# Patient Record
Sex: MALE | Race: WHITE
[De-identification: names, ages, dates, MRNs, and addresses within clinical notes are randomized per-mention and may not be internally consistent; named-entity substitution may affect disease eponyms.]

---

## 2017-06-17 ENCOUNTER — HOSPITAL ENCOUNTER (INPATIENT)
Dept: HOSPITAL 47 - EC | Age: 52
LOS: 3 days | Discharge: HOME | DRG: 434 | End: 2017-06-20
Payer: COMMERCIAL

## 2017-06-17 VITALS — BODY MASS INDEX: 34.2 KG/M2

## 2017-06-17 DIAGNOSIS — F32.9: ICD-10-CM

## 2017-06-17 DIAGNOSIS — F17.200: ICD-10-CM

## 2017-06-17 DIAGNOSIS — K70.10: Primary | ICD-10-CM

## 2017-06-17 DIAGNOSIS — F10.20: ICD-10-CM

## 2017-06-17 DIAGNOSIS — Z82.49: ICD-10-CM

## 2017-06-17 DIAGNOSIS — K76.89: ICD-10-CM

## 2017-06-17 DIAGNOSIS — Z79.899: ICD-10-CM

## 2017-06-17 DIAGNOSIS — D69.59: ICD-10-CM

## 2017-06-17 DIAGNOSIS — I10: ICD-10-CM

## 2017-06-17 DIAGNOSIS — Z88.2: ICD-10-CM

## 2017-06-17 DIAGNOSIS — N28.9: ICD-10-CM

## 2017-06-17 DIAGNOSIS — Z88.5: ICD-10-CM

## 2017-06-17 DIAGNOSIS — Z79.82: ICD-10-CM

## 2017-06-17 DIAGNOSIS — K21.9: ICD-10-CM

## 2017-06-17 DIAGNOSIS — E87.70: ICD-10-CM

## 2017-06-17 DIAGNOSIS — E66.9: ICD-10-CM

## 2017-06-17 DIAGNOSIS — K76.0: ICD-10-CM

## 2017-06-17 LAB
ALP SERPL-CCNC: 253 U/L (ref 38–126)
ALT SERPL-CCNC: 399 U/L (ref 21–72)
ANION GAP SERPL CALC-SCNC: 16 MMOL/L
AST SERPL-CCNC: 434 U/L (ref 17–59)
BASOPHILS # BLD AUTO: 0 K/UL (ref 0–0.2)
BASOPHILS NFR BLD AUTO: 0 %
BILIRUB UR QL STRIP.AUTO: (no result)
BUN SERPL-SCNC: 16 MG/DL (ref 9–20)
CALCIUM SPEC-MCNC: 8.1 MG/DL (ref 8.4–10.2)
CH: 29
CHCM: 34.3
CHLORIDE SERPL-SCNC: 100 MMOL/L (ref 98–107)
CK SERPL-CCNC: 136 U/L (ref 55–170)
CO2 SERPL-SCNC: 20 MMOL/L (ref 22–30)
EOSINOPHIL # BLD AUTO: 0 K/UL (ref 0–0.7)
EOSINOPHIL NFR BLD AUTO: 0 %
ERYTHROCYTE [DISTWIDTH] IN BLOOD BY AUTOMATED COUNT: 5.07 M/UL (ref 4.3–5.9)
ERYTHROCYTE [DISTWIDTH] IN BLOOD: 16.3 % (ref 11.5–15.5)
GLUCOSE SERPL-MCNC: 134 MG/DL (ref 74–99)
GLUCOSE UR QL: (no result)
HCT VFR BLD AUTO: 43 % (ref 39–53)
HDW: 2.39
HEPATITIS B CORE IGM INDEX: 0.06
HEPATITIS B SURFACE AG INDEX: 0.05
HEPATITIS C VIRUS IGG  INDEX: 0.04
HGB BLD-MCNC: 14.9 GM/DL (ref 13–17.5)
LUC NFR BLD AUTO: 2 %
LYMPHOCYTES # SPEC AUTO: 1.5 K/UL (ref 1–4.8)
LYMPHOCYTES NFR SPEC AUTO: 16 %
MCH RBC QN AUTO: 29.4 PG (ref 25–35)
MCHC RBC AUTO-ENTMCNC: 34.7 G/DL (ref 31–37)
MCV RBC AUTO: 84.8 FL (ref 80–100)
MONOCYTES # BLD AUTO: 0.5 K/UL (ref 0–1)
MONOCYTES NFR BLD AUTO: 5 %
NEUTROPHILS # BLD AUTO: 7.1 K/UL (ref 1.3–7.7)
NEUTROPHILS NFR BLD AUTO: 77 %
NON-AFRICAN AMERICAN GFR(MDRD): >60
PARTICLE COUNT: (no result)
PH UR: 5.5 [PH] (ref 5–8)
POTASSIUM SERPL-SCNC: 3.9 MMOL/L (ref 3.5–5.1)
PROT SERPL-MCNC: 6.7 G/DL (ref 6.3–8.2)
RBC UR QL: 1 /HPF (ref 0–5)
SODIUM SERPL-SCNC: 136 MMOL/L (ref 137–145)
SP GR UR: 1.02 (ref 1–1.03)
TROPONIN I SERPL-MCNC: 0.02 NG/ML (ref 0–0.03)
UA BILLING (MACRO VS. MICRO): (no result)
UROBILINOGEN UR QL STRIP: 2 MG/DL (ref ?–2)
WBC # BLD AUTO: 0.17 10*3/UL
WBC # BLD AUTO: 9.2 K/UL (ref 3.8–10.6)
WBC #/AREA URNS HPF: 8 /HPF (ref 0–5)
WBC (PEROX): 8.97

## 2017-06-17 PROCEDURE — 85025 COMPLETE CBC W/AUTO DIFF WBC: CPT

## 2017-06-17 PROCEDURE — 76705 ECHO EXAM OF ABDOMEN: CPT

## 2017-06-17 PROCEDURE — 80053 COMPREHEN METABOLIC PANEL: CPT

## 2017-06-17 PROCEDURE — 85610 PROTHROMBIN TIME: CPT

## 2017-06-17 PROCEDURE — 82550 ASSAY OF CK (CPK): CPT

## 2017-06-17 PROCEDURE — 83880 ASSAY OF NATRIURETIC PEPTIDE: CPT

## 2017-06-17 PROCEDURE — 93005 ELECTROCARDIOGRAM TRACING: CPT

## 2017-06-17 PROCEDURE — 81001 URINALYSIS AUTO W/SCOPE: CPT

## 2017-06-17 PROCEDURE — 96374 THER/PROPH/DIAG INJ IV PUSH: CPT

## 2017-06-17 PROCEDURE — 99285 EMERGENCY DEPT VISIT HI MDM: CPT

## 2017-06-17 PROCEDURE — 71020: CPT

## 2017-06-17 PROCEDURE — 82553 CREATINE MB FRACTION: CPT

## 2017-06-17 PROCEDURE — 74177 CT ABD & PELVIS W/CONTRAST: CPT

## 2017-06-17 PROCEDURE — 83735 ASSAY OF MAGNESIUM: CPT

## 2017-06-17 PROCEDURE — 82140 ASSAY OF AMMONIA: CPT

## 2017-06-17 PROCEDURE — 84484 ASSAY OF TROPONIN QUANT: CPT

## 2017-06-17 PROCEDURE — 36415 COLL VENOUS BLD VENIPUNCTURE: CPT

## 2017-06-17 PROCEDURE — 80074 ACUTE HEPATITIS PANEL: CPT

## 2017-06-17 RX ADMIN — PANTOPRAZOLE SODIUM SCH MG: 40 TABLET, DELAYED RELEASE ORAL at 20:21

## 2017-06-17 RX ADMIN — CEFAZOLIN SCH MLS/HR: 330 INJECTION, POWDER, FOR SOLUTION INTRAMUSCULAR; INTRAVENOUS at 18:01

## 2017-06-17 RX ADMIN — IOHEXOL PRN ML: 350 INJECTION, SOLUTION INTRAVENOUS at 18:00

## 2017-06-17 RX ADMIN — DIAZEPAM SCH MG: 5 TABLET ORAL at 22:29

## 2017-06-17 RX ADMIN — ASPIRIN 81 MG CHEWABLE TABLET SCH MG: 81 TABLET CHEWABLE at 20:21

## 2017-06-17 RX ADMIN — LISINOPRIL SCH MG: 20 TABLET ORAL at 20:21

## 2017-06-17 RX ADMIN — IOHEXOL PRN ML: 350 INJECTION, SOLUTION INTRAVENOUS at 16:50

## 2017-06-17 RX ADMIN — CITALOPRAM HYDROBROMIDE SCH MG: 20 TABLET ORAL at 20:21

## 2017-06-17 NOTE — US
EXAMINATION TYPE: US abdomen limited

 

DATE OF EXAM: 6/17/2017

 

COMPARISON: NONE

 

CLINICAL HISTORY: Pain. EC patient with low energy, elevated liver function tests/bilirubin

 

EXAM MEASUREMENTS:

 

Liver Length:  21.4 cm   

Gallbladder Wall:  0.2 cm   

CBD:  0.3 cm

Right Kidney:  12.1 x 6.4 x 5.9 cm

 

 

 

Pancreas:  hyperechoic and limitedly seen due to overlying bowel gas

Liver:  enlarged and attenuated posteriorly  

Gallbladder:  wnl

**Evidence for sonographic Sheikh's sign:  No

CBD:  wnl 

Right Kidney:  wnl 

 

Liver measures 21 cm compatible with hepatomegaly

 

IMPRESSION: 

1. Liver appears enlarged and with increased  echogenicity correlate for fatty infiltration. Hepatiti
s or hepatocellular disease in the differential diagnosis.

## 2017-06-17 NOTE — ED
General Adult HPI





- General


Chief complaint: Recheck/Abnormal Lab/Rx


Stated complaint: low energy


Time Seen by Provider: 06/17/17 10:31


Source: patient


Mode of arrival: ambulatory


Limitations: no limitations





- History of Present Illness


Initial comments: 





51-year-old presents not feeling well for the last couple days and states his 

arms feel heavy note chest pain and feels some nausea.  He states that he has a 

history of hypertension and is on clonidine patch number to, Lotrel and was 

placed on Dyazide since taking the Dyazide he has not felt well was 

discontinued couple days ago he continues to feel weak.  No discomfort in his 

arms no shortness of breath when he walks.  No history of diabetes asthma 

seizures or heart disease has been treated for difficult hypertension for years





- Related Data


 Home Medications











 Medication  Instructions  Recorded  Confirmed


 


Aspirin EC [Ecotrin Low Dose] 81 mg PO HS 06/17/17 06/17/17


 


Citalopram Hydrobromide [CeleXA] 40 mg PO HS 06/17/17 06/17/17


 


EPINEPHrine [Epipen 2-Moses] 0.3 mg IM ONCE PRN 06/17/17 06/17/17


 


Multivitamins, Thera [Multivitamin 1 tab PO HS 06/17/17 06/17/17





(formulary)]   


 


Omeprazole 40 mg PO HS 06/17/17 06/17/17


 


amLODIPine BESYLATE/BENAZEPRIL 1 cap PO HS 06/17/17 06/17/17





[Lotrel 10-20 mg Capsule]   


 


cloNIDine 0.2 MG/24HR PATCH 1 patch TRANSDERM TU 06/17/17 06/17/17





[Catapres-TTS]   


 


tiZANidine HCL [Zanaflex] 4 mg PO Q8HR PRN 06/17/17 06/17/17











 Allergies











Allergy/AdvReac Type Severity Reaction Status Date / Time


 


bee venom protein (honey bee) Allergy  Anaphylaxis Verified 06/17/17 11:28


 


codeine Allergy  Nausea & Verified 06/17/17 11:28





   Vomiting  


 


Sulfa (Sulfonamide Allergy  Swelling Verified 06/17/17 11:28





Antibiotics)     














Review of Systems


ROS Statement: 


Those systems with pertinent positive or pertinent negative responses have been 

documented in the HPI.





ROS Other: All systems not noted in ROS Statement are negative.


Constitutional: Denies: fever, chills, weakness


Eyes: Denies: eye pain, eye discharge


ENT: Denies: ear pain, throat pain


Respiratory: Denies: cough, dyspnea


Cardiovascular: Denies: chest pain


Endocrine: Reports: fatigue


Gastrointestinal: Reports: nausea.  Denies: abdominal pain, vomiting, diarrhea


Genitourinary: Denies: urgency, dysuria, frequency


Musculoskeletal: Denies: back pain


Skin: Denies: rash


Neurological: Denies: headache


Psychiatric: Denies: anxiety, depression


Hematological/Lymphatic: Denies: easy bleeding, easy bruising





Past Medical History


Past Medical History: Hypertension


Additional Past Medical History / Comment(s): seasonal allergies


History of Any Multi-Drug Resistant Organisms: None Reported


Past Surgical History: Hernia Repair


Past Psychological History: No Psychological Hx Reported


Smoking Status: Former smoker


Past Alcohol Use History: Occasional


Past Drug Use History: None Reported





General Exam


Limitations: no limitations


General appearance: alert, in no apparent distress


Head exam: Present: atraumatic


Eye exam: Present: PERRL, EOMI


ENT exam: Present: normal oropharynx, mucous membranes moist, TM's normal 

bilaterally


Neck exam: Present: normal inspection


Respiratory exam: Present: normal lung sounds bilaterally


Cardiovascular Exam: Present: regular rate, normal heart sounds.  Absent: 

systolic murmur, diastolic murmur


GI/Abdominal exam: Present: soft.  Absent: tenderness, normal bowel sounds


Extremities exam: Present: normal inspection


Neurological exam: Present: alert, CN II-XII intact


Psychiatric exam: Present: normal affect, normal mood


Skin exam: Present: warm, dry





Course


 Vital Signs











  06/17/17 06/17/17 06/17/17





  10:23 12:04 13:00


 


Temperature 97.8 F  


 


Pulse Rate 108 H 95 96


 


Respiratory 20 18 18





Rate   


 


Blood Pressure 132/84 123/68 123/75


 


O2 Sat by Pulse 98 96 97





Oximetry   














  06/17/17





  14:00


 


Temperature 


 


Pulse Rate 94


 


Respiratory 18





Rate 


 


Blood Pressure 132/77


 


O2 Sat by Pulse 97





Oximetry 














Medical Decision Making





- Medical Decision Making





Elevated liver enzymes showed appears to be obstructive ultrasound shows no 

stones patient's feeling rather poor poorly has some edema him after discussing 

with the attending will admit consult gastro-as well as CT ordered, slow 

hydration.





- Lab Data


Result diagrams: 


 06/17/17 11:15





 06/17/17 11:15


 Lab Results











  06/17/17 06/17/17 06/17/17 Range/Units





  11:15 11:15 11:15 


 


WBC  9.2    (3.8-10.6)  k/uL


 


RBC  5.07    (4.30-5.90)  m/uL


 


Hgb  14.9    (13.0-17.5)  gm/dL


 


Hct  43.0    (39.0-53.0)  %


 


MCV  84.8    (80.0-100.0)  fL


 


MCH  29.4    (25.0-35.0)  pg


 


MCHC  34.7    (31.0-37.0)  g/dL


 


RDW  16.3 H    (11.5-15.5)  %


 


Plt Count  152    (150-450)  k/uL


 


Neutrophils %  77    %


 


Lymphocytes %  16    %


 


Monocytes %  5    %


 


Eosinophils %  0    %


 


Basophils %  0    %


 


Neutrophils #  7.1    (1.3-7.7)  k/uL


 


Lymphocytes #  1.5    (1.0-4.8)  k/uL


 


Monocytes #  0.5    (0-1.0)  k/uL


 


Eosinophils #  0.0    (0-0.7)  k/uL


 


Basophils #  0.0    (0-0.2)  k/uL


 


Anisocytosis  Slight    


 


Sodium   136 L   (137-145)  mmol/L


 


Potassium   3.9   (3.5-5.1)  mmol/L


 


Chloride   100   ()  mmol/L


 


Carbon Dioxide   20 L   (22-30)  mmol/L


 


Anion Gap   16   mmol/L


 


BUN   16   (9-20)  mg/dL


 


Creatinine   0.96   (0.66-1.25)  mg/dL


 


Est GFR (MDRD) Af Amer   >60   (>60 ml/min/1.73 sqM)  


 


Est GFR (MDRD) Non-Af   >60   (>60 ml/min/1.73 sqM)  


 


Glucose   134 H   (74-99)  mg/dL


 


Calcium   8.1 L   (8.4-10.2)  mg/dL


 


Total Bilirubin   3.4 H   (0.2-1.3)  mg/dL


 


AST   434 H   (17-59)  U/L


 


ALT   399 H   (21-72)  U/L


 


Alkaline Phosphatase   253 H   ()  U/L


 


Total Creatine Kinase    136  ()  U/L


 


CK-MB (CK-2)    2.3  (0.0-2.4)  ng/mL


 


CK-MB (CK-2) Rel Index    1.7  


 


Troponin I    0.017  (0.000-0.034)  ng/mL


 


NT-Pro-B Natriuret Pep     pg/mL


 


Total Protein   6.7   (6.3-8.2)  g/dL


 


Albumin   3.5   (3.5-5.0)  g/dL


 


Urine Color     


 


Urine Appearance     (Clear)  


 


Urine pH     (5.0-8.0)  


 


Ur Specific Gravity     (1.001-1.035)  


 


Urine Protein     (Negative)  


 


Urine Glucose (UA)     (Negative)  


 


Urine Ketones     (Negative)  


 


Urine Blood     (Negative)  


 


Urine Nitrite     (Negative)  


 


Urine Bilirubin     (Negative)  


 


Urine Urobilinogen     (<2.0)  mg/dL


 


Ur Leukocyte Esterase     (Negative)  


 


Urine RBC     (0-5)  /hpf


 


Urine WBC     (0-5)  /hpf


 


Cellular Casts     (0)  /lpf


 


Hyaline Casts     (0-2)  /lpf


 


Urine Mucus     (None)  /hpf














  06/17/17 06/17/17 Range/Units





  11:15 11:45 


 


WBC    (3.8-10.6)  k/uL


 


RBC    (4.30-5.90)  m/uL


 


Hgb    (13.0-17.5)  gm/dL


 


Hct    (39.0-53.0)  %


 


MCV    (80.0-100.0)  fL


 


MCH    (25.0-35.0)  pg


 


MCHC    (31.0-37.0)  g/dL


 


RDW    (11.5-15.5)  %


 


Plt Count    (150-450)  k/uL


 


Neutrophils %    %


 


Lymphocytes %    %


 


Monocytes %    %


 


Eosinophils %    %


 


Basophils %    %


 


Neutrophils #    (1.3-7.7)  k/uL


 


Lymphocytes #    (1.0-4.8)  k/uL


 


Monocytes #    (0-1.0)  k/uL


 


Eosinophils #    (0-0.7)  k/uL


 


Basophils #    (0-0.2)  k/uL


 


Anisocytosis    


 


Sodium    (137-145)  mmol/L


 


Potassium    (3.5-5.1)  mmol/L


 


Chloride    ()  mmol/L


 


Carbon Dioxide    (22-30)  mmol/L


 


Anion Gap    mmol/L


 


BUN    (9-20)  mg/dL


 


Creatinine    (0.66-1.25)  mg/dL


 


Est GFR (MDRD) Af Amer    (>60 ml/min/1.73 sqM)  


 


Est GFR (MDRD) Non-Af    (>60 ml/min/1.73 sqM)  


 


Glucose    (74-99)  mg/dL


 


Calcium    (8.4-10.2)  mg/dL


 


Total Bilirubin    (0.2-1.3)  mg/dL


 


AST    (17-59)  U/L


 


ALT    (21-72)  U/L


 


Alkaline Phosphatase    ()  U/L


 


Total Creatine Kinase    ()  U/L


 


CK-MB (CK-2)    (0.0-2.4)  ng/mL


 


CK-MB (CK-2) Rel Index    


 


Troponin I    (0.000-0.034)  ng/mL


 


NT-Pro-B Natriuret Pep  51   pg/mL


 


Total Protein    (6.3-8.2)  g/dL


 


Albumin    (3.5-5.0)  g/dL


 


Urine Color   Dark Yellow  


 


Urine Appearance   Cloudy  (Clear)  


 


Urine pH   5.5  (5.0-8.0)  


 


Ur Specific Gravity   1.019  (1.001-1.035)  


 


Urine Protein   Trace H  (Negative)  


 


Urine Glucose (UA)   1+ H  (Negative)  


 


Urine Ketones   Negative  (Negative)  


 


Urine Blood   Negative  (Negative)  


 


Urine Nitrite   Negative  (Negative)  


 


Urine Bilirubin   1+ H  (Negative)  


 


Urine Urobilinogen   2.0  (<2.0)  mg/dL


 


Ur Leukocyte Esterase   Negative  (Negative)  


 


Urine RBC   1  (0-5)  /hpf


 


Urine WBC   8 H  (0-5)  /hpf


 


Cellular Casts   10  (0)  /lpf


 


Hyaline Casts   27 H  (0-2)  /lpf


 


Urine Mucus   Few H  (None)  /hpf














- EKG Data


-: EKG Interpreted by Me





06/17/17 12:01


EKG 06/17/2017 1146 ventricular rate 10 5 bpm, OR interval 134 ms, QRS duration 

90 ms, QT interval 324 ms sinus tachycardia possible left atrial enlargement 

and nonspecific ST abnormality.





- Radiology Data


Radiology results: report reviewed


No acute processes





Disposition


Clinical Impression: 


 Obstructive jaundice





Disposition: ADMITTED AS IP TO THIS HOSP


Condition: Fair


Referrals: 


Rick Woodward MD [Primary Care Provider] - 1-2 days


Time of Disposition: 14:38

## 2017-06-17 NOTE — CT
EXAMINATION TYPE: CT abdomen pelvis w con

 

DATE OF EXAM: 6/17/2017

 

COMPARISON: NONE

 

HISTORY: Patient complains of fatigue and nausea.

 

CT DLP: 2082.6 mGycm

Automated exposure control for dose reduction was used.

 

TECHNIQUE:  Helical acquisition of images was performed from the lung bases through the pelvis.

 

CONTRAST: 

Performed with Oral Contrast and with IV Contrast, patient injected with 100 mL of Omnipaque 300.

 

FINDINGS: 

 

Lung bases are clear of infiltrate. There is no pleural effusion.

 

There is some decreased density throughout the liver consistent with fatty infiltration. Gallbladder 
appears normal. Spleen and pancreas appear normal. Bile ducts are not dilated. There is no adrenal ma
ss. Kidneys show satisfactory contrast opacification. There is no hydronephrosis. There is no retrope
ritoneal adenopathy. There is no ascites. Bladder distends smoothly. There is no sign of a pelvic mas
s. I see no bony destructive process. Appendix appears normal.

IMPRESSION: 

THERE IS EVIDENCE OF FATTY INFILTRATION OF THE LIVER. NO SIGN OF ACUTE ABDOMEN AND PELVIS.

## 2017-06-17 NOTE — XR
EXAMINATION TYPE: XR chest 2V

 

DATE OF EXAM: 6/17/2017

 

COMPARISON: NONE

 

TECHNIQUE: PA and lateral views submitted.

 

HISTORY: Chest discomfort

 

FINDINGS:

The lungs are clear and  there is no pneumothorax, pleural effusion, or focal pneumonia.  Heart mildl
y prominent. Hypertrophic and degenerative change of the spine.

 

IMPRESSION: 

1. No acute process.

## 2017-06-18 LAB
ALP SERPL-CCNC: 199 U/L (ref 38–126)
ALT SERPL-CCNC: 300 U/L (ref 21–72)
ANION GAP SERPL CALC-SCNC: 11 MMOL/L
AST SERPL-CCNC: 287 U/L (ref 17–59)
BASOPHILS # BLD AUTO: 0 K/UL (ref 0–0.2)
BASOPHILS NFR BLD AUTO: 0 %
BUN SERPL-SCNC: 11 MG/DL (ref 9–20)
CALCIUM SPEC-MCNC: 7.4 MG/DL (ref 8.4–10.2)
CH: 28.7
CHCM: 32.8
CHLORIDE SERPL-SCNC: 103 MMOL/L (ref 98–107)
CO2 SERPL-SCNC: 21 MMOL/L (ref 22–30)
EOSINOPHIL # BLD AUTO: 0 K/UL (ref 0–0.7)
EOSINOPHIL NFR BLD AUTO: 0 %
ERYTHROCYTE [DISTWIDTH] IN BLOOD BY AUTOMATED COUNT: 4.69 M/UL (ref 4.3–5.9)
ERYTHROCYTE [DISTWIDTH] IN BLOOD: 16.6 % (ref 11.5–15.5)
GLUCOSE SERPL-MCNC: 106 MG/DL (ref 74–99)
HCT VFR BLD AUTO: 41.2 % (ref 39–53)
HDW: 2.28
HGB BLD-MCNC: 13.3 GM/DL (ref 13–17.5)
LUC NFR BLD AUTO: 1 %
LYMPHOCYTES # SPEC AUTO: 1.7 K/UL (ref 1–4.8)
LYMPHOCYTES NFR SPEC AUTO: 26 %
MAGNESIUM SPEC-SCNC: 1.6 MG/DL (ref 1.6–2.3)
MCH RBC QN AUTO: 28.2 PG (ref 25–35)
MCHC RBC AUTO-ENTMCNC: 32.2 G/DL (ref 31–37)
MCV RBC AUTO: 87.8 FL (ref 80–100)
MONOCYTES # BLD AUTO: 0.4 K/UL (ref 0–1)
MONOCYTES NFR BLD AUTO: 6 %
NEUTROPHILS # BLD AUTO: 4.3 K/UL (ref 1.3–7.7)
NEUTROPHILS NFR BLD AUTO: 66 %
NON-AFRICAN AMERICAN GFR(MDRD): >60
POTASSIUM SERPL-SCNC: 3.4 MMOL/L (ref 3.5–5.1)
PROT SERPL-MCNC: 6.1 G/DL (ref 6.3–8.2)
SODIUM SERPL-SCNC: 135 MMOL/L (ref 137–145)
WBC # BLD AUTO: 0.09 10*3/UL
WBC # BLD AUTO: 6.5 K/UL (ref 3.8–10.6)
WBC (PEROX): 6.33

## 2017-06-18 RX ADMIN — LISINOPRIL SCH MG: 20 TABLET ORAL at 22:11

## 2017-06-18 RX ADMIN — CITALOPRAM HYDROBROMIDE SCH MG: 20 TABLET ORAL at 22:11

## 2017-06-18 RX ADMIN — ENOXAPARIN SODIUM SCH MG: 40 INJECTION SUBCUTANEOUS at 08:39

## 2017-06-18 RX ADMIN — PANTOPRAZOLE SODIUM SCH MG: 40 TABLET, DELAYED RELEASE ORAL at 22:11

## 2017-06-18 RX ADMIN — DIAZEPAM SCH MG: 5 TABLET ORAL at 15:34

## 2017-06-18 RX ADMIN — ASPIRIN 81 MG CHEWABLE TABLET SCH MG: 81 TABLET CHEWABLE at 22:09

## 2017-06-18 RX ADMIN — METOPROLOL TARTRATE SCH MG: 25 TABLET, FILM COATED ORAL at 08:38

## 2017-06-18 RX ADMIN — DIAZEPAM SCH MG: 5 TABLET ORAL at 22:09

## 2017-06-18 RX ADMIN — DIAZEPAM SCH MG: 5 TABLET ORAL at 08:39

## 2017-06-18 RX ADMIN — CEFAZOLIN SCH: 330 INJECTION, POWDER, FOR SOLUTION INTRAMUSCULAR; INTRAVENOUS at 10:30

## 2017-06-18 NOTE — HP
DATE OF ADMISSION:  6/17/2017



PRESENTING COMPLAINT:  Weak and tired. 



HISTORY OF PRESENTING COMPLAINT:  This is a pleasant 51-year-old 

patient of Dr. Woodward.  Chronic stable medical conditions include 

GERD, hypertension and depression. Patient is here with his wife. 

Patient recently noticed that he was developing calf swelling, was 

given a diuretic. Started feeling tired and backed off of the diuretic 

after he noticed low blood pressure.  Started getting more and more 

weak and tired.  After (      ) he lost about 14 pounds in 2 weeks and 

decided to come in.  The patient was found to have elevated liver 

enzymes. No abdominal pain.  



REVIEW OF SYSTEMS:

CONSTITUTIONAL: Weak, tired. 

HEENT: Facial puffiness. 

RESPIRATORY: None. 

CARDIOVASCULAR: None. 

GASTROINTESTINAL:  Some abdominal distention. 

GENITOURINARY: None. 

MUSCULOSKELETAL: None. 

DERMATOLOGIC:  None. 

HEMATOLOGIC: None. 

LYMPHATICS: None. 

PSYCHIATRY: None. 

NEUROLOGICAL: None.



PAST SURGICAL HISTORY: GERD, hypertension, seasonal allergies. 



PAST SURGICAL HISTORY: Hernia repair, umbilical hernia repair. 



SOCIAL HISTORY: Patient drinks about 4 to 5 beers a day. Works as a 

 from the [employer]. .  



FAMILY HISTORY: Diabetes, hypertension. 



HOME MEDICATIONS:

1.  Zanaflex 4 mg q.8 p.r.n. 

2.  Catapres patch, 0.2 every Tuesday. 

3.  Lotrel 10/20, 1 capsule p.o. q.h.s. 

4.  Omeprazole 40 mg q.h.s. 

5.  Multivitamin 1 tablet p.o. q.h.s. 

6.  EpiPen two packs 0.3 mg once p.r.n. 

7.  Celexa 40 mg q.h.s. 

8.  Aspirin 81 mg p.o. q.h.s. 



ALLERGIES: BEE VENOM, CODEIINE, SULFA. 



On examination, temperature 97.8, pulse 108, respiratory rate 20, 

blood pressure 130/84, pulse ox 98% on room air on admission. 

GENERAL APPEARANCE: Well built. BMI 34.2. Sitting up. Somewhat (      

) complexion.  Buggy eyes. 

HEENT: External appearance of nose and ears normal.  Oral cavity 

normal.  

NECK: JVD not raised, thyroid not palpable. 

RESPIRATORY: Effort normal. 

LUNGS: Fair air entry. 

CARDIOVASCULAR: First and second sounds normal. No edema. 

ABDOMEN: Distended. Liver is palpable four fingers, nontender.  Spleen 

not palpable. No guarding or rigidity..  

LYMPHATIC: No lymph nodes palpable in neck or axillae. 

PSYCHIATRY: Alert and normal times three.  Mood and affect normal. 

NEUROLOGICAL: Pupils equal. Cranial nerves grossly intact. The patient 

has fine tremors. DERMATOLOGIC:  Patient has spider nevi in the upper 

chest. 



INVESTIGATIONS: White count 9.2, hemoglobin 14.9. Potassium 3.9. BUN 

and creatinine are normal.  Bilirubin is 3.4, repeat is 5.  AST is 

434, ALT is 399. ProBNP is 51.  Hepatitis screen is negative. CT scan 

of the abdomen and pelvis shows some fatty infiltration. Gallbladder 

is normal. The patient's abdominal ultrasound shows liver to be 

enlarged.  



ASSESSMENT:

1.  Acute alcoholic hepatitis. 

2.  Hyperbilirubinemia. 

3.  Obesity, body mass index of 34.2. 

4.  Chronic alcohol dependence. 

5.  Mild fluid overload. 

6.  Gastroesophageal reflux disease.

7.  Essential hypertension.

8.  Depression, not otherwise specified.



PLAN: Patient is counseled on cessation of smoking and he understands 

the same. Repeat patient's LFTs tomorrow to make sure they are 

trending downward.  We will replace the patient's potassium.  Dr. Pandya 

with GI was consulted.  Maybe we will do a waitful watch and make sure 

they are coming down and have the patient followed as an outpatient 

depending how he fares. Case was discussed with the patient and the 

patient's wife. Questions were answered.  



EVANGELIST WOODWARD MD

## 2017-06-19 LAB
ALP SERPL-CCNC: 239 U/L (ref 38–126)
ALT SERPL-CCNC: 298 U/L (ref 21–72)
ANION GAP SERPL CALC-SCNC: 12 MMOL/L
AST SERPL-CCNC: 257 U/L (ref 17–59)
BASOPHILS # BLD AUTO: 0 K/UL (ref 0–0.2)
BASOPHILS NFR BLD AUTO: 0 %
BUN SERPL-SCNC: 8 MG/DL (ref 9–20)
CALCIUM SPEC-MCNC: 8.3 MG/DL (ref 8.4–10.2)
CH: 28.7
CHCM: 31.8
CHLORIDE SERPL-SCNC: 101 MMOL/L (ref 98–107)
CO2 SERPL-SCNC: 23 MMOL/L (ref 22–30)
EOSINOPHIL # BLD AUTO: 0 K/UL (ref 0–0.7)
EOSINOPHIL NFR BLD AUTO: 1 %
ERYTHROCYTE [DISTWIDTH] IN BLOOD BY AUTOMATED COUNT: 5.21 M/UL (ref 4.3–5.9)
ERYTHROCYTE [DISTWIDTH] IN BLOOD: 16.6 % (ref 11.5–15.5)
GLUCOSE SERPL-MCNC: 106 MG/DL (ref 74–99)
HCT VFR BLD AUTO: 47.3 % (ref 39–53)
HDW: 2.36
HGB BLD-MCNC: 15.1 GM/DL (ref 13–17.5)
LUC NFR BLD AUTO: 2 %
LYMPHOCYTES # SPEC AUTO: 1.2 K/UL (ref 1–4.8)
LYMPHOCYTES NFR SPEC AUTO: 20 %
MCH RBC QN AUTO: 29 PG (ref 25–35)
MCHC RBC AUTO-ENTMCNC: 31.9 G/DL (ref 31–37)
MCV RBC AUTO: 90.7 FL (ref 80–100)
MONOCYTES # BLD AUTO: 0.3 K/UL (ref 0–1)
MONOCYTES NFR BLD AUTO: 6 %
NEUTROPHILS # BLD AUTO: 4.2 K/UL (ref 1.3–7.7)
NEUTROPHILS NFR BLD AUTO: 72 %
NON-AFRICAN AMERICAN GFR(MDRD): >60
POTASSIUM SERPL-SCNC: 3.6 MMOL/L (ref 3.5–5.1)
PROT SERPL-MCNC: 7.7 G/DL (ref 6.3–8.2)
SODIUM SERPL-SCNC: 136 MMOL/L (ref 137–145)
WBC # BLD AUTO: 0.09 10*3/UL
WBC # BLD AUTO: 5.9 K/UL (ref 3.8–10.6)
WBC (PEROX): 6.22

## 2017-06-19 RX ADMIN — DIAZEPAM SCH MG: 5 TABLET ORAL at 17:06

## 2017-06-19 RX ADMIN — DIAZEPAM SCH: 5 TABLET ORAL at 08:58

## 2017-06-19 RX ADMIN — LISINOPRIL SCH MG: 20 TABLET ORAL at 20:47

## 2017-06-19 RX ADMIN — CITALOPRAM HYDROBROMIDE SCH MG: 20 TABLET ORAL at 20:47

## 2017-06-19 RX ADMIN — CEFAZOLIN SCH: 330 INJECTION, POWDER, FOR SOLUTION INTRAMUSCULAR; INTRAVENOUS at 16:26

## 2017-06-19 RX ADMIN — ASPIRIN 81 MG CHEWABLE TABLET SCH MG: 81 TABLET CHEWABLE at 20:48

## 2017-06-19 RX ADMIN — METOPROLOL TARTRATE SCH: 25 TABLET, FILM COATED ORAL at 08:58

## 2017-06-19 RX ADMIN — ENOXAPARIN SODIUM SCH: 40 INJECTION SUBCUTANEOUS at 08:58

## 2017-06-19 RX ADMIN — PANTOPRAZOLE SODIUM SCH MG: 40 TABLET, DELAYED RELEASE ORAL at 20:48

## 2017-06-19 RX ADMIN — DIAZEPAM SCH MG: 5 TABLET ORAL at 20:47

## 2017-06-19 RX ADMIN — CEFAZOLIN SCH MLS/HR: 330 INJECTION, POWDER, FOR SOLUTION INTRAMUSCULAR; INTRAVENOUS at 00:13

## 2017-06-19 NOTE — P.CONS
History of Present Illness





- Reason for Consult


Consult date: 06/18/17





- History of Present Illness





The patient is a 51-year-old male who presented to the emergency room with the 

complaint of tiredness and weakness of few days' duration and some nausea.  The 

patient was noted to have abnormal liver chemistries.  Ultrasound and CT of the 

abdomen showed fatty infiltration of the liver.  The patient was admitted to 

the hospital for further workup and management.  We are asked to see him 

regarding his liver test abnormalities.





His total bilirubin yesterday was 3.4 today at 5.  /297, /300 

alkaline phosphatase 253/199.  His platelets were 152 on admission today 119.  

Normal MCV.  The patient indicated that he had blood drawn last around 2 years 

ago and had been told that he had mild elevations in his liver enzymes in the 

past.





The patient admits to drinking 4-5 beers daily and he has done that 

continuously for a long time.  In addition, he has hypertension and has been 

having blood pressure control issues and his medications were recently adjusted 

and Dyazide was admitted after the onset of lower extremity edema.  The edema 

has improved and he lost weight but she was feeling tired and he stopped his 

Dyazide few days earlier.





There is no history of hepatitis or exposure to persons with hepatitis or 

family history of liver disease.  No other symptoms were associated with 

chronic liver disease.  He probably has added 20-25 pounds over the last couple 

or 3 years.





Review of Systems





Constitutional: Denies fever, chills or unintentional weight loss


Neurologic: No headaches, double vision or other neurologic complaints


Cardiopulmonary: Denied chest pain and shortness of breath or palpitations.  

Has history of hypertension as noted above


Gastrointestinal: See present illness above.  History of chronic reflux


Genitourinary: Denies hematuria, dysuria or frequency


Endocrine: Denied diabetes or thyroid disease


Musculoskeletal: Denied joint pains or swelling


Skin: No rashes


Hematologic: No anemia or bleeding tendency


Psychiatric: History of depression on treatment





Past Medical History


Past Medical History: GERD/Reflux, Hypertension


Additional Past Medical History / Comment(s): seasonal allergies


History of Any Multi-Drug Resistant Organisms: None Reported


Past Surgical History: Hernia Repair


Additional Past Surgical History / Comment(s): umbilical hernia repair 2013


Past Psychological History: No Psychological Hx Reported


Smoking Status: Never smoker


Past Alcohol Use History: Daily


Additional Past Alcohol Use History / Comment(s): pt states about 2 beers per 

day, last yesterday evening





- Past Family History


  ** Mother


Family Medical History: Diabetes Mellitus, Hypertension





  ** Father


Family Medical History: Hypertension





Medications and Allergies


 Home Medications











 Medication  Instructions  Recorded  Confirmed  Type


 


Aspirin EC [Ecotrin Low Dose] 81 mg PO HS 06/17/17 06/17/17 History


 


Citalopram Hydrobromide [CeleXA] 40 mg PO HS 06/17/17 06/17/17 History


 


EPINEPHrine [Epipen 2-Moses] 0.3 mg IM ONCE PRN 06/17/17 06/17/17 History


 


Multivitamins, Thera [Multivitamin 1 tab PO HS 06/17/17 06/17/17 History





(formulary)]    


 


Omeprazole 40 mg PO HS 06/17/17 06/17/17 History


 


amLODIPine BESYLATE/BENAZEPRIL 1 cap PO  06/17/17 06/17/17 History





[Lotrel 10-20 mg Capsule]    


 


cloNIDine 0.2 MG/24HR PATCH 1 patch TRANSDERM TU 06/17/17 06/17/17 History





[Catapres-TTS]    


 


tiZANidine HCL [Zanaflex] 4 mg PO Q8HR PRN 06/17/17 06/17/17 History











 Allergies











Allergy/AdvReac Type Severity Reaction Status Date / Time


 


bee venom protein (honey bee) Allergy  Anaphylaxis Verified 06/17/17 11:28


 


codeine Allergy  Nausea & Verified 06/17/17 11:28





   Vomiting  


 


Sulfa (Sulfonamide Allergy  Swelling Verified 06/17/17 11:28





Antibiotics)     














Physical Exam


Vitals: 


 Vital Signs











  Temp Pulse Pulse Resp BP BP Pulse Ox


 


 06/18/17 07:00  98.4 F   80  16   118/70  95


 


 06/18/17 00:00    102 H  18   


 


 06/17/17 23:00  99.1 F   102 H  18   117/67  96


 


 06/17/17 16:00  98.4 F   95  18   130/64  100


 


 06/17/17 15:00  99.1 F  98   18  129/85   96


 


 06/17/17 14:00   94   18  132/77   97








 Intake and Output











 06/17/17 06/18/17 06/18/17





 22:59 06:59 14:59


 


Intake Total 240 295 240


 


Output Total   1


 


Balance 240 295 239


 


Intake:   


 


  Intake, IV Titration  295 





  Amount   


 


    Sodium Chloride 0.9% 1,  295 





    000 ml @ 60 mls/hr IV .   





    [id number] [facility] Rx#:[id number]   


 


  Oral 240  240


 


Output:   


 


  Urine   1


 


Other:   


 


  Voiding Method Toilet Toilet Toilet


 


  # Voids 2 4 


 


  Weight 111.13 kg  














General: Appears stated age, very pleasant, in no acute distress


Head and neck: Normocephalic and atraumatic, conjunctivae pink and sclerae muddy

, mucous membranes moist and pink.  No masses in the neck or tracheal shifts


Lungs: Clear to auscultation with no dullness to percussion


Heart: Regular, no abnormal sounds, murmurs, gallops or friction rubs


Abdomen: Obese but soft.  No masses or organomegaly is appreciated or any 

shifting dullness or fluid wave.  No tenderness, guarding or rebound.  Bowel 

sounds present


Extremities: No clubbing, cyanosis or edema


Skin: No obvious spider angiomas or palmar erythema


Neurologic: Alert and oriented 3.  Cranial nerves grossly intact.  No gross 

sensory or motor abnormalities





Results


CBC & Chem 7: 


 06/18/17 06:46





 06/18/17 06:46


Labs: 


 Abnormal Lab Results - Last 24 Hours (Table)











  06/18/17 06/18/17 Range/Units





  06:46 06:46 


 


RDW  16.6 H   (11.5-15.5)  %


 


Plt Count  119 L   (150-450)  k/uL


 


Sodium   135 L  (137-145)  mmol/L


 


Potassium   3.4 L  (3.5-5.1)  mmol/L


 


Carbon Dioxide   21 L  (22-30)  mmol/L


 


Glucose   106 H  (74-99)  mg/dL


 


Calcium   7.4 L  (8.4-10.2)  mg/dL


 


Total Bilirubin   5.0 H  (0.2-1.3)  mg/dL


 


AST   287 H  (17-59)  U/L


 


ALT   300 H  (21-72)  U/L


 


Alkaline Phosphatase   199 H  ()  U/L


 


Total Protein   6.1 L  (6.3-8.2)  g/dL


 


Albumin   3.0 L  (3.5-5.0)  g/dL














Assessment and Plan


Plan: 





The abnormal liver enzymes and total bilirubin in this patient could be a 

manifestation of acute hepatitis superimposed on chronic liver disease.  His 

alcohol consumption has been excessive for many years and he is obese and the 

CT and ultrasound showing evidence of fatty infiltration of the liver with no 

evidence of extra biliary tree obstruction.  An acute alcoholic hepatitis 

superimposed on chronic fatty liver, whether alcohol related or nonalcoholic 

fatty liver disease, is likely.  The effect of medication should be kept in 

mind and the patient has already stopped Dyazide and his other medications have 

not been changed for some time.  Despite the absence of family history of liver 

disease, other coexisting conditions such as hemochromatosis should be kept in 

mind.  The patient is not manifesting other evidence of liver failure.





I agree with your current management.  Will observe closely in the hospital 

while monitoring for alcohol withdrawal.  Will repeat and follow liver profile 

and clinical status closely.  Consideration can be given for a liver biopsy in 

the short-term to bring some clarity as to the various issues at play, as noted 

above, and whether he already has evidence of scarring.





I will discuss with you and follow do with interest.

## 2017-06-19 NOTE — P.PN
Progress Note - Text


DATE OF SERVICE: 


06/19/2017





PRESENTING COMPLAINT:


Weak and tired





INTERVAL HISTORY:


This patient presented with acute alcoholic hepatitis.  Patient was sitting at 

the edge of the bed, eating his lunch, appears comfortable, able walk to and 

from the bathroom with no assistance.  Appears jaundiced. Has edema to lower 

extremities, added aldactone.   Wife, mother-in-law at the bedside.  Patient 

and family had many questions regarding patient's condition.  Questions were 

answered or referred to GI.








REVIEW OF SYSTEMS:


Done for constitutional ,cardiovascular, GI, pulmonary with relevant findings 

as above.





CURRENT MEDICATIONS


Valium, 5 mg by mouth 3 times a day, Lovenox, Zestril, melatonin, Lopressor 25 

mg by mouth daily, Protonix.





PHYSICAL EXAM


VITAL SIGNS: 


Temperature 98.2, pulse 86, respiratory rate 16, blood pressure 126/81, oxygen 

saturation 97% on room air





GENERAL APPEARANCE: Sitting on the bed, not in distress. 


EYES: Pupils equal. icteric conjunctiva.  


NECK: JVD not raised. Mass not palpable. 


RESPIRATORY: Respiratory effort normal. Lungs clear to auscultation. 


CARDIOVASCULAR: First and second sounds normal.Mild edema. 


ABDOMEN: Soft. Liver and spleen not palpable. No tenderness. No mass palpable. 


PSYCHIATRY: Alert and oriented x3. Mood and affect normal. 





INVESTIGATIONS:


Platelet count 106, sodium 136, total bilirubin 9.5,  ALTs to 98 

alkaline phosphatase 239.





ASSESSMENT:


1.  Acute alcoholic hepatitis.


2. Thrombocytopenia, likely due to alcohol use.


2.  Hyperbilirubinemia.


3   Obesity body mass index of 34.2.


4.  Chronic alcohol dependence.


5.  Mild fluid overload.


6.  Gastric esophageal reflux disease.


7.  Essential hypertension.


8.  Depression not otherwise specified.





PLAN:


Bilirubin elevated from previous days values will continue to Monitor LFTs daily

, Aldactone added.  Patient and family had many questions regarding patient's 

condition, diet, post hospital care.  Discussion had an questions answered to 

the patient's and family's satisfaction.  Discharge planning for the next 1-2 

days.





NP statement: Patient was seen and examined by nurse practitioner Ernestina George and all elements of the case discussed with attending  Dr. Chavez

## 2017-06-20 VITALS
DIASTOLIC BLOOD PRESSURE: 85 MMHG | RESPIRATION RATE: 20 BRPM | SYSTOLIC BLOOD PRESSURE: 138 MMHG | TEMPERATURE: 97.9 F | HEART RATE: 91 BPM

## 2017-06-20 LAB
ALP SERPL-CCNC: 247 U/L (ref 38–126)
ALT SERPL-CCNC: 224 U/L (ref 21–72)
ANION GAP SERPL CALC-SCNC: 13 MMOL/L
AST SERPL-CCNC: 191 U/L (ref 17–59)
BASOPHILS # BLD AUTO: 0 K/UL (ref 0–0.2)
BASOPHILS NFR BLD AUTO: 0 %
BUN SERPL-SCNC: 9 MG/DL (ref 9–20)
CALCIUM SPEC-MCNC: 8.4 MG/DL (ref 8.4–10.2)
CH: 29.2
CHCM: 32.7
CHLORIDE SERPL-SCNC: 105 MMOL/L (ref 98–107)
CO2 SERPL-SCNC: 24 MMOL/L (ref 22–30)
EOSINOPHIL # BLD AUTO: 0.1 K/UL (ref 0–0.7)
EOSINOPHIL NFR BLD AUTO: 1 %
ERYTHROCYTE [DISTWIDTH] IN BLOOD BY AUTOMATED COUNT: 5.06 M/UL (ref 4.3–5.9)
ERYTHROCYTE [DISTWIDTH] IN BLOOD: 16.8 % (ref 11.5–15.5)
GLUCOSE SERPL-MCNC: 115 MG/DL (ref 74–99)
HCT VFR BLD AUTO: 45.6 % (ref 39–53)
HDW: 2.59
HGB BLD-MCNC: 14.9 GM/DL (ref 13–17.5)
INR PPP: 1.2 (ref ?–1.1)
LUC NFR BLD AUTO: 3 %
LYMPHOCYTES # SPEC AUTO: 1.7 K/UL (ref 1–4.8)
LYMPHOCYTES NFR SPEC AUTO: 27 %
MCH RBC QN AUTO: 29.3 PG (ref 25–35)
MCHC RBC AUTO-ENTMCNC: 32.6 G/DL (ref 31–37)
MCV RBC AUTO: 89.9 FL (ref 80–100)
MONOCYTES # BLD AUTO: 0.3 K/UL (ref 0–1)
MONOCYTES NFR BLD AUTO: 5 %
NEUTROPHILS # BLD AUTO: 3.9 K/UL (ref 1.3–7.7)
NEUTROPHILS NFR BLD AUTO: 65 %
NON-AFRICAN AMERICAN GFR(MDRD): >60
POTASSIUM SERPL-SCNC: 4 MMOL/L (ref 3.5–5.1)
PROT SERPL-MCNC: 7.4 G/DL (ref 6.3–8.2)
PT BLD: 12 SEC (ref 9–12)
SODIUM SERPL-SCNC: 142 MMOL/L (ref 137–145)
WBC # BLD AUTO: 0.16 10*3/UL
WBC # BLD AUTO: 6.1 K/UL (ref 3.8–10.6)
WBC (PEROX): 6.05

## 2017-06-20 RX ADMIN — METOPROLOL TARTRATE SCH MG: 25 TABLET, FILM COATED ORAL at 08:20

## 2017-06-20 RX ADMIN — CEFAZOLIN SCH: 330 INJECTION, POWDER, FOR SOLUTION INTRAMUSCULAR; INTRAVENOUS at 11:11

## 2017-06-20 RX ADMIN — DIAZEPAM SCH MG: 5 TABLET ORAL at 08:20

## 2017-06-20 RX ADMIN — ENOXAPARIN SODIUM SCH: 40 INJECTION SUBCUTANEOUS at 08:17

## 2017-06-20 NOTE — PN
DATE OF SERVICE:  06/19/2017



ATTENDING NOTE:  



This patient was seen and examined by me on 6/19/2017.  I reviewed the 

note of my nurse practitioner, Ms. George. Discussed, reviewed 

additional findings as below. Patient admitted with acute alcoholic 

hepatitis. Having some swelling in the lower extremity. He has been 

getting IV fluids. Somewhat tired. Wife in the room.  



On examination, blood pressure 126/81. 

Icterus is present. 

Edema is present. 

ABDOMEN: Distended, soft. 



INVESTIGATIONS: , , total bilirubin 9.5. 



ASSESSMENT:

1. Acute alcoholic hepatitis with some improvement in LFTs. 

2. Hyperbilirubinemia, worsening. 

3. Acute fluid overload from getting IV fluids and renal dysfunction. 



PLAN: IV fluids will be discontinued. Will add Aldactone. Repeat labs 

in the morning. Care was discussed in detail with the patient and the 

wife. Will follow.

## 2017-06-20 NOTE — P.PN
Subjective


Principal diagnosis: 


Alcohol hepatitis








-year-old male reevaluated today in regards to elevated liver enzymes.  Total 

bilirubin yesterday 9.5 up from 5.0 day prior.  Feels well.  Denies abdominal 

pain.  Tolerating regular diet.  Afebrile.  Repeat morning chemistries pending.








Objective





- Vital Signs


Vital signs: 


 Vital Signs











Temp  97.9 F   06/20/17 07:00


 


Pulse  91   06/20/17 07:00


 


Resp  20   06/20/17 07:00


 


BP  138/85   06/20/17 07:00


 


Pulse Ox  98   06/20/17 07:00








 Intake & Output











 06/19/17 06/20/17 06/20/17





 18:59 06:59 18:59


 


Intake Total 200 1180 


 


Output Total 1  


 


Balance 199 1180 


 


Weight 111.13 kg  


 


Intake:   


 


  Oral 200 1180 


 


Output:   


 


  Urine 1  


 


Other:   


 


  Voiding Method Toilet Toilet 


 


  # Voids 2 2 














- Exam


General appearance: The patient is alert, oriented, in no acute distress.  

Visibly jaundice.


HET: Head is normocephalic and atraumatic.  Pupils are equal and reactive.  

Sclerae icterus.  Oropharynx is clear without lesions.


Neck: Supple without lymphadenopathy.  Trachea midline.


Heart: S1 S2.  Regular rate and rhythm.


Lungs: No crackles or wheezes are heard.


Abdomen: Soft, nontender, mildly bloated with  bowel sounds.  No peritoneal 

signs.  No palpable organomegaly or masses.


Extremities: Resting tremors; mild.  Normal skin color and turgor.  No cyanosis

, rash, ulceration, clubbing, or edema.  Radial and pedal pulses are 2/4 

bilaterally.


Neurological: No focal deficits.  Strength and sensation are grossly intact.








- Labs


CBC & Chem 7: 


 06/20/17 07:08





 06/19/17 10:08


Labs: 


 Abnormal Lab Results - Last 24 Hours (Table)











  06/19/17 06/19/17 06/20/17 Range/Units





  10:08 10:08 07:08 


 


RDW  16.6 H   16.8 H  (11.5-15.5)  %


 


Plt Count  106 L   105 L  (150-450)  k/uL


 


Sodium   136 L   (137-145)  mmol/L


 


BUN   8 L   (9-20)  mg/dL


 


Glucose   106 H   (74-99)  mg/dL


 


Calcium   8.3 L   (8.4-10.2)  mg/dL


 


Total Bilirubin   9.5 H   (0.2-1.3)  mg/dL


 


AST   257 H   (17-59)  U/L


 


ALT   298 H   (21-72)  U/L


 


Alkaline Phosphatase   239 H   ()  U/L














Assessment and Plan


(1) Hepatitis


Narrative/Plan: 


Acute hepatitis suspect alcohol component with possible superimposed chronic 

liver disease


Status: Acute   


Plan: 


1.  PT/INR, ammonia level, CMP.  Will review; if stable plan for discharge 

today and follow-up in office within a week with repeat blood work in 3-5 days.

  Alcohol abstinence strongly advised.





Assessment and plan of care discussed with Dr. Pandya

## 2017-06-20 NOTE — P.DS
Providers


Date of admission: 


06/17/17 14:32





Expected date of discharge: 06/20/17


Attending physician: 


Andrew Chavez





Consults: 





 





06/17/17 14:33


Consult Physician Routine 


   Consulting Provider: Jose Pandya


   Consult Reason/Comments: Elevated Liver Enzymes


   Do you want consulting provider notified?: Yes











Primary care physician: 


[facility] Course: 











FINAL DIAGNOSES:


Acute alcoholic hepatitis.


Thrombocytopenia, likely due to alcohol use.


Hyperbilirubinemia.


Obesity body mass index of 34.2.


Chronic alcohol dependence.


Mild fluid overload.


Gastroesophageal reflux disease.


Essential hypertension.


Depression not otherwise specified.





HOSPTIAL COURSE:


This a 51-year-old gentleman who was admitted with acute alcoholic hepatitis 

secondary to alcohol use.  IV fluids initiated, lab work drawn, nothing by 

mouth.  Total bilirubin on day of admission was 5.0, subsequently went up to 9.5

, and patient was kept an extra day to keep a close eye on these values. Today 

bilirubin 7.3.  Overall liver function tests are down trending or stayed the 

same.  There was some fluid overload noted to patient's lower extremities, 

Aldactone was added NICKY hose ordered.   Tolerating his diet, ambulatory in the 

halls, moved his bowels.  GI cleared the patient for discharge and is to follow-

up with lab work in 3-5 days and should see Dr. Pandya in a week.








PHYSICAL EXAM: 





CARDIOVASCULAR: First and second sounds noted, mild edema present, NICKY hose in 

place.





RESPIRATORY: Respiratory effort normal, lungs diminished bilaterally to 

auscultation





GI: No tenderness noted, mild bloating , liver and spleen not palpable





NEUROLOGIC: Resting tremor noted








Patient was seen and examined by nurse practitioner Ernestina George in all 

elements of the case discussed with attending Dr. Chavez


DISOPSITION: 


Pertinent Studies: 


ABDOMINAL ULTRASOUND:


Liver appears enlarged with an increased echogenicity correlate for fatty 

infiltrate.  Hepatitis or hepatocellular disease in the differential diagnosis.








Patient Condition at Discharge: Stable





Plan - Discharge Summary


New Discharge Prescriptions: 


New


   Diazepam [Valium] 5 mg PO AS DIRECTED #6 tab


   Spironolactone [Aldactone] 25 mg PO DAILY #30 tab





Continue


   tiZANidine HCL [Zanaflex] 4 mg PO Q8HR PRN


     PRN Reason: Muscle Spasm


   Multivitamins, Thera [Multivitamin (formulary)] 1 tab PO HS


   Aspirin EC [Ecotrin Low Dose] 81 mg PO HS


   cloNIDine 0.2 MG/24HR PATCH [Catapres-TTS] 1 patch TRANSDERM 


   amLODIPine BESYLATE/BENAZEPRIL [Lotrel 10-20 mg Capsule] 1 cap PO HS


   Omeprazole 40 mg PO HS


   EPINEPHrine [Epipen 2-Moses] 0.3 mg IM ONCE PRN


     PRN Reason: Anaphylaxis


   Citalopram Hydrobromide [CeleXA] 40 mg PO HS


Discharge Medication List





Aspirin EC [Ecotrin Low Dose] 81 mg PO HS 06/17/17 [History]


Citalopram Hydrobromide [CeleXA] 40 mg PO HS 06/17/17 [History]


EPINEPHrine [Epipen 2-Moses] 0.3 mg IM ONCE PRN 06/17/17 [History]


Multivitamins, Thera [Multivitamin (formulary)] 1 tab PO HS 06/17/17 [History]


Omeprazole 40 mg PO HS 06/17/17 [History]


amLODIPine BESYLATE/BENAZEPRIL [Lotrel 10-20 mg Capsule] 1 cap PO HS 06/17/17 [

History]


cloNIDine 0.2 MG/24HR PATCH [Catapres-TTS] 1 patch TRANSDERM  06/17/17 [

History]


tiZANidine HCL [Zanaflex] 4 mg PO Q8HR PRN 06/17/17 [History]


Diazepam [Valium] 5 mg PO AS DIRECTED #6 tab 06/20/17 [Rx]


Spironolactone [Aldactone] 25 mg PO DAILY #30 tab 06/20/17 [Rx]








Follow up Appointment(s)/Referral(s): 


Jose Pandya MD [STAFF PHYSICIAN] - 07/13/17 4:45 pm


Rick Woodward MD [Primary Care Provider] - 06/22/17 2:00 pm


Patient Instructions/Handouts:  Spironolactone (By mouth), Diazepam (By mouth)


Activity/Diet/Wound Care/Special Instructions: 


NO alcohol


Discharge Disposition: HOME SELF-CARE

## 2017-06-23 NOTE — DS
DATE OF ADMISSION:   06/17/2017

DATE OF DISCHARGE:   06/20/2017



ATTENDING NOTE: This patient seen and examined by me on 06/20/2017. 

Reviewed the discharge summary of my nurse practitioner, Ms. Brittaneycuba. 

Discussed additional findings below. This is a patient admitted with 

acute alcoholic hepatitis, drinking for quite some time. Patient's AST 

and ALT were 434/399 on admission, did come down to 191/224 at the 

time of discharge. Patient's total bilirubin was 3.4, did go up to 

9.5, started to come down. The patient's hepatitis A, B, and C screen 

was negative. Platelets are running low, felt to be from alcohol. 

Patient is counseled extensively about cessation of alcohol. Also, 

patient getting fluid overloaded from liver disease and Aldactone is 

being added. Told to check daily weights and keep some fluid 

restricted. Patient's ultrasound of the abdomen showed the liver to be 

enlarged and CT scan of the abdomen and pelvis are again suggestive of 

fatty infiltration, enlarged liver.  



CONSULTATIONS: Dr. Pandya from GI. 



DISCHARGE PLANNING: More than 35 minutes. 



DISCHARGE MEDICATIONS: 

1. Aspirin 81 mg q.h.s. 

2. Celexa 40 mg p.o. q.h.s. 

3. EpiPen p.r.n. 

4. Multivitamin 1 tablet p.o. q.h.s. 

5. Omeprazole 40 mg at bedtime. 

6. Lotrel 10/20, 1 capsule p.o. q.h.s. 

7. Catapres patch Tuesday. 

8. Zanaflex 4 mg q.8 p.r.n.

9. Valium taper. 

10. Aldactone 25 mg a day. 



Follow up with Dr. Pandya on 07/13/2017. Follow up with Dr. Rick Woodward on 06/22/2017.  



FINAL DIAGNOSES:

1. Acute fluid overload due to chronic liver disease. 

2. Hyperbilirubinemia due to chronic liver disease due to alcohol.

## 2017-07-17 NOTE — DS
ADDENDUM:



DATE OF ADMISSION:  06/17/2017

DATE OF DISCHARGE:  06/21/2017



On examination, 

PSYCH:  Alert and oriented x3.

LUNGS:  Slightly decreased breath sounds.

ABDOMEN:  Soft, nontender. 
MTDD

## 2018-03-01 ENCOUNTER — HOSPITAL ENCOUNTER (OUTPATIENT)
Dept: HOSPITAL 47 - ORWHC2ENDO | Age: 53
Discharge: HOME | End: 2018-03-01
Payer: COMMERCIAL

## 2018-03-01 VITALS — SYSTOLIC BLOOD PRESSURE: 135 MMHG | HEART RATE: 67 BPM | DIASTOLIC BLOOD PRESSURE: 82 MMHG

## 2018-03-01 VITALS — TEMPERATURE: 98.4 F | RESPIRATION RATE: 16 BRPM

## 2018-03-01 VITALS — BODY MASS INDEX: 25.7 KG/M2

## 2018-03-01 DIAGNOSIS — Z91.030: ICD-10-CM

## 2018-03-01 DIAGNOSIS — I10: ICD-10-CM

## 2018-03-01 DIAGNOSIS — Z79.82: ICD-10-CM

## 2018-03-01 DIAGNOSIS — Z88.2: ICD-10-CM

## 2018-03-01 DIAGNOSIS — Z79.899: ICD-10-CM

## 2018-03-01 DIAGNOSIS — Z88.5: ICD-10-CM

## 2018-03-01 DIAGNOSIS — Z12.11: Primary | ICD-10-CM

## 2018-03-01 DIAGNOSIS — K21.9: ICD-10-CM

## 2018-03-01 PROCEDURE — 45378 DIAGNOSTIC COLONOSCOPY: CPT

## 2018-03-01 NOTE — P.PCN
Date of Procedure: 03/01/18


Procedure(s) Performed: 


Procedure: Total colonoscopy.





Preoperative diagnosis: Screening for neoplasia.





Postoperative diagnosis: Exam within normal limits.





Preparation: HalfLytely prep.





Sedation: Was provided by anesthesia.





Brief clinical history: The patient is a 52-year-old male who is referred for 

this evaluation for screening for neoplasia age being his risk factor.  There 

is no family history of colon cancer.  The patient has no abdominal complaints, 

bleeding or anemia.  This would be his first colonoscopy.





Procedure: With the patient on his left lateral decubitus position and after 

informed consent and adequate sedation, the perianal area was inspected and it 

did not show any fissures or fistulas.  There were no masses felt on digital 

rectal examination.  The Olympus CFQ 160L video colonoscope was then inserted 

in the rectum in the usual fashion and advanced to the cecum.  The mucosa 

appeared healthy.  No polyps or tumors were seen or any obvious diverticular 

disease or other pathology.  I retroflexed the endoscope in the rectum before 

the endoscope was withdrawn.





The patient tolerated the procedure well.





Plan: The patient was reassured. He will follow-up with you as planned and I 

recommended repeat exam in 10 years.

## 2018-07-31 ENCOUNTER — HOSPITAL ENCOUNTER (OUTPATIENT)
Dept: HOSPITAL 47 - RADUSWWP | Age: 53
Discharge: HOME | End: 2018-07-31
Payer: COMMERCIAL

## 2018-07-31 DIAGNOSIS — F10.21: ICD-10-CM

## 2018-07-31 DIAGNOSIS — K76.0: Primary | ICD-10-CM

## 2018-07-31 PROCEDURE — 76705 ECHO EXAM OF ABDOMEN: CPT

## 2018-07-31 NOTE — US
EXAMINATION TYPE: US liver

 

DATE OF EXAM: 7/31/2018

 

COMPARISON: US 6/17/2017

 

CLINICAL HISTORY: 52-year-old male F10.21 History of alcoholism independence in. Pt states history of
 ETOH abuse, recent relapse x 1 month

 

TECHNIQUE: Multiple sonographic images of the right upper quadrant are obtained.

 

FINDINGS:

 

EXAM MEASUREMENTS:

 

Liver Length:  19.3 cm   

Gallbladder Wall:  0.2 cm   

CBD:  0.2 cm

Right Kidney:  11.1 x 5.0 x 4.9 cm

 

 

 

Pancreas:  Obscured by bowel gas

Liver:  Enlarged, slightly heterogeneous, however marked improvement in penetration when compared to 
previous  

Gallbladder:  wnl

**Evidence for sonographic Sheikh's sign:  No

CBD:  wnl 

Right Kidney:  No hydronephrosis 

 

 

 

IMPRESSION: 

1. Some improvement in the hepatomegaly (19.3 cm now versus 21.4 cm, previously).

2. Significant improvement in the previous fatty infiltration of the liver.

## 2018-08-02 ENCOUNTER — HOSPITAL ENCOUNTER (EMERGENCY)
Dept: HOSPITAL 47 - EC | Age: 53
Discharge: HOME | End: 2018-08-02
Payer: COMMERCIAL

## 2018-08-02 VITALS — DIASTOLIC BLOOD PRESSURE: 68 MMHG | HEART RATE: 83 BPM | SYSTOLIC BLOOD PRESSURE: 114 MMHG | TEMPERATURE: 97.9 F

## 2018-08-02 VITALS — RESPIRATION RATE: 18 BRPM

## 2018-08-02 DIAGNOSIS — Z88.5: ICD-10-CM

## 2018-08-02 DIAGNOSIS — Z98.890: ICD-10-CM

## 2018-08-02 DIAGNOSIS — Z91.030: ICD-10-CM

## 2018-08-02 DIAGNOSIS — I10: ICD-10-CM

## 2018-08-02 DIAGNOSIS — T78.2XXA: ICD-10-CM

## 2018-08-02 DIAGNOSIS — T63.441A: Primary | ICD-10-CM

## 2018-08-02 DIAGNOSIS — Z79.899: ICD-10-CM

## 2018-08-02 DIAGNOSIS — Z88.2: ICD-10-CM

## 2018-08-02 DIAGNOSIS — Z79.82: ICD-10-CM

## 2018-08-02 DIAGNOSIS — K21.9: ICD-10-CM

## 2018-08-02 PROCEDURE — 99284 EMERGENCY DEPT VISIT MOD MDM: CPT

## 2018-08-02 PROCEDURE — 96361 HYDRATE IV INFUSION ADD-ON: CPT

## 2018-08-02 PROCEDURE — 96375 TX/PRO/DX INJ NEW DRUG ADDON: CPT

## 2018-08-02 PROCEDURE — 96374 THER/PROPH/DIAG INJ IV PUSH: CPT

## 2018-08-02 NOTE — ED
General Adult HPI





- General


Chief complaint: Allergic Reaction


Stated complaint: Allergic Reaction


Time Seen by Provider: 08/02/18 11:25


Source: patient, RN notes reviewed


Mode of arrival: ambulatory


Limitations: no limitations





- History of Present Illness


Initial comments: 





This is a 52-year-old male who presents emergency Department with a known 

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  Patient is a  he got 

stung by multiple bees and he believes those are the same bees that gave him an 

anaphylactic reaction before.  Patient took a Benadryl and gave him self and 

epinephrine.  Patient came to the hospital recently.  Patient denies any throat 

swelling lip tingling lip swelling or difficulty breathing.  Patient states he 

does have a rash is itchy all over.  Patient states this does not feel as bad 

as the last time.  Patient denies any chest pain or discomfort patient denies 

any palpitations.  Patient denies abdominal pain patient denies nausea vomiting 

diarrhea.  Patient denies any lightheadedness dizziness or near syncopal 

episode.  Patient states he has itching around his eyes he feels as though his 

eyes a little swollen his itching of his hands chest and back.





- Related Data


 Home Medications











 Medication  Instructions  Recorded  Confirmed


 


Aspirin EC [Ecotrin Low Dose] 81 mg PO HS 06/17/17 08/02/18


 


Multivitamins, Thera [Multivitamin 1 tab PO HS 06/17/17 08/02/18





(formulary)]   


 


Omeprazole 40 mg PO HS 06/17/17 08/02/18


 


Atorvastatin [Lipitor] 20 mg PO HS 02/28/18 08/02/18


 


Cholecalciferol [Vitamin D3] 5,000 unit PO HS 02/28/18 08/02/18


 


Diazepam [Valium] 5 mg PO BID PRN 02/28/18 08/02/18


 


Turmeric Root Extract [Turmeric] 500 mg PO DAILY 02/28/18 08/02/18


 


amLODIPine BESYLATE/BENAZEPRIL 1 cap PO BID 02/28/18 08/02/18





[amLODIPine BESYLATE/BENAZEPRIL   





5-20 mg]   


 


EPINEPHrine [Epipen 2-Moses] 0.3 mg IM ONCE PRN 08/02/18 08/02/18


 


Melatonin 3 mg PO HS 08/02/18 08/02/18


 


PARoxetine HCL [PARoxetine HCL ER] 25 mg PO DAILY 08/02/18 08/02/18


 


diphenhydrAMINE [Benadryl] 25 mg PO HS PRN 08/02/18 08/02/18


 


tiZANidine HCL 4 mg PO Q8H 08/02/18 08/02/18








 Previous Rx's











 Medication  Instructions  Recorded


 


EPINEPHrine (Auto Inject) [Epipen] 0.3 mg IM ONCE PRN #2 syringe 08/02/18


 


predniSONE 40 mg PO DAILY #8 tab 08/02/18











 Allergies











Allergy/AdvReac Type Severity Reaction Status Date / Time


 


bee venom protein (honey bee) Allergy  Anaphylaxis Verified 08/02/18 11:48


 


Sulfa (Sulfonamide Allergy  Swelling Verified 08/02/18 11:48





Antibiotics)     


 


codeine AdvReac  Nausea & Verified 08/02/18 11:48





   Vomiting  














Review of Systems


ROS Statement: 


Those systems with pertinent positive or pertinent negative responses have been 

documented in the HPI.





ROS Other: All systems not noted in ROS Statement are negative.





Past Medical History


Past Medical History: GERD/Reflux, Hypertension


Additional Past Medical History / Comment(s): seasonal allergies


History of Any Multi-Drug Resistant Organisms: None Reported


Past Surgical History: Hernia Repair


Additional Past Surgical History / Comment(s): umbilical hernia repair 2013


Past Anesthesia/Blood Transfusion Reactions: Postoperative Nausea & Vomiting (

PONV)


Past Psychological History: No Psychological Hx Reported


Smoking Status: Never smoker


Past Alcohol Use History: Occasional


Past Drug Use History: None Reported





- Past Family History


  ** Mother


Family Medical History: Cancer, Diabetes Mellitus, Hypertension


Additional Family Medical History / Comment(s): nonHodgkins lymphoma





  ** Father


Family Medical History: Hypertension





General Exam





- General Exam Comments


Initial Comments: 





GENERAL:


Patient is well-developed and well-nourished.  Patient is nontoxic and well-

hydrated and is in mild distress.





ENT:


Neck is soft and supple.  No significant lymphadenopathy is noted.  Oropharynx 

is clear.  Moist mucous membranes.  Neck has full range of motion without 

eliciting any pain.  





EYES:


The sclera were anicteric and conjunctiva were pink and moist.  Extraocular 

movements were intact and pupils were equal round and reactive to light.  

Eyelids were unremarkable.





PULMONARY:


Unlabored respirations.  Good breath sounds bilaterally.  No audible rales 

rhonchi or wheezing was noted.





CARDIOVASCULAR:


There is a regular rate and rhythm without any murmurs gallops or rubs. 





SKIN:


Patient has some swelling underneath both eyes and some redness.  Patient has 

some redness to his arms chest abdomen back..





NEUROLOGIC:


Patient is alert and oriented x3.  Cranial nerves II through XII are grossly 

intact.  Motor and sensory are also intact.  Normal speech, volume and content.

  Symmetrical smile.  





MUSCULOSKELETAL:


Normal extremities with adequate strength and full range of motion.  


LYMPHATICS:


No significant lymphadenopathy is noted





PSYCHIATRIC:


Normal psychiatric evaluation. 


Limitations: no limitations





Course


 Vital Signs











  08/02/18 08/02/18





  11:27 12:16


 


Temperature 99.1 F 


 


Pulse Rate 124 H 84


 


Respiratory 18 18





Rate  


 


Blood Pressure 120/66 104/61


 


O2 Sat by Pulse 97 98





Oximetry  














Medical Decision Making





- Medical Decision Making





EKG shows normal sinus rhythm at 77 bpm TX interval 146 dresses 82 QT interval 

38 QTC is 439.  Patient's EKG shows no ST segment elevation or depression or T 

wave abnormalities are noted.





One pack and reevaluate the patient he stated the itching gone away if the 

swelling is gone away and he has had no throat swelling or difficulty breathing.





Critical Care Time


Critical Care Time: Yes


Total Critical Care Time: 35





Disposition


Clinical Impression: 


 Anaphylaxis





Disposition: HOME SELF-CARE


Instructions:  Anaphylaxis (ED)


Prescriptions: 


EPINEPHrine (Auto Inject) [Epipen] 0.3 mg IM ONCE PRN #2 syringe


 PRN Reason: Anaphylaxis


predniSONE 40 mg PO DAILY #8 tab


Is patient prescribed a controlled substance at d/c from ED?: No


Referrals: 


Rick Woodward MD [Primary Care Provider] - 1-2 days


Time of Disposition: 12:57

## 2018-09-03 ENCOUNTER — HOSPITAL ENCOUNTER (EMERGENCY)
Dept: HOSPITAL 47 - EC | Age: 53
Discharge: HOME | End: 2018-09-03
Payer: COMMERCIAL

## 2018-09-03 VITALS — TEMPERATURE: 98.6 F | RESPIRATION RATE: 18 BRPM

## 2018-09-03 VITALS — HEART RATE: 98 BPM | SYSTOLIC BLOOD PRESSURE: 130 MMHG | DIASTOLIC BLOOD PRESSURE: 79 MMHG

## 2018-09-03 DIAGNOSIS — F10.230: Primary | ICD-10-CM

## 2018-09-03 DIAGNOSIS — Z88.5: ICD-10-CM

## 2018-09-03 DIAGNOSIS — Z88.2: ICD-10-CM

## 2018-09-03 DIAGNOSIS — Z79.899: ICD-10-CM

## 2018-09-03 DIAGNOSIS — K21.9: ICD-10-CM

## 2018-09-03 DIAGNOSIS — Z91.030: ICD-10-CM

## 2018-09-03 DIAGNOSIS — Z79.82: ICD-10-CM

## 2018-09-03 DIAGNOSIS — I10: ICD-10-CM

## 2018-09-03 LAB
ALBUMIN SERPL-MCNC: 4.5 G/DL (ref 3.5–5)
ALP SERPL-CCNC: 95 U/L (ref 38–126)
ALT SERPL-CCNC: 65 U/L (ref 21–72)
ANION GAP SERPL CALC-SCNC: 16 MMOL/L
AST SERPL-CCNC: 101 U/L (ref 17–59)
BASOPHILS # BLD AUTO: 0 K/UL (ref 0–0.2)
BASOPHILS NFR BLD AUTO: 0 %
BUN SERPL-SCNC: 14 MG/DL (ref 9–20)
CALCIUM SPEC-MCNC: 9.4 MG/DL (ref 8.4–10.2)
CHLORIDE SERPL-SCNC: 105 MMOL/L (ref 98–107)
CK SERPL-CCNC: 88 U/L (ref 55–170)
CO2 SERPL-SCNC: 18 MMOL/L (ref 22–30)
EOSINOPHIL # BLD AUTO: 0.1 K/UL (ref 0–0.7)
EOSINOPHIL NFR BLD AUTO: 1 %
ERYTHROCYTE [DISTWIDTH] IN BLOOD BY AUTOMATED COUNT: 5.19 M/UL (ref 4.3–5.9)
ERYTHROCYTE [DISTWIDTH] IN BLOOD: 12.9 % (ref 11.5–15.5)
GLUCOSE SERPL-MCNC: 121 MG/DL (ref 74–99)
HCT VFR BLD AUTO: 47.5 % (ref 39–53)
HGB BLD-MCNC: 15.9 GM/DL (ref 13–17.5)
HYALINE CASTS UR QL AUTO: 13 /LPF (ref 0–2)
LIPASE SERPL-CCNC: 354 U/L (ref 23–300)
LYMPHOCYTES # SPEC AUTO: 1.5 K/UL (ref 1–4.8)
LYMPHOCYTES NFR SPEC AUTO: 11 %
MAGNESIUM SPEC-SCNC: 1.6 MG/DL (ref 1.6–2.3)
MCH RBC QN AUTO: 30.7 PG (ref 25–35)
MCHC RBC AUTO-ENTMCNC: 33.6 G/DL (ref 31–37)
MCV RBC AUTO: 91.4 FL (ref 80–100)
MONOCYTES # BLD AUTO: 0.5 K/UL (ref 0–1)
MONOCYTES NFR BLD AUTO: 4 %
NEUTROPHILS # BLD AUTO: 10.7 K/UL (ref 1.3–7.7)
NEUTROPHILS NFR BLD AUTO: 83 %
PH UR: 5.5 [PH] (ref 5–8)
PLATELET # BLD AUTO: 233 K/UL (ref 150–450)
POTASSIUM SERPL-SCNC: 4.1 MMOL/L (ref 3.5–5.1)
PROT SERPL-MCNC: 7.7 G/DL (ref 6.3–8.2)
PROT UR QL: (no result)
RBC UR QL: 3 /HPF (ref 0–5)
SODIUM SERPL-SCNC: 139 MMOL/L (ref 137–145)
SP GR UR: 1.02 (ref 1–1.03)
SQUAMOUS UR QL AUTO: <1 /HPF (ref 0–4)
TROPONIN I SERPL-MCNC: <0.012 NG/ML (ref 0–0.03)
UROBILINOGEN UR QL STRIP: <2 MG/DL (ref ?–2)
WBC # BLD AUTO: 12.9 K/UL (ref 3.8–10.6)
WBC #/AREA URNS HPF: 3 /HPF (ref 0–5)

## 2018-09-03 PROCEDURE — 80320 DRUG SCREEN QUANTALCOHOLS: CPT

## 2018-09-03 PROCEDURE — 84484 ASSAY OF TROPONIN QUANT: CPT

## 2018-09-03 PROCEDURE — 83690 ASSAY OF LIPASE: CPT

## 2018-09-03 PROCEDURE — 96361 HYDRATE IV INFUSION ADD-ON: CPT

## 2018-09-03 PROCEDURE — 82553 CREATINE MB FRACTION: CPT

## 2018-09-03 PROCEDURE — 99285 EMERGENCY DEPT VISIT HI MDM: CPT

## 2018-09-03 PROCEDURE — 36415 COLL VENOUS BLD VENIPUNCTURE: CPT

## 2018-09-03 PROCEDURE — 80053 COMPREHEN METABOLIC PANEL: CPT

## 2018-09-03 PROCEDURE — 82075 ASSAY OF BREATH ETHANOL: CPT

## 2018-09-03 PROCEDURE — 85025 COMPLETE CBC W/AUTO DIFF WBC: CPT

## 2018-09-03 PROCEDURE — 81001 URINALYSIS AUTO W/SCOPE: CPT

## 2018-09-03 PROCEDURE — 96375 TX/PRO/DX INJ NEW DRUG ADDON: CPT

## 2018-09-03 PROCEDURE — 83735 ASSAY OF MAGNESIUM: CPT

## 2018-09-03 PROCEDURE — 84100 ASSAY OF PHOSPHORUS: CPT

## 2018-09-03 PROCEDURE — 96374 THER/PROPH/DIAG INJ IV PUSH: CPT

## 2018-09-03 PROCEDURE — 82550 ASSAY OF CK (CPK): CPT

## 2018-09-03 PROCEDURE — 93005 ELECTROCARDIOGRAM TRACING: CPT

## 2018-09-03 PROCEDURE — 96372 THER/PROPH/DIAG INJ SC/IM: CPT

## 2018-09-03 NOTE — ED
General Adult HPI





- General


Chief complaint: Recheck/Abnormal Lab/Rx


Stated complaint: Withdrawls


Time Seen by Provider: 09/03/18 12:42


Source: patient, RN notes reviewed


Mode of arrival: ambulatory


Limitations: no limitations





- History of Present Illness


Initial comments: 





Patient's a 52-year-old male significant past medical history for hypertension, 

alcoholism, presenting to the emergency room today with a chief complaint of 

alcohol withdrawal.  Patient states that his last drink was at midnight.  He 

states he's had some shaking and nausea vomiting.  Patient denies any other 

complaints or symptoms currently. Patient denies any recent fever, chills, 

shortness of breath, chest pain, back pain, numbness or tingling, headaches or 

visual changes, or any other complaints.





- Related Data


 Home Medications











 Medication  Instructions  Recorded  Confirmed


 


Aspirin EC [Ecotrin Low Dose] 81 mg PO HS 06/17/17 08/02/18


 


Multivitamins, Thera [Multivitamin 1 tab PO HS 06/17/17 08/02/18





(formulary)]   


 


Omeprazole 40 mg PO HS 06/17/17 08/02/18


 


Atorvastatin [Lipitor] 20 mg PO HS 02/28/18 08/02/18


 


Cholecalciferol [Vitamin D3] 5,000 unit PO HS 02/28/18 08/02/18


 


Diazepam [Valium] 5 mg PO BID PRN 02/28/18 08/02/18


 


Turmeric Root Extract [Turmeric] 500 mg PO DAILY 02/28/18 08/02/18


 


amLODIPine BESYLATE/BENAZEPRIL 1 cap PO BID 02/28/18 08/02/18





[amLODIPine BESYLATE/BENAZEPRIL   





5-20 mg]   


 


EPINEPHrine [Epipen 2-Moses] 0.3 mg IM ONCE PRN 08/02/18 08/02/18


 


Melatonin 3 mg PO HS 08/02/18 08/02/18


 


PARoxetine HCL [PARoxetine HCL ER] 25 mg PO DAILY 08/02/18 08/02/18


 


diphenhydrAMINE [Benadryl] 25 mg PO HS PRN 08/02/18 08/02/18


 


tiZANidine HCL 4 mg PO Q8H 08/02/18 08/02/18








 Previous Rx's











 Medication  Instructions  Recorded


 


EPINEPHrine (Auto Inject) [Epipen] 0.3 mg IM ONCE PRN #2 syringe 08/02/18


 


predniSONE 40 mg PO DAILY #8 tab 08/02/18


 


Ondansetron Odt [Zofran ODT] 4 mg PO Q8HR PRN #20 tab 09/03/18


 


chlordiazePOXIDE HCl [Librium] 25 mg PO AS DIRECTED #22 capsule 09/03/18


 


cloNIDine HCL [Catapres] 0.1 mg PO BID #6 tab 09/03/18











 Allergies











Allergy/AdvReac Type Severity Reaction Status Date / Time


 


bee venom protein (honey bee) Allergy  Anaphylaxis Verified 09/03/18 12:40


 


Sulfa (Sulfonamide Allergy  Swelling Verified 09/03/18 12:40





Antibiotics)     


 


codeine AdvReac  Nausea & Verified 09/03/18 12:40





   Vomiting  














Review of Systems


ROS Statement: 


Those systems with pertinent positive or pertinent negative responses have been 

documented in the HPI.





ROS Other: All systems not noted in ROS Statement are negative.





Past Medical History


Past Medical History: GERD/Reflux, Hypertension


Additional Past Medical History / Comment(s): seasonal allergies


History of Any Multi-Drug Resistant Organisms: None Reported


Past Surgical History: Hernia Repair


Additional Past Surgical History / Comment(s): umbilical hernia repair 2013


Past Anesthesia/Blood Transfusion Reactions: Postoperative Nausea & Vomiting (

PONV)


Past Psychological History: No Psychological Hx Reported


Smoking Status: Never smoker


Past Alcohol Use History: Abuse, Daily, Heavy


Past Drug Use History: Prescription Drug Abuse





- Past Family History


  ** Mother


Family Medical History: Cancer, Diabetes Mellitus, Hypertension


Additional Family Medical History / Comment(s): nonHodgkins lymphoma





  ** Father


Family Medical History: Hypertension





General Exam





- General Exam Comments


Initial Comments: 





General:  The patient is awake and alert, in no distress, and does not appear 

acutely ill. 


Eye:  Pupils are equal, round and reactive to light, extra-ocular movements are 

intact.  No nystagmus.  There is normal conjunctiva bilaterally.  No signs of 

icterus.  


Ears, nose, mouth and throat:  There are moist mucous membranes and no oral 

lesions. 


Neck:  The neck is supple, there is no tenderness or JVD.  


Cardiovascular:  There is a regular rate and rhythm. No murmur, rub or gallop 

is appreciated.


Respiratory:  Lungs are clear to auscultation, respirations are non-labored, 

breath sounds are equal.  No wheezes, stridor, rales, or rhonchi.


Gastrointestinal:  Abdomen soft on palpation.


Musculoskeletal:  Normal ROM, no tenderness.  Strength 5/5. Sensation intact. 

Pulses equal bilaterally 2+.  


Neurological:  A&O x 3. CN II-XII intact, There are no obvious motor or sensory 

deficits. Coordination appears grossly intact. Speech is normal.


Skin:  Skin is warm and dry and no rashes or lesions are noted. 


Psychiatric:  Cooperative, appropriate mood & affect, normal judgment.  


Limitations: no limitations





Course


 Vital Signs











  09/03/18 09/03/18





  12:37 13:52


 


Temperature 98.6 F 


 


Pulse Rate 130 H 98


 


Respiratory 18 18





Rate  


 


Blood Pressure 106/75 130/79


 


O2 Sat by Pulse 97 99





Oximetry  














EKG Findings





- EKG Comments:


EKG Findings:: EKG performed at 1350: Shows normal sinus rhythm at 88 bpm.  NE 

interval is 138.  .  QT//445.  Compared to previous EKG on 06/17/ 2017.





Medical Decision Making





- Medical Decision Making





Patient reexamined at this time shows no signs of distress.  Is resting 

comfortable.  States he feels much better here in the emergency room.  Patient 

labs been reviewed.  Options were discussed with patient about admission versus 

outpatient therapy.  He states he would like to go home.  He'll be prescribed 

medications of Zofran, Catapres, and Librium.  He is advised to return if any 

symptoms increase worsen.  He states he does plan on trying to get into a rehab 

program.





- Lab Data


Result diagrams: 


 09/03/18 13:14





 09/03/18 13:14


 Lab Results











  09/03/18 09/03/18 09/03/18 Range/Units





  13:14 13:14 13:14 


 


WBC  12.9 H    (3.8-10.6)  k/uL


 


RBC  5.19    (4.30-5.90)  m/uL


 


Hgb  15.9    (13.0-17.5)  gm/dL


 


Hct  47.5    (39.0-53.0)  %


 


MCV  91.4    (80.0-100.0)  fL


 


MCH  30.7    (25.0-35.0)  pg


 


MCHC  33.6    (31.0-37.0)  g/dL


 


RDW  12.9    (11.5-15.5)  %


 


Plt Count  233    (150-450)  k/uL


 


Neutrophils %  83    %


 


Lymphocytes %  11    %


 


Monocytes %  4    %


 


Eosinophils %  1    %


 


Basophils %  0    %


 


Neutrophils #  10.7 H    (1.3-7.7)  k/uL


 


Lymphocytes #  1.5    (1.0-4.8)  k/uL


 


Monocytes #  0.5    (0-1.0)  k/uL


 


Eosinophils #  0.1    (0-0.7)  k/uL


 


Basophils #  0.0    (0-0.2)  k/uL


 


Sodium   139   (137-145)  mmol/L


 


Potassium   4.1   (3.5-5.1)  mmol/L


 


Chloride   105   ()  mmol/L


 


Carbon Dioxide   18 L   (22-30)  mmol/L


 


Anion Gap   16   mmol/L


 


BUN   14   (9-20)  mg/dL


 


Creatinine   0.60 L   (0.66-1.25)  mg/dL


 


Est GFR (CKD-EPI)AfAm   >90   (>60 ml/min/1.73 sqM)  


 


Est GFR (CKD-EPI)NonAf   >90   (>60 ml/min/1.73 sqM)  


 


Glucose   121 H   (74-99)  mg/dL


 


Calcium   9.4   (8.4-10.2)  mg/dL


 


Phosphorus   3.9   (2.5-4.5)  mg/dL


 


Magnesium   1.6   (1.6-2.3)  mg/dL


 


Total Bilirubin   1.2   (0.2-1.3)  mg/dL


 


AST   101 H   (17-59)  U/L


 


ALT   65   (21-72)  U/L


 


Alkaline Phosphatase   95   ()  U/L


 


Total Creatine Kinase    88  ()  U/L


 


CK-MB (CK-2)    1.2  (0.0-2.4)  ng/mL


 


CK-MB (CK-2) Rel Index    1.4  


 


Troponin I    <0.012  (0.000-0.034)  ng/mL


 


Total Protein   7.7   (6.3-8.2)  g/dL


 


Albumin   4.5   (3.5-5.0)  g/dL


 


Lipase   354 H   ()  U/L


 


Urine Color     


 


Urine Appearance     (Clear)  


 


Urine pH     (5.0-8.0)  


 


Ur Specific Gravity     (1.001-1.035)  


 


Urine Protein     (Negative)  


 


Urine Glucose (UA)     (Negative)  


 


Urine Ketones     (Negative)  


 


Urine Blood     (Negative)  


 


Urine Nitrite     (Negative)  


 


Urine Bilirubin     (Negative)  


 


Urine Urobilinogen     (<2.0)  mg/dL


 


Ur Leukocyte Esterase     (Negative)  


 


Urine RBC     (0-5)  /hpf


 


Urine WBC     (0-5)  /hpf


 


Ur Squamous Epith Cells     (0-4)  /hpf


 


Urine Bacteria     (None)  /hpf


 


Hyaline Casts     (0-2)  /lpf


 


Urine Mucus     (None)  /hpf


 


Serum Alcohol   <10   mg/dL














  09/03/18 Range/Units





  14:46 


 


WBC   (3.8-10.6)  k/uL


 


RBC   (4.30-5.90)  m/uL


 


Hgb   (13.0-17.5)  gm/dL


 


Hct   (39.0-53.0)  %


 


MCV   (80.0-100.0)  fL


 


MCH   (25.0-35.0)  pg


 


MCHC   (31.0-37.0)  g/dL


 


RDW   (11.5-15.5)  %


 


Plt Count   (150-450)  k/uL


 


Neutrophils %   %


 


Lymphocytes %   %


 


Monocytes %   %


 


Eosinophils %   %


 


Basophils %   %


 


Neutrophils #   (1.3-7.7)  k/uL


 


Lymphocytes #   (1.0-4.8)  k/uL


 


Monocytes #   (0-1.0)  k/uL


 


Eosinophils #   (0-0.7)  k/uL


 


Basophils #   (0-0.2)  k/uL


 


Sodium   (137-145)  mmol/L


 


Potassium   (3.5-5.1)  mmol/L


 


Chloride   ()  mmol/L


 


Carbon Dioxide   (22-30)  mmol/L


 


Anion Gap   mmol/L


 


BUN   (9-20)  mg/dL


 


Creatinine   (0.66-1.25)  mg/dL


 


Est GFR (CKD-EPI)AfAm   (>60 ml/min/1.73 sqM)  


 


Est GFR (CKD-EPI)NonAf   (>60 ml/min/1.73 sqM)  


 


Glucose   (74-99)  mg/dL


 


Calcium   (8.4-10.2)  mg/dL


 


Phosphorus   (2.5-4.5)  mg/dL


 


Magnesium   (1.6-2.3)  mg/dL


 


Total Bilirubin   (0.2-1.3)  mg/dL


 


AST   (17-59)  U/L


 


ALT   (21-72)  U/L


 


Alkaline Phosphatase   ()  U/L


 


Total Creatine Kinase   ()  U/L


 


CK-MB (CK-2)   (0.0-2.4)  ng/mL


 


CK-MB (CK-2) Rel Index   


 


Troponin I   (0.000-0.034)  ng/mL


 


Total Protein   (6.3-8.2)  g/dL


 


Albumin   (3.5-5.0)  g/dL


 


Lipase   ()  U/L


 


Urine Color  Yellow  


 


Urine Appearance  Cloudy  (Clear)  


 


Urine pH  5.5  (5.0-8.0)  


 


Ur Specific Gravity  1.022  (1.001-1.035)  


 


Urine Protein  1+ H  (Negative)  


 


Urine Glucose (UA)  Trace H  (Negative)  


 


Urine Ketones  Negative  (Negative)  


 


Urine Blood  Negative  (Negative)  


 


Urine Nitrite  Negative  (Negative)  


 


Urine Bilirubin  Negative  (Negative)  


 


Urine Urobilinogen  <2.0  (<2.0)  mg/dL


 


Ur Leukocyte Esterase  Negative  (Negative)  


 


Urine RBC  3  (0-5)  /hpf


 


Urine WBC  3  (0-5)  /hpf


 


Ur Squamous Epith Cells  <1  (0-4)  /hpf


 


Urine Bacteria  Rare H  (None)  /hpf


 


Hyaline Casts  13 H  (0-2)  /lpf


 


Urine Mucus  Many H  (None)  /hpf


 


Serum Alcohol   mg/dL














Disposition


Clinical Impression: 


 Alcohol withdrawal





Disposition: HOME SELF-CARE


Condition: Good


Instructions:  Alcohol Withdrawal (ED)


Additional Instructions: 


Please use medication as discussed.  Please follow-up with family doctor in the 

next 2 days.  Please return to emergency room if the symptoms increase or 

worsen or for any other concerns.


Prescriptions: 


chlordiazePOXIDE HCl [Librium] 25 mg PO AS DIRECTED #22 capsule


cloNIDine HCL [Catapres] 0.1 mg PO BID #6 tab


Ondansetron Odt [Zofran ODT] 4 mg PO Q8HR PRN #20 tab


 PRN Reason: Nausea


Is patient prescribed a controlled substance at d/c from ED?: No


Referrals: 


Rick Woodward MD [Primary Care Provider] - 1-2 days


Time of Disposition: 15:03

## 2019-07-17 ENCOUNTER — HOSPITAL ENCOUNTER (INPATIENT)
Dept: HOSPITAL 47 - EC | Age: 54
LOS: 2 days | Discharge: HOME | DRG: 683 | End: 2019-07-19
Attending: HOSPITALIST | Admitting: HOSPITALIST
Payer: COMMERCIAL

## 2019-07-17 VITALS — BODY MASS INDEX: 30.2 KG/M2

## 2019-07-17 DIAGNOSIS — Z98.890: ICD-10-CM

## 2019-07-17 DIAGNOSIS — E87.2: ICD-10-CM

## 2019-07-17 DIAGNOSIS — R77.8: ICD-10-CM

## 2019-07-17 DIAGNOSIS — Z88.5: ICD-10-CM

## 2019-07-17 DIAGNOSIS — M54.9: ICD-10-CM

## 2019-07-17 DIAGNOSIS — N17.9: Primary | ICD-10-CM

## 2019-07-17 DIAGNOSIS — K70.10: ICD-10-CM

## 2019-07-17 DIAGNOSIS — E86.0: ICD-10-CM

## 2019-07-17 DIAGNOSIS — Z88.2: ICD-10-CM

## 2019-07-17 DIAGNOSIS — Z80.7: ICD-10-CM

## 2019-07-17 DIAGNOSIS — I10: ICD-10-CM

## 2019-07-17 DIAGNOSIS — K21.9: ICD-10-CM

## 2019-07-17 DIAGNOSIS — E78.5: ICD-10-CM

## 2019-07-17 DIAGNOSIS — Z91.030: ICD-10-CM

## 2019-07-17 DIAGNOSIS — Z82.49: ICD-10-CM

## 2019-07-17 DIAGNOSIS — Z79.899: ICD-10-CM

## 2019-07-17 DIAGNOSIS — Z79.82: ICD-10-CM

## 2019-07-17 DIAGNOSIS — Z91.048: ICD-10-CM

## 2019-07-17 DIAGNOSIS — M19.90: ICD-10-CM

## 2019-07-17 DIAGNOSIS — F32.9: ICD-10-CM

## 2019-07-17 DIAGNOSIS — K76.9: ICD-10-CM

## 2019-07-17 DIAGNOSIS — M54.2: ICD-10-CM

## 2019-07-17 DIAGNOSIS — F10.239: ICD-10-CM

## 2019-07-17 DIAGNOSIS — M62.82: ICD-10-CM

## 2019-07-17 DIAGNOSIS — I95.9: ICD-10-CM

## 2019-07-17 DIAGNOSIS — K52.9: ICD-10-CM

## 2019-07-17 DIAGNOSIS — Z83.3: ICD-10-CM
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
## 2019-07-17 DIAGNOSIS — E87.6: ICD-10-CM

## 2019-07-17 LAB
ALBUMIN SERPL-MCNC: 3.6 G/DL (ref 3.5–5)
ALP SERPL-CCNC: 257 U/L (ref 38–126)
ALT SERPL-CCNC: 169 U/L (ref 21–72)
ANION GAP SERPL CALC-SCNC: 18 MMOL/L
APTT BLD: 21.9 SEC (ref 22–30)
AST SERPL-CCNC: 363 U/L (ref 17–59)
BASOPHILS # BLD AUTO: 0 K/UL (ref 0–0.2)
BASOPHILS NFR BLD AUTO: 0 %
BILIRUB UR QL STRIP.AUTO: (no result)
BUN SERPL-SCNC: 19 MG/DL (ref 9–20)
CALCIUM SPEC-MCNC: 8.6 MG/DL (ref 8.4–10.2)
CHLORIDE SERPL-SCNC: 89 MMOL/L (ref 98–107)
CO2 SERPL-SCNC: 27 MMOL/L (ref 22–30)
EOSINOPHIL # BLD AUTO: 0.1 K/UL (ref 0–0.7)
EOSINOPHIL NFR BLD AUTO: 1 %
ERYTHROCYTE [DISTWIDTH] IN BLOOD BY AUTOMATED COUNT: 4.62 M/UL (ref 4.3–5.9)
ERYTHROCYTE [DISTWIDTH] IN BLOOD: 15.6 % (ref 11.5–15.5)
GLUCOSE SERPL-MCNC: 146 MG/DL (ref 74–99)
HCT VFR BLD AUTO: 41.4 % (ref 39–53)
HGB BLD-MCNC: 13.7 GM/DL (ref 13–17.5)
HYALINE CASTS UR QL AUTO: 157 /LPF (ref 0–2)
INR PPP: 1.1 (ref ?–1.2)
LIPASE SERPL-CCNC: 173 U/L (ref 23–300)
LYMPHOCYTES # SPEC AUTO: 1.7 K/UL (ref 1–4.8)
LYMPHOCYTES NFR SPEC AUTO: 23 %
MAGNESIUM SPEC-SCNC: 1.9 MG/DL (ref 1.6–2.3)
MCH RBC QN AUTO: 29.7 PG (ref 25–35)
MCHC RBC AUTO-ENTMCNC: 33.1 G/DL (ref 31–37)
MCV RBC AUTO: 89.7 FL (ref 80–100)
MONOCYTES # BLD AUTO: 0.3 K/UL (ref 0–1)
MONOCYTES NFR BLD AUTO: 5 %
NEUTROPHILS # BLD AUTO: 5.1 K/UL (ref 1.3–7.7)
NEUTROPHILS NFR BLD AUTO: 70 %
PH UR: 5.5 [PH] (ref 5–8)
PLATELET # BLD AUTO: 92 K/UL (ref 150–450)
POTASSIUM SERPL-SCNC: 2.8 MMOL/L (ref 3.5–5.1)
PROT SERPL-MCNC: 6.7 G/DL (ref 6.3–8.2)
PROT UR QL: (no result)
PT BLD: 11.7 SEC (ref 9–12)
RBC UR QL: 3 /HPF (ref 0–5)
SODIUM SERPL-SCNC: 134 MMOL/L (ref 137–145)
SP GR UR: 1.02 (ref 1–1.03)
SQUAMOUS UR QL AUTO: 2 /HPF (ref 0–4)
UROBILINOGEN UR QL STRIP: 3 MG/DL (ref ?–2)
WBC # BLD AUTO: 7.2 K/UL (ref 3.8–10.6)
WBC #/AREA URNS HPF: 47 /HPF (ref 0–5)

## 2019-07-17 PROCEDURE — 74176 CT ABD & PELVIS W/O CONTRAST: CPT

## 2019-07-17 PROCEDURE — 93005 ELECTROCARDIOGRAM TRACING: CPT

## 2019-07-17 PROCEDURE — 81001 URINALYSIS AUTO W/SCOPE: CPT

## 2019-07-17 PROCEDURE — 83605 ASSAY OF LACTIC ACID: CPT

## 2019-07-17 PROCEDURE — 80320 DRUG SCREEN QUANTALCOHOLS: CPT

## 2019-07-17 PROCEDURE — 96365 THER/PROPH/DIAG IV INF INIT: CPT

## 2019-07-17 PROCEDURE — 96375 TX/PRO/DX INJ NEW DRUG ADDON: CPT

## 2019-07-17 PROCEDURE — 80048 BASIC METABOLIC PNL TOTAL CA: CPT

## 2019-07-17 PROCEDURE — 99285 EMERGENCY DEPT VISIT HI MDM: CPT

## 2019-07-17 PROCEDURE — 96361 HYDRATE IV INFUSION ADD-ON: CPT

## 2019-07-17 PROCEDURE — 85610 PROTHROMBIN TIME: CPT

## 2019-07-17 PROCEDURE — 84484 ASSAY OF TROPONIN QUANT: CPT

## 2019-07-17 PROCEDURE — 36415 COLL VENOUS BLD VENIPUNCTURE: CPT

## 2019-07-17 PROCEDURE — 82550 ASSAY OF CK (CPK): CPT

## 2019-07-17 PROCEDURE — 71046 X-RAY EXAM CHEST 2 VIEWS: CPT

## 2019-07-17 PROCEDURE — 85025 COMPLETE CBC W/AUTO DIFF WBC: CPT

## 2019-07-17 PROCEDURE — 80053 COMPREHEN METABOLIC PANEL: CPT

## 2019-07-17 PROCEDURE — 80306 DRUG TEST PRSMV INSTRMNT: CPT

## 2019-07-17 PROCEDURE — 83735 ASSAY OF MAGNESIUM: CPT

## 2019-07-17 PROCEDURE — 85730 THROMBOPLASTIN TIME PARTIAL: CPT

## 2019-07-17 PROCEDURE — 96372 THER/PROPH/DIAG INJ SC/IM: CPT

## 2019-07-17 PROCEDURE — 83690 ASSAY OF LIPASE: CPT

## 2019-07-17 PROCEDURE — 82533 TOTAL CORTISOL: CPT

## 2019-07-17 RX ADMIN — POTASSIUM CHLORIDE SCH MLS/HR: 14.9 INJECTION, SOLUTION INTRAVENOUS at 23:27

## 2019-07-17 RX ADMIN — Medication SCH: at 23:18

## 2019-07-17 RX ADMIN — CEFAZOLIN SCH MLS/HR: 330 INJECTION, POWDER, FOR SOLUTION INTRAMUSCULAR; INTRAVENOUS at 20:32

## 2019-07-17 RX ADMIN — ATORVASTATIN CALCIUM SCH MG: 20 TABLET, FILM COATED ORAL at 20:32

## 2019-07-17 RX ADMIN — THERA TABS SCH: TAB at 19:55

## 2019-07-17 RX ADMIN — DIAZEPAM SCH MG: 2 TABLET ORAL at 23:27

## 2019-07-17 RX ADMIN — ASPIRIN 81 MG CHEWABLE TABLET SCH MG: 81 TABLET CHEWABLE at 20:32

## 2019-07-17 RX ADMIN — CEFAZOLIN SCH MLS/HR: 330 INJECTION, POWDER, FOR SOLUTION INTRAMUSCULAR; INTRAVENOUS at 11:51

## 2019-07-17 RX ADMIN — Medication SCH MG: at 20:32

## 2019-07-17 RX ADMIN — PANTOPRAZOLE SODIUM SCH MG: 40 TABLET, DELAYED RELEASE ORAL at 18:25

## 2019-07-17 NOTE — CT
EXAMINATION TYPE: CT abdomen pelvis wo con

 

DATE OF EXAM: 7/17/2019

 

COMPARISON: 6/17/2017

 

HISTORY: vomiting, weakness, elevated lactic acid

 

CT DLP: 697.1 mGycm

 

Examination of the solid and hollow viscera is limited given the lack of contrast.

 

FINDINGS: 

 

LUNG BASES: No evidence for nodule. No evidence for infiltrate.

 

LIVER/GB: Hepatomegaly with hepatic steatosis. The gallbladder is unremarkable. No space-occupying he
patic lesion.

 

PANCREAS: No pancreatic mass identified. No inflammatory process seen.

 

SPLEEN: No evidence for splenomegaly. No intrasplenic lesions seen.

 

ADRENALS: No adrenal nodules identified. No evidence for thickening.

 

KIDNEYS: No evidence for renal mass. No nephrolithiasis. No hydronephrosis.

 

BOWEL: Appendix has a normal appearance. No evidence of bowel obstruction. No inflammatory process. N
o evidence for free air or abscess.

 

Lymph nodes: No evidence for adenopathy greater than 1 cm.

 

Abdominal aorta: Atheromatous changes seen. No evidence for aneurysm.

 

Genital organs: No significant abnormality.

 

Other: Degenerative changes lumbar spine. A small fat-containing umbilical hernia noted.

 

IMPRESSION: 

1. No acute process noted at this time.

2. Hepatomegaly with hepatic steatosis.

3. Fat-containing umbilical hernia small in size.

## 2019-07-17 NOTE — P.HPIM
History of Present Illness


H&P Date: 07/17/19


Chief Complaint: Weak and tired





History of presenting complaint:


This is a pleasant 53-year-old patient follows with  from [facility]. 

Chronic stable medical conditions include GERD, hypertension, hyperlipidemia.  

History drink heavy alcohol and he did not drink since April.  Patient over 2 

weeks ago went on vacation started drinking again.  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 

For last 3 days has been feeling what he describes as hot and started vomiting 

for 3 days not able to keep much food down this morning was lightheaded and got 

dizzy twice and he started get up.  He felt rather exhausted.  Finally was sent 

into the hospital from his work.  No fever no chills.  Just exhausted and tired.

 No chest pain no palpitation.





Review of systems:


GEN.:  Weak tired, dizzy


EYES: None


HEENT: None


NECK: None


RESPIRATORY: None


CARDIOVASCULAR: None


GASTROINTESTINAL: Nausea vomiting, not able to keep much food down


GENITOURINARY: None


MUSCULOSKELETAL: None


LYMPHATICS: None


HEMATOLOGICAL: None  


PSYCHIATRY: None


NEUROLOGICAL: Some tremorsc





Past medical history:


GERD, hypertension, liver disease, seasonal ALLERGIES





Social history patient is drinking alcohol for several years up to 3 months ago 

but then relapsed about 10 days ago when he went on a vacation.  Smoking never. 

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





Family history:


Diabetes, hypertension, non-Hodgkin's lymphoma





Physical examination:


VITAL SIGNS: 98.5, 91, 16, 86/53, 96% room air


GENERAL: Average built, sitting up, tired appearing.


EYES: Pupils equal.  Conjunctiva normal.


HEENT: External appearance of nose and ears normal, oral cavity grossly normal.


NECK: JVD not raised; masses not palpable.


HEART: First and second heart sounds are normal;  no edema.  


LUNGS: Respiratory rate normal; clear to auscultation.  


ABDOMEN: Soft,  nontender, liver spleen not palpable, no masses palpable.  


PSYCH: Alert and oriented x3;  mood  and affect bit anxiousl.  


NEUROLOGICAL: Cranial nerves grossly intact; no facial asymmetry,   power and 

sensation grossly intact, fine tremors. 


LYMPHATICS: No lymph nodes palpable in the axilla and neck





INVESTIGATIONS, reviewed in the clinical context:


White count 7.2 hemoglobin 13.7 platelets 92 potassium 2.8 BUN 19 creatinine is 

4.06


Potassium 2.8 crit and 4.06  ENT was 69 total bilirubin 6.8





Assessment:


-Acute severe renal failure from persistent vomiting chronic causing acute 

kidney injury


-Severe hypokalemia from vomiting


-Lactic acidosis type II from dehydration


-Alcoholic hepatitis


-Troponin leak likely from hemodynamic mismatch no evidence of acute cornea 

syndrome


-GERD


-Essential hypertension


-Hyperlipidemia


-Early alcohol withdrawal syndrome


-Mild rhabdomyolysis





Plan:


Patient started and IV fluids decisions such patient's fluids to lactated 

Ringer's.  We'll also add Valium 2 mg 3 times a day for withdrawal syndrome 

control follow electrolytes closely repeat labs in the morning care was 

discussed with the patient





Past Medical History


Past Medical History: GERD/Reflux, Hypertension, Liver Disease


Additional Past Medical History / Comment(s): seasonal allergies


History of Any Multi-Drug Resistant Organisms: None Reported


Past Surgical History: Hernia Repair


Additional Past Surgical History / Comment(s): umbilical hernia repair 2013


Past Anesthesia/Blood Transfusion Reactions: Postoperative Nausea & Vomiting 

(PONV)


Past Psychological History: No Psychological Hx Reported


Smoking Status: Never smoker


Past Alcohol Use History: Abuse, Daily, Heavy


Past Drug Use History: Prescription Drug Abuse





- Past Family History


  ** Mother


Family Medical History: Cancer, Diabetes Mellitus, Hypertension


Additional Family Medical History / Comment(s): nonHodgkins lymphoma





  ** Father


Family Medical History: Hypertension





Medications and Allergies


                                Home Medications











 Medication  Instructions  Recorded  Confirmed  Type


 


Aspirin EC [Ecotrin Low Dose] 81 mg PO HS 06/17/17 07/17/19 History


 


Multivitamins, Thera [Multivitamin 1 tab PO HS 06/17/17 07/17/19 History





(formulary)]    


 


Atorvastatin [Lipitor] 20 mg PO HS 02/28/18 07/17/19 History


 


Cholecalciferol [Vitamin D3] 5,000 unit PO HS 02/28/18 07/17/19 History


 


amLODIPine BESYLATE/BENAZEPRIL 1 cap PO BID 02/28/18 07/17/19 History





[amLODIPine BESYLATE/BENAZEPRIL    





5-20 mg]    


 


EPINEPHrine (Auto Inject) [Epipen] 0.3 mg IM ONCE PRN #2 syringe 08/02/18 07/17/19 Rx


 


Melatonin 3 mg PO HS 08/02/18 07/17/19 History


 


Citalopram Hydrobromide [CeleXA] 60 mg PO DAILY 07/17/19 07/17/19 History


 


Glucosam/Ky-Msm1/C/Bear/Bosw 1 tab PO DAILY 07/17/19 07/17/19 History





[Glucosamine-Chondroitin Tablet]    


 


Naltrexone HCl [Revia] 50 mg PO DAILY 07/17/19 07/17/19 History


 


Omega-3 Fatty Acids/Fish Oil [Fish 1 cap PO DAILY 07/17/19 07/17/19 History





Oil 1,000 mg Softgel]    


 


Omeprazole [PriLOSEC] 20 mg PO AC-BID 07/17/19 07/17/19 History


 


Ondansetron Odt [Zofran ODT] 4 mg PO TID PRN 07/17/19 07/17/19 History


 


Thiamine [Vitamin B-1] 50 mg PO DAILY 07/17/19 07/17/19 History


 


traZODone HCL 50 mg PO HS PRN 07/17/19 07/17/19 History








                                    Allergies











Allergy/AdvReac Type Severity Reaction Status Date / Time


 


bee venom protein (honey bee) Allergy  Anaphylaxis Verified 07/17/19 10:02


 


Sulfa (Sulfonamide Allergy  Swelling Verified 07/17/19 10:02





Antibiotics)     


 


codeine AdvReac  Nausea & Verified 07/17/19 10:02





   Vomiting  














Physical Exam


Vitals: 


                                   Vital Signs











  Temp Pulse Pulse Resp BP BP Pulse Ox


 


 07/17/19 19:50  98.5 F   69  17   101/62  100


 


 07/17/19 18:30   72   18  109/65   98


 


 07/17/19 17:51   68   18  99/63   99


 


 07/17/19 15:25   76   18  98/62   98


 


 07/17/19 13:21  98.6 F  59 L   18  98/69   98


 


 07/17/19 11:40   58 L   18  94/62   100


 


 07/17/19 10:15      77/53  


 


 07/17/19 09:36  98.5 F  91   16  86/53   96








                                Intake and Output











 07/17/19 07/17/19 07/17/19





 06:59 14:59 22:59


 


Other:   


 


  Voiding Method   Toilet


 


  Weight  90.718 kg 














Results


CBC & Chem 7: 


                                 07/17/19 10:18





                                 07/17/19 10:18


Labs: 


                  Abnormal Lab Results - Last 24 Hours (Table)











  07/17/19 07/17/19 07/17/19 Range/Units





  10:18 10:18 10:18 


 


RDW   15.6 H   (11.5-15.5)  %


 


Plt Count   92 L   (150-450)  k/uL


 


APTT     (22.0-30.0)  sec


 


Sodium  134 L    (137-145)  mmol/L


 


Potassium  2.8 L    (3.5-5.1)  mmol/L


 


Chloride  89 L    ()  mmol/L


 


Creatinine  4.06 H    (0.66-1.25)  mg/dL


 


Glucose  146 H    (74-99)  mg/dL


 


Plasma Lactic Acid Darrick    5.2 H*  (0.7-2.0)  mmol/L


 


Total Bilirubin  6.8 H    (0.2-1.3)  mg/dL


 


AST  363 H    (17-59)  U/L


 


ALT  169 H    (21-72)  U/L


 


Alkaline Phosphatase  257 H    ()  U/L


 


Creatine Kinase     ()  U/L


 


Troponin I     (0.000-0.034)  ng/mL


 


Urine Protein     (Negative)  


 


Urine Glucose (UA)     (Negative)  


 


Urine Blood     (Negative)  


 


Urine Bilirubin     (Negative)  


 


Urine WBC     (0-5)  /hpf


 


Urine WBC Clumps     (None)  /hpf


 


Hyaline Casts     (0-2)  /lpf


 


Urine Mucus     (None)  /hpf














  07/17/19 07/17/19 07/17/19 Range/Units





  10:18 10:18 10:18 


 


RDW     (11.5-15.5)  %


 


Plt Count     (150-450)  k/uL


 


APTT  21.9 L    (22.0-30.0)  sec


 


Sodium     (137-145)  mmol/L


 


Potassium     (3.5-5.1)  mmol/L


 


Chloride     ()  mmol/L


 


Creatinine     (0.66-1.25)  mg/dL


 


Glucose     (74-99)  mg/dL


 


Plasma Lactic Acid Darrick     (0.7-2.0)  mmol/L


 


Total Bilirubin     (0.2-1.3)  mg/dL


 


AST     (17-59)  U/L


 


ALT     (21-72)  U/L


 


Alkaline Phosphatase     ()  U/L


 


Creatine Kinase    906 H  ()  U/L


 


Troponin I   0.054 H*   (0.000-0.034)  ng/mL


 


Urine Protein     (Negative)  


 


Urine Glucose (UA)     (Negative)  


 


Urine Blood     (Negative)  


 


Urine Bilirubin     (Negative)  


 


Urine WBC     (0-5)  /hpf


 


Urine WBC Clumps     (None)  /hpf


 


Hyaline Casts     (0-2)  /lpf


 


Urine Mucus     (None)  /hpf














  07/17/19 Range/Units





  11:49 


 


RDW   (11.5-15.5)  %


 


Plt Count   (150-450)  k/uL


 


APTT   (22.0-30.0)  sec


 


Sodium   (137-145)  mmol/L


 


Potassium   (3.5-5.1)  mmol/L


 


Chloride   ()  mmol/L


 


Creatinine   (0.66-1.25)  mg/dL


 


Glucose   (74-99)  mg/dL


 


Plasma Lactic Acid Darrick   (0.7-2.0)  mmol/L


 


Total Bilirubin   (0.2-1.3)  mg/dL


 


AST   (17-59)  U/L


 


ALT   (21-72)  U/L


 


Alkaline Phosphatase   ()  U/L


 


Creatine Kinase   ()  U/L


 


Troponin I   (0.000-0.034)  ng/mL


 


Urine Protein  1+ H  (Negative)  


 


Urine Glucose (UA)  Trace H  (Negative)  


 


Urine Blood  Small H  (Negative)  


 


Urine Bilirubin  2+ H  (Negative)  


 


Urine WBC  47 H  (0-5)  /hpf


 


Urine WBC Clumps  Few H  (None)  /hpf


 


Hyaline Casts  157 H  (0-2)  /lpf


 


Urine Mucus  Many H  (None)  /hpf














Thrombosis Risk Factor Assmnt





- Choose All That Apply


Any of the Below Risk Factors Present?: Yes


Each Factor Represents 1 point: Age 41-60 years, Obesity (BMI >25)


Other Risk Factors: No


Other congenital or acquired thrombophilia - If yes, enter type in comment: No


Thrombosis Risk Factor Assessment Total Risk Factor Score: 2


Thrombosis Risk Factor Assessment Level: Low Risk

## 2019-07-17 NOTE — P.[facility]
History of Present Illness





- Reason for Consult


acute renal failure





- History of Present Illness





Reason for consultation: Acute kidney injury





History of present illness:


Patient is a 53-year-old male seen in renal consultation for acute kidney 

injury.  Patient was evaluated in the emergency room.  Patient presented to the 

hospital with generalized weakness.  He also  admits to nausea and vomiting 

going on since Sunday.  Patient states yesterday he had about 8 episodes of 

emesis.  Denies any diarrhea.  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 

out of the heat.  He has not been able to eat and drink much.  He was also 

taking antihypertensives at home including losartan.  Patient's blood pressure 

was low in the systolic 70s.  He has received 2 L of normal saline bolus and is 

now maintained on normal saline at 100 mL an hour.  No evidence of 

hydronephrosis was noted on CAT scan.  He denies regular use of nonsteroidals.  

No history of diabetes.  Denies family history of renal disease.  Patient states

he hasn't been urinating much.  He denies any hematuria or dysuria.  Vomiting 

has improved.  Potassium was low at 2.8 which is being replaced.  No abdominal 

pain.  No edema.





Vital signs are stable.


General: The patient appeared well nourished and normally developed. 


HEENT: Head exam is unremarkable. Neck is without jugular venous distension.


LUNGS: Lungs are clear to auscultation and percussion. Breath sounds decreased.


HEART: Rate and Rhythm are regular. First and second heart sounds normal. No 

murmurs, rubs or gallops. 


ABDOMEN: Abdominal exam reveals normal bowel sounds. Non-tender and non-

distended. No evidence of peritonitis.


EXTREMITITES: No clubbing, cyanosis, or edema.





Past Medical History


Past Medical History: GERD/Reflux, Hyperlipidemia, Hypertension, Liver Disease, 

Osteoarthritis (OA)


Additional Past Medical History / Comment(s): ETOH abuse-has quit several times 

and last quit 4/2019 but resumed drinking recently while on vacation-last drank 

7/14/19, alcoholic hepatitis with past withdrawal/DTs, DDD, cervical and back 

pain, seasonal allergies.


History of Any Multi-Drug Resistant Organisms: None Reported


Past Surgical History: Hernia Repair


Additional Past Surgical History / Comment(s): umbilical hernia repair 2013, co

lonoscopy, vasectomy


Past Anesthesia/Blood Transfusion Reactions: Postoperative Nausea & Vomiting 

(PONV)


Past Psychological History: Depression


Additional Psychological History / Comment(s): Pt resides alone.  He is 

independent.


Smoking Status: Never smoker


Past Alcohol Use History: Abuse, Daily, Heavy


Additional Past Alcohol Use History / Comment(s): Pt has hx of alcoholism.  Pt 

states he quit drinking in April, 2019 and recently relapsed while on vacation-

last drank 7/14/19.  He states he has quit in the past, once for nearly a year.


Additional Drug Use History / Comment(s): Pt denies PDA.





- Past Family History


  ** Mother


Family Medical History: Cancer, Diabetes Mellitus, Hypertension


Additional Family Medical History / Comment(s): nonHodgkins lymphoma





  ** Father


Family Medical History: Hypertension





Medications and Allergies


                                Home Medications











 Medication  Instructions  Recorded  Confirmed  Type


 


Aspirin EC [Ecotrin Low Dose] 81 mg PO HS 06/17/17 07/17/19 History


 


Multivitamins, Thera [Multivitamin 1 tab PO HS 06/17/17 07/17/19 History





(formulary)]    


 


Atorvastatin [Lipitor] 20 mg PO HS 02/28/18 07/17/19 History


 


Cholecalciferol [Vitamin D3] 5,000 unit PO HS 02/28/18 07/17/19 History


 


amLODIPine BESYLATE/BENAZEPRIL 1 cap PO BID 02/28/18 07/17/19 History





[amLODIPine BESYLATE/BENAZEPRIL    





5-20 mg]    


 


EPINEPHrine (Auto Inject) [Epipen] 0.3 mg IM ONCE PRN #2 syringe 08/02/18 07/17/19 Rx


 


Melatonin 3 mg PO HS 08/02/18 07/17/19 History


 


Citalopram Hydrobromide [CeleXA] 60 mg PO DAILY 07/17/19 07/17/19 History


 


Glucosam/Ky-Msm1/C/Bear/Bosw 1 tab PO DAILY 07/17/19 07/17/19 History





[Glucosamine-Chondroitin Tablet]    


 


Naltrexone HCl [Revia] 50 mg PO DAILY 07/17/19 07/17/19 History


 


Omega-3 Fatty Acids/Fish Oil [Fish 1 cap PO DAILY 07/17/19 07/17/19 History





Oil 1,000 mg Softgel]    


 


Omeprazole [PriLOSEC] 20 mg PO AC-BID 07/17/19 07/17/19 History


 


Ondansetron Odt [Zofran ODT] 4 mg PO TID PRN 07/17/19 07/17/19 History


 


Thiamine [Vitamin B-1] 50 mg PO DAILY 07/17/19 07/17/19 History


 


traZODone HCL 50 mg PO HS PRN 07/17/19 07/17/19 History








                                    Allergies











Allergy/AdvReac Type Severity Reaction Status Date / Time


 


bee venom protein (honey bee) Allergy  Anaphylaxis Verified 07/17/19 10:02


 


Sulfa (Sulfonamide Allergy  Swelling Verified 07/17/19 10:02





Antibiotics)     


 


codeine AdvReac  Nausea & Verified 07/17/19 10:02





   Vomiting  














Physical Exam


Vitals: 


                                   Vital Signs











  Temp Pulse Resp BP Pulse Ox


 


 07/17/19 13:21  98.6 F  59 L  18  98/69  98


 


 07/17/19 11:40   58 L  18  94/62  100


 


 07/17/19 10:15     77/53 


 


 07/17/19 09:36  98.5 F  91  16  86/53  96








                                Intake and Output











 07/16/19 07/17/19 07/17/19





 22:59 06:59 14:59


 


Other:   


 


  Weight   90.718 kg














Results





- Lab Results


                             Most recent lab results











Calcium  8.6 mg/dL (8.4-10.2)   07/17/19  10:18    


 


Magnesium  1.9 mg/dL (1.6-2.3)   07/17/19  10:18    














                                 07/17/19 10:18





                                 07/17/19 10:18





Assessment and Plan


Plan: 





Assessment:


1.  Acute kidney injury mostly prerenal secondary to hypotension and 

intravascular volume depletion from vomiting.  Creatinine 4.06 on admission.  

Baseline creatinine near 1.  No evidence of hydronephrosis noted.


2.  Hypotension secondary to antihypertensives and intravascular volume 

depletion.


3.  Hypokalemia from poor oral intake.  Magnesium normal.  Being replaced.


4.  Lactic acidosis secondary to hypotension.


5.  Nausea and vomiting.  Better. ? Gastroenteritis.  No acute process noted on 

CAT scan.





Plan:


Maintain normal saline at 100 mL an hour.


Hold antihypertensives.


Check morning cortisol level.


Replace potassium.  80 mEq today.


Repeat electrolytes in the morning.





Thank you for the consultation.  I will continue to follow the patient with you 

during his hospital stay.

## 2019-07-17 NOTE — ED
Weakness HPI





- General


Chief complaint: Weakness


Stated complaint: falls, vomiting


Time Seen by Provider: 07/17/19 09:59


Source: patient


Mode of arrival: ambulatory


Limitations: no limitations





- History of Present Illness


Initial comments: 


53-year-old male presenting for chief complaint generalized weakness. Patient 

states that he has been weak and vomiting since Sunday.  Patient states he is on

naltrexone, patient states he did drink on the medication, then began vomiting. 

He states the vomiting continued into Monday, slowed down Tuesday and has not 

had any vomiting since yesterday evening.  Does drop to cyst diarrhea fever or 

recent travel.  Patient states he has been unable to keep down water.  Patient 

states when he goes to work for the past 2 days he has felt weak especially 

going from sitting to standing.  Patient states she has been taking his blood 

pressure medications.  Patient states he has cut down his drinking significantly

he states he was put on the blood pressure medication but it was elevated when 

he was a heavy everyday drinker.  Patient states he has had slight hand tremors 

but does not feel like he is withdrawing.  Patient has no other complaints 

denies any chest pain shortness of breath he denies any headache or neck 

stiffness.  Review of systems negative upon arrival patient appears well however

the pressure significantly decreased.  HR WNL. Afebrile. 








- Related Data


                                Home Medications











 Medication  Instructions  Recorded  Confirmed


 


Aspirin EC [Ecotrin Low Dose] 81 mg PO HS 06/17/17 07/17/19


 


Multivitamins, Thera [Multivitamin 1 tab PO HS 06/17/17 07/17/19





(formulary)]   


 


Atorvastatin [Lipitor] 20 mg PO HS 02/28/18 07/17/19


 


Cholecalciferol [Vitamin D3] 5,000 unit PO HS 02/28/18 07/17/19


 


amLODIPine BESYLATE/BENAZEPRIL 1 cap PO BID 02/28/18 07/17/19





[amLODIPine BESYLATE/BENAZEPRIL   





5-20 mg]   


 


Melatonin 3 mg PO HS 08/02/18 07/17/19


 


Citalopram Hydrobromide [CeleXA] 60 mg PO DAILY 07/17/19 07/17/19


 


Glucosam/Ky-Msm1/C/Bear/Bosw 1 tab PO DAILY 07/17/19 07/17/19





[Glucosamine-Chondroitin Tablet]   


 


Naltrexone HCl [Revia] 50 mg PO DAILY 07/17/19 07/17/19


 


Omega-3 Fatty Acids/Fish Oil [Fish 1 cap PO DAILY 07/17/19 07/17/19





Oil 1,000 mg Softgel]   


 


Omeprazole [PriLOSEC] 20 mg PO AC-BID 07/17/19 07/17/19


 


Ondansetron Odt [Zofran ODT] 4 mg PO TID PRN 07/17/19 07/17/19


 


Thiamine [Vitamin B-1] 50 mg PO DAILY 07/17/19 07/17/19


 


traZODone HCL 50 mg PO HS PRN 07/17/19 07/17/19








                                  Previous Rx's











 Medication  Instructions  Recorded


 


EPINEPHrine (Auto Inject) [Epipen] 0.3 mg IM ONCE PRN #2 syringe 08/02/18











                                    Allergies











Allergy/AdvReac Type Severity Reaction Status Date / Time


 


bee venom protein (honey bee) Allergy  Anaphylaxis Verified 07/17/19 10:02


 


Sulfa (Sulfonamide Allergy  Swelling Verified 07/17/19 10:02





Antibiotics)     


 


codeine AdvReac  Nausea & Verified 07/17/19 10:02





   Vomiting  














Review of Systems


ROS Statement: 


Those systems with pertinent positive or pertinent negative responses have been 

documented in the HPI.





ROS Other: All systems not noted in ROS Statement are negative.





Past Medical History


Past Medical History: GERD/Reflux, Hypertension, Liver Disease


Additional Past Medical History / Comment(s): seasonal allergies


History of Any Multi-Drug Resistant Organisms: None Reported


Past Surgical History: Hernia Repair


Additional Past Surgical History / Comment(s): umbilical hernia repair 2013


Past Anesthesia/Blood Transfusion Reactions: Postoperative Nausea & Vomiting 

(PONV)


Past Psychological History: No Psychological Hx Reported


Smoking Status: Never smoker


Past Alcohol Use History: Abuse, Daily, Heavy


Past Drug Use History: Prescription Drug Abuse





- Past Family History


  ** Mother


Family Medical History: Cancer, Diabetes Mellitus, Hypertension


Additional Family Medical History / Comment(s): nonHodgkins lymphoma





  ** Father


Family Medical History: Hypertension





General Exam





- General Exam Comments


Initial Comments: 


General:  The patient is awake and alert, in no distress, resting comfortably


Eye: +3 mm pupils are equal, round and reactive to light, extra-ocular movements

 are intact.  No nystagmus.  There is normal conjunctiva bilaterally.  No signs 

of icterus.  


Ears, nose, mouth and throat:  There are moist mucous membranes and no oral 

lesions. 


Neck:  The neck is supple, there is no tenderness or JVD.  


Cardiovascular:  There is a regular rate and rhythm. No murmur, rub or gallop is

 appreciated.


Respiratory:  Lungs are clear to auscultation, respirations are non-labored, 

breath sounds are equal.  No wheezes, stridor, rales, or rhonchi.


Gastrointestinal:  Soft, non-distended, non-tender abdomen without masses or 

organomegaly noted. There is no rebound or guarding present.  No CVA tenderness.

 Bowel sounds are unremarkable.


Musculoskeletal:  Normal ROM, no tenderness.  Strength 5/5. Sensation intact. 

Pulses equal bilaterally 2+.  


Neurological:  A&O x 3. CN II-XII intact, There are no obvious motor or sensory 

deficits. Coordination appears grossly intact. Speech is normal. No tremor.


Skin:  Skin is warm and dry and no rashes or lesions are noted. No LE edema.


Psychiatric:  Cooperative, appropriate mood & affect, normal judgment.  


Limitations: no limitations





Course


                                   Vital Signs











  07/17/19 07/17/19 07/17/19





  09:36 10:15 11:40


 


Temperature 98.5 F  


 


Pulse Rate 91  58 L


 


Respiratory 16  18





Rate   


 


Blood Pressure 86/53 77/53 94/62


 


O2 Sat by Pulse 96  100





Oximetry   














  07/17/19





  13:21


 


Temperature 98.6 F


 


Pulse Rate 59 L


 


Respiratory 18





Rate 


 


Blood Pressure 98/69


 


O2 Sat by Pulse 98





Oximetry 














Procedures





- Universal Protocol (Time Out)


Nurse: Sulema Mcnair





Medical Decision Making





- Medical Decision Making


53-year-old male presented for generalized weakness.  Hypotensive upon arrival. 

 Patient states she has been vomiting after drinking  while taking naltrexone. 

Alcohol today 0. Patient dry on exam. Patient Cr increased, troponin elevated, 

transaminases elevated. No abdominal pain on exam or complaints of abdominal 

pain. Patient EKG WNL.  At this time I feel patient is dehydrated which has 

caused prerenal acute kidney failure. Lactic acid 5.2 initially, repeat 2.0. 

Patient afebrile does not appears septic. CT abdomen obtained given history of 

vomiting although I feel it is medication induced. (-) for acute process. No 

obstructive urological process noted. UA revealed significant WBC, given 

ceftriaxone. Potassium 2.8, orally replaced.  Patient was given aggressive IV 

hydration emergency department. BP continues to trend upwards. Remains stable. 

Discussed case with attending Dr. Dockery, will admit patient for YEVGENIY, dehydration,

 elevated troponin and lactic acid. Dr. Chavez accepted admission, no further 

instruction. Nephrology on consult. Patient agreeable with admission.








- Lab Data


Result diagrams: 


                                 07/17/19 10:18





                                 07/17/19 10:18


                                   Lab Results











  07/17/19 07/17/19 07/17/19 Range/Units





  10:18 10:18 10:18 


 


WBC   7.2   (3.8-10.6)  k/uL


 


RBC   4.62   (4.30-5.90)  m/uL


 


Hgb   13.7   (13.0-17.5)  gm/dL


 


Hct   41.4   (39.0-53.0)  %


 


MCV   89.7   (80.0-100.0)  fL


 


MCH   29.7   (25.0-35.0)  pg


 


MCHC   33.1   (31.0-37.0)  g/dL


 


RDW   15.6 H   (11.5-15.5)  %


 


Plt Count   92 L   (150-450)  k/uL


 


Neutrophils %   70   %


 


Lymphocytes %   23   %


 


Monocytes %   5   %


 


Eosinophils %   1   %


 


Basophils %   0   %


 


Neutrophils #   5.1   (1.3-7.7)  k/uL


 


Lymphocytes #   1.7   (1.0-4.8)  k/uL


 


Monocytes #   0.3   (0-1.0)  k/uL


 


Eosinophils #   0.1   (0-0.7)  k/uL


 


Basophils #   0.0   (0-0.2)  k/uL


 


Manual Slide Review   Performed   


 


Poikilocytosis (manual   Present   


 


Stomatocytes   Present   


 


PT     (9.0-12.0)  sec


 


INR     (<1.2)  


 


APTT     (22.0-30.0)  sec


 


Sodium  134 L    (137-145)  mmol/L


 


Potassium  2.8 L    (3.5-5.1)  mmol/L


 


Chloride  89 L    ()  mmol/L


 


Carbon Dioxide  27    (22-30)  mmol/L


 


Anion Gap  18    mmol/L


 


BUN  19    (9-20)  mg/dL


 


Creatinine  4.06 H    (0.66-1.25)  mg/dL


 


Est GFR (CKD-EPI)AfAm  18    (>60 ml/min/1.73 sqM)  


 


Est GFR (CKD-EPI)NonAf  16    (>60 ml/min/1.73 sqM)  


 


Glucose  146 H    (74-99)  mg/dL


 


Lactic Ac Sepsis Rflx     


 


Plasma Lactic Acid Darrick    5.2 H*  (0.7-2.0)  mmol/L


 


Calcium  8.6    (8.4-10.2)  mg/dL


 


Magnesium  1.9    (1.6-2.3)  mg/dL


 


Total Bilirubin  6.8 H    (0.2-1.3)  mg/dL


 


AST  363 H    (17-59)  U/L


 


ALT  169 H    (21-72)  U/L


 


Alkaline Phosphatase  257 H    ()  U/L


 


Creatine Kinase     ()  U/L


 


Troponin I     (0.000-0.034)  ng/mL


 


Total Protein  6.7    (6.3-8.2)  g/dL


 


Albumin  3.6    (3.5-5.0)  g/dL


 


Lipase  173    ()  U/L


 


Urine Color     


 


Urine Appearance     (Clear)  


 


Urine pH     (5.0-8.0)  


 


Ur Specific Gravity     (1.001-1.035)  


 


Urine Protein     (Negative)  


 


Urine Glucose (UA)     (Negative)  


 


Urine Ketones     (Negative)  


 


Urine Blood     (Negative)  


 


Urine Nitrite     (Negative)  


 


Urine Bilirubin     (Negative)  


 


Urine Urobilinogen     (<2.0)  mg/dL


 


Ur Leukocyte Esterase     (Negative)  


 


Urine RBC     (0-5)  /hpf


 


Urine WBC     (0-5)  /hpf


 


Urine WBC Clumps     (None)  /hpf


 


Ur Squamous Epith Cells     (0-4)  /hpf


 


Hyaline Casts     (0-2)  /lpf


 


Urine Mucus     (None)  /hpf


 


Urine Opiates Screen     (NotDetected)  


 


Ur Oxycodone Screen     (NotDetected)  


 


Urine Methadone Screen     (NotDetected)  


 


Ur Propoxyphene Screen     (NotDetected)  


 


Ur Barbiturates Screen     (NotDetected)  


 


U Tricyclic Antidepress     (NotDetected)  


 


Ur Phencyclidine Scrn     (NotDetected)  


 


Ur Amphetamines Screen     (NotDetected)  


 


U Methamphetamines Scrn     (NotDetected)  


 


U Benzodiazepines Scrn     (NotDetected)  


 


Urine Cocaine Screen     (NotDetected)  


 


U Marijuana (THC) Screen     (NotDetected)  


 


Serum Alcohol  <10    mg/dL














  07/17/19 07/17/19 07/17/19 Range/Units





  10:18 10:18 10:18 


 


WBC     (3.8-10.6)  k/uL


 


RBC     (4.30-5.90)  m/uL


 


Hgb     (13.0-17.5)  gm/dL


 


Hct     (39.0-53.0)  %


 


MCV     (80.0-100.0)  fL


 


MCH     (25.0-35.0)  pg


 


MCHC     (31.0-37.0)  g/dL


 


RDW     (11.5-15.5)  %


 


Plt Count     (150-450)  k/uL


 


Neutrophils %     %


 


Lymphocytes %     %


 


Monocytes %     %


 


Eosinophils %     %


 


Basophils %     %


 


Neutrophils #     (1.3-7.7)  k/uL


 


Lymphocytes #     (1.0-4.8)  k/uL


 


Monocytes #     (0-1.0)  k/uL


 


Eosinophils #     (0-0.7)  k/uL


 


Basophils #     (0-0.2)  k/uL


 


Manual Slide Review     


 


Poikilocytosis (manual     


 


Stomatocytes     


 


PT  11.7    (9.0-12.0)  sec


 


INR  1.1    (<1.2)  


 


APTT  21.9 L    (22.0-30.0)  sec


 


Sodium     (137-145)  mmol/L


 


Potassium     (3.5-5.1)  mmol/L


 


Chloride     ()  mmol/L


 


Carbon Dioxide     (22-30)  mmol/L


 


Anion Gap     mmol/L


 


BUN     (9-20)  mg/dL


 


Creatinine     (0.66-1.25)  mg/dL


 


Est GFR (CKD-EPI)AfAm     (>60 ml/min/1.73 sqM)  


 


Est GFR (CKD-EPI)NonAf     (>60 ml/min/1.73 sqM)  


 


Glucose     (74-99)  mg/dL


 


Lactic Ac Sepsis Rflx     


 


Plasma Lactic Acid Darrick     (0.7-2.0)  mmol/L


 


Calcium     (8.4-10.2)  mg/dL


 


Magnesium     (1.6-2.3)  mg/dL


 


Total Bilirubin     (0.2-1.3)  mg/dL


 


AST     (17-59)  U/L


 


ALT     (21-72)  U/L


 


Alkaline Phosphatase     ()  U/L


 


Creatine Kinase    906 H  ()  U/L


 


Troponin I   0.054 H*   (0.000-0.034)  ng/mL


 


Total Protein     (6.3-8.2)  g/dL


 


Albumin     (3.5-5.0)  g/dL


 


Lipase     ()  U/L


 


Urine Color     


 


Urine Appearance     (Clear)  


 


Urine pH     (5.0-8.0)  


 


Ur Specific Gravity     (1.001-1.035)  


 


Urine Protein     (Negative)  


 


Urine Glucose (UA)     (Negative)  


 


Urine Ketones     (Negative)  


 


Urine Blood     (Negative)  


 


Urine Nitrite     (Negative)  


 


Urine Bilirubin     (Negative)  


 


Urine Urobilinogen     (<2.0)  mg/dL


 


Ur Leukocyte Esterase     (Negative)  


 


Urine RBC     (0-5)  /hpf


 


Urine WBC     (0-5)  /hpf


 


Urine WBC Clumps     (None)  /hpf


 


Ur Squamous Epith Cells     (0-4)  /hpf


 


Hyaline Casts     (0-2)  /lpf


 


Urine Mucus     (None)  /hpf


 


Urine Opiates Screen     (NotDetected)  


 


Ur Oxycodone Screen     (NotDetected)  


 


Urine Methadone Screen     (NotDetected)  


 


Ur Propoxyphene Screen     (NotDetected)  


 


Ur Barbiturates Screen     (NotDetected)  


 


U Tricyclic Antidepress     (NotDetected)  


 


Ur Phencyclidine Scrn     (NotDetected)  


 


Ur Amphetamines Screen     (NotDetected)  


 


U Methamphetamines Scrn     (NotDetected)  


 


U Benzodiazepines Scrn     (NotDetected)  


 


Urine Cocaine Screen     (NotDetected)  


 


U Marijuana (THC) Screen     (NotDetected)  


 


Serum Alcohol     mg/dL














  07/17/19 07/17/19 Range/Units





  11:09 11:49 


 


WBC    (3.8-10.6)  k/uL


 


RBC    (4.30-5.90)  m/uL


 


Hgb    (13.0-17.5)  gm/dL


 


Hct    (39.0-53.0)  %


 


MCV    (80.0-100.0)  fL


 


MCH    (25.0-35.0)  pg


 


MCHC    (31.0-37.0)  g/dL


 


RDW    (11.5-15.5)  %


 


Plt Count    (150-450)  k/uL


 


Neutrophils %    %


 


Lymphocytes %    %


 


Monocytes %    %


 


Eosinophils %    %


 


Basophils %    %


 


Neutrophils #    (1.3-7.7)  k/uL


 


Lymphocytes #    (1.0-4.8)  k/uL


 


Monocytes #    (0-1.0)  k/uL


 


Eosinophils #    (0-0.7)  k/uL


 


Basophils #    (0-0.2)  k/uL


 


Manual Slide Review    


 


Poikilocytosis (manual    


 


Stomatocytes    


 


PT    (9.0-12.0)  sec


 


INR    (<1.2)  


 


APTT    (22.0-30.0)  sec


 


Sodium    (137-145)  mmol/L


 


Potassium    (3.5-5.1)  mmol/L


 


Chloride    ()  mmol/L


 


Carbon Dioxide    (22-30)  mmol/L


 


Anion Gap    mmol/L


 


BUN    (9-20)  mg/dL


 


Creatinine    (0.66-1.25)  mg/dL


 


Est GFR (CKD-EPI)AfAm    (>60 ml/min/1.73 sqM)  


 


Est GFR (CKD-EPI)NonAf    (>60 ml/min/1.73 sqM)  


 


Glucose    (74-99)  mg/dL


 


Lactic Ac Sepsis Rflx  Y   


 


Plasma Lactic Acid Darrick    (0.7-2.0)  mmol/L


 


Calcium    (8.4-10.2)  mg/dL


 


Magnesium    (1.6-2.3)  mg/dL


 


Total Bilirubin    (0.2-1.3)  mg/dL


 


AST    (17-59)  U/L


 


ALT    (21-72)  U/L


 


Alkaline Phosphatase    ()  U/L


 


Creatine Kinase    ()  U/L


 


Troponin I    (0.000-0.034)  ng/mL


 


Total Protein    (6.3-8.2)  g/dL


 


Albumin    (3.5-5.0)  g/dL


 


Lipase    ()  U/L


 


Urine Color   Dark Brown  


 


Urine Appearance   Turbid  (Clear)  


 


Urine pH   5.5  (5.0-8.0)  


 


Ur Specific Gravity   1.021  (1.001-1.035)  


 


Urine Protein   1+ H  (Negative)  


 


Urine Glucose (UA)   Trace H  (Negative)  


 


Urine Ketones   Negative  (Negative)  


 


Urine Blood   Small H  (Negative)  


 


Urine Nitrite   Negative  (Negative)  


 


Urine Bilirubin   2+ H  (Negative)  


 


Urine Urobilinogen   3.0  (<2.0)  mg/dL


 


Ur Leukocyte Esterase   Negative  (Negative)  


 


Urine RBC   3  (0-5)  /hpf


 


Urine WBC   47 H  (0-5)  /hpf


 


Urine WBC Clumps   Few H  (None)  /hpf


 


Ur Squamous Epith Cells   2  (0-4)  /hpf


 


Hyaline Casts   157 H  (0-2)  /lpf


 


Urine Mucus   Many H  (None)  /hpf


 


Urine Opiates Screen   Not Detected  (NotDetected)  


 


Ur Oxycodone Screen   Not Detected  (NotDetected)  


 


Urine Methadone Screen   Not Detected  (NotDetected)  


 


Ur Propoxyphene Screen   Not Detected  (NotDetected)  


 


Ur Barbiturates Screen   Not Detected  (NotDetected)  


 


U Tricyclic Antidepress   Not Detected  (NotDetected)  


 


Ur Phencyclidine Scrn   Not Detected  (NotDetected)  


 


Ur Amphetamines Screen   Not Detected  (NotDetected)  


 


U Methamphetamines Scrn   Not Detected  (NotDetected)  


 


U Benzodiazepines Scrn   Not Detected  (NotDetected)  


 


Urine Cocaine Screen   Not Detected  (NotDetected)  


 


U Marijuana (THC) Screen   Not Detected  (NotDetected)  


 


Serum Alcohol    mg/dL














- EKG Data


EKG Comments: 








A 12-lead EKG was performed and shows the following: Rate is 68bpm, and rhythm 

is normal sinus. There are normal QRS complexes and normal R-wave progression. 

ST segments have no elevation or depression, and MN segments appear normal.  MN 

interval 154 ms, QRS duration 108 ms, QT//450 ms.








Disposition


Clinical Impression: 


 Dehydration, Acute renal failure, Vomiting, Low blood potassium, Elevated liver

 enzymes, History of alcohol abuse, Hypotension, Weakness, Elevated troponin





Disposition: ADMITTED AS IP TO THIS [facility]


Condition: Serious


Is patient prescribed a controlled substance at d/c from ED?: No


Time of Disposition: 12:05


Decision to Admit Reason: Admit from EC


Decision Date: 07/17/19


Decision Time: 12:05

## 2019-07-17 NOTE — XR
EXAMINATION TYPE: XR chest 2V

 

DATE OF EXAM: 7/17/2019

 

COMPARISON: 6/17/2017

 

HISTORY: Shortness of breath

 

TECHNIQUE:  Frontal and lateral views of the chest are obtained.

 

FINDINGS:

 

Scattered senescent parenchymal changes noted. Hyperinflation compatible with COPD. 

 

No evidence for infiltrate. No evidence for atelectasis.

 

Heart size is stable.

 

Mediastinal structures are stable and grossly unremarkable.

 

No evidence for hilar prominence.

 

Degenerative changes dorsal spine. 

 

IMPRESSION:

1. No evidence for acute pulmonary disease.

## 2019-07-18 VITALS — RESPIRATION RATE: 20 BRPM

## 2019-07-18 LAB
ANION GAP SERPL CALC-SCNC: 5 MMOL/L
BUN SERPL-SCNC: 10 MG/DL (ref 9–20)
CALCIUM SPEC-MCNC: 8.1 MG/DL (ref 8.4–10.2)
CHLORIDE SERPL-SCNC: 103 MMOL/L (ref 98–107)
CO2 SERPL-SCNC: 30 MMOL/L (ref 22–30)
GLUCOSE SERPL-MCNC: 104 MG/DL (ref 74–99)
MAGNESIUM SPEC-SCNC: 2 MG/DL (ref 1.6–2.3)
POTASSIUM SERPL-SCNC: 3.8 MMOL/L (ref 3.5–5.1)
SODIUM SERPL-SCNC: 138 MMOL/L (ref 137–145)

## 2019-07-18 RX ADMIN — PANTOPRAZOLE SODIUM SCH MG: 40 TABLET, DELAYED RELEASE ORAL at 06:31

## 2019-07-18 RX ADMIN — DIAZEPAM SCH MG: 2 TABLET ORAL at 09:14

## 2019-07-18 RX ADMIN — DIAZEPAM SCH MG: 2 TABLET ORAL at 15:27

## 2019-07-18 RX ADMIN — Medication SCH MG: at 06:31

## 2019-07-18 RX ADMIN — CITALOPRAM HYDROBROMIDE SCH MG: 20 TABLET ORAL at 09:14

## 2019-07-18 RX ADMIN — POTASSIUM CHLORIDE SCH MLS/HR: 14.9 INJECTION, SOLUTION INTRAVENOUS at 16:47

## 2019-07-18 RX ADMIN — POTASSIUM CHLORIDE SCH MLS/HR: 14.9 INJECTION, SOLUTION INTRAVENOUS at 06:35

## 2019-07-18 RX ADMIN — Medication SCH MG: at 16:44

## 2019-07-18 RX ADMIN — ASPIRIN 81 MG CHEWABLE TABLET SCH MG: 81 TABLET CHEWABLE at 21:43

## 2019-07-18 RX ADMIN — PANTOPRAZOLE SODIUM SCH MG: 40 TABLET, DELAYED RELEASE ORAL at 16:44

## 2019-07-18 RX ADMIN — Medication SCH MG: at 21:43

## 2019-07-18 RX ADMIN — POTASSIUM CHLORIDE SCH: 14.9 INJECTION, SOLUTION INTRAVENOUS at 14:04

## 2019-07-18 RX ADMIN — THERA TABS SCH EACH: TAB at 21:43

## 2019-07-18 RX ADMIN — DIAZEPAM SCH MG: 2 TABLET ORAL at 21:43

## 2019-07-18 RX ADMIN — NALTREXONE HYDROCHLORIDE SCH MG: 50 TABLET, FILM COATED ORAL at 09:14

## 2019-07-18 RX ADMIN — ATORVASTATIN CALCIUM SCH MG: 20 TABLET, FILM COATED ORAL at 21:43

## 2019-07-18 NOTE — P.PN
Progress Note - Text


Progress Note Date: 07/18/19





Chief Complaint: Weak and tired





Interval history:


This is a pleasant 53-year-old patient follows with  from [place]. 

Chronic stable medical conditions include GERD, hypertension, hyperlipidemia.  

History drink heavy alcohol and he did not drink since April.  Patient over 2 

weeks ago went on vacation started drinking again.  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 

For last 3 days has been feeling what he describes as hot and started vomiting 

for 3 days not able to keep much food down this morning was lightheaded and got 

dizzy twice and he started get up.  He felt rather exhausted.  Finally was sent 

into the hospital from his work.  No fever no chills.  Just exhausted and tired.

 No chest pain no palpitation.


Admitting diagnoses acute severe renal failure, severe hyperkalemia, lactic 

acidosis.


Today-does feel a bit better.  Up to the bathroom.  Still some shaking is 

present.  Oral intake improving.  Oral intake improving





Review of systems: Was done for constitutional, cardiovascular, GI, pulmonary. 

relevant finding as above





Active Medications





Aspirin (Aspirin)  81 mg PO [facility]


   Last Admin: 07/17/19 20:32 Dose:  81 mg


   Documented by: 


Atorvastatin Calcium (Lipitor)  20 mg PO [facility]


   Last Admin: 07/17/19 20:32 Dose:  20 mg


   Documented by: 


Citalopram Hydrobromide (Celexa)  60 mg PO DAILY [facility]


   Last Admin: 07/18/19 09:14 Dose:  60 mg


   Documented by: 


Diazepam (Valium)  2 mg PO TID [facility]


   Last Admin: 07/18/19 15:27 Dose:  2 mg


   Documented by: 


Enoxaparin Sodium (Lovenox)  40 mg SQ Q24H [facility]


Lactated Ringer's (Lactated Ringers)  1,000 mls @ 150 mls/hr IV .Q6H40M [facility]


   Last Admin: 07/18/19 16:47 Dose:  150 mls/hr


   Documented by: 


Lorazepam (Ativan)  1 mg IV Q2HR PRN


   PRN Reason: CIWA 8 or 9


   Last Admin: 07/18/19 06:35 Dose:  1 mg


   Documented by: 


Lorazepam (Ativan)  1 mg IV Q1HR PRN


   PRN Reason: CIWA 10 to 15


Lorazepam (Ativan)  2 mg IV Q10M PRN


   PRN Reason: CIWA 16 or higher


   Stop: 07/19/19 10:00


Melatonin (Melatonin)  3 mg PO HS [facility]


   Last Admin: 07/17/19 23:18 Dose:  Not Given


   Documented by: 


Multivitamins (Theragran)  1 each PO HS [facility]


   Last Admin: 07/17/19 19:55 Dose:  Not Given


   Documented by: 


Naloxone HCl (Narcan)  0.2 mg IV Q2M PRN


   PRN Reason: Opioid Reversal


Naltrexone HCl (Revia)  50 mg PO DAILY [facility]


   Last Admin: 07/18/19 09:14 Dose:  50 mg


   Documented by: 


Pantoprazole Sodium (Protonix)  40 mg PO AC-BID [facility]


   Last Admin: 07/18/19 16:44 Dose:  40 mg


   Documented by: 


Thiamine HCl (Vitamin B-1)  100 mg PO BID-W/MEALS [facility]


   Last Admin: 07/18/19 16:44 Dose:  100 mg


   Documented by: 


Trazodone HCl (Desyrel)  50 mg PO HS PRN


   PRN Reason: Insomnia


   Last Admin: 07/17/19 22:25 Dose:  50 mg


   Documented by: 











Physical examination:


VITAL SIGNS: 98, 75, 18, 120s/84, 93% room air


GENERAL: Sitting up, appears a bit more steady today


EYES: Pupils equal.  Conjunctiva normal.


HEENT: External appearance of nose and ears normal, oral cavity grossly normal.


NECK: JVD not raised; masses not palpable.


HEART: First and second heart sounds are normal;  no edema.  


LUNGS: Respiratory rate normal; clear to auscultation.  


ABDOMEN: Soft,  nontender, liver spleen not palpable, no masses palpable.  


PSYCH: Alert and oriented x3;  mood  and affect bit anxiousl.  


NEUROLOGICAL: Cranial nerves grossly intact; no facial asymmetry,   power and 

sensation grossly intact, fine tremors. 








INVESTIGATIONS, reviewed in the clinical context:


White count 7.2 hemoglobin 13.7 platelets 92 potassium 2.8 BUN 19 creatinine is 

4.06


Potassium 3.8 BUN 10 creatinine 1.29





Assessment:


-Acute severe renal failure from persistent vomiting chronic causing acute 

kidney injury, improving


-Severe hypokalemia from vomiting, improving


-Lactic acidosis type II from dehydration


-Alcoholic hepatitis


-Troponin leak likely from hemodynamic mismatch no evidence of acute cornea 

syndrome


-GERD


-Essential hypertension


-Hyperlipidemia


-Early alcohol withdrawal syndrome


-Mild rhabdomyolysis





Plan:


We'll keep the patient on Valium today.  Potassium being aggressively replaced. 

So his magnesium.  Patient is counseled against drinking alcohol yet again.  

Questions were answered.  Encouraged to ablate.  Possible discharge in 24 hours.

## 2019-07-18 NOTE — P.PN
Subjective





Patient is seen in follow-up for acute kidney injury.  Creatinine was 4.06 on 

admission and is down to 1.29 today.  Patient denies any chest pain or shortness

of breath.  Blood pressures controlled.  He is maintained on IV fluids.  Good 

urine output.  No dizziness.  





Vital signs are stable.


General: The patient appeared well nourished and normally developed. 


HEENT: Head exam is unremarkable. Neck is without jugular venous distension.


LUNGS: Lungs are clear to auscultation and percussion. Breath sounds decreased.


HEART: Rate and Rhythm are regular. First and second heart sounds normal. No 

murmurs, rubs or gallops. 


ABDOMEN: Abdominal exam reveals normal bowel sounds. Non-tender and non-

distended. No evidence of peritonitis.


EXTREMITITES: No clubbing, cyanosis, or edema.





Objective





- Vital Signs


Vital signs: 


                                   Vital Signs











Temp  98 F   07/18/19 08:00


 


Pulse  75   07/18/19 08:00


 


Resp  18   07/18/19 08:00


 


BP  128/84   07/18/19 08:00


 


Pulse Ox  93 L  07/18/19 08:00








                                 Intake & Output











 07/17/19 07/18/19 07/18/19





 18:59 06:59 18:59


 


Intake Total  250 360


 


Balance  250 360


 


Weight 90.718 kg 98.4 kg 98.4 kg


 


Intake:   


 


  Intake, IV Titration  250 





  Amount   


 


    Sodium Chloride 0.9% 1,  250 





    000 ml @ 100 mls/hr IV .   





    Q10H [facility] Rx#:[id number]   


 


  Oral   360


 


Other:   


 


  Voiding Method  Toilet 


 


  # Voids  1 














- Labs


CBC & Chem 7: 


                                 07/17/19 10:18





                                 07/18/19 05:33


Labs: 


                  Abnormal Lab Results - Last 24 Hours (Table)











  07/17/19 07/17/19 07/17/19 Range/Units





  10:18 10:18 10:18 


 


RDW  15.6 H    (11.5-15.5)  %


 


Plt Count  92 L    (150-450)  k/uL


 


Creatinine     (0.66-1.25)  mg/dL


 


Glucose     (74-99)  mg/dL


 


Plasma Lactic Acid Darrick   5.2 H*   (0.7-2.0)  mmol/L


 


Calcium     (8.4-10.2)  mg/dL


 


Creatine Kinase     ()  U/L


 


Troponin I    0.054 H*  (0.000-0.034)  ng/mL


 


Urine Protein     (Negative)  


 


Urine Glucose (UA)     (Negative)  


 


Urine Blood     (Negative)  


 


Urine Bilirubin     (Negative)  


 


Urine WBC     (0-5)  /hpf


 


Urine WBC Clumps     (None)  /hpf


 


Hyaline Casts     (0-2)  /lpf


 


Urine Mucus     (None)  /hpf














  07/17/19 07/17/19 07/18/19 Range/Units





  10:18 11:49 05:33 


 


RDW     (11.5-15.5)  %


 


Plt Count     (150-450)  k/uL


 


Creatinine    1.29 H  (0.66-1.25)  mg/dL


 


Glucose    104 H  (74-99)  mg/dL


 


Plasma Lactic Acid Darrick     (0.7-2.0)  mmol/L


 


Calcium    8.1 L  (8.4-10.2)  mg/dL


 


Creatine Kinase  906 H    ()  U/L


 


Troponin I     (0.000-0.034)  ng/mL


 


Urine Protein   1+ H   (Negative)  


 


Urine Glucose (UA)   Trace H   (Negative)  


 


Urine Blood   Small H   (Negative)  


 


Urine Bilirubin   2+ H   (Negative)  


 


Urine WBC   47 H   (0-5)  /hpf


 


Urine WBC Clumps   Few H   (None)  /hpf


 


Hyaline Casts   157 H   (0-2)  /lpf


 


Urine Mucus   Many H   (None)  /hpf














Assessment and Plan


Plan: 





Assessment:


1.  Acute kidney injury mostly prerenal secondary to hypotension and 

intravascular volume depletion from vomiting.  Creatinine 4.06 on admission and 

is down to 1.29 today.  Baseline creatinine near 1.  No evidence of 

hydronephrosis noted.


2.  Hypotension secondary to antihypertensives and intravascular volume 

depletion.  Resolved.


3.  Hypokalemia from poor oral intake.  Magnesium normal.  Better post 

replacement.


4.  Lactic acidosis secondary to hypotension.


5.  Nausea and vomiting.  Better. ? Gastroenteritis.  No acute process noted on 

CAT scan.





Plan:


Decreased rate of LR to 75 mL an hour.


Hold antihypertensives.

## 2019-07-19 VITALS — SYSTOLIC BLOOD PRESSURE: 143 MMHG | DIASTOLIC BLOOD PRESSURE: 96 MMHG | TEMPERATURE: 97.8 F | HEART RATE: 73 BPM

## 2019-07-19 LAB
ANION GAP SERPL CALC-SCNC: 7 MMOL/L
BUN SERPL-SCNC: 4 MG/DL (ref 9–20)
CALCIUM SPEC-MCNC: 8.5 MG/DL (ref 8.4–10.2)
CHLORIDE SERPL-SCNC: 106 MMOL/L (ref 98–107)
CO2 SERPL-SCNC: 27 MMOL/L (ref 22–30)
GLUCOSE SERPL-MCNC: 126 MG/DL (ref 74–99)
MAGNESIUM SPEC-SCNC: 1.9 MG/DL (ref 1.6–2.3)
POTASSIUM SERPL-SCNC: 4.1 MMOL/L (ref 3.5–5.1)
SODIUM SERPL-SCNC: 140 MMOL/L (ref 137–145)

## 2019-07-19 RX ADMIN — DIAZEPAM SCH MG: 2 TABLET ORAL at 07:37

## 2019-07-19 RX ADMIN — NALTREXONE HYDROCHLORIDE SCH MG: 50 TABLET, FILM COATED ORAL at 07:37

## 2019-07-19 RX ADMIN — PANTOPRAZOLE SODIUM SCH MG: 40 TABLET, DELAYED RELEASE ORAL at 07:37

## 2019-07-19 RX ADMIN — Medication SCH MG: at 07:37

## 2019-07-19 RX ADMIN — CITALOPRAM HYDROBROMIDE SCH MG: 20 TABLET ORAL at 07:37

## 2019-07-19 RX ADMIN — POTASSIUM CHLORIDE SCH MLS/HR: 14.9 INJECTION, SOLUTION INTRAVENOUS at 07:37

## 2019-07-19 RX ADMIN — POTASSIUM CHLORIDE SCH: 14.9 INJECTION, SOLUTION INTRAVENOUS at 01:41

## 2019-07-22 NOTE — P.DS
Providers


Date of admission: 


07/17/19 13:02





Expected date of discharge: 07/19/19


Attending physician: 


Andrew Chavez





Consults: 





                                        





07/17/19 12:05


Consult Physician Routine 


   Consulting Provider: Asiya Lim


   Consult Reason/Comments: acute renal failure (suspected pre renal)


   Do you want consulting provider notified?: Yes











Primary care physician: 


Rick Woodward





[facility] Course: 





Interval history:


This is a pleasant 53-year-old patient follows with  from [place]. 

Chronic stable medical conditions include GERD, hypertension, hyperlipidemia.  

History drink heavy alcohol and he did not drink since April.  Patient over 2 

weeks ago went on vacation started drinking again.  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 

For last 3 days has been feeling what he describes as hot and started vomiting 

for 3 days not able to keep much food down this morning was lightheaded and got 

dizzy twice and he started get up.  He felt rather exhausted.  Finally was sent 

into the hospital from his work.  No fever no chills.  Just exhausted and tired.

 No chest pain no palpitation.


Admitting diagnoses acute severe renal failure, severe hyperkalemia, lactic 

acidosis.


Treated with IV fluids.  Electrolytes were corrected.  He'll also put on Valium 

for early alcohol withdrawal syndrome.  Doing much better by the time of 

discharge.  Creatinine dropped from 4.06 down to 0.81.  Patient is counseled at 

length about his alcohol.  Questions were answered.  Patient's up and about.  

Feeling well.


Discussion and discharge planning more than 35 minutes





Physical examination:


VITAL SIGNS: 97.8, 73, 20, 143/96, 95% room air


GENERAL: Sitting up in a chair, comfortable


EYES: Pupils equal.  Conjunctiva normal.


HEENT: External appearance of nose and ears normal, oral cavity grossly normal.


NECK: JVD not raised; masses not palpable.


HEART: First and second heart sounds are normal;  no edema.  


LUNGS: Respiratory rate normal; clear to auscultation.  


ABDOMEN: Soft,  nontender, liver spleen not palpable, no masses palpable.  


PSYCH: Alert and oriented x3;  mood  and affect bit anxiousl.  


NEUROLOGICAL: Cranial nerves grossly intact; no facial asymmetry,   power and 

sensation grossly intact, fine tremors. 








INVESTIGATIONS, reviewed in the clinical context:


Creatinine 0.81





Assessment:


-Acute severe renal failure, prerenal from persistent vomiting chronic causing 

acute kidney injury, improving


-Severe hypokalemia from vomiting, improving


-Lactic acidosis type II from dehydration


-Alcoholic hepatitis


-Troponin leak likely from hemodynamic mismatch no evidence of acute coronary 

syndrome


-GERD


-Essential hypertension


-Hyperlipidemia


-Early alcohol withdrawal syndrome


-Mild rhabdomyolysis





Disposition:


Home











Patient Condition at Discharge: Stable





Plan - Discharge Summary


Discharge Rx Participant: No


New Discharge Prescriptions: 


New


   Metoprolol Tartrate [Lopressor] 12.5 mg PO BID #6 dose





Continue


   Multivitamins, Thera [Multivitamin (formulary)] 1 tab PO HS


   Aspirin EC [Ecotrin Low Dose] 81 mg PO HS


   Cholecalciferol [Vitamin D3 (25 Mcg = 1000 Iu)] 5,000 unit PO HS


   Atorvastatin [Lipitor] 20 mg PO HS


   Melatonin 3 mg PO HS


   EPINEPHrine (Auto Inject) [Epipen] 0.3 mg IM ONCE PRN #2 syringe


     PRN Reason: Anaphylaxis


   Omeprazole [PriLOSEC] 20 mg PO AC-BID


   Ondansetron Odt [Zofran ODT] 4 mg PO TID PRN


     PRN Reason: Nausea


   Glucosam/Ky-Msm1/C/Bear/Bosw [Glucosamine-Chondroitin Tablet] 1 tab PO 

DAILY


   traZODone HCL 50 mg PO HS PRN


     PRN Reason: Insomnia


   Thiamine [Vitamin B-1] 50 mg PO DAILY


   Naltrexone HCl [Revia] 50 mg PO DAILY


   Citalopram Hydrobromide [CeleXA] 60 mg PO DAILY


   Omega-3 Fatty Acids/Fish Oil [Fish Oil 1,000 mg Softgel] 1 cap PO DAILY





Discontinued


   amLODIPine BESYLATE/BENAZEPRIL [amLODIPine BESYLATE/BENAZEPRIL 5-20 mg] 1 cap

PO BID


Discharge Medication List





Aspirin EC [Ecotrin Low Dose] 81 mg PO HS 06/17/17 [History]


Multivitamins, Thera [Multivitamin (formulary)] 1 tab PO HS 06/17/17 [History]


Atorvastatin [Lipitor] 20 mg PO HS 02/28/18 [History]


Cholecalciferol [Vitamin D3 (25 Mcg = 1000 Iu)] 5,000 unit PO HS 02/28/18 

[History]


EPINEPHrine (Auto Inject) [Epipen] 0.3 mg IM ONCE PRN #2 syringe 08/02/18 [Rx]


Melatonin 3 mg PO HS 08/02/18 [History]


Citalopram Hydrobromide [CeleXA] 60 mg PO DAILY 07/17/19 [History]


Glucosam/Ky-Msm1/C/Bear/Bosw [Glucosamine-Chondroitin Tablet] 1 tab PO DAILY 

07/17/19 [History]


Naltrexone HCl [Revia] 50 mg PO DAILY 07/17/19 [History]


Omega-3 Fatty Acids/Fish Oil [Fish Oil 1,000 mg Softgel] 1 cap PO DAILY 07/17/19

[History]


Omeprazole [PriLOSEC] 20 mg PO AC-BID 07/17/19 [History]


Ondansetron Odt [Zofran ODT] 4 mg PO TID PRN 07/17/19 [History]


Thiamine [Vitamin B-1] 50 mg PO DAILY 07/17/19 [History]


traZODone HCL 50 mg PO HS PRN 07/17/19 [History]


Metoprolol Tartrate [Lopressor] 12.5 mg PO BID #6 dose 07/19/19 [Rx]








Follow up Appointment(s)/Referral(s): 


Rick Woodward MD [Primary Care Provider] - 07/22/19 12:15 pm


Patient Instructions/Handouts:  Dehydration (DC), Abuse of Alcohol (DC)


Discharge/Stand Alone Forms:  Work/Release Restrictions Form

## 2020-02-13 ENCOUNTER — HOSPITAL ENCOUNTER (EMERGENCY)
Dept: HOSPITAL 47 - EC | Age: 55
Discharge: HOME | End: 2020-02-13
Payer: COMMERCIAL

## 2020-02-13 VITALS — RESPIRATION RATE: 16 BRPM | HEART RATE: 112 BPM

## 2020-02-13 VITALS — SYSTOLIC BLOOD PRESSURE: 149 MMHG | DIASTOLIC BLOOD PRESSURE: 104 MMHG

## 2020-02-13 VITALS — TEMPERATURE: 98 F

## 2020-02-13 DIAGNOSIS — Z79.82: ICD-10-CM

## 2020-02-13 DIAGNOSIS — Z91.030: ICD-10-CM

## 2020-02-13 DIAGNOSIS — Z88.5: ICD-10-CM

## 2020-02-13 DIAGNOSIS — Z79.899: ICD-10-CM

## 2020-02-13 DIAGNOSIS — Z88.2: ICD-10-CM

## 2020-02-13 DIAGNOSIS — K21.9: ICD-10-CM

## 2020-02-13 DIAGNOSIS — R11.2: Primary | ICD-10-CM

## 2020-02-13 DIAGNOSIS — Z98.890: ICD-10-CM

## 2020-02-13 DIAGNOSIS — Z82.49: ICD-10-CM

## 2020-02-13 DIAGNOSIS — R16.0: ICD-10-CM

## 2020-02-13 DIAGNOSIS — R74.0: ICD-10-CM

## 2020-02-13 DIAGNOSIS — R00.0: ICD-10-CM

## 2020-02-13 DIAGNOSIS — I10: ICD-10-CM

## 2020-02-13 LAB
ALBUMIN SERPL-MCNC: 4.1 G/DL (ref 3.5–5)
ALP SERPL-CCNC: 138 U/L (ref 38–126)
ALT SERPL-CCNC: 54 U/L (ref 4–49)
AMYLASE SERPL-CCNC: 86 U/L (ref 30–110)
ANION GAP SERPL CALC-SCNC: 18 MMOL/L
AST SERPL-CCNC: 78 U/L (ref 17–59)
BASOPHILS # BLD AUTO: 0.2 K/UL (ref 0–0.2)
BASOPHILS NFR BLD AUTO: 2 %
BUN SERPL-SCNC: 10 MG/DL (ref 9–20)
CALCIUM SPEC-MCNC: 9.1 MG/DL (ref 8.4–10.2)
CHLORIDE SERPL-SCNC: 100 MMOL/L (ref 98–107)
CO2 SERPL-SCNC: 22 MMOL/L (ref 22–30)
EOSINOPHIL # BLD AUTO: 0 K/UL (ref 0–0.7)
EOSINOPHIL NFR BLD AUTO: 0 %
ERYTHROCYTE [DISTWIDTH] IN BLOOD BY AUTOMATED COUNT: 4.66 M/UL (ref 4.3–5.9)
ERYTHROCYTE [DISTWIDTH] IN BLOOD: 13.6 % (ref 11.5–15.5)
GLUCOSE SERPL-MCNC: 117 MG/DL (ref 74–99)
HCT VFR BLD AUTO: 44.8 % (ref 39–53)
HGB BLD-MCNC: 14.8 GM/DL (ref 13–17.5)
HYALINE CASTS UR QL AUTO: 132 /LPF (ref 0–2)
KETONES UR QL STRIP.AUTO: (no result)
LYMPHOCYTES # SPEC AUTO: 1.4 K/UL (ref 1–4.8)
LYMPHOCYTES NFR SPEC AUTO: 14 %
MCH RBC QN AUTO: 31.7 PG (ref 25–35)
MCHC RBC AUTO-ENTMCNC: 33 G/DL (ref 31–37)
MCV RBC AUTO: 96.1 FL (ref 80–100)
MONOCYTES # BLD AUTO: 0.7 K/UL (ref 0–1)
MONOCYTES NFR BLD AUTO: 7 %
NEUTROPHILS # BLD AUTO: 7.1 K/UL (ref 1.3–7.7)
NEUTROPHILS NFR BLD AUTO: 73 %
PH UR: 5.5 [PH] (ref 5–8)
PLATELET # BLD AUTO: 160 K/UL (ref 150–450)
POTASSIUM SERPL-SCNC: 3.4 MMOL/L (ref 3.5–5.1)
PROT SERPL-MCNC: 7.6 G/DL (ref 6.3–8.2)
PROT UR QL: (no result)
RBC UR QL: <1 /HPF (ref 0–5)
SODIUM SERPL-SCNC: 140 MMOL/L (ref 137–145)
SP GR UR: 1.03 (ref 1–1.03)
SQUAMOUS UR QL AUTO: 1 /HPF (ref 0–4)
UROBILINOGEN UR QL STRIP: 4 MG/DL (ref ?–2)
WBC # BLD AUTO: 9.8 K/UL (ref 3.8–10.6)
WBC #/AREA URNS HPF: 4 /HPF (ref 0–5)

## 2020-02-13 PROCEDURE — 76705 ECHO EXAM OF ABDOMEN: CPT

## 2020-02-13 PROCEDURE — 99284 EMERGENCY DEPT VISIT MOD MDM: CPT

## 2020-02-13 PROCEDURE — 81001 URINALYSIS AUTO W/SCOPE: CPT

## 2020-02-13 PROCEDURE — 82150 ASSAY OF AMYLASE: CPT

## 2020-02-13 PROCEDURE — 96374 THER/PROPH/DIAG INJ IV PUSH: CPT

## 2020-02-13 PROCEDURE — 80053 COMPREHEN METABOLIC PANEL: CPT

## 2020-02-13 PROCEDURE — 96361 HYDRATE IV INFUSION ADD-ON: CPT

## 2020-02-13 PROCEDURE — 36415 COLL VENOUS BLD VENIPUNCTURE: CPT

## 2020-02-13 PROCEDURE — 85025 COMPLETE CBC W/AUTO DIFF WBC: CPT

## 2020-02-13 PROCEDURE — 83690 ASSAY OF LIPASE: CPT

## 2020-02-13 PROCEDURE — 93005 ELECTROCARDIOGRAM TRACING: CPT

## 2020-02-13 PROCEDURE — 84484 ASSAY OF TROPONIN QUANT: CPT

## 2020-02-13 NOTE — US
EXAMINATION TYPE: US abdomen limited

 

DATE OF EXAM: 2/13/2020

 

COMPARISON: NONE

 

CLINICAL HISTORY: pain.

 

EXAM MEASUREMENTS:

 

Liver Length:  19.2 cm   

Gallbladder Wall:  0.3 cm   

CBD:  0.4 cm

Right Kidney:  11.2 x 5.5 x 5.5 cm

 

Severe overlying bowel gas, patient unable to hold still for test, technically difficult and limited 
study.

 

Pancreas:  Obscured by bowel gas

Liver:  Increased attenuation, decreased visualization of vessels, enlarged, somewhat limited visuali
zation

Gallbladder:  wnl, as seen, limited views

**Evidence for sonographic Sheikh's sign:  no

CBD:  wnl, limited visualization 

Right Kidney:  No hydronephrosis or masses seen, limited views 

 

 

 

IMPRESSION:

 

1. Hepatomegaly with underlying hepatic steatosis.

## 2020-02-13 NOTE — ED
Nausea/Vomiting/Diarrhea HPI





- General


Chief complaint: Nausea/Vomiting/Diarrhea


Stated complaint: Nausea/Vomiting/Dizzy


Time Seen by Provider: 02/13/20 11:10


Source: patient, RN notes reviewed


Mode of arrival: ambulatory


Limitations: no limitations





- History of Present Illness


Initial comments: 





Patient 54-year-old male presented to the emergency room today with chief 

complaint of symptoms of nausea, vomiting over the last 4 days.  Patient does 

admit that he was able to keep down fluids (approximately 2 days ago he says is 

more difficult time.  Patient does admit to some soreness in the abdomen with 

vomiting.  He states is having approximately 10:15 episodes per day.  Denies any

signs of blood in the emesis.  Patient denies any diarrhea.  She points her 

symptoms at this time. Patient denies any recent fever, chills, shortness of 

breath, chest pain, back pain, numbness or tingling, dysuria or hematuria, 

constipation or diarrhea, headaches or visual changes, or any other complaints.





- Related Data


                                Home Medications











 Medication  Instructions  Recorded  Confirmed


 


Aspirin EC [Ecotrin Low Dose] 81 mg PO HS 06/17/17 07/17/19


 


Multivitamins, Thera [Multivitamin 1 tab PO HS 06/17/17 07/17/19





(formulary)]   


 


Atorvastatin [Lipitor] 20 mg PO HS 02/28/18 07/17/19


 


Cholecalciferol [Vitamin D3 (25 5,000 unit PO HS 02/28/18 07/17/19





Mcg = 1000 Iu)]   


 


Melatonin 3 mg PO HS 08/02/18 07/17/19


 


Citalopram Hydrobromide [CeleXA] 60 mg PO DAILY 07/17/19 07/17/19


 


Glucosam/Ky-Msm1/C/Bear/Bosw 1 tab PO DAILY 07/17/19 07/17/19





[Glucosamine-Chondroitin Tablet]   


 


Naltrexone HCl [Revia] 50 mg PO DAILY 07/17/19 07/17/19


 


Omega-3 Fatty Acids/Fish Oil [Fish 1 cap PO DAILY 07/17/19 07/17/19





Oil 1,000 mg Softgel]   


 


Omeprazole [PriLOSEC] 20 mg PO AC-BID 07/17/19 07/17/19


 


Ondansetron Odt [Zofran ODT] 4 mg PO TID PRN 07/17/19 07/17/19


 


Thiamine [Vitamin B-1] 50 mg PO DAILY 07/17/19 07/17/19


 


traZODone HCL 50 mg PO HS PRN 07/17/19 07/17/19








                                  Previous Rx's











 Medication  Instructions  Recorded


 


EPINEPHrine (Auto Inject) [Epipen] 0.3 mg IM ONCE PRN #2 syringe 08/02/18


 


Metoprolol Tartrate [Lopressor] 12.5 mg PO BID #6 dose 07/19/19


 


Ondansetron Odt [Zofran ODT] 4 mg PO Q8HR PRN #20 tab 02/13/20











                                    Allergies











Allergy/AdvReac Type Severity Reaction Status Date / Time


 


bee venom protein (honey bee) Allergy  Anaphylaxis Verified 02/13/20 10:56


 


Sulfa (Sulfonamide Allergy  Swelling Verified 02/13/20 10:56





Antibiotics)     


 


codeine AdvReac  Nausea & Verified 02/13/20 10:56





   Vomiting  














Review of Systems


ROS Statement: 


Those systems with pertinent positive or pertinent negative responses have been 

documented in the HPI.





ROS Other: All systems not noted in ROS Statement are negative.





Past Medical History


Past Medical History: GERD/Reflux, Hypertension, Liver Disease


Additional Past Medical History / Comment(s): seasonal allergies


History of Any Multi-Drug Resistant Organisms: None Reported


Past Surgical History: Hernia Repair


Additional Past Surgical History / Comment(s): umbilical hernia repair 2013


Past Anesthesia/Blood Transfusion Reactions: Postoperative Nausea & Vomiting 

(PONV)


Past Psychological History: No Psychological Hx Reported


Smoking Status: Never smoker


Past Alcohol Use History: Heavy


Past Drug Use History: Prescription Drug Abuse





- Past Family History


  ** Mother


Family Medical History: Cancer, Diabetes Mellitus, Hypertension


Additional Family Medical History / Comment(s): nonHodgkins lymphoma





  ** Father


Family Medical History: Hypertension





General Exam





- General Exam Comments


Initial Comments: 





General:  The patient is awake and alert, in no distress, and does not appear 

acutely ill. 


Eye:  There is normal conjunctiva bilaterally.  No signs of icterus.  


Ears, nose, mouth and throat:  There are moist mucous membranes and no oral 

lesions. 


Neck:  The neck is supple, there is no tenderness or JVD.  


Cardiovascular:  There is a regular rate and rhythm. No murmur, rub or gallop is

appreciated.


Respiratory:  Lungs are clear to auscultation, respirations are non-labored, 

breath sounds are equal.  No wheezes, stridor, rales, or rhonchi.


Gastrointestinal:  Soft, non-distended, non-tender abdomen without masses or org

anomegaly noted. There is no rebound or guarding present.  No CVA tenderness. 


Musculoskeletal:  Normal ROM, no tenderness.    


Neurological:  A&O x 3. CN II-XII intact, There are no obvious motor or sensory 

deficits. Coordination appears grossly intact. Speech is normal.


Skin:  Skin is warm and dry and no rashes or lesions are noted. 


Psychiatric:  Cooperative, appropriate mood & affect, normal judgment.  


Limitations: no limitations





Course


                                   Vital Signs











  02/13/20 02/13/20





  10:56 13:51


 


Temperature 98.7 F 98.0 F


 


Pulse Rate 128 H 102 H


 


Respiratory 20 18





Rate  


 


Blood Pressure 145/108 148/102


 


O2 Sat by Pulse 94 L 98





Oximetry  














Medical Decision Making





- Medical Decision Making





EKG performed at 1110: Shows sinus tachycardia 126 bpm.  DC interval 142.  QRS 

90.  QT//443.  No acute ST change





Patient reexamined at this time is resting over.  Does note feeling better here 

in emergency room after IV fluids, Zofran.  Patient labs been reviewed did show 

mildly elevated liver enzymes and alk phos.  A ultrasound was obtained showing 

hepatomegaly.  Patient does admit to a past history of daily drinking.  Patient 

states she's had elevated liver enzymes in the past.  At this times doing well 

feels couple being discharged home.  Will be prescription for Zofran./Pulses 

family doctor next 2 days.  Advised to return to emergency room symptoms 

increase or worsen or for new concerns.  Patient states understanding and is in 

agreement with this plan.





- Lab Data


Result diagrams: 


                                 02/13/20 11:20





                                 02/13/20 11:20


                                   Lab Results











  02/13/20 02/13/20 02/13/20 Range/Units





  11:20 11:20 11:20 


 


WBC   9.8   (3.8-10.6)  k/uL


 


RBC   4.66   (4.30-5.90)  m/uL


 


Hgb   14.8   (13.0-17.5)  gm/dL


 


Hct   44.8   (39.0-53.0)  %


 


MCV   96.1   (80.0-100.0)  fL


 


MCH   31.7   (25.0-35.0)  pg


 


MCHC   33.0   (31.0-37.0)  g/dL


 


RDW   13.6   (11.5-15.5)  %


 


Plt Count   160   (150-450)  k/uL


 


Neutrophils %   73   %


 


Lymphocytes %   14   %


 


Monocytes %   7   %


 


Eosinophils %   0   %


 


Basophils %   2   %


 


Neutrophils #   7.1   (1.3-7.7)  k/uL


 


Lymphocytes #   1.4   (1.0-4.8)  k/uL


 


Monocytes #   0.7   (0-1.0)  k/uL


 


Eosinophils #   0.0   (0-0.7)  k/uL


 


Basophils #   0.2   (0-0.2)  k/uL


 


Sodium  140    (137-145)  mmol/L


 


Potassium  3.4 L    (3.5-5.1)  mmol/L


 


Chloride  100    ()  mmol/L


 


Carbon Dioxide  22    (22-30)  mmol/L


 


Anion Gap  18    mmol/L


 


BUN  10    (9-20)  mg/dL


 


Creatinine  1.15    (0.66-1.25)  mg/dL


 


Est GFR (CKD-EPI)AfAm  84    (>60 ml/min/1.73 sqM)  


 


Est GFR (CKD-EPI)NonAf  72    (>60 ml/min/1.73 sqM)  


 


Glucose  117 H    (74-99)  mg/dL


 


Calcium  9.1    (8.4-10.2)  mg/dL


 


Total Bilirubin  1.1    (0.2-1.3)  mg/dL


 


AST  78 H    (17-59)  U/L


 


ALT  54 H    (4-49)  U/L


 


Alkaline Phosphatase  138 H    ()  U/L


 


Troponin I    <0.012  (0.000-0.034)  ng/mL


 


Total Protein  7.6    (6.3-8.2)  g/dL


 


Albumin  4.1    (3.5-5.0)  g/dL


 


Amylase  86    ()  U/L


 


Lipase  296    ()  U/L


 


Urine Color     


 


Urine Appearance     (Clear)  


 


Urine pH     (5.0-8.0)  


 


Ur Specific Gravity     (1.001-1.035)  


 


Urine Protein     (Negative)  


 


Urine Glucose (UA)     (Negative)  


 


Urine Ketones     (Negative)  


 


Urine Blood     (Negative)  


 


Urine Nitrite     (Negative)  


 


Urine Bilirubin     (Negative)  


 


Urine Urobilinogen     (<2.0)  mg/dL


 


Ur Leukocyte Esterase     (Negative)  


 


Urine RBC     (0-5)  /hpf


 


Urine WBC     (0-5)  /hpf


 


Ur Squamous Epith Cells     (0-4)  /hpf


 


Hyaline Casts     (0-2)  /lpf


 


Urine Mucus     (None)  /hpf














  02/13/20 Range/Units





  12:46 


 


WBC   (3.8-10.6)  k/uL


 


RBC   (4.30-5.90)  m/uL


 


Hgb   (13.0-17.5)  gm/dL


 


Hct   (39.0-53.0)  %


 


MCV   (80.0-100.0)  fL


 


MCH   (25.0-35.0)  pg


 


MCHC   (31.0-37.0)  g/dL


 


RDW   (11.5-15.5)  %


 


Plt Count   (150-450)  k/uL


 


Neutrophils %   %


 


Lymphocytes %   %


 


Monocytes %   %


 


Eosinophils %   %


 


Basophils %   %


 


Neutrophils #   (1.3-7.7)  k/uL


 


Lymphocytes #   (1.0-4.8)  k/uL


 


Monocytes #   (0-1.0)  k/uL


 


Eosinophils #   (0-0.7)  k/uL


 


Basophils #   (0-0.2)  k/uL


 


Sodium   (137-145)  mmol/L


 


Potassium   (3.5-5.1)  mmol/L


 


Chloride   ()  mmol/L


 


Carbon Dioxide   (22-30)  mmol/L


 


Anion Gap   mmol/L


 


BUN   (9-20)  mg/dL


 


Creatinine   (0.66-1.25)  mg/dL


 


Est GFR (CKD-EPI)AfAm   (>60 ml/min/1.73 sqM)  


 


Est GFR (CKD-EPI)NonAf   (>60 ml/min/1.73 sqM)  


 


Glucose   (74-99)  mg/dL


 


Calcium   (8.4-10.2)  mg/dL


 


Total Bilirubin   (0.2-1.3)  mg/dL


 


AST   (17-59)  U/L


 


ALT   (4-49)  U/L


 


Alkaline Phosphatase   ()  U/L


 


Troponin I   (0.000-0.034)  ng/mL


 


Total Protein   (6.3-8.2)  g/dL


 


Albumin   (3.5-5.0)  g/dL


 


Amylase   ()  U/L


 


Lipase   ()  U/L


 


Urine Color  Yellow  


 


Urine Appearance  Cloudy  (Clear)  


 


Urine pH  5.5  (5.0-8.0)  


 


Ur Specific Gravity  1.028  (1.001-1.035)  


 


Urine Protein  1+ H  (Negative)  


 


Urine Glucose (UA)  Negative  (Negative)  


 


Urine Ketones  2+ H  (Negative)  


 


Urine Blood  Negative  (Negative)  


 


Urine Nitrite  Negative  (Negative)  


 


Urine Bilirubin  Negative  (Negative)  


 


Urine Urobilinogen  4.0  (<2.0)  mg/dL


 


Ur Leukocyte Esterase  Negative  (Negative)  


 


Urine RBC  <1  (0-5)  /hpf


 


Urine WBC  4  (0-5)  /hpf


 


Ur Squamous Epith Cells  1  (0-4)  /hpf


 


Hyaline Casts  132 H  (0-2)  /lpf


 


Urine Mucus  Many H  (None)  /hpf














Disposition


Clinical Impression: 


 Nausea & vomiting





Disposition: HOME SELF-CARE


Condition: Good


Instructions (If sedation given, give patient instructions):  Acute Nausea and 

Vomiting (ED)


Additional Instructions: 


Please use medication as discussed.  Please follow-up with family doctor in the 

next 2 days of symptoms have not improved.  Please return to emergency room if 

the symptoms increase or worsen or for any other concerns.


Prescriptions: 


Ondansetron Odt [Zofran ODT] 4 mg PO Q8HR PRN #20 tab


 PRN Reason: Nausea


Is patient prescribed a controlled substance at d/c from ED?: No


Referrals: 


Rick Woodward MD [Primary Care Provider] - 1-2 days


Time of Disposition: 14:38

## 2020-08-19 ENCOUNTER — HOSPITAL ENCOUNTER (OUTPATIENT)
Dept: HOSPITAL 47 - RADMRIMAIN | Age: 55
Discharge: HOME | End: 2020-08-19
Attending: NURSE PRACTITIONER
Payer: COMMERCIAL

## 2020-08-19 DIAGNOSIS — M71.22: ICD-10-CM

## 2020-08-19 DIAGNOSIS — M17.12: ICD-10-CM

## 2020-08-19 DIAGNOSIS — S83.242A: Primary | ICD-10-CM

## 2020-08-20 NOTE — MR
EXAMINATION TYPE: MR knee LT wo con

 

DATE OF EXAM: 8/19/2020

 

COMPARISON: NONE

 

HISTORY: Lt knee pain/swelling x 6 mos

 

TECHNIQUE: 

Multiplanar, multisequence images of the knee is performed without IV contrast.

 

FINDINGS:

 

MEDIAL MENISCUS: Medial extrusion medial meniscus noted on coronal images Anterior horn is intact wit
hout tear. Posterior horn shows oblique signal centrally sagittal image 25 extending to inferior leslie
cular surface consistent with full-thickness tear.

 

LATERAL MENISCUS: Anterior and posterior horns are intact without tear.

 

CRUCIATE LIGAMENTS: The anterior and posterior cruciate ligaments are intact and unremarkable.

 

COLLATERAL LIGAMENTS: The medial collateral ligament and lateral collateral ligament complex are inta
ct and unremarkable. 

 

EXTENSOR MECHANISM: Visualized quadriceps and patellar tendons are intact.

 

EFFUSION:  No significant suprapatellar joint effusion.

 

POPLITEAL CYST: Small to tiny popliteal/baker cyst sagittal image 27 for reference.

 

TRICOMPARTMENT SPACES: Moderate narrowing patellofemoral compartment with mild spurring. More mild to
 moderate narrowing and mild spurring medial greater than lateral tibiofemoral compartments.

 

CARTILAGE: Some chondromalacia patella with thinning of articular cartilage along the inferior aspect
 of the posterior patellar pole. No full thickness cartilaginous loss. Some thinning of articular car
tilage medial tibial femoral compartment best seen on sagittal images

 

BONE MARROW SIGNAL: No focal abnormal marrow signal is appreciated.

 

OTHER: Mild to moderate soft tissue swelling and diffuse subcutaneous edema is noted.

 

IMPRESSION: 

1. Full-thickness focal oblique tear posterior horn medial meniscus.

2. Fairly moderate tricompartment degenerative changes greatest patellofemoral compartment as detaile
d above.

3. Mild to moderate diffuse soft tissue swelling and subcutaneous edema noted.

4. Small to tiny popliteal cyst.

## 2021-06-07 ENCOUNTER — HOSPITAL ENCOUNTER (EMERGENCY)
Dept: HOSPITAL 47 - EC | Age: 56
Discharge: HOME | End: 2021-06-07
Payer: COMMERCIAL

## 2021-06-07 VITALS — RESPIRATION RATE: 20 BRPM

## 2021-06-07 VITALS — SYSTOLIC BLOOD PRESSURE: 130 MMHG | HEART RATE: 74 BPM | DIASTOLIC BLOOD PRESSURE: 96 MMHG | TEMPERATURE: 98.6 F

## 2021-06-07 DIAGNOSIS — K21.9: ICD-10-CM

## 2021-06-07 DIAGNOSIS — I10: ICD-10-CM

## 2021-06-07 DIAGNOSIS — Z79.82: ICD-10-CM
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
## 2021-06-07 DIAGNOSIS — E86.0: Primary | ICD-10-CM

## 2021-06-07 DIAGNOSIS — S09.90XA: ICD-10-CM
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
## 2021-06-07 LAB
ALBUMIN SERPL-MCNC: 3.1 G/DL (ref 3.5–5)
ALP SERPL-CCNC: 64 U/L (ref 38–126)
ALT SERPL-CCNC: 19 U/L (ref 4–49)
ANION GAP SERPL CALC-SCNC: 9 MMOL/L
APTT BLD: 22.6 SEC (ref 22–30)
AST SERPL-CCNC: 32 U/L (ref 17–59)
BASOPHILS # BLD AUTO: 0.1 K/UL (ref 0–0.2)
BASOPHILS NFR BLD AUTO: 1 %
BUN SERPL-SCNC: 5 MG/DL (ref 9–20)
CALCIUM SPEC-MCNC: 8.9 MG/DL (ref 8.4–10.2)
CHLORIDE SERPL-SCNC: 108 MMOL/L (ref 98–107)
CO2 SERPL-SCNC: 25 MMOL/L (ref 22–30)
EOSINOPHIL # BLD AUTO: 0.3 K/UL (ref 0–0.7)
EOSINOPHIL NFR BLD AUTO: 3 %
ERYTHROCYTE [DISTWIDTH] IN BLOOD BY AUTOMATED COUNT: 3.68 M/UL (ref 4.3–5.9)
ERYTHROCYTE [DISTWIDTH] IN BLOOD: 15.7 % (ref 11.5–15.5)
GLUCOSE SERPL-MCNC: 115 MG/DL (ref 74–99)
HCT VFR BLD AUTO: 35 % (ref 39–53)
HGB BLD-MCNC: 11.7 GM/DL (ref 13–17.5)
INR PPP: 1 (ref ?–1.2)
LYMPHOCYTES # SPEC AUTO: 2 K/UL (ref 1–4.8)
LYMPHOCYTES NFR SPEC AUTO: 20 %
MAGNESIUM SPEC-SCNC: 2 MG/DL (ref 1.6–2.3)
MCH RBC QN AUTO: 31.8 PG (ref 25–35)
MCHC RBC AUTO-ENTMCNC: 33.5 G/DL (ref 31–37)
MCV RBC AUTO: 95.1 FL (ref 80–100)
MONOCYTES # BLD AUTO: 0.6 K/UL (ref 0–1)
MONOCYTES NFR BLD AUTO: 5 %
NEUTROPHILS # BLD AUTO: 7.1 K/UL (ref 1.3–7.7)
NEUTROPHILS NFR BLD AUTO: 70 %
PLATELET # BLD AUTO: 283 K/UL (ref 150–450)
POTASSIUM SERPL-SCNC: 3.6 MMOL/L (ref 3.5–5.1)
PROT SERPL-MCNC: 5.8 G/DL (ref 6.3–8.2)
PT BLD: 10.7 SEC (ref 9–12)
SODIUM SERPL-SCNC: 142 MMOL/L (ref 137–145)
WBC # BLD AUTO: 10.2 K/UL (ref 3.8–10.6)

## 2021-06-07 PROCEDURE — 85730 THROMBOPLASTIN TIME PARTIAL: CPT

## 2021-06-07 PROCEDURE — 83735 ASSAY OF MAGNESIUM: CPT

## 2021-06-07 PROCEDURE — 80320 DRUG SCREEN QUANTALCOHOLS: CPT

## 2021-06-07 PROCEDURE — 85025 COMPLETE CBC W/AUTO DIFF WBC: CPT

## 2021-06-07 PROCEDURE — 96361 HYDRATE IV INFUSION ADD-ON: CPT

## 2021-06-07 PROCEDURE — 71046 X-RAY EXAM CHEST 2 VIEWS: CPT

## 2021-06-07 PROCEDURE — 99284 EMERGENCY DEPT VISIT MOD MDM: CPT

## 2021-06-07 PROCEDURE — 84484 ASSAY OF TROPONIN QUANT: CPT

## 2021-06-07 PROCEDURE — 93005 ELECTROCARDIOGRAM TRACING: CPT

## 2021-06-07 PROCEDURE — 80053 COMPREHEN METABOLIC PANEL: CPT

## 2021-06-07 PROCEDURE — 72125 CT NECK SPINE W/O DYE: CPT

## 2021-06-07 PROCEDURE — 96360 HYDRATION IV INFUSION INIT: CPT

## 2021-06-07 PROCEDURE — 70450 CT HEAD/BRAIN W/O DYE: CPT

## 2021-06-07 PROCEDURE — 85610 PROTHROMBIN TIME: CPT

## 2021-06-07 NOTE — XR
EXAMINATION TYPE: XR chest 2V

 

DATE OF EXAM: 6/7/2021

 

COMPARISON: 7/17/2019

 

HISTORY: Chest pain

 

TECHNIQUE: 2 views

 

FINDINGS: Heart is normal. Lungs are clear of consolidation. There is no pleural effusion. There is r
elative poor inspiration. There is no heart failure.

 

IMPRESSION: Inspiration decreased slightly compared to old exam. No pulmonary consolidation or heart 
failure.

## 2021-06-07 NOTE — ED
Head Injury HPI





- General


Chief complaint: Head Injury


Stated complaint: fell, hit head


Time Seen by Provider: 06/07/21 17:32


Source: patient, RN notes reviewed


Mode of arrival: ambulatory


Limitations: no limitations





- History of Present Illness


Initial comments: 





Patient is a 55-year-old male that presents to the emergency department with his

daughter complaining of falls.  Daughter notes the patient is been falling more 

having difficulty walking in sending weird incoherent texts.  Patient notes that

he was carrying a laundry basket while wearing his bifocals when he kindly got 

disoriented fell hit his temple on the left side on his ottoman and then rolled 

out of for hit the back of his head on the floor.  Daughter notes the patient 

has drank 1 for local and another alcoholic drink today.  Daughter also notes 

that patient left work around noon thinking at work a full day.  Patient denied 

any pain while sitting up in bed during exam and interview.  He was answering 

questions appropriately.  Patient denied any blood thinners or loss of consc

iousness.  Patient did note that he takes ReVia and is still drinking alcohol 

the same time.  He denied any chest pain shortness of breath headache nausea 

vomiting diarrhea constipation fever fatigue chills.





- Related Data


                                Home Medications











 Medication  Instructions  Recorded  Confirmed


 


Aspirin EC [Ecotrin Low Dose] 81 mg PO HS 06/17/17 07/17/19


 


Multivitamins, Thera [Multivitamin 1 tab PO HS 06/17/17 07/17/19





(formulary)]   


 


Atorvastatin [Lipitor] 20 mg PO HS 02/28/18 07/17/19


 


Cholecalciferol [Vitamin D3 (25 5,000 unit PO HS 02/28/18 07/17/19





Mcg = 1000 Iu)]   


 


Melatonin 3 mg PO HS 08/02/18 07/17/19


 


Citalopram Hydrobromide [CeleXA] 60 mg PO DAILY 07/17/19 07/17/19


 


Glucosam/Ky-Msm1/C/Bear/Bosw 1 tab PO DAILY 07/17/19 07/17/19





[Glucosamine-Chondroitin Tablet]   


 


Naltrexone HCl [Revia] 50 mg PO DAILY 07/17/19 07/17/19


 


Omega-3 Fatty Acids/Fish Oil [Fish 1 cap PO DAILY 07/17/19 07/17/19





Oil 1,000 mg Softgel]   


 


Omeprazole [PriLOSEC] 20 mg PO AC-BID 07/17/19 07/17/19


 


Ondansetron Odt [Zofran ODT] 4 mg PO TID PRN 07/17/19 07/17/19


 


Thiamine [Vitamin B-1] 50 mg PO DAILY 07/17/19 07/17/19


 


traZODone HCL 50 mg PO HS PRN 07/17/19 07/17/19








                                  Previous Rx's











 Medication  Instructions  Recorded


 


EPINEPHrine (Auto Inject) [Epipen] 0.3 mg IM ONCE PRN #2 syringe 08/02/18


 


Metoprolol Tartrate [Lopressor] 12.5 mg PO BID #6 dose 07/19/19


 


Ondansetron Odt [Zofran ODT] 4 mg PO Q8HR PRN #20 tab 02/13/20











Allergies/Adverse reactions: 


                                    Allergies











Allergy/AdvReac Type Severity Reaction Status Date / Time


 


bee venom protein (honey bee) Allergy  Anaphylaxis Verified 06/07/21 17:20


 


Sulfa (Sulfonamide Allergy  Swelling Verified 06/07/21 17:20





Antibiotics)     


 


codeine AdvReac  Nausea & Verified 06/07/21 17:20





   Vomiting  














Review of Systems


ROS Statement: 


Those systems with pertinent positive or pertinent negative responses have been 

documented in the HPI.





ROS Other: All systems not noted in ROS Statement are negative.





Past Medical History


Past Medical History: GERD/Reflux, Hypertension, Liver Disease


Additional Past Medical History / Comment(s): seasonal allergies


History of Any Multi-Drug Resistant Organisms: None Reported


Past Surgical History: Hernia Repair


Additional Past Surgical History / Comment(s): umbilical hernia repair 2013


Past Anesthesia/Blood Transfusion Reactions: Postoperative Nausea & Vomiting 

(PONV)


Past Psychological History: No Psychological Hx Reported


Smoking Status: Never smoker


Past Alcohol Use History: Heavy


Past Drug Use History: Prescription Drug Abuse





- Past Family History


  ** Mother


Family Medical History: Cancer, Diabetes Mellitus, Hypertension


Additional Family Medical History / Comment(s): nonHodgkins lymphoma





  ** Father


Family Medical History: Hypertension





General Exam


Limitations: no limitations


General appearance: alert, in no apparent distress, appears intoxicated


Head exam: Present: atraumatic, normocephalic, normal inspection


Eye exam: Present: normal appearance, PERRL, EOMI.  Absent: scleral icterus, 

conjunctival injection, periorbital swelling


Neck exam: Present: normal inspection.  Absent: tenderness, lymphadenopathy


Respiratory exam: Present: normal lung sounds bilaterally.  Absent: respiratory 

distress, wheezes, rales, rhonchi, stridor


Cardiovascular Exam: Present: regular rate, normal rhythm, normal heart sounds. 

Absent: systolic murmur, diastolic murmur, rubs, gallop, clicks


GI/Abdominal exam: Present: soft, normal bowel sounds.  Absent: distended, 

tenderness, guarding, rebound, rigid


Extremities exam: Present: normal inspection, full ROM, normal capillary refill.

 Absent: tenderness, pedal edema, joint swelling, calf tenderness


Neurological exam: Present: alert, oriented X3


  ** Expanded


Patient oriented to: Present: person, place, time


Speech: Present: fluid speech


Cranial nerves: EOM's Intact: Normal, Tongue Deviation: Normal, Nystagmus: 

Normal


Cerebellar function: Finger to Nose: Normal, Heel to Shin: Normal


Motor strength exam: RUE: 5, LUE: 5, RLE: 5, LLE: 5


Eye Response: (4) open spontaneously


Motor Response: (6) obeys commands


Verbal Response: (5) oriented


Psychiatric exam: Present: normal affect, normal mood


Skin exam: Present: warm, dry, intact, normal color.  Absent: rash





Course


                                   Vital Signs











  06/07/21





  17:17


 


Temperature 98.0 F


 


Pulse Rate 88


 


Respiratory 20





Rate 


 


Blood Pressure 116/78


 


O2 Sat by Pulse 95





Oximetry 














Medical Decision Making





- Medical Decision Making





55-year-old male complaining of hitting his temple and back of his head after 

falling at home.


Labs, EKG, cardiac monitor, CT of the brain and C-spine, chest x-ray, 1 L normal

saline ordered.


Labs: Hemoglobin 11.7, hematocrit 35.0 be within 5, creatinine 0.59, troponin 

within normal limits, clotting within normal limits, rest unremarkable..


Imaging negative for any acute process.


Case discussed with Dr. Malave, patient can discharge home follow-up primary 

care.





- Lab Data


Result diagrams: 


                                 06/07/21 18:19





                                 06/07/21 18:19


                                   Lab Results











  06/07/21 06/07/21 06/07/21 Range/Units





  18:19 18:19 18:19 


 


WBC  10.2    (3.8-10.6)  k/uL


 


RBC  3.68 L    (4.30-5.90)  m/uL


 


Hgb  11.7 L    (13.0-17.5)  gm/dL


 


Hct  35.0 L    (39.0-53.0)  %


 


MCV  95.1    (80.0-100.0)  fL


 


MCH  31.8    (25.0-35.0)  pg


 


MCHC  33.5    (31.0-37.0)  g/dL


 


RDW  15.7 H    (11.5-15.5)  %


 


Plt Count  283    (150-450)  k/uL


 


MPV  7.1    


 


Neutrophils %  70    %


 


Lymphocytes %  20    %


 


Monocytes %  5    %


 


Eosinophils %  3    %


 


Basophils %  1    %


 


Neutrophils #  7.1    (1.3-7.7)  k/uL


 


Lymphocytes #  2.0    (1.0-4.8)  k/uL


 


Monocytes #  0.6    (0-1.0)  k/uL


 


Eosinophils #  0.3    (0-0.7)  k/uL


 


Basophils #  0.1    (0-0.2)  k/uL


 


PT   10.7   (9.0-12.0)  sec


 


INR   1.0   (<1.2)  


 


APTT   22.6   (22.0-30.0)  sec


 


Sodium    142  (137-145)  mmol/L


 


Potassium    3.6  (3.5-5.1)  mmol/L


 


Chloride    108 H  ()  mmol/L


 


Carbon Dioxide    25  (22-30)  mmol/L


 


Anion Gap    9  mmol/L


 


BUN    5 L  (9-20)  mg/dL


 


Creatinine    0.59 L  (0.66-1.25)  mg/dL


 


Est GFR (CKD-EPI)AfAm    >90  (>60 ml/min/1.73 sqM)  


 


Est GFR (CKD-EPI)NonAf    >90  (>60 ml/min/1.73 sqM)  


 


Glucose    115 H  (74-99)  mg/dL


 


Calcium    8.9  (8.4-10.2)  mg/dL


 


Magnesium    2.0  (1.6-2.3)  mg/dL


 


Total Bilirubin    0.3  (0.2-1.3)  mg/dL


 


AST    32  (17-59)  U/L


 


ALT    19  (4-49)  U/L


 


Alkaline Phosphatase    64  ()  U/L


 


Troponin I     (0.000-0.034)  ng/mL


 


Total Protein    5.8 L  (6.3-8.2)  g/dL


 


Albumin    3.1 L  (3.5-5.0)  g/dL


 


Serum Alcohol    54  mg/dL














  06/07/21 Range/Units





  18:19 


 


WBC   (3.8-10.6)  k/uL


 


RBC   (4.30-5.90)  m/uL


 


Hgb   (13.0-17.5)  gm/dL


 


Hct   (39.0-53.0)  %


 


MCV   (80.0-100.0)  fL


 


MCH   (25.0-35.0)  pg


 


MCHC   (31.0-37.0)  g/dL


 


RDW   (11.5-15.5)  %


 


Plt Count   (150-450)  k/uL


 


MPV   


 


Neutrophils %   %


 


Lymphocytes %   %


 


Monocytes %   %


 


Eosinophils %   %


 


Basophils %   %


 


Neutrophils #   (1.3-7.7)  k/uL


 


Lymphocytes #   (1.0-4.8)  k/uL


 


Monocytes #   (0-1.0)  k/uL


 


Eosinophils #   (0-0.7)  k/uL


 


Basophils #   (0-0.2)  k/uL


 


PT   (9.0-12.0)  sec


 


INR   (<1.2)  


 


APTT   (22.0-30.0)  sec


 


Sodium   (137-145)  mmol/L


 


Potassium   (3.5-5.1)  mmol/L


 


Chloride   ()  mmol/L


 


Carbon Dioxide   (22-30)  mmol/L


 


Anion Gap   mmol/L


 


BUN   (9-20)  mg/dL


 


Creatinine   (0.66-1.25)  mg/dL


 


Est GFR (CKD-EPI)AfAm   (>60 ml/min/1.73 sqM)  


 


Est GFR (CKD-EPI)NonAf   (>60 ml/min/1.73 sqM)  


 


Glucose   (74-99)  mg/dL


 


Calcium   (8.4-10.2)  mg/dL


 


Magnesium   (1.6-2.3)  mg/dL


 


Total Bilirubin   (0.2-1.3)  mg/dL


 


AST   (17-59)  U/L


 


ALT   (4-49)  U/L


 


Alkaline Phosphatase   ()  U/L


 


Troponin I  <0.012  (0.000-0.034)  ng/mL


 


Total Protein   (6.3-8.2)  g/dL


 


Albumin   (3.5-5.0)  g/dL


 


Serum Alcohol   mg/dL














- EKG Data


-: EKG Interpreted by Me


EKG shows normal: sinus rhythm


Rate: normal


EKG Comments: 





Ventricular rate 75 bpm, GA interval 140 ms, QRS duration 90 ms,  ms, 

PRT axis 29/13/80.


Normal sinus rhythm, normal ECG.





- Radiology Data


Radiology results: report reviewed, image reviewed


Chest x-ray: Inspiration decreased slightly compared to old exam.  No pulmonary 

consolidation heart failure.


CT of the brain and C-spine: Negative computed tomography scan of the brain.  

Mild atrophy.  Spondylitic changes in the cervical spine.  No fracture.





Disposition


Clinical Impression: 


 Weakness, Dehydration, Fall





Disposition: HOME SELF-CARE


Condition: Stable


Instructions (If sedation given, give patient instructions):  Concussion (ED), 

Dehydration (ED)


Additional Instructions: 


Please return to the Emergency Department if symptoms worsen or any other conc

erns.


Follow-up with primary care in the next 1-2 days.


Discussed with primary care potential medication reactions.


Increase oral fluid intake.


Avoid alcohol.





Is patient prescribed a controlled substance at d/c from ED?: No


Referrals: 


Rick Woodward MD [Primary Care Provider] - 1-2 days


Time of Disposition: 19:47

## 2021-06-07 NOTE — CT
EXAMINATION TYPE: CT brain caoiine wo con

 

DATE OF EXAM: 6/7/2021

 

COMPARISON: CT brain 3/16/2011

 

HISTORY: Pt fall

 

CT DLP: 1591.8 mGycm

Automated exposure control for dose reduction was used.

 

There is mild cerebral atrophy. There is no mass effect nor midline shift. There is no sign of intrac
ranial hemorrhage. The calvarium is intact. Skull base is intact. There is normal aeration of the mas
toid sinuses.

 

The cervical vertebra have  Fairly Normal alignment. There is degenerative anterior spurring at C5-6 
and C6-7. There is disc space narrowing at C5-6 and C6-7. There is spurring and minimal subluxation a
t C3-4.

 

IMPRESSION:

Negative CT scan of the brain. Mild atrophy.

 

Spondylotic changes in the cervical spine. No fracture.

## 2021-08-23 ENCOUNTER — HOSPITAL ENCOUNTER (INPATIENT)
Dept: HOSPITAL 47 - EC | Age: 56
LOS: 23 days | Discharge: HOME HEALTH | DRG: 871 | End: 2021-09-15
Attending: INTERNAL MEDICINE | Admitting: INTERNAL MEDICINE
Payer: COMMERCIAL

## 2021-08-23 VITALS — BODY MASS INDEX: 21 KG/M2

## 2021-08-23 DIAGNOSIS — E87.0: ICD-10-CM

## 2021-08-23 DIAGNOSIS — E53.1: ICD-10-CM

## 2021-08-23 DIAGNOSIS — M62.82: ICD-10-CM

## 2021-08-23 DIAGNOSIS — E11.9: ICD-10-CM
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
## 2021-08-23 DIAGNOSIS — A86: ICD-10-CM

## 2021-08-23 DIAGNOSIS — F10.231: ICD-10-CM

## 2021-08-23 DIAGNOSIS — B96.89: ICD-10-CM

## 2021-08-23 DIAGNOSIS — D50.9: ICD-10-CM

## 2021-08-23 DIAGNOSIS — Z79.899: ICD-10-CM

## 2021-08-23 DIAGNOSIS — F41.1: ICD-10-CM

## 2021-08-23 DIAGNOSIS — K21.9: ICD-10-CM

## 2021-08-23 DIAGNOSIS — S80.212A: ICD-10-CM

## 2021-08-23 DIAGNOSIS — J98.6: ICD-10-CM

## 2021-08-23 DIAGNOSIS — B37.0: ICD-10-CM

## 2021-08-23 DIAGNOSIS — E87.6: ICD-10-CM

## 2021-08-23 DIAGNOSIS — J96.01: ICD-10-CM

## 2021-08-23 DIAGNOSIS — Z82.49: ICD-10-CM

## 2021-08-23 DIAGNOSIS — E51.9: ICD-10-CM

## 2021-08-23 DIAGNOSIS — I50.9: ICD-10-CM
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
## 2021-08-23 DIAGNOSIS — Z88.2: ICD-10-CM

## 2021-08-23 DIAGNOSIS — D53.9: ICD-10-CM

## 2021-08-23 DIAGNOSIS — M87.9: ICD-10-CM

## 2021-08-23 DIAGNOSIS — R29.6: ICD-10-CM

## 2021-08-23 DIAGNOSIS — Z91.81: ICD-10-CM

## 2021-08-23 DIAGNOSIS — S20.319A: ICD-10-CM

## 2021-08-23 DIAGNOSIS — G92: ICD-10-CM

## 2021-08-23 DIAGNOSIS — E43: ICD-10-CM

## 2021-08-23 DIAGNOSIS — S02.32XA: ICD-10-CM

## 2021-08-23 DIAGNOSIS — Z80.7: ICD-10-CM

## 2021-08-23 DIAGNOSIS — G03.9: ICD-10-CM

## 2021-08-23 DIAGNOSIS — R70.0: ICD-10-CM

## 2021-08-23 DIAGNOSIS — M47.9: ICD-10-CM

## 2021-08-23 DIAGNOSIS — S80.211A: ICD-10-CM

## 2021-08-23 DIAGNOSIS — K76.0: ICD-10-CM

## 2021-08-23 DIAGNOSIS — L03.116: ICD-10-CM

## 2021-08-23 DIAGNOSIS — A41.9: Primary | ICD-10-CM

## 2021-08-23 DIAGNOSIS — Z88.0: ICD-10-CM

## 2021-08-23 DIAGNOSIS — E83.42: ICD-10-CM

## 2021-08-23 DIAGNOSIS — Z78.1: ICD-10-CM

## 2021-08-23 DIAGNOSIS — E86.0: ICD-10-CM

## 2021-08-23 DIAGNOSIS — E87.8: ICD-10-CM

## 2021-08-23 DIAGNOSIS — S05.12XA: ICD-10-CM

## 2021-08-23 DIAGNOSIS — G93.89: ICD-10-CM

## 2021-08-23 DIAGNOSIS — M77.52: ICD-10-CM

## 2021-08-23 DIAGNOSIS — J98.11: ICD-10-CM

## 2021-08-23 DIAGNOSIS — I11.0: ICD-10-CM

## 2021-08-23 DIAGNOSIS — J69.0: ICD-10-CM

## 2021-08-23 DIAGNOSIS — Z20.822: ICD-10-CM

## 2021-08-23 DIAGNOSIS — Z83.3: ICD-10-CM

## 2021-08-23 LAB
ALBUMIN SERPL-MCNC: 2.8 G/DL (ref 3.5–5)
ALP SERPL-CCNC: 73 U/L (ref 38–126)
ALT SERPL-CCNC: 44 U/L (ref 4–49)
ANION GAP SERPL CALC-SCNC: 13 MMOL/L
ANION GAP SERPL CALC-SCNC: 14 MMOL/L
APTT BLD: 22.5 SEC (ref 22–30)
AST SERPL-CCNC: 100 U/L (ref 17–59)
BASOPHILS # BLD AUTO: 0.1 K/UL (ref 0–0.2)
BASOPHILS NFR BLD AUTO: 1 %
BUN SERPL-SCNC: 14 MG/DL (ref 9–20)
BUN SERPL-SCNC: 15 MG/DL (ref 9–20)
CALCIUM SPEC-MCNC: 8.3 MG/DL (ref 8.4–10.2)
CALCIUM SPEC-MCNC: 8.9 MG/DL (ref 8.4–10.2)
CHLORIDE SERPL-SCNC: 114 MMOL/L (ref 98–107)
CHLORIDE SERPL-SCNC: 115 MMOL/L (ref 98–107)
CK SERPL-CCNC: 999 U/L (ref 55–170)
CO2 SERPL-SCNC: 24 MMOL/L (ref 22–30)
CO2 SERPL-SCNC: 25 MMOL/L (ref 22–30)
EOSINOPHIL # BLD AUTO: 0 K/UL (ref 0–0.7)
EOSINOPHIL NFR BLD AUTO: 0 %
ERYTHROCYTE [DISTWIDTH] IN BLOOD BY AUTOMATED COUNT: 3.65 M/UL (ref 4.3–5.9)
ERYTHROCYTE [DISTWIDTH] IN BLOOD: 14.8 % (ref 11.5–15.5)
GLUCOSE BLD-MCNC: 96 MG/DL (ref 75–99)
GLUCOSE SERPL-MCNC: 95 MG/DL (ref 74–99)
GLUCOSE SERPL-MCNC: 95 MG/DL (ref 74–99)
HCT VFR BLD AUTO: 37.3 % (ref 39–53)
HGB BLD-MCNC: 12.3 GM/DL (ref 13–17.5)
INR PPP: 1.2 (ref ?–1.2)
LACTATE BLDV-SCNC: 1.8 MMOL/L (ref 0.7–2)
LYMPHOCYTES # SPEC AUTO: 1.4 K/UL (ref 1–4.8)
LYMPHOCYTES NFR SPEC AUTO: 18 %
MCH RBC QN AUTO: 33.6 PG (ref 25–35)
MCHC RBC AUTO-ENTMCNC: 32.8 G/DL (ref 31–37)
MCV RBC AUTO: 102.3 FL (ref 80–100)
MONOCYTES # BLD AUTO: 0.6 K/UL (ref 0–1)
MONOCYTES NFR BLD AUTO: 8 %
NEUTROPHILS # BLD AUTO: 5.5 K/UL (ref 1.3–7.7)
NEUTROPHILS NFR BLD AUTO: 70 %
PLATELET # BLD AUTO: 275 K/UL (ref 150–450)
POTASSIUM SERPL-SCNC: 3.2 MMOL/L (ref 3.5–5.1)
POTASSIUM SERPL-SCNC: 3.4 MMOL/L (ref 3.5–5.1)
PROT SERPL-MCNC: 5.6 G/DL (ref 6.3–8.2)
PT BLD: 12.1 SEC (ref 9–12)
SODIUM SERPL-SCNC: 152 MMOL/L (ref 137–145)
SODIUM SERPL-SCNC: 153 MMOL/L (ref 137–145)
WBC # BLD AUTO: 7.8 K/UL (ref 3.8–10.6)

## 2021-08-23 PROCEDURE — 88108 CYTOPATH CONCENTRATE TECH: CPT

## 2021-08-23 PROCEDURE — 93005 ELECTROCARDIOGRAM TRACING: CPT

## 2021-08-23 PROCEDURE — 84443 ASSAY THYROID STIM HORMONE: CPT

## 2021-08-23 PROCEDURE — 87186 SC STD MICRODIL/AGAR DIL: CPT

## 2021-08-23 PROCEDURE — 87075 CULTR BACTERIA EXCEPT BLOOD: CPT

## 2021-08-23 PROCEDURE — 84157 ASSAY OF PROTEIN OTHER: CPT

## 2021-08-23 PROCEDURE — 85379 FIBRIN DEGRADATION QUANT: CPT

## 2021-08-23 PROCEDURE — 93306 TTE W/DOPPLER COMPLETE: CPT

## 2021-08-23 PROCEDURE — 82140 ASSAY OF AMMONIA: CPT

## 2021-08-23 PROCEDURE — 80320 DRUG SCREEN QUANTALCOHOLS: CPT

## 2021-08-23 PROCEDURE — 86780 TREPONEMA PALLIDUM: CPT

## 2021-08-23 PROCEDURE — 82746 ASSAY OF FOLIC ACID SERUM: CPT

## 2021-08-23 PROCEDURE — 71275 CT ANGIOGRAPHY CHEST: CPT

## 2021-08-23 PROCEDURE — 71250 CT THORAX DX C-: CPT

## 2021-08-23 PROCEDURE — 86618 LYME DISEASE ANTIBODY: CPT

## 2021-08-23 PROCEDURE — 96372 THER/PROPH/DIAG INJ SC/IM: CPT

## 2021-08-23 PROCEDURE — 87529 HSV DNA AMP PROBE: CPT

## 2021-08-23 PROCEDURE — 86789 WEST NILE VIRUS ANTIBODY: CPT

## 2021-08-23 PROCEDURE — 82550 ASSAY OF CK (CPK): CPT

## 2021-08-23 PROCEDURE — 72158 MRI LUMBAR SPINE W/O & W/DYE: CPT

## 2021-08-23 PROCEDURE — 84145 PROCALCITONIN (PCT): CPT

## 2021-08-23 PROCEDURE — 87798 DETECT AGENT NOS DNA AMP: CPT

## 2021-08-23 PROCEDURE — 83735 ASSAY OF MAGNESIUM: CPT

## 2021-08-23 PROCEDURE — 74177 CT ABD & PELVIS W/CONTRAST: CPT

## 2021-08-23 PROCEDURE — 36410 VNPNXR 3YR/> PHY/QHP DX/THER: CPT

## 2021-08-23 PROCEDURE — 85730 THROMBOPLASTIN TIME PARTIAL: CPT

## 2021-08-23 PROCEDURE — 70553 MRI BRAIN STEM W/O & W/DYE: CPT

## 2021-08-23 PROCEDURE — 70551 MRI BRAIN STEM W/O DYE: CPT

## 2021-08-23 PROCEDURE — 80048 BASIC METABOLIC PNL TOTAL CA: CPT

## 2021-08-23 PROCEDURE — 87077 CULTURE AEROBIC IDENTIFY: CPT

## 2021-08-23 PROCEDURE — 86403 PARTICLE AGGLUT ANTBDY SCRN: CPT

## 2021-08-23 PROCEDURE — 84484 ASSAY OF TROPONIN QUANT: CPT

## 2021-08-23 PROCEDURE — 83921 ORGANIC ACID SINGLE QUANT: CPT

## 2021-08-23 PROCEDURE — 87205 SMEAR GRAM STAIN: CPT

## 2021-08-23 PROCEDURE — 83605 ASSAY OF LACTIC ACID: CPT

## 2021-08-23 PROCEDURE — 87496 CYTOMEG DNA AMP PROBE: CPT

## 2021-08-23 PROCEDURE — 80306 DRUG TEST PRSMV INSTRMNT: CPT

## 2021-08-23 PROCEDURE — 87070 CULTURE OTHR SPECIMN AEROBIC: CPT

## 2021-08-23 PROCEDURE — 82803 BLOOD GASES ANY COMBINATION: CPT

## 2021-08-23 PROCEDURE — 86038 ANTINUCLEAR ANTIBODIES: CPT

## 2021-08-23 PROCEDURE — 85610 PROTHROMBIN TIME: CPT

## 2021-08-23 PROCEDURE — 82565 ASSAY OF CREATININE: CPT

## 2021-08-23 PROCEDURE — 78306 BONE IMAGING WHOLE BODY: CPT

## 2021-08-23 PROCEDURE — 76937 US GUIDE VASCULAR ACCESS: CPT

## 2021-08-23 PROCEDURE — 87252 VIRUS INOCULATION TISSUE: CPT

## 2021-08-23 PROCEDURE — 87635 SARS-COV-2 COVID-19 AMP PRB: CPT

## 2021-08-23 PROCEDURE — 80053 COMPREHEN METABOLIC PANEL: CPT

## 2021-08-23 PROCEDURE — 89050 BODY FLUID CELL COUNT: CPT

## 2021-08-23 PROCEDURE — 86140 C-REACTIVE PROTEIN: CPT

## 2021-08-23 PROCEDURE — 85025 COMPLETE CBC W/AUTO DIFF WBC: CPT

## 2021-08-23 PROCEDURE — 95816 EEG AWAKE AND DROWSY: CPT

## 2021-08-23 PROCEDURE — 81003 URINALYSIS AUTO W/O SCOPE: CPT

## 2021-08-23 PROCEDURE — 86225 DNA ANTIBODY NATIVE: CPT

## 2021-08-23 PROCEDURE — 86788 WEST NILE VIRUS AB IGM: CPT

## 2021-08-23 PROCEDURE — 72125 CT NECK SPINE W/O DYE: CPT

## 2021-08-23 PROCEDURE — 93880 EXTRACRANIAL BILAT STUDY: CPT

## 2021-08-23 PROCEDURE — 87040 BLOOD CULTURE FOR BACTERIA: CPT

## 2021-08-23 PROCEDURE — 70450 CT HEAD/BRAIN W/O DYE: CPT

## 2021-08-23 PROCEDURE — 85652 RBC SED RATE AUTOMATED: CPT

## 2021-08-23 PROCEDURE — 99285 EMERGENCY DEPT VISIT HI MDM: CPT

## 2021-08-23 PROCEDURE — 84207 ASSAY OF VITAMIN B-6: CPT

## 2021-08-23 PROCEDURE — 87498 ENTEROVIRUS PROBE&REVRS TRNS: CPT

## 2021-08-23 PROCEDURE — 82945 GLUCOSE OTHER FLUID: CPT

## 2021-08-23 PROCEDURE — 89060 EXAM SYNOVIAL FLUID CRYSTALS: CPT

## 2021-08-23 PROCEDURE — 82805 BLOOD GASES W/O2 SATURATION: CPT

## 2021-08-23 PROCEDURE — 80202 ASSAY OF VANCOMYCIN: CPT

## 2021-08-23 PROCEDURE — 71045 X-RAY EXAM CHEST 1 VIEW: CPT

## 2021-08-23 PROCEDURE — 36415 COLL VENOUS BLD VENIPUNCTURE: CPT

## 2021-08-23 PROCEDURE — 36600 WITHDRAWAL OF ARTERIAL BLOOD: CPT

## 2021-08-23 PROCEDURE — 94760 N-INVAS EAR/PLS OXIMETRY 1: CPT

## 2021-08-23 PROCEDURE — 82607 VITAMIN B-12: CPT

## 2021-08-23 PROCEDURE — 71046 X-RAY EXAM CHEST 2 VIEWS: CPT

## 2021-08-23 PROCEDURE — 85027 COMPLETE CBC AUTOMATED: CPT

## 2021-08-23 PROCEDURE — 70486 CT MAXILLOFACIAL W/O DYE: CPT

## 2021-08-23 RX ADMIN — SODIUM BICARBONATE SCH MLS/HR: 84 INJECTION, SOLUTION INTRAVENOUS at 19:09

## 2021-08-23 RX ADMIN — Medication SCH MG: at 19:08

## 2021-08-23 NOTE — XR
EXAMINATION TYPE: XR elbow complete RT

 

DATE OF EXAM: 8/23/2021

 

COMPARISON: NONE

 

HISTORY: Elbow pain

 

TECHNIQUE: 3 views

 

FINDINGS: There is some spurring on the olecranon process of the ulna. I see no fracture nor dislocat
ion. There is no sign of elbow joint effusion.

 

IMPRESSION: Olecranon process spur formation. Posterior soft tissue swelling. No fracture seen.

## 2021-08-23 NOTE — P.HPIM
History of Present Illness


H&P Date: 08/23/21


Chief Complaint: alcohol abuse and withdrawal





55 y o Male with long history of alcohol abuse





Patient was found laying down at his home covered with feces very dirty 

environment as his home is notable taking care of her daughter and brother at 

bedside splinting that patient has been going downhill since his divorce back in

October he has not been taking care of himself abusing alcohol with frequent 

hospital admissions for same problem


Upon arrival of EMS when he was found by one of the neighbors covered in feces 

but responsive with empty bottles of alcohol around him be transferred to the 

hospital upon arrival he was cleaned up and was found to be hallucinating sugge

stive of being in DTs for which she was started on benzodiazepines per CIWA 

scale patient is unable to provide any meaningful history history was obtained 

by talking to the family at bedside.


Patient daughter is hoping that she can get guardianship over him and trying to 

help him get back on his feet.  He 6 months away from [employer] used to work at

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 

to work.


No other history is available at this time due to patient mental status initial 

workup in the ED showed microcytic anemia, hypokalemia, hypernatremia.


CT of the head no acute pathology


CT of the face showed old left orbital fracture inferiorly


X-ray of the right elbow showed olecranon process spur with some soft tissue 

swelling


EKG showed sinus rhythm


Alcohol level was <10











Review of Systems


ROS unobtainable: due to mental status





Past Medical History


Past Medical History: GERD/Reflux, Hypertension, Liver Disease


Additional Past Medical History / Comment(s): seasonal allergies


History of Any Multi-Drug Resistant Organisms: None Reported


Past Surgical History: Hernia Repair


Additional Past Surgical History / Comment(s): umbilical hernia repair 2013


Past Anesthesia/Blood Transfusion Reactions: Postoperative Nausea & Vomiting 

(PONV)


Past Psychological History: No Psychological Hx Reported


Smoking Status: Never smoker


Past Alcohol Use History: Heavy


Past Drug Use History: Prescription Drug Abuse





- Past Family History


  ** Mother


Family Medical History: Cancer, Diabetes Mellitus, Hypertension


Additional Family Medical History / Comment(s): nonHodgkins lymphoma





  ** Father


Family Medical History: Hypertension





Medications and Allergies


                                Home Medications











 Medication  Instructions  Recorded  Confirmed  Type


 


Naltrexone HCl [Revia] 50 mg PO DAILY 07/17/19 08/23/21 History


 


traZODone HCL 50 mg PO HS PRN 07/17/19 08/23/21 History


 


Gabapentin [Neurontin] 100 mg PO TID 08/23/21 08/23/21 History


 


Omeprazole 40 mg PO DAILY 08/23/21 08/23/21 History


 


Promethazine HCl 12.5 mg PO DAILY PRN 08/23/21 08/23/21 History


 


Vilazodone HCl [Viibryd] 40 mg PO DAILY 08/23/21 08/23/21 History


 


amLODIPine BESYLATE/BENAZEPRIL 1 cap PO BID 08/23/21 08/23/21 History





[amLODIPine BESYLATE/BENAZEPRIL    





5-20 MG]    


 


tiZANidine HCL 4 mg PO TID 08/23/21 08/23/21 History








                                    Allergies











Allergy/AdvReac Type Severity Reaction Status Date / Time


 


bee venom protein (honey bee) Allergy  Anaphylaxis Verified 08/23/21 18:27


 


Sulfa (Sulfonamide Allergy  Swelling Verified 08/23/21 18:27





Antibiotics)     


 


codeine AdvReac  Nausea & Verified 08/23/21 18:27





   Vomiting  














Physical Exam


Vitals: 


                                   Vital Signs











  Temp Pulse Resp BP Pulse Ox


 


 08/23/21 22:44  98.1 F  72  18  124/66  97


 


 08/23/21 17:51   96  18  128/76  98


 


 08/23/21 15:49  99.1 F  101 H  20  135/73  94 L








                                Intake and Output











 08/23/21 08/23/21 08/24/21





 14:59 22:59 06:59


 


Other:   


 


  Weight  99.79 kg 














Constitutional:          Patient clearly confused with auditory and visual 

hallucinations, however patient is following commands and cooperative


Eyes:      Anicteric sclerae, moist conjunctiva, 


         Pupils equal round reactive to light





ENMT:      Bruising around the left eye multiple cuts and wounds over his face


         Oropharynx clear, no erythema, or exudates





Neck:      Supple,no masses, or JVD


         No carotid bruits


         No thyromegaly





Lungs:      Clear to auscultation


         Clear to percussion


         Normal respiratory effort, no accessory muscle use 





Cardiovascular:      Heart regular in rate and rhythm, 


         No murmurs, gallops, or rubs


         No peripheral edema





Abdominal:       Soft


         Nontender, no guarding, rebound or rigidity


         Abdomen moving with respiration


         Normoactive bowel sounds


         No hepatomegaly, No splenomegaly


         No palpable mass 


         No abdominal wall hernia noted 





Skin:      Multiple bruising throughout at different stages of healing, multiple

cuts and abrasions over bilateral upper and lower extremities however it spares 

the patient's chest abdomen and back





Extremities:      No digital cyanosis 


         No clubbing


         Pedal pulses intact and symmetrical


         Radial pulses intact and symmetrical 


         No calf tenderness 





Psychiatric:      Alert and oriented to self only


         Hallucinating





Neuro      unable to assess proper neurologically however patient is moving all 

4 extremities following simple commands


Lymphatics:       no palpable cervical or supraclavicular , or inguinal lymph 

nodes  





Results


CBC & Chem 7: 


                                 08/23/21 16:34





                                 08/23/21 16:34


Labs: 


                  Abnormal Lab Results - Last 24 Hours (Table)











  08/23/21 08/23/21 08/23/21 Range/Units





  16:34 16:34 16:34 


 


RBC  3.65 L    (4.30-5.90)  m/uL


 


Hgb  12.3 L    (13.0-17.5)  gm/dL


 


Hct  37.3 L    (39.0-53.0)  %


 


MCV  102.3 H    (80.0-100.0)  fL


 


PT   12.1 H   (9.0-12.0)  sec


 


INR   1.2 H   (<1.2)  


 


Sodium    153 H  (137-145)  mmol/L


 


Potassium    3.4 L  (3.5-5.1)  mmol/L


 


Chloride    115 H  ()  mmol/L


 


AST    100 H  (17-59)  U/L


 


Creatine Kinase    999 H  ()  U/L


 


Total Protein    5.6 L  (6.3-8.2)  g/dL


 


Albumin    2.8 L  (3.5-5.0)  g/dL














Assessment and Plan


Assessment: 





Alcohol abuse with alcohol withdrawal syndrome


Delirium tremens


Plan


Benzodiazepines per CIWA scale


IV fluid hydration


Thiamine


Seizure precautions


Fall precautions


Monitor vital signs





Poor living condition


Recurrent episodes of hospital admission for alcohol abuse


Jake. life stressor with possible major depression


Plan


Psych consultation


 consultation





Macrocytic anemia mild


Most likely secondary to alcohol abuse


No reported GI bleeding





Multiple skin abrasions


Left eye bruising


Supportive care and wound care


No acute fractures identified





Hypernatremia most likely with dehydration


Close monitoring of sodium will repeat before midnight


IV fluid hydration boluses with normal saline then patient will continue on half

normal saline


For maintenance fluid





 GI prophylaxis PPI





CODE STATUS: Full code


DVT prophylaxis: Mechanical


Discussed with: Patient, ER, RN


Anticipated length of stay  more than 2 midnights


Anticipated discharge place: Pending clinical course


A total of 65 minutes was spent on the care of this complex patient more than 

50% of the time was spent in counseling and care coordination. 





Patient daughter Dinah would like to be contacted and updated at [phone]

## 2021-08-23 NOTE — CT
EXAMINATION TYPE: CT facial bones wo con

 

DATE OF EXAM: 8/23/2021

 

COMPARISON: None

 

HISTORY: Fall.

Pain

CT DLP: 1368.8 mGycm

Automated exposure control for dose reduction was used.

 

Images obtained from the bottom of the mandible to the top of the frontal sinuses without contrast.

 

The mandibular ring is intact. Temporomandibular joints are intact. Zygomatic arches appear normal. N
jami bone is intact.

 

The maxilla is intact. There is some depression of the floor of the left bony orbit. There is no muco
sal thickening. The left maxillary sinus is normally aerated. This is probably an old blowout fractur
e. Displacement is 4 mm. There is no evidence of retro-orbital mass. The globes are symmetric. There 
is fairly normal aeration of the paranasal sinuses. There is some fatty infiltration of the submandib
ular salivary glands. There is similar change in the parotid glands.

 

IMPRESSION:

There is evidence of old blowout fracture of the floor of the left bony orbit. No acute fracture seen
.

## 2021-08-23 NOTE — XR
EXAMINATION TYPE: XR chest 2V

 

DATE OF EXAM: 8/23/2021

 

COMPARISON: 6/7/2021

 

HISTORY: Altered mental status

 

TECHNIQUE: 2 views

 

FINDINGS: Heart and mediastinum are normal. Lungs are clear. Diaphragm is normal. Bony thorax appears
 intact. There is some atelectasis right lung base. There are no hilar masses.

 

 

 

IMPRESSION: Minimal subsegmental atelectasis right lung base without change. Normal heart.

## 2021-08-23 NOTE — CT
EXAMINATION TYPE: CT brain cspine wo con

 

DATE OF EXAM: 8/23/2021

 

COMPARISON: 6/7/2021

 

HISTORY: Fall.

Pain

CT DLP: 1368.8 mGycm

Automated exposure control for dose reduction was used.

 

There is cerebral cortical atrophy. There is no mass effect nor midline shift. There is no sign of in
tracranial hemorrhage. Calvarium is intact. Skull base is intact. There is normal aeration of the mas
toid sinuses.

 

The cervical vertebra have fairly normal alignment. There is degenerative disc space narrowing at C5-
6 and C6-7 with spurring of the endplates. Facet joints are intact. Prevertebral soft tissues are int
act. There is mild cervical facet arthropathy.

 

IMPRESSION:

Cerebral atrophy. No acute intracranial abnormality. No adverse change compared to old exam.

 

Spondylotic changes in the cervical spine. No fracture. No change compared to old exam.

## 2021-08-23 NOTE — ED
Alcohol HPI





- General


Chief Complaint: Alcohol


Stated Complaint: Hypertension


Source: patient


Mode of arrival: EMS


Limitations: altered mental status, physical limitation





- History of Present Illness


Initial Comments: 





Patient is a 55-year-old male with past medical history of alcohol abuse, 

prescription drug abuse who presents emergency Department with altered mental 

status.  EMS states that a neighbor called EMS as the patient was found down at 

his house.  Per EMS he was delirious and living in his own feces.  He had a 

bunch of alcohol bottles lying around him.  It was unknown how long the patient 

had been on the floor.  He had multiple bruises in different stages of healing. 

He has a hematoma to his left eye abrasions to his lower extremities.  Patient 

has been seen in our emergency room for similar complaints.  Daughter presents 

to bedside and states that the patient is a chronic alcoholic and has been in 

rehab multiple times.  She states that she has not seen him in a couple of days 

but attempts to stay in his life.  It is unknown when the patient last drank.  

The remainder of HPI is limited





- Related Data


                                Home Medications











 Medication  Instructions  Recorded  Confirmed


 


Naltrexone HCl [Revia] 50 mg PO DAILY 07/17/19 08/23/21


 


traZODone HCL 50 mg PO HS PRN 07/17/19 08/23/21


 


Gabapentin [Neurontin] 100 mg PO TID 08/23/21 08/23/21


 


Omeprazole 40 mg PO DAILY 08/23/21 08/23/21


 


Promethazine HCl 12.5 mg PO DAILY PRN 08/23/21 08/23/21


 


Vilazodone HCl [Viibryd] 40 mg PO DAILY 08/23/21 08/23/21


 


amLODIPine BESYLATE/BENAZEPRIL 1 cap PO BID 08/23/21 08/23/21





[amLODIPine BESYLATE/BENAZEPRIL   





5-20 MG]   


 


tiZANidine HCL 4 mg PO TID 08/23/21 08/23/21











                                    Allergies











Allergy/AdvReac Type Severity Reaction Status Date / Time


 


bee venom protein (honey bee) Allergy  Anaphylaxis Verified 08/23/21 18:27


 


lactose Allergy  Diarrhea Verified 08/29/21 16:33


 


Penicillins Allergy  Unknown Verified 08/24/21 03:38


 


Sulfa (Sulfonamide Allergy  Swelling Verified 08/23/21 18:27





Antibiotics)     


 


codeine AdvReac  Nausea & Verified 08/23/21 18:27





   Vomiting  














Review of Systems


ROS Statement: 


Those systems with pertinent positive or pertinent negative responses have been 

documented in the HPI.





ROS Other: All systems not noted in ROS Statement are negative.





Past Medical History


Past Medical History: GERD/Reflux, Hypertension, Liver Disease


Additional Past Medical History / Comment(s): seasonal allergies


History of Any Multi-Drug Resistant Organisms: None Reported


Past Surgical History: Hernia Repair


Additional Past Surgical History / Comment(s): umbilical hernia repair 2013


Past Anesthesia/Blood Transfusion Reactions: Postoperative Nausea & Vomiting 

(PONV)


Past Psychological History: No Psychological Hx Reported


Smoking Status: Never smoker


Past Alcohol Use History: Heavy


Past Drug Use History: Prescription Drug Abuse





- Past Family History


  ** Mother


Family Medical History: Cancer, Diabetes Mellitus, Hypertension


Additional Family Medical History / Comment(s): nonHodgkins lymphoma





  ** Father


Family Medical History: Hypertension





General Exam


Limitations: altered mental status, physical limitation


General appearance: other (confused, disheveled, covered in feces)


Eye exam: Present: conjunctival injection


ENT exam: Present: mucous membranes dry


Respiratory exam: Present: normal lung sounds bilaterally.  Absent: respiratory 

distress, wheezes, rales, rhonchi, stridor


Cardiovascular Exam: Present: normal rhythm, tachycardia


GI/Abdominal exam: Present: soft, normal bowel sounds.  Absent: distended, 

tenderness, guarding, rebound, rigid


Extremities exam: Present: other (multiple abrasions to all 4 extremities in 

different stages of healing, covered in feces)


Neurological exam: Present: altered, other (slurred speech. finger to nose 

difficult in both extremities. )


Psychiatric exam: Present: agitated





Course


                                   Vital Signs











  08/23/21 08/23/21 08/23/21





  15:49 17:51 22:44


 


Temperature 99.1 F  98.1 F


 


Pulse Rate 101 H 96 72


 


Respiratory 20 18 18





Rate   


 


Blood Pressure 135/73 128/76 124/66


 


O2 Sat by Pulse 94 L 98 97





Oximetry   














Medical Decision Making





- Medical Decision Making





Upon arrival the patient was placed into room 6.  A thorough history and 

physical exam was performed.  Patient does have bruising noted over his left 

arm, bilateral arms and bilateral lower extremities.  He is covered in feces.  

Patient is cleaned up.  IV is established and laboratory studies were conducted.

 He is tremulous in the room and confused therefore did start him on CIWA 

protocol.  Review of the patient's laboratory studies demonstrate a sodium of 

153.  I did start the patient on half normal saline.  Repeat BMP is scheduled 

for 11 PM.  CK is 999.  Alcohol is not detected.  Chest x-ray demonstrates 

minimal subsegmental atelectasis on the right lung at the base.  CT of the 

patient's head and cervical spine demonstrates her brought to the, no acute 

cranial abnormality.  No adverse change compared to old.  Spondylitic changes in

the cervical spine with no acute fractures.  CT of the patient's face 

demonstrates a blowout fracture of the left.  Radiologist does believe that this

is old however the patient does have acute ecchymosis around the eye.  I spoke 

with the patient's daughter who states that he previously had a fracture around 

his eye when he was hospitalized earlier this year.  As the patient is impending

DTs I did recommend admission to the daughter for which she agreed.  She would 

like to obtain guardianship.  I will consult social work and case management.  I

spoke with Dr. Horton who agreed to admit the patient. Patient currently 

awaiting a bed on the floor





- Lab Data


Result diagrams: 


                                 09/03/21 07:37





                                 09/03/21 06:36


                                   Lab Results











  08/23/21 08/23/21 08/23/21 Range/Units





  15:48 16:34 16:34 


 


WBC   7.8   (3.8-10.6)  k/uL


 


RBC   3.65 L   (4.30-5.90)  m/uL


 


Hgb   12.3 L   (13.0-17.5)  gm/dL


 


Hct   37.3 L   (39.0-53.0)  %


 


MCV   102.3 H   (80.0-100.0)  fL


 


MCH   33.6   (25.0-35.0)  pg


 


MCHC   32.8   (31.0-37.0)  g/dL


 


RDW   14.8   (11.5-15.5)  %


 


Plt Count   275   (150-450)  k/uL


 


MPV   8.1   


 


Neutrophils %   70   %


 


Lymphocytes %   18   %


 


Monocytes %   8   %


 


Eosinophils %   0   %


 


Basophils %   1   %


 


Neutrophils #   5.5   (1.3-7.7)  k/uL


 


Lymphocytes #   1.4   (1.0-4.8)  k/uL


 


Monocytes #   0.6   (0-1.0)  k/uL


 


Eosinophils #   0.0   (0-0.7)  k/uL


 


Basophils #   0.1   (0-0.2)  k/uL


 


Hypochromasia   Slight   


 


Macrocytosis   Slight   


 


PT    12.1 H  (9.0-12.0)  sec


 


INR    1.2 H  (<1.2)  


 


APTT    22.5  (22.0-30.0)  sec


 


Sodium     (137-145)  mmol/L


 


Potassium     (3.5-5.1)  mmol/L


 


Chloride     ()  mmol/L


 


Carbon Dioxide     (22-30)  mmol/L


 


Anion Gap     mmol/L


 


BUN     (9-20)  mg/dL


 


Creatinine     (0.66-1.25)  mg/dL


 


Est GFR (CKD-EPI)AfAm     (>60 ml/min/1.73 sqM)  


 


Est GFR (CKD-EPI)NonAf     (>60 ml/min/1.73 sqM)  


 


Glucose     (74-99)  mg/dL


 


POC Glucose (mg/dL)  96    (75-99)  mg/dL


 


POC Glu Operater ID  Ja Yusuf    


 


Plasma Lactic Acid Darrick     (0.7-2.0)  mmol/L


 


Calcium     (8.4-10.2)  mg/dL


 


Total Bilirubin     (0.2-1.3)  mg/dL


 


AST     (17-59)  U/L


 


ALT     (4-49)  U/L


 


Alkaline Phosphatase     ()  U/L


 


Ammonia     (<30)  umol/L


 


Creatine Kinase     ()  U/L


 


Troponin I     (0.000-0.034)  ng/mL


 


Total Protein     (6.3-8.2)  g/dL


 


Albumin     (3.5-5.0)  g/dL


 


Serum Alcohol     mg/dL














  08/23/21 08/23/21 08/23/21 Range/Units





  16:34 16:34 16:34 


 


WBC     (3.8-10.6)  k/uL


 


RBC     (4.30-5.90)  m/uL


 


Hgb     (13.0-17.5)  gm/dL


 


Hct     (39.0-53.0)  %


 


MCV     (80.0-100.0)  fL


 


MCH     (25.0-35.0)  pg


 


MCHC     (31.0-37.0)  g/dL


 


RDW     (11.5-15.5)  %


 


Plt Count     (150-450)  k/uL


 


MPV     


 


Neutrophils %     %


 


Lymphocytes %     %


 


Monocytes %     %


 


Eosinophils %     %


 


Basophils %     %


 


Neutrophils #     (1.3-7.7)  k/uL


 


Lymphocytes #     (1.0-4.8)  k/uL


 


Monocytes #     (0-1.0)  k/uL


 


Eosinophils #     (0-0.7)  k/uL


 


Basophils #     (0-0.2)  k/uL


 


Hypochromasia     


 


Macrocytosis     


 


PT     (9.0-12.0)  sec


 


INR     (<1.2)  


 


APTT     (22.0-30.0)  sec


 


Sodium  153 H    (137-145)  mmol/L


 


Potassium  3.4 L    (3.5-5.1)  mmol/L


 


Chloride  115 H    ()  mmol/L


 


Carbon Dioxide  24    (22-30)  mmol/L


 


Anion Gap  14    mmol/L


 


BUN  15    (9-20)  mg/dL


 


Creatinine  0.74    (0.66-1.25)  mg/dL


 


Est GFR (CKD-EPI)AfAm  >90    (>60 ml/min/1.73 sqM)  


 


Est GFR (CKD-EPI)NonAf  >90    (>60 ml/min/1.73 sqM)  


 


Glucose  95    (74-99)  mg/dL


 


POC Glucose (mg/dL)     (75-99)  mg/dL


 


POC Glu Operater ID     


 


Plasma Lactic Acid Darrick    1.8  (0.7-2.0)  mmol/L


 


Calcium  8.9    (8.4-10.2)  mg/dL


 


Total Bilirubin  1.0    (0.2-1.3)  mg/dL


 


AST  100 H    (17-59)  U/L


 


ALT  44    (4-49)  U/L


 


Alkaline Phosphatase  73    ()  U/L


 


Ammonia    12  (<30)  umol/L


 


Creatine Kinase  999 H    ()  U/L


 


Troponin I   0.016   (0.000-0.034)  ng/mL


 


Total Protein  5.6 L    (6.3-8.2)  g/dL


 


Albumin  2.8 L    (3.5-5.0)  g/dL


 


Serum Alcohol  <10    mg/dL














- EKG Data


EKG Comments: 





EKG demonstrates sinus rhythm with ventricular rate of 75.  MO interval 128.  

QRS 90.  QTC of 431.  No acute ST segment elevations or depressions





Disposition


Clinical Impression: 


 History of alcohol abuse, Alcohol withdrawal delirium, Impending delirium 

tremens, Blunt head trauma, Orbital fracture, Hypernatremia





Disposition: ADMITTED AS IP TO THIS HOSP


Condition: Serious


Is patient prescribed a controlled substance at d/c from ED?: No


Decision to Admit Reason: Admit from EC


Decision Date: 08/23/21


Decision Time: 18:11

## 2021-08-24 LAB
ANION GAP SERPL CALC-SCNC: 8 MMOL/L
BASOPHILS # BLD AUTO: 0.1 K/UL (ref 0–0.2)
BASOPHILS NFR BLD AUTO: 1 %
BUN SERPL-SCNC: 11 MG/DL (ref 9–20)
CALCIUM SPEC-MCNC: 8.1 MG/DL (ref 8.4–10.2)
CHLORIDE SERPL-SCNC: 115 MMOL/L (ref 98–107)
CO2 SERPL-SCNC: 27 MMOL/L (ref 22–30)
EOSINOPHIL # BLD AUTO: 0.1 K/UL (ref 0–0.7)
EOSINOPHIL NFR BLD AUTO: 2 %
ERYTHROCYTE [DISTWIDTH] IN BLOOD BY AUTOMATED COUNT: 3.64 M/UL (ref 4.3–5.9)
ERYTHROCYTE [DISTWIDTH] IN BLOOD: 14.9 % (ref 11.5–15.5)
GLUCOSE SERPL-MCNC: 89 MG/DL (ref 74–99)
HCT VFR BLD AUTO: 37.4 % (ref 39–53)
HGB BLD-MCNC: 12.2 GM/DL (ref 13–17.5)
LYMPHOCYTES # SPEC AUTO: 1.6 K/UL (ref 1–4.8)
LYMPHOCYTES NFR SPEC AUTO: 24 %
MCH RBC QN AUTO: 33.5 PG (ref 25–35)
MCHC RBC AUTO-ENTMCNC: 32.6 G/DL (ref 31–37)
MCV RBC AUTO: 102.8 FL (ref 80–100)
MONOCYTES # BLD AUTO: 0.4 K/UL (ref 0–1)
MONOCYTES NFR BLD AUTO: 7 %
NEUTROPHILS # BLD AUTO: 4.5 K/UL (ref 1.3–7.7)
NEUTROPHILS NFR BLD AUTO: 65 %
PLATELET # BLD AUTO: 262 K/UL (ref 150–450)
POTASSIUM SERPL-SCNC: 3.3 MMOL/L (ref 3.5–5.1)
SODIUM SERPL-SCNC: 150 MMOL/L (ref 137–145)
WBC # BLD AUTO: 6.8 K/UL (ref 3.8–10.6)

## 2021-08-24 RX ADMIN — POTASSIUM CHLORIDE SCH: 14.9 INJECTION, SOLUTION INTRAVENOUS at 17:54

## 2021-08-24 RX ADMIN — ENOXAPARIN SODIUM SCH MG: 40 INJECTION SUBCUTANEOUS at 08:53

## 2021-08-24 RX ADMIN — POTASSIUM CHLORIDE SCH MLS/HR: 7.46 INJECTION, SOLUTION INTRAVENOUS at 03:49

## 2021-08-24 RX ADMIN — POTASSIUM CHLORIDE SCH MLS/HR: 14.9 INJECTION, SOLUTION INTRAVENOUS at 15:17

## 2021-08-24 RX ADMIN — POTASSIUM CHLORIDE SCH MLS/HR: 14.9 INJECTION, SOLUTION INTRAVENOUS at 22:52

## 2021-08-24 RX ADMIN — SODIUM BICARBONATE SCH: 84 INJECTION, SOLUTION INTRAVENOUS at 10:42

## 2021-08-24 RX ADMIN — Medication SCH MG: at 16:50

## 2021-08-24 RX ADMIN — POTASSIUM CHLORIDE SCH MLS/HR: 7.46 INJECTION, SOLUTION INTRAVENOUS at 01:41

## 2021-08-24 RX ADMIN — POTASSIUM CHLORIDE SCH MLS/HR: 7.46 INJECTION, SOLUTION INTRAVENOUS at 02:39

## 2021-08-24 RX ADMIN — Medication SCH MG: at 06:59

## 2021-08-24 RX ADMIN — PANTOPRAZOLE SODIUM SCH MG: 40 TABLET, DELAYED RELEASE ORAL at 06:59

## 2021-08-24 NOTE — P.PN
Subjective


Progress Note Date: 08/24/21





Patient was seen and evaluated by me this morning.  He was shaking and having 

visual hallucinations the time of my evaluation.  Nursing staff informed me that

he has been getting IV Ativan around the clock.  Patient is able to answer 

simple questions and has multiple bruises all over his body





Objective





- Vital Signs


Vital signs: 


                                   Vital Signs











Temp  97.8 F   08/24/21 11:42


 


Pulse  86   08/24/21 11:42


 


Resp  16   08/24/21 11:42


 


BP  136/77   08/24/21 11:42


 


Pulse Ox  94 L  08/24/21 11:42








                                 Intake & Output











 08/23/21 08/24/21 08/24/21





 18:59 06:59 18:59


 


Intake Total   520


 


Balance   520


 


Weight 99.79 kg 99.79 kg 


 


Intake:   


 


  Oral   520


 


Other:   


 


  Voiding Method  Diaper Diaper


 


  # Voids  1 1


 


  # Bowel Movements   1














- Exam





General:  The patient is awake and alert, in no distress


Eye: there is normal conjunctiva bilaterally.  


Neck:  The neck is supple, there is no  JVD.  


Cardiovascular:  Normal  S1-S2, no S3-S4, no murmurs.


Respiratory: Lungs clear to auscultation bilaterally


Gastrointestinal: Abdomen is soft, nontender


Musculoskeletal:  There is no pedal edema.  


Neurological:. Speech is normal. 


Skin:  Skin is warm and dry 





- Labs


CBC & Chem 7: 


                                 08/24/21 08:27





                                 08/24/21 08:27


Labs: 


                  Abnormal Lab Results - Last 24 Hours (Table)











  08/23/21 08/23/21 08/23/21 Range/Units





  16:34 16:34 16:34 


 


RBC  3.65 L    (4.30-5.90)  m/uL


 


Hgb  12.3 L    (13.0-17.5)  gm/dL


 


Hct  37.3 L    (39.0-53.0)  %


 


MCV  102.3 H    (80.0-100.0)  fL


 


PT   12.1 H   (9.0-12.0)  sec


 


INR   1.2 H   (<1.2)  


 


Sodium    153 H  (137-145)  mmol/L


 


Potassium    3.4 L  (3.5-5.1)  mmol/L


 


Chloride    115 H  ()  mmol/L


 


Creatinine     (0.66-1.25)  mg/dL


 


Calcium     (8.4-10.2)  mg/dL


 


AST    100 H  (17-59)  U/L


 


Creatine Kinase    999 H  ()  U/L


 


Total Protein    5.6 L  (6.3-8.2)  g/dL


 


Albumin    2.8 L  (3.5-5.0)  g/dL














  08/23/21 08/24/21 08/24/21 Range/Units





  23:15 08:27 08:27 


 


RBC   3.64 L   (4.30-5.90)  m/uL


 


Hgb   12.2 L   (13.0-17.5)  gm/dL


 


Hct   37.4 L   (39.0-53.0)  %


 


MCV   102.8 H   (80.0-100.0)  fL


 


PT     (9.0-12.0)  sec


 


INR     (<1.2)  


 


Sodium  152 H   150 H  (137-145)  mmol/L


 


Potassium  3.2 L   3.3 L  (3.5-5.1)  mmol/L


 


Chloride  114 H   115 H  ()  mmol/L


 


Creatinine    0.62 L  (0.66-1.25)  mg/dL


 


Calcium  8.3 L   8.1 L  (8.4-10.2)  mg/dL


 


AST     (17-59)  U/L


 


Creatine Kinase     ()  U/L


 


Total Protein     (6.3-8.2)  g/dL


 


Albumin     (3.5-5.0)  g/dL














Assessment and Plan


Assessment: 





This is a 55-year-old male was brought into the emergency room after he was 

found by one of the neighbors covered in feces but responsive with empty bottles

of alcohol around him be transferred to the hospital upon arrival he was cleaned

up and was found to be hallucinating suggestive of being in DTs for which she 

was started on benzodiazepines per CIWA scale.


CT of the head no acute pathology


CT of the face showed old left orbital fracture inferiorly


X-ray of the right elbow showed olecranon process spur with some soft tissue 

swelling


EKG showed sinus rhythm


Alcohol level was <10








Assessment: 





Alcohol abuse with alcohol withdrawal syndrome


Delirium tremens


Rhabdomyolysis





Plan


Benzodiazepines per CIWA scale


Height value of 5 mg 3 times a day


IV fluid hydration


Thiamine


Seizure precautions


Fall precautions


Monitor vital signs





Poor living condition


Recurrent episodes of hospital admission for alcohol abuse


Jake. life stressor with possible major depression





Plan


Psych consultation


 consultation





Macrocytic anemia mild


Most likely secondary to alcohol abuse


No reported GI bleeding





Multiple skin abrasions


Left eye bruising


Supportive care and wound care


No acute fractures identified





Hypernatremia most likely with dehydration


Close monitoring of sodium will repeat before midnight


Change IV fluid to D5/half-normal saline








 GI prophylaxis PPI





CODE STATUS: Full code


DVT prophylaxis: Mechanical


Discussed with: Patient, ER, RN


Anticipated length of stay  more than 2 midnights


Anticipated discharge place: Pending clinical course


A total of 65 minutes was spent on the care of this complex patient more than 

50% of the time was spent in counseling and care coordination.

## 2021-08-25 LAB
ANION GAP SERPL CALC-SCNC: 4 MMOL/L
BUN SERPL-SCNC: 5 MG/DL (ref 9–20)
CALCIUM SPEC-MCNC: 7.9 MG/DL (ref 8.4–10.2)
CHLORIDE SERPL-SCNC: 113 MMOL/L (ref 98–107)
CK SERPL-CCNC: 217 U/L (ref 55–170)
CO2 SERPL-SCNC: 28 MMOL/L (ref 22–30)
GLUCOSE BLD-MCNC: 106 MG/DL (ref 75–99)
GLUCOSE BLD-MCNC: 109 MG/DL (ref 75–99)
GLUCOSE SERPL-MCNC: 114 MG/DL (ref 74–99)
MAGNESIUM SPEC-SCNC: 1.8 MG/DL (ref 1.6–2.3)
PH UR: 5.5 [PH] (ref 5–8)
POTASSIUM SERPL-SCNC: 3.1 MMOL/L (ref 3.5–5.1)
SODIUM SERPL-SCNC: 145 MMOL/L (ref 137–145)
SP GR UR: 1.01 (ref 1–1.03)
UROBILINOGEN UR QL STRIP: 2 MG/DL (ref ?–2)

## 2021-08-25 RX ADMIN — ENOXAPARIN SODIUM SCH MG: 40 INJECTION SUBCUTANEOUS at 09:00

## 2021-08-25 RX ADMIN — POTASSIUM CHLORIDE SCH MLS/HR: 14.9 INJECTION, SOLUTION INTRAVENOUS at 23:10

## 2021-08-25 RX ADMIN — Medication SCH MG: at 06:15

## 2021-08-25 RX ADMIN — PANTOPRAZOLE SODIUM SCH MG: 40 TABLET, DELAYED RELEASE ORAL at 06:15

## 2021-08-25 RX ADMIN — Medication SCH MG: at 09:00

## 2021-08-25 RX ADMIN — POTASSIUM CHLORIDE SCH MLS/HR: 14.9 INJECTION, SOLUTION INTRAVENOUS at 06:16

## 2021-08-25 NOTE — P.CN
Psychiatric Consult





- .


Consult date: 08/25/21


Consult:: 





08/25/21 14:09


IDENTIFYING DATA: This patient is a employed, , 55-year-old  

male admitted for altered mental status in the context of heavy alcohol use.  





HISTORY OF PRESENT ILLNESS: The patient presented to the hospital on 08/23/21, 

brought in by EMS after the neighbor found the patient on the floor in his 

house.  He was noted to be delirious and covered in his own feces.  The patient 

was also noted to have a bunch of alcohol bottles around him.  It is unknown how

long the patient to be on the floor.  He was also noted to have multiple bruises

in different stages of healing as well as a hematoma to the left eye and 

abrasions to his lower extremities.  The patient is noted to have visited this 

emergency department multiple times for similar complaints.  History provided by

the patient's daughter to the emergency room physician stated that the patient 

is a chronic alcoholic and has been to rehabilitation multiple times.  It is 

uncertain how long it has been since the patient's last drink.


Psychiatry has been consulted for hallucinations.  The patient has been noted by

staff to be quite delirious, disorganized, disoriented, and responding to 

internal stimuli.  He appeared to be reaching and objects that were not present 

and speaking to people that were not there.  The patient is currently wearing 

mitts for his protection.  The patient is grossly disorganized at this time and 

is unable to provide any clear or coherent history.


Collateral information from the patient's daughter reveal that the patient has 

had long standing issues with anxiety. The patient's mother passed away a year 

ago and the patient's substance use and mood has significantly worsened. She 

states he has had history of hallucinations including "seeing the dog crawl up 

the walls." His house is noted by his daughter to be uninhabitable due to the 

condition it is in. She states that it is like an episode of "hoarders." He has 

a history of marijuana and alcohol use. 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

the [employer]. 





PAST PSYCHIATRIC HISTORY:


Review of the patient's home medications reveal that he is prescribed 

gabapentin, naltrexone, trazodone, and Viibryd. Patient's daughter report he has

significant issues with anxiety. His medication regimen is often used for 

management of depression, alcohol use disorder, and insomnia. 





PAST MEDICAL HISTORY: 


Past Medical History: GERD/Reflux, Hypertension, Liver Disease


Additional Past Medical History / Comment(s): seasonal allergies


History of Any Multi-Drug Resistant Organisms: None Reported


Past Surgical History: Hernia Repair


Additional Past Surgical History / Comment(s): umbilical hernia repair 2013


Past Anesthesia/Blood Transfusion Reactions: Postoperative Nausea & Vomiting 

(PONV)


Past Psychological History: No Psychological Hx Reported


Smoking Status: Never smoker


Past Alcohol Use History: Heavy


Past Drug Use History: Prescription Drug Abuse





ALLERGIES: bee venom, penicillins, sulfa, codeine





CHEMICAL DEPENDENCY HISTORY: Alcohol is drug of choice. He would drink daily as 

per daughter. 





FAMILY PSYCHIATRIC/SUBSTANCE USE HISTORY: As per daughter, the patient's brother

has severe depression and alcohol use disorder. He also attempted suicide in the

past.  





SOCIAL HISTORY: Patient is currently . He was  twice. He has been

 from his second wife 2 years ago. He quit going to work and began 

rehabilitation. Went to [facility] for rehab at the suggestion of

his daughter.   





MENTAL STATUS EXAM: 


General Appearance: Patient appears to be disheveled, wearing mitts, has a 

noticeable abrasion on his face. 


Behavior: Patient is disorganized and appears to respond to internal stimuli.


Speech: Patient's speech is dysarthric, and nonspontaneous. 


Mood/Affect: Unable to assess


Suicidality/Homicidality: Unable to assess 


Perceptions: Patient is responding to internal stimuli. 


Though content/process: Grossly disorganized


Memory and concentration: Poor.


Judgment and insight: Very poor.





IMPRESSIONS: 


Acute Alcohol withdrawal


Alcohol Hallucinosis vs Delirium Tremens


Alcohol Use Disorder, severe





PLAN: 


-At this time patient DOES NOT meet criteria for inpatient psychiatric 

admission. 





-Patient DOES NOT have decision making capacity at this time and is unable to 

reason through  and communicate/appreciate the risks, benefits and alternatives 

to treatment.





-Delirium precautions recommended with patient including - avoiding use of 

narcotics and CNS sedatives, limit anticholinergic medications when possible, 

frequent re-orientation, minimize use of restraints, open window shades during 

the day and close them at night





-Would recommend the following medication changes/additions: 


Increase Valium 10 mg QID for withdrawal management.





-Will continue to follow along





08/25/21 14:09

## 2021-08-25 NOTE — P.PN
Subjective


Progress Note Date: 08/25/21





Patient is still very confused.  He is still in soft restraints.  Patient is 

awake and alert and oriented to himself.  His having active hallucination 

according to nursing staff.  He has been getting Valium since admission and IV 

Ativan per [facility] protocol.  He did not receive any IV Ativan since this morning.





Objective





- Vital Signs


Vital signs: 


                                   Vital Signs











Temp  98.5 F   08/25/21 08:00


 


Pulse  108 H  08/25/21 08:00


 


Resp  16   08/25/21 08:00


 


BP  126/80   08/25/21 08:00


 


Pulse Ox  94 L  08/25/21 08:00








                                 Intake & Output











 08/24/21 08/25/21 08/25/21





 18:59 06:59 18:59


 


Intake Total 1220  60


 


Balance 1220  60


 


Weight 99.79 kg  


 


Intake:   


 


  Oral 1220  60


 


Other:   


 


  Voiding Method Diaper Diaper Diaper


 


  # Voids 1 1 


 


  # Bowel Movements 1 1 














- Exam





General:  The patient is awake and alert, in no distress


Eye: there is normal conjunctiva bilaterally.  


Neck:  The neck is supple, there is no  JVD.  


Cardiovascular:  Normal  S1-S2, no S3-S4, no murmurs.


Respiratory: Lungs clear to auscultation bilaterally


Gastrointestinal: Abdomen is soft, nontender


Musculoskeletal:  There is no pedal edema.  


Neurological:. Speech is normal. 


Skin:  Skin is warm and dry 





- Labs


CBC & Chem 7: 


                                 08/24/21 08:27





                                 08/25/21 07:53


Labs: 


                  Abnormal Lab Results - Last 24 Hours (Table)











  08/25/21 Range/Units





  07:53 


 


Potassium  3.1 L  (3.5-5.1)  mmol/L


 


Chloride  113 H  ()  mmol/L


 


BUN  5 L  (9-20)  mg/dL


 


Creatinine  0.53 L  (0.66-1.25)  mg/dL


 


Glucose  114 H  (74-99)  mg/dL


 


Calcium  7.9 L  (8.4-10.2)  mg/dL


 


Creatine Kinase  217 H  ()  U/L














Assessment and Plan


Assessment: 





This is a 55-year-old male was brought into the emergency room after he was 

found by one of the neighbors covered in feces but responsive with empty bottles

of alcohol around him be transferred to the hospital upon arrival he was cleaned

up and was found to be hallucinating suggestive of being in DTs for which she 

was started on benzodiazepines per CIWA scale.


CT of the head no acute pathology


CT of the face showed old left orbital fracture inferiorly


X-ray of the right elbow showed olecranon process spur with some soft tissue 

swelling


EKG showed sinus rhythm


Alcohol level was <10





Assessment: 





Alcohol abuse with alcohol withdrawal syndrome


Delirium tremens


Rhabdomyolysis


Encephalopathy with hallucinations





Plan


Benzodiazepines per CIWA scale


Valium 5 mg 3 times a day


IV fluid hydration


Thiamine


Seizure precautions


Fall precautions


Computed tomography scan of the head showed no acute findings.


Consults neurology and psychiatry for further evaluation





Poor living condition


Recurrent episodes of hospital admission for alcohol abuse


Jake. life stressor with possible major depression





Plan


Psych consultation


 consultation





Macrocytic anemia mild


Most likely secondary to alcohol abuse


No reported GI bleeding





Multiple skin abrasions


Left eye bruising


Supportive care and wound care


No acute fractures identified





Hypernatremia most likely with dehydration


Improved with IV fluid





Yesterday, I spoke to his daughter over the phone.  She told me that patient has

normal mentation usually this is completely not him.








 GI prophylaxis PPI





CODE STATUS: Full code


DVT prophylaxis: Mechanical


Discussed with: Patient, ER, RN


Anticipated length of stay  more than 2 midnights


Anticipated discharge place: Pending clinical course


A total of 65 minutes was spent on the care of this complex patient more than 

50% of the time was spent in counseling and care coordination.

## 2021-08-26 LAB
ANION GAP SERPL CALC-SCNC: 3 MMOL/L
BASOPHILS # BLD AUTO: 0.1 K/UL (ref 0–0.2)
BASOPHILS NFR BLD AUTO: 1 %
BUN SERPL-SCNC: <2 MG/DL (ref 9–20)
CALCIUM SPEC-MCNC: 7.9 MG/DL (ref 8.4–10.2)
CHLORIDE SERPL-SCNC: 112 MMOL/L (ref 98–107)
CO2 SERPL-SCNC: 29 MMOL/L (ref 22–30)
EOSINOPHIL # BLD AUTO: 0.2 K/UL (ref 0–0.7)
EOSINOPHIL NFR BLD AUTO: 2 %
ERYTHROCYTE [DISTWIDTH] IN BLOOD BY AUTOMATED COUNT: 3.57 M/UL (ref 4.3–5.9)
ERYTHROCYTE [DISTWIDTH] IN BLOOD: 15.1 % (ref 11.5–15.5)
GLUCOSE SERPL-MCNC: 124 MG/DL (ref 74–99)
HCO3 BLDA-SCNC: 26 MMOL/L (ref 21–25)
HCT VFR BLD AUTO: 36 % (ref 39–53)
HGB BLD-MCNC: 12.2 GM/DL (ref 13–17.5)
LYMPHOCYTES # SPEC AUTO: 1.6 K/UL (ref 1–4.8)
LYMPHOCYTES NFR SPEC AUTO: 18 %
MAGNESIUM SPEC-SCNC: 1.7 MG/DL (ref 1.6–2.3)
MCH RBC QN AUTO: 34.2 PG (ref 25–35)
MCHC RBC AUTO-ENTMCNC: 33.9 G/DL (ref 31–37)
MCV RBC AUTO: 100.8 FL (ref 80–100)
MONOCYTES # BLD AUTO: 0.6 K/UL (ref 0–1)
MONOCYTES NFR BLD AUTO: 6 %
NEUTROPHILS # BLD AUTO: 6.3 K/UL (ref 1.3–7.7)
NEUTROPHILS NFR BLD AUTO: 71 %
PCO2 BLDA: 33 MMHG (ref 35–45)
PH BLDA: 7.51 [PH] (ref 7.35–7.45)
PLATELET # BLD AUTO: 263 K/UL (ref 150–450)
PO2 BLDA: 85 MMHG (ref 83–108)
POTASSIUM SERPL-SCNC: 2.9 MMOL/L (ref 3.5–5.1)
SODIUM SERPL-SCNC: 144 MMOL/L (ref 137–145)
VIT B12 SERPL-MCNC: 582 PG/ML (ref 200–944)
WBC # BLD AUTO: 8.8 K/UL (ref 3.8–10.6)

## 2021-08-26 RX ADMIN — POTASSIUM CHLORIDE SCH MLS/HR: 14.9 INJECTION, SOLUTION INTRAVENOUS at 09:30

## 2021-08-26 RX ADMIN — SODIUM CHLORIDE SCH MLS/HR: 9 INJECTION, SOLUTION INTRAVENOUS at 09:45

## 2021-08-26 RX ADMIN — ACETAMINOPHEN PRN MG: 325 TABLET, FILM COATED ORAL at 09:52

## 2021-08-26 RX ADMIN — POTASSIUM CHLORIDE STA: 20 TABLET, EXTENDED RELEASE ORAL at 17:53

## 2021-08-26 RX ADMIN — POTASSIUM CHLORIDE SCH MLS/HR: 7.46 INJECTION, SOLUTION INTRAVENOUS at 22:30

## 2021-08-26 RX ADMIN — POTASSIUM CHLORIDE SCH MLS/HR: 7.46 INJECTION, SOLUTION INTRAVENOUS at 21:14

## 2021-08-26 RX ADMIN — Medication SCH MG: at 06:11

## 2021-08-26 RX ADMIN — Medication SCH: at 17:51

## 2021-08-26 RX ADMIN — POTASSIUM CHLORIDE SCH MLS/HR: 7.46 INJECTION, SOLUTION INTRAVENOUS at 23:51

## 2021-08-26 RX ADMIN — ENOXAPARIN SODIUM SCH MG: 40 INJECTION SUBCUTANEOUS at 09:37

## 2021-08-26 RX ADMIN — POTASSIUM CHLORIDE SCH MLS/HR: 7.46 INJECTION, SOLUTION INTRAVENOUS at 18:36

## 2021-08-26 RX ADMIN — POTASSIUM CHLORIDE STA MEQ: 20 TABLET, EXTENDED RELEASE ORAL at 17:36

## 2021-08-26 RX ADMIN — SODIUM CHLORIDE SCH MLS/HR: 900 INJECTION, SOLUTION INTRAVENOUS at 13:00

## 2021-08-26 RX ADMIN — SODIUM CHLORIDE SCH MLS/HR: 9 INJECTION, SOLUTION INTRAVENOUS at 23:51

## 2021-08-26 RX ADMIN — SODIUM CHLORIDE SCH MLS/HR: 9 INJECTION, SOLUTION INTRAVENOUS at 17:37

## 2021-08-26 RX ADMIN — PANTOPRAZOLE SODIUM SCH MG: 40 TABLET, DELAYED RELEASE ORAL at 06:10

## 2021-08-26 RX ADMIN — MAGNESIUM SULFATE IN DEXTROSE SCH MLS/HR: 10 INJECTION, SOLUTION INTRAVENOUS at 09:36

## 2021-08-26 RX ADMIN — SODIUM CHLORIDE SCH MLS/HR: 900 INJECTION, SOLUTION INTRAVENOUS at 20:08

## 2021-08-26 RX ADMIN — MAGNESIUM SULFATE IN DEXTROSE SCH MLS/HR: 10 INJECTION, SOLUTION INTRAVENOUS at 09:35

## 2021-08-26 NOTE — P.CNPUL
History of Present Illness


Consult date: 08/26/21


Reason for consult: dyspnea


Chief complaint: Acute EtOH withdrawal, aspiration pneumonia,


History of present illness: 


 This a 55-year-old white male patient who was admitted to the hospital on 

August 23, 2021 when he was found delirious and confused by his neighbor and his

home.  Patient was found laying on the floor, in his own feces, he had a bunch 

alcohol bottles lying around him.  he had multiple bruises and abrasions in 

different stages of healing.  He has a hematoma to his left eye , and abrasions 

on his knees and lower extremities.  Tendon has a history of cullen abuse, eats 

marijuana edibles.  Drug screen was positive for marijuana, benzodiazepines.  

His alcohol level was less than 10.  Brain CT showed no acute intracranial 

abnormality, no fracture, cervical spine, additional spondylotic changes in the 

cervical spine.  CT of the facial bones showed evidence of old fracture of the 

floor of the left bony orbit, no acute fractures.  Initial chest x-ray showed 

minimal subsegmental atelectasis in the right lung base without change.  EKG 

showed sinus rhythm.  Initial blood work showed a white count of 7.8, hemoglobin

12.3, INR of 1.2, sodium was 153, potassium 3.4, chloride is 115, CO2 is 24, B1 

and creatinine 0.74, troponin was 0.016.  Patient was started on benzodiazepines

in the form of Ativan per CIWA protocol.  Neurology consultation was obtained.  

Psychiatric services are following.  Patient is on thiamine and folate per CIWA 

protocol.  ABG showed moderate to severe degree of slowing, consistent with 

toxic metabolic encephalopathy, no epileptiform discharges were seen.  This 

morning patient started spiking fevers with a temp of 101.4F, he is also 

requiring supplemental oxygen currently at 2 L with a pulse ox of 93-97%, he is 

receiving Valium and when necessary Ativan.  Quite lethargic, confused, he opens

eyes to voice, however he is not able to provide any meaningful verbal respons

es.  Today's chest x-ray has been reviewed showing right basilar patchy 

infiltrate and/or atelectasis.  Patient was placed on empiric antibiotics in the

form of Rocephin and vancomycin.  In view of altered mentation and fever lumbar 

puncture was requested however anesthesia was unable to perform it because 

Lovenox was given this morning.  We were consulted in view of depressed level of

consciousness related to benzodiazepines and possibility of needing to intubate 

the patient placement on mechanical ventilator.  Blood gas has been obtained 

showing pO2 of 85, pCO2 of 33, pH of 7.51 this was done on FiO2 of 28%, patient 

is breathing fairly comfortably, does not appear to be in any acute distress, 

head of the bed is up 35, he is currently on 2 L of oxygen, his pulse ox is 

97%.  No coughing or wheezing.  He continues on empiric antibiotics. 








Review of Systems


All systems: negative


Constitutional: Reports fatigue, Reports lethargy, Reports weakness, Denies 

chills, Denies fever


Eyes: denies blurred vision, denies pain


Ears, nose, mouth and throat: Denies headache, Denies sore throat


Cardiovascular: Denies chest pain, Denies shortness of breath


Respiratory: Reports dyspnea, Denies cough


Gastrointestinal: Denies abdominal pain, Denies diarrhea, Denies nausea, Denies 

vomiting


Musculoskeletal: Denies myalgias


Integumentary: Denies pruritus, Denies rash


Neurological: Reports balance difficulties, Reports change in mentation, Denies 

numbness, Denies weakness


Psychiatric: Denies anxiety, Denies depression


Endocrine: Denies fatigue, Denies weight change





Past Medical History


Past Medical History: GERD/Reflux, Hypertension, Liver Disease


Additional Past Medical History / Comment(s): seasonal allergies


History of Any Multi-Drug Resistant Organisms: None Reported


Past Surgical History: Hernia Repair


Additional Past Surgical History / Comment(s): umbilical hernia repair 2013


Past Anesthesia/Blood Transfusion Reactions: Postoperative Nausea & Vomiting 

(PONV)


Past Psychological History: No Psychological Hx Reported


Smoking Status: Never smoker


Past Alcohol Use History: Heavy


Past Drug Use History: Prescription Drug Abuse





- Past Family History


  ** Mother


Family Medical History: Cancer, Diabetes Mellitus, Hypertension


Additional Family Medical History / Comment(s): nonHodgkins lymphoma





  ** Father


Family Medical History: Hypertension





Medications and Allergies


                                Home Medications











 Medication  Instructions  Recorded  Confirmed  Type


 


Naltrexone HCl [Revia] 50 mg PO DAILY 07/17/19 08/23/21 History


 


traZODone HCL 50 mg PO HS PRN 07/17/19 08/23/21 History


 


Gabapentin [Neurontin] 100 mg PO TID 08/23/21 08/23/21 History


 


Omeprazole 40 mg PO DAILY 08/23/21 08/23/21 History


 


Promethazine HCl 12.5 mg PO DAILY PRN 08/23/21 08/23/21 History


 


Vilazodone HCl [Viibryd] 40 mg PO DAILY 08/23/21 08/23/21 History


 


amLODIPine BESYLATE/BENAZEPRIL 1 cap PO BID 08/23/21 08/23/21 History





[amLODIPine BESYLATE/BENAZEPRIL    





5-20 MG]    


 


tiZANidine HCL 4 mg PO TID 08/23/21 08/23/21 History








                                    Allergies











Allergy/AdvReac Type Severity Reaction Status Date / Time


 


bee venom protein (honey bee) Allergy  Anaphylaxis Verified 08/23/21 18:27


 


Penicillins Allergy  Unknown Verified 08/24/21 03:38


 


Sulfa (Sulfonamide Allergy  Swelling Verified 08/23/21 18:27





Antibiotics)     


 


codeine AdvReac  Nausea & Verified 08/23/21 18:27





   Vomiting  














Physical Exam


Vitals: 


                                   Vital Signs











  Temp Pulse Resp BP Pulse Ox


 


 08/26/21 12:00  99.6 F  109 H  22  141/91  97


 


 08/26/21 08:00  101.4 F H  122 H  22  139/84  93 L


 


 08/26/21 03:19  98.2 F  110 H  20  127/81  98


 


 08/26/21 01:21   111 H   


 


 08/25/21 23:29  99.9 F H  111 H  18  144/87  98


 


 08/25/21 20:00  99.8 F H  110 H  18  135/79  93 L


 


 08/25/21 16:00  98.7 F  100  16  128/78  94 L








                                Intake and Output











 08/26/21 08/26/21 08/26/21





 06:59 14:59 22:59


 


Intake Total  0 


 


Output Total 500 100 


 


Balance -500 -100 


 


Intake:   


 


  Oral  0 


 


Output:   


 


  Urine 500 100 


 


Other:   


 


  Voiding Method External Catheter External Catheter 


 


  # Voids  2 


 


  # Bowel Movements 1  











 GENERAL EXAM: Lethargic, confused, 55-year-old white male, 2 L of oxygen and 

the pulse ox 97%, patient has bruises and abrasions on his face and left eye, 

knees and feet comfortable in no apparent distress.


HEAD: Normocephalic/atraumatic.


EYES: Normal reaction of pupils, equal size.  Conjunctiva pink, sclera white.


NOSE: Clear with pink turbinates.


THROAT: No erythema or exudates.


NECK: No masses, no JVD, no thyroid enlargement, no adenopathy.


CHEST: No chest wall deformity.  Symmetrical expansion. 


LUNGS: Equal air entry with dim breath sounds, no crackles


CVS: Regular rate and rhythm, normal S1 and S2, no gallops, no murmurs, no rubs


ABDOMEN: Soft, nontender.  No hepatosplenomegaly, normal bowel sounds, no 

guarding or rigidity.


EXTREMITIES: No clubbing, no edema, no cyanosis, 2+ pulses and upper and lower 

extremities.


MUSCULOSKELETAL: Muscle strength and tone normal.


SPINE: No scoliosis or deformity


SKIN: No rashes


CENTRAL NERVOUS SYSTEM: Confused No focal deficits, tone is normal in all 4 e

xtremities.











Results





- Laboratory Findings


CBC and BMP: 


                                 08/26/21 07:00





                                 08/26/21 07:00


ABG











ABG pH  7.51  (7.35-7.45)  H  08/26/21  11:18    


 


ABG pCO2  33 mmHg (35-45)  L  08/26/21  11:18    


 


ABG pO2  85 mmHg ()   08/26/21  11:18    


 


ABG O2 Saturation  97.9 % (94-97)  H  08/26/21  11:18    





PT/INR, D-dimer











PT  12.1 sec (9.0-12.0)  H  08/23/21  16:34    


 


INR  1.2  (<1.2)  H  08/23/21  16:34    








Abnormal lab findings: 


                                  Abnormal Labs











  08/23/21 08/23/21 08/23/21





  16:34 16:34 16:34


 


RBC  3.65 L  


 


Hgb  12.3 L  


 


Hct  37.3 L  


 


MCV  102.3 H  


 


PT   12.1 H 


 


INR   1.2 H 


 


ABG pH   


 


ABG pCO2   


 


ABG HCO3   


 


ABG O2 Saturation   


 


Sodium    153 H


 


Potassium    3.4 L


 


Chloride    115 H


 


BUN   


 


Creatinine   


 


Glucose   


 


POC Glucose (mg/dL)   


 


Calcium   


 


AST    100 H


 


Creatine Kinase    999 H


 


Total Protein    5.6 L


 


Albumin    2.8 L


 


Urine Protein   


 


Urine Glucose (UA)   


 


U Benzodiazepines Scrn   


 


U Marijuana (THC) Screen   














  08/23/21 08/24/21 08/24/21





  23:15 08:27 08:27


 


RBC   3.64 L 


 


Hgb   12.2 L 


 


Hct   37.4 L 


 


MCV   102.8 H 


 


PT   


 


INR   


 


ABG pH   


 


ABG pCO2   


 


ABG HCO3   


 


ABG O2 Saturation   


 


Sodium  152 H   150 H


 


Potassium  3.2 L   3.3 L


 


Chloride  114 H   115 H


 


BUN   


 


Creatinine    0.62 L


 


Glucose   


 


POC Glucose (mg/dL)   


 


Calcium  8.3 L   8.1 L


 


AST   


 


Creatine Kinase   


 


Total Protein   


 


Albumin   


 


Urine Protein   


 


Urine Glucose (UA)   


 


U Benzodiazepines Scrn   


 


U Marijuana (THC) Screen   














  08/25/21 08/25/21 08/25/21





  07:53 12:51 16:46


 


RBC   


 


Hgb   


 


Hct   


 


MCV   


 


PT   


 


INR   


 


ABG pH   


 


ABG pCO2   


 


ABG HCO3   


 


ABG O2 Saturation   


 


Sodium   


 


Potassium  3.1 L  


 


Chloride  113 H  


 


BUN  5 L  


 


Creatinine  0.53 L  


 


Glucose  114 H  


 


POC Glucose (mg/dL)   109 H  106 H


 


Calcium  7.9 L  


 


AST   


 


Creatine Kinase  217 H  


 


Total Protein   


 


Albumin   


 


Urine Protein   


 


Urine Glucose (UA)   


 


U Benzodiazepines Scrn   


 


U Marijuana (THC) Screen   














  08/25/21 08/26/21 08/26/21





  19:03 07:00 07:00


 


RBC    3.57 L


 


Hgb    12.2 L


 


Hct    36.0 L


 


MCV    100.8 H


 


PT   


 


INR   


 


ABG pH   


 


ABG pCO2   


 


ABG HCO3   


 


ABG O2 Saturation   


 


Sodium   


 


Potassium   2.9 L 


 


Chloride   112 H 


 


BUN   <2 L 


 


Creatinine   0.55 L 


 


Glucose   124 H 


 


POC Glucose (mg/dL)   


 


Calcium   7.9 L 


 


AST   


 


Creatine Kinase   


 


Total Protein   


 


Albumin   


 


Urine Protein  Trace H  


 


Urine Glucose (UA)  Trace H  


 


U Benzodiazepines Scrn  Detected H  


 


U Marijuana (THC) Screen  Detected H  














  08/26/21





  11:18


 


RBC 


 


Hgb 


 


Hct 


 


MCV 


 


PT 


 


INR 


 


ABG pH  7.51 H


 


ABG pCO2  33 L


 


ABG HCO3  26 H


 


ABG O2 Saturation  97.9 H


 


Sodium 


 


Potassium 


 


Chloride 


 


BUN 


 


Creatinine 


 


Glucose 


 


POC Glucose (mg/dL) 


 


Calcium 


 


AST 


 


Creatine Kinase 


 


Total Protein 


 


Albumin 


 


Urine Protein 


 


Urine Glucose (UA) 


 


U Benzodiazepines Scrn 


 


U Marijuana (THC) Screen 














- Diagnostic Findings


Chest x-ray: report reviewed, image reviewed


Additional studies: 


 CT of the head and cervical spine, results of the face CT, EKG, carotid Doppler

study, EEG results reviewed








Assessment and Plan


Plan: 


 Assessment:





#1.  Acute hypoxic respiratory failure related to atelectasis, and possibility 

of right basilar pneumonia is not completely excluded, chest x-ray showing right

basilar patchy infiltrate





#2.  Altered mental status, related to acute delirium tremens.  CT of the head 

and cervical spine showing no acute pathology





#3.  History of prescription drug abuse, alcohol abuse





#4.  Falls at home, related to altered mental status





#5.  Multiple skin abrasions, including left eye bruising, with a CT of the face

showing no acute fractures





#6.  Hypernatremia related to dehydration free water deficit, improved with IV 

fluids





#7.  Hypertension





#8.  GERD/reflux





#9.  Anxiety





Plan:





Continue on medical treatment


Ativan per CIWA protocol


Current CIWA score is 5


Chest x-ray has been reviewed, continue current antibiotics


Patient is breathing spontaneously, blood gas has been reviewed


No need for transfer to ICU, CIWA is currently low, and patient is able to 

protect his airway


Head of the bed up 35 at all times


Maintain safety precautions


Provide safety sitter


We'll continue to closely follow





I performed a history & physical examination of the patient and discussed their 

management with my nurse practitioner, Blanche Dobbins.  I reviewed the nurse 

practitioner's note and agree with the documented findings and plan of care.  

Lung sounds are positive for diffuse wheezes throughout the lung fields.  The 

findings and the impression was discussed with the patient.  I attest to the 

documentation by the nurse practitioner. 





Time with Patient: Greater than 30

## 2021-08-26 NOTE — P.PN
Progress Note - Text


Progress Note Date: 08/26/21





Interval History:


Patient was seen resting in bed, currently wearing mitts.  The patient is 

attempting to get out of bed and remove his mitts.  At this time, the patient is

unable to respond appropriately to the psychiatric interview.  As per discussion

with the patient's nurse, the patient has not been doing well and there is 

concern for aspiration pneumonia.  Primary team is considering intubation for 

the patient to stabilize his airway.





Mental Status Exam:


General Appearance: Patient appears to be disheveled, wearing mitts, has a 

noticeable abrasion on his face. 


Behavior: Patient is disorganized and appears to respond to internal stimuli.


Speech: Patient's speech is dysarthric, and nonspontaneous. 


Mood/Affect: Unable to assess


Suicidality/Homicidality: Unable to assess 


Perceptions: Patient is responding to internal stimuli. 


Though content/process: Grossly disorganized


Memory and concentration: Poor.


Judgment and insight: Very poor.





Assessment


Acute Alcohol withdrawal


Alcohol Hallucinosis vs Delirium Tremens


Alcohol Use Disorder, severe





Plan:


-At this time patient DOES NOT meet criteria for inpatient psychiatric 

admission. 





-Patient DOES NOT have decision making capacity at this time and is unable to 

reason through  and communicate/appreciate the risks, benefits and alternatives 

to treatment.





-Delirium precautions recommended with patient including - avoiding use of 

narcotics and CNS sedatives, limit anticholinergic medications when possible, 

frequent re-orientation, minimize use of restraints, open window shades during 

the day and close them at night





-Would recommend the following medication changes/additions: 


No change in medication regimen at this time.  Will defer to the primary team 

for management of severe alcohol withdrawal.





-Agree with neurological workup for possible infectious process as well.





-Psychiatry will sign off at this time.  Consider reconsultation when the 

patient is more appropriate for a psychiatric evaluation.

## 2021-08-26 NOTE — P.PAINCN
History of Present Illness





- Reason for Consult


Consult date: 08/26/21





- History of Present Illness





This is a 55-year-old patient who presents as an inpatient and we are consulted 

for lumbar puncture.  The patient was brought in 3 days ago due to altered 

mental status and unresponsiveness.  Patient has a history of alcoholism as 

well.  Essentially the patient has been extremely delirious disorganized is 

oriented and barely responding to stimuli.  Patient is currently wearing mitts 

for protection.  Primary team is concerned for some type of central nervous 

system infection so they are consulted us for lumbar puncture.  Patient does not

have a known history of any back intervention.








In addition to above, 13-point review of systems is also negative for chest 

pain, shortness of breath, changes in vision, changes in hearing, new onset 

weakness, abdominal pain, diarrhea, extreme fatigue, malaise, fever, skin 

changes, homicidal or suicidal ideation, or bowel or bladder incontinence.





Physical exam:


Vital Signs:  Reviewed in EMR


GENERAL: Obtunded, hardly alert


PSYCH: Groggy, does not answer questions, AAOx0


SKIN: Skin color, texture, turgor normal, no rashes or lesions


HEENT: Normocephalic, atraumatic. 


CV: No pedal edema


RESP: Respirations are labored, no audible wheezing


GI: Abdomen non-distended


NEUR: unable to assess as patient is not cooperative.








Assessment:


1. Encephalopathy possibly due to infection


2. Delirium tremens





Plan:





- We will continue to assess the patient if he'll be able to receive the lumbar 

puncture.  If the patient continues to deteriorate and requires intubation or is

uncooperative he may not be able to do this.  Regardless the patient's Lovenox 

will need to be held at least 12 hours prior to this.





Past Medical History


Past Medical History: GERD/Reflux, Hypertension, Liver Disease


Additional Past Medical History / Comment(s): seasonal allergies


History of Any Multi-Drug Resistant Organisms: None Reported


Past Surgical History: Hernia Repair


Additional Past Surgical History / Comment(s): umbilical hernia repair 2013


Past Anesthesia/Blood Transfusion Reactions: Postoperative Nausea & Vomiting 

(PONV)


Past Psychological History: No Psychological Hx Reported


Smoking Status: Never smoker


Past Alcohol Use History: Heavy


Past Drug Use History: Prescription Drug Abuse





- Past Family History


  ** Mother


Family Medical History: Cancer, Diabetes Mellitus, Hypertension


Additional Family Medical History / Comment(s): nonHodgkins lymphoma





  ** Father


Family Medical History: Hypertension





Medications and Allergies


                                Home Medications











 Medication  Instructions  Recorded  Confirmed  Type


 


Naltrexone HCl [Revia] 50 mg PO DAILY 07/17/19 08/23/21 History


 


traZODone HCL 50 mg PO HS PRN 07/17/19 08/23/21 History


 


Gabapentin [Neurontin] 100 mg PO TID 08/23/21 08/23/21 History


 


Omeprazole 40 mg PO DAILY 08/23/21 08/23/21 History


 


Promethazine HCl 12.5 mg PO DAILY PRN 08/23/21 08/23/21 History


 


Vilazodone HCl [Viibryd] 40 mg PO DAILY 08/23/21 08/23/21 History


 


amLODIPine BESYLATE/BENAZEPRIL 1 cap PO BID 08/23/21 08/23/21 History





[amLODIPine BESYLATE/BENAZEPRIL    





5-20 MG]    


 


tiZANidine HCL 4 mg PO TID 08/23/21 08/23/21 History








                                    Allergies











Allergy/AdvReac Type Severity Reaction Status Date / Time


 


bee venom protein (honey bee) Allergy  Anaphylaxis Verified 08/23/21 18:27


 


Penicillins Allergy  Unknown Verified 08/24/21 03:38


 


Sulfa (Sulfonamide Allergy  Swelling Verified 08/23/21 18:27





Antibiotics)     


 


codeine AdvReac  Nausea & Verified 08/23/21 18:27





   Vomiting  














Physical Exam


Vitals: 


                                   Vital Signs











  Temp Pulse Resp BP Pulse Ox


 


 08/26/21 12:00  99.6 F  109 H  22  141/91  97


 


 08/26/21 08:00  101.4 F H  122 H  22  139/84  93 L


 


 08/26/21 03:19  98.2 F  110 H  20  127/81  98


 


 08/26/21 01:21   111 H   


 


 08/25/21 23:29  99.9 F H  111 H  18  144/87  98


 


 08/25/21 20:00  99.8 F H  110 H  18  135/79  93 L


 


 08/25/21 16:00  98.7 F  100  16  128/78  94 L








                                Intake and Output











 08/26/21 08/26/21 08/26/21





 06:59 14:59 22:59


 


Intake Total  0 


 


Output Total 500 100 


 


Balance -500 -100 


 


Intake:   


 


  Oral  0 


 


Output:   


 


  Urine 500 100 


 


Other:   


 


  Voiding Method External Catheter External Catheter 


 


  # Voids  2 


 


  # Bowel Movements 1  














Results


CBC & Chem 7: 


                                 08/26/21 07:00





                                 08/26/21 07:00


Labs: 


                  Abnormal Lab Results - Last 24 Hours (Table)











  08/25/21 08/25/21 08/26/21 Range/Units





  16:46 19:03 07:00 


 


RBC     (4.30-5.90)  m/uL


 


Hgb     (13.0-17.5)  gm/dL


 


Hct     (39.0-53.0)  %


 


MCV     (80.0-100.0)  fL


 


ABG pH     (7.35-7.45)  


 


ABG pCO2     (35-45)  mmHg


 


ABG HCO3     (21-25)  mmol/L


 


ABG O2 Saturation     (94-97)  %


 


Potassium    2.9 L  (3.5-5.1)  mmol/L


 


Chloride    112 H  ()  mmol/L


 


BUN    <2 L  (9-20)  mg/dL


 


Creatinine    0.55 L  (0.66-1.25)  mg/dL


 


Glucose    124 H  (74-99)  mg/dL


 


POC Glucose (mg/dL)  106 H    (75-99)  mg/dL


 


Calcium    7.9 L  (8.4-10.2)  mg/dL


 


Urine Protein   Trace H   (Negative)  


 


Urine Glucose (UA)   Trace H   (Negative)  


 


U Benzodiazepines Scrn   Detected H   (NotDetected)  


 


U Marijuana (THC) Screen   Detected H   (NotDetected)  














  08/26/21 08/26/21 Range/Units





  07:00 11:18 


 


RBC  3.57 L   (4.30-5.90)  m/uL


 


Hgb  12.2 L   (13.0-17.5)  gm/dL


 


Hct  36.0 L   (39.0-53.0)  %


 


MCV  100.8 H   (80.0-100.0)  fL


 


ABG pH   7.51 H  (7.35-7.45)  


 


ABG pCO2   33 L  (35-45)  mmHg


 


ABG HCO3   26 H  (21-25)  mmol/L


 


ABG O2 Saturation   97.9 H  (94-97)  %


 


Potassium    (3.5-5.1)  mmol/L


 


Chloride    ()  mmol/L


 


BUN    (9-20)  mg/dL


 


Creatinine    (0.66-1.25)  mg/dL


 


Glucose    (74-99)  mg/dL


 


POC Glucose (mg/dL)    (75-99)  mg/dL


 


Calcium    (8.4-10.2)  mg/dL


 


Urine Protein    (Negative)  


 


Urine Glucose (UA)    (Negative)  


 


U Benzodiazepines Scrn    (NotDetected)  


 


U Marijuana (THC) Screen    (NotDetected)  














PQRS Measure Charge Sheet


PQRS Narrative: 


                                        





Smoking Status                   Never smoker


Do You Want the Pneumonia        No


Vaccine AT THIS TIME?            


Blood Pressure [Supine]          141/91


Blood Pressure                   124/66


Pain Intensity [None]            0


Pain Intensity                   0


Scale Used                       Numeric (1 - 10)








Home Medications: 


Ambulatory Orders





Naltrexone HCl [Revia] 50 mg PO DAILY 07/17/19 


traZODone HCL 50 mg PO HS PRN 07/17/19 


Gabapentin [Neurontin] 100 mg PO TID 08/23/21 


Omeprazole 40 mg PO DAILY 08/23/21 


Promethazine HCl 12.5 mg PO DAILY PRN 08/23/21 


Vilazodone HCl [Viibryd] 40 mg PO DAILY 08/23/21 


amLODIPine BESYLATE/BENAZEPRIL [amLODIPine BESYLATE/BENAZEPRIL 5-20 MG] 1 cap PO

BID 08/23/21 


tiZANidine HCL 4 mg PO TID 08/23/21

## 2021-08-26 NOTE — XR
EXAMINATION TYPE: XR chest 1V portable

 

DATE OF EXAM: 8/26/2021

 

HISTORY: Shortness of breath.

 

COMPARISON: 8/23/2021

 

TECHNIQUE: Single view of the chest is submitted.

 

FINDINGS:

Demonstrated are scattered senescent parenchymal change.  

 

Right basilar patchy infiltrate and/or atelectasis noted.

 

The heart is stable.

 

Hilar and mediastinal structures are within normal limits.  

 

Degenerative changes are seen of the dorsal spine. 

 

 IMPRESSION: 

 

1.  Right basilar patchy infiltrate and/or atelectasis noted.

## 2021-08-26 NOTE — P.PN
Subjective


Progress Note Date: 08/26/21





Patient is more confused today.  His mentation is getting worse.  He is very 

lethargic and almost obtunded.  He barely open his eyes and responds icing his 

name but unable to answer any other questions otherwise.  He had a fever of 

101.4 this morning.





Objective





- Vital Signs


Vital signs: 


                                   Vital Signs











Temp  99.6 F   08/26/21 12:00


 


Pulse  109 H  08/26/21 12:00


 


Resp  22   08/26/21 12:00


 


BP  141/91   08/26/21 12:00


 


Pulse Ox  97   08/26/21 12:00








                                 Intake & Output











 08/25/21 08/26/21 08/26/21





 18:59 06:59 18:59


 


Intake Total 240  0


 


Output Total 400 500 100


 


Balance -160 -500 -100


 


Intake:   


 


  Oral 240  0


 


Output:   


 


  Urine 400 500 100


 


Other:   


 


  Voiding Method Diaper External Catheter External Catheter


 


  # Voids 1  2


 


  # Bowel Movements 1 1 














- Exam





General:  The patient is very confused and lethargic


Eye: there is normal conjunctiva bilaterally.  


Neck:  The neck is supple, there is no  JVD.  


Cardiovascular:  Normal  S1-S2, no S3-S4, no murmurs.


Respiratory: Lungs clear to auscultation bilaterally


Gastrointestinal: Abdomen is soft, nontender


Musculoskeletal:  There is no pedal edema.  


Skin:  Skin is warm and dry 





- Labs


CBC & Chem 7: 


                                 08/26/21 07:00





                                 08/26/21 07:00


Labs: 


                  Abnormal Lab Results - Last 24 Hours (Table)











  08/25/21 08/25/21 08/25/21 Range/Units





  12:51 16:46 19:03 


 


RBC     (4.30-5.90)  m/uL


 


Hgb     (13.0-17.5)  gm/dL


 


Hct     (39.0-53.0)  %


 


MCV     (80.0-100.0)  fL


 


ABG pH     (7.35-7.45)  


 


ABG pCO2     (35-45)  mmHg


 


ABG HCO3     (21-25)  mmol/L


 


ABG O2 Saturation     (94-97)  %


 


Potassium     (3.5-5.1)  mmol/L


 


Chloride     ()  mmol/L


 


BUN     (9-20)  mg/dL


 


Creatinine     (0.66-1.25)  mg/dL


 


Glucose     (74-99)  mg/dL


 


POC Glucose (mg/dL)  109 H  106 H   (75-99)  mg/dL


 


Calcium     (8.4-10.2)  mg/dL


 


Urine Protein    Trace H  (Negative)  


 


Urine Glucose (UA)    Trace H  (Negative)  


 


U Benzodiazepines Scrn    Detected H  (NotDetected)  


 


U Marijuana (THC) Screen    Detected H  (NotDetected)  














  08/26/21 08/26/21 08/26/21 Range/Units





  07:00 07:00 11:18 


 


RBC   3.57 L   (4.30-5.90)  m/uL


 


Hgb   12.2 L   (13.0-17.5)  gm/dL


 


Hct   36.0 L   (39.0-53.0)  %


 


MCV   100.8 H   (80.0-100.0)  fL


 


ABG pH    7.51 H  (7.35-7.45)  


 


ABG pCO2    33 L  (35-45)  mmHg


 


ABG HCO3    26 H  (21-25)  mmol/L


 


ABG O2 Saturation    97.9 H  (94-97)  %


 


Potassium  2.9 L    (3.5-5.1)  mmol/L


 


Chloride  112 H    ()  mmol/L


 


BUN  <2 L    (9-20)  mg/dL


 


Creatinine  0.55 L    (0.66-1.25)  mg/dL


 


Glucose  124 H    (74-99)  mg/dL


 


POC Glucose (mg/dL)     (75-99)  mg/dL


 


Calcium  7.9 L    (8.4-10.2)  mg/dL


 


Urine Protein     (Negative)  


 


Urine Glucose (UA)     (Negative)  


 


U Benzodiazepines Scrn     (NotDetected)  


 


U Marijuana (THC) Screen     (NotDetected)  














Assessment and Plan


Assessment: 





This is a 55-year-old male was brought into the emergency room after he was 

found by one of the neighbors covered in feces but responsive with empty bottles

of alcohol around him be transferred to the hospital upon arrival he was cleaned

up and was found to be hallucinating suggestive of being in DTs for which she 

was started on benzodiazepines per CIWA scale.


CT of the head no acute pathology


CT of the face showed old left orbital fracture inferiorly


X-ray of the right elbow showed olecranon process spur with some soft tissue 

swelling


EKG showed sinus rhythm


Alcohol level was <10





Assessment: 





Alcohol abuse with alcohol withdrawal syndrome


Delirium tremens


Rhabdomyolysis


Encephalopathy with hallucinations





Plan


Benzodiazepines per CIWA scale


Valium 5 mg 3 times a day


IV fluid hydration


Thiamine


Seizure precautions


Fall precautions


Computed tomography scan of the head showed no acute findings.


Consults neurology and psychiatry for further evaluation


Valium dose increased to 10 mg 4 times a day by psychiatry





Acute toxo metabolic encephalopathy


Sepsis without septic shock


Concerns about meningitis


Started on broad spectrum antibiotic with vancomycin, ceftriaxone, and acyclovir


Blood gas showed no evidence of acute CO2 narcosis


Chest x-ray with questionable pneumonia


LP ordered


I am concerned about patient being able to protect his airway and may require 

ICU transfer for that reason I would consult pulmonary for further evaluation








Poor living condition


Recurrent episodes of hospital admission for alcohol abuse


Jake. life stressor with possible major depression





Plan


Psych consultation


 consultation





Macrocytic anemia mild


Most likely secondary to alcohol abuse


No reported GI bleeding





Multiple skin abrasions


Left eye bruising


Supportive care and wound care


No acute fractures identified





Hypernatremia 


Improved with IV fluid





 GI prophylaxis PPI





CODE STATUS: Full code


DVT prophylaxis: Mechanical


Discussed with: Patient, ER, RN


Anticipated length of stay  more than 2 midnights


Anticipated discharge place: Pending clinical course


A total of 65 minutes was spent on the care of this complex patient more than 

50% of the time was spent in counseling and care coordination.

## 2021-08-26 NOTE — XR
EXAMINATION TYPE: XR ankle complete LT

 

DATE OF EXAM: 8/26/2021

 

COMPARISON: NONE

 

HISTORY: Pain

 

TECHNIQUE: 3 views of the left ankle are submitted for evaluation.

 

FINDINGS: There is no evidence for fracture or dislocation. Ankle mortise is intact. Soft tissues are
 within normal limits.

 

IMPRESSION:

1. No evidence for acute fracture.

## 2021-08-26 NOTE — US
EXAMINATION TYPE: US carotid duplex BILAT

 

DATE OF EXAM: 8/26/2021

 

COMPARISON: NONE

 

CLINICAL HISTORY: 55-year-old male with syncope, fall. Detoxing.  Hallucinations.  Poor historian.  

 

TECHNIQUE: Carotid duplex ultrasound examination. Indirect Doppler criteria was utilized.

 

FINDINGS:

 

Sonographer notes:***Suboptimal exam due the patient's inability to stay still

 

EXAM MEASUREMENTS: 

 

RIGHT:  Peak Systolic Velocity (PSV) cm/sec

----- Right CCA:  38.4  

----- Right ICA:  66.9     

----- Right ECA:  107.3   

ICA/CCA ratio:  1.7    

 

RIGHT:  End Diastole cm/sec

----- Right CCA:  15.3   

----- Right ICA:  16.4      

----- Right ECA:  24.5     

 

LEFT:  Peak Systolic Velocity (PSV) cm/sec

----- Left CCA:  63.3  

----- Left ICA:  59.2   

----- Left ECA:  83.4  

ICA/CCA ratio:  0.9  

 

LEFT:  End Diastole cm/sec

----- Left CCA:  8.9  

----- Left ICA:  18.2   

----- Left ECA:  20.8 

 

VERTEBRALS (direction of flow):

Right Vertebral: Antegrade

Left Vertebral: Antegrade

 

Rhythm:  Normal

 

Sonographer notes: No plaque, wall thickening, or elevated velocities. 

 

 

 

IMPRESSION:  

Technical limitations due to patient's inability to hold still. No hemodynamically significant ICA st
enosis identified on either side.

 

   

 

 

NASCET criteria was used in interpretation of this exam?

 

Criteria for Assigning % of Stenosis / Diameter reduction

(Estimation based on the indirect measurements of the internal carotid artery velocities (ICA PSV).

1.  Normal (no stenosis)=ICA PSV < 125 cm/s: ratio < 2.0: ICA EDV<40 cm/s.

2. Less than 50% stenosis=ICA PSV < 125 cm/s: ratio < 2.0: ICA EDV<40 cm/s.

3.  50 to 69% stenosis=ICA PSV of 125 to 230 cm/s: ration 2.0 ? 4.0: ICA EDV  cm/s.

4.  Greater than 70% stenosis to near occlusion= ICA PSV > 230 cm/s: ratio > 4.0: ICA EDV > 100 cm/s.
 

5.  Near occlusion= ICA PSV velocities may be low or undetectable: variable ratio and ICA EDV.

6.  Total occlusion=unable to detect flow.

## 2021-08-26 NOTE — EEG
ELECTROENCEPHALOGRAM REPORT



DATE OF SERVICE:

08/26/2021



PREAMBLE:

This is a 55-year-old male with altered mental status.  This study is performed to

evaluate for any epileptiform activity.



EEG FINDINGS:

This is a 21-channel routine EEG recording in a patient utilizing 10/20 international

system with referential and bipolar montages.  Background consists of moderately well-

developed and regulated, predominantly 5-6 hertz moderate amplitude theta activity seen

in bihemispheric region. Intermittent delta slowing was also seen in generalized

distribution.  Background does not seem to be reactive to eye opening or closing.

Photic driving response was not seen.  Occasional bursts of generalized mild

suppression were also seen.  Different stages of sleep were not seen.  No focal or

generalized epileptiform activity was seen.  EKG channel showed no arrhythmia.

Hyperventilation was not performed.



IMPRESSION:

This is an abnormal EEG due to background slowing of moderate to severe degree.  This

is suggestive of generalized cerebral dysfunction as can be seen with toxic metabolic

encephalopathy or due to diffuse structural brain abnormality.  No epileptiform

activity was seen.





MMODL / IJN: [id number] / Job#: [id number]

## 2021-08-27 LAB
ALBUMIN SERPL-MCNC: 2.4 G/DL (ref 3.5–5)
ALP SERPL-CCNC: 59 U/L (ref 38–126)
ALT SERPL-CCNC: 28 U/L (ref 4–49)
ANION GAP SERPL CALC-SCNC: 7 MMOL/L
AST SERPL-CCNC: 59 U/L (ref 17–59)
BASOPHILS # BLD AUTO: 0.1 K/UL (ref 0–0.2)
BASOPHILS NFR BLD AUTO: 1 %
BUN SERPL-SCNC: 4 MG/DL (ref 9–20)
CALCIUM SPEC-MCNC: 7.6 MG/DL (ref 8.4–10.2)
CELL CNT PNL CSF: 100
CHLORIDE SERPL-SCNC: 110 MMOL/L (ref 98–107)
CO2 BLDA-SCNC: 28 MMOL/L (ref 19–24)
CO2 SERPL-SCNC: 25 MMOL/L (ref 22–30)
EOSINOPHIL # BLD AUTO: 0.1 K/UL (ref 0–0.7)
EOSINOPHIL NFR BLD AUTO: 1 %
ERYTHROCYTE [DISTWIDTH] IN BLOOD BY AUTOMATED COUNT: 3.53 M/UL (ref 4.3–5.9)
ERYTHROCYTE [DISTWIDTH] IN BLOOD: 15 % (ref 11.5–15.5)
GLUCOSE BLD-MCNC: 108 MG/DL (ref 75–99)
GLUCOSE BLD-MCNC: 96 MG/DL (ref 75–99)
GLUCOSE BLD-MCNC: 99 MG/DL (ref 75–99)
GLUCOSE BLD-MCNC: 99 MG/DL (ref 75–99)
GLUCOSE CSF-MCNC: 69 MG/DL (ref 40–70)
GLUCOSE SERPL-MCNC: 105 MG/DL (ref 74–99)
GRANULOCYTES NFR CSF MANUAL: 85 %
HCO3 BLDA-SCNC: 27 MMOL/L (ref 21–25)
HCT VFR BLD AUTO: 35.3 % (ref 39–53)
HGB BLD-MCNC: 12.1 GM/DL (ref 13–17.5)
INR PPP: 1.4 (ref ?–1.2)
LYMPHOCYTES # SPEC AUTO: 1.5 K/UL (ref 1–4.8)
LYMPHOCYTES NFR SPEC AUTO: 13 %
MAGNESIUM SPEC-SCNC: 1.8 MG/DL (ref 1.6–2.3)
MCH RBC QN AUTO: 34.3 PG (ref 25–35)
MCHC RBC AUTO-ENTMCNC: 34.3 G/DL (ref 31–37)
MCV RBC AUTO: 99.9 FL (ref 80–100)
MONOCYTES # BLD AUTO: 0.6 K/UL (ref 0–1)
MONOCYTES NFR BLD AUTO: 6 %
MONONUC CELLS NFR CSF: 15 %
NEUTROPHILS # BLD AUTO: 9.1 K/UL (ref 1.3–7.7)
NEUTROPHILS NFR BLD AUTO: 79 %
NUC CELL # CSF: 18 U/L (ref 0–5)
PCO2 BLDA: 35 MMHG (ref 35–45)
PH BLDA: 7.5 [PH] (ref 7.35–7.45)
PLATELET # BLD AUTO: 236 K/UL (ref 150–450)
PO2 BLDA: 76 MMHG (ref 83–108)
POTASSIUM SERPL-SCNC: 3.5 MMOL/L (ref 3.5–5.1)
PROT SERPL-MCNC: 5.2 G/DL (ref 6.3–8.2)
PT BLD: 13.8 SEC (ref 9–12)
RBC # CSF: 100 %
RBC # CSF: 5175 U/L (ref 0–10)
SODIUM SERPL-SCNC: 142 MMOL/L (ref 137–145)
SPECIMEN VOL CSF: 2 ML
WBC # BLD AUTO: 11.5 K/UL (ref 3.8–10.6)

## 2021-08-27 PROCEDURE — 05HD33Z INSERTION OF INFUSION DEVICE INTO RIGHT CEPHALIC VEIN, PERCUTANEOUS APPROACH: ICD-10-PCS

## 2021-08-27 PROCEDURE — 009U3ZX DRAINAGE OF SPINAL CANAL, PERCUTANEOUS APPROACH, DIAGNOSTIC: ICD-10-PCS

## 2021-08-27 RX ADMIN — SODIUM CHLORIDE SCH MLS/HR: 9 INJECTION, SOLUTION INTRAVENOUS at 10:34

## 2021-08-27 RX ADMIN — POTASSIUM CHLORIDE SCH MLS/HR: 14.9 INJECTION, SOLUTION INTRAVENOUS at 10:35

## 2021-08-27 RX ADMIN — Medication SCH: at 06:58

## 2021-08-27 RX ADMIN — Medication SCH: at 18:05

## 2021-08-27 RX ADMIN — POTASSIUM CHLORIDE SCH MLS/HR: 14.9 INJECTION, SOLUTION INTRAVENOUS at 17:01

## 2021-08-27 RX ADMIN — SODIUM CHLORIDE SCH MLS/HR: 900 INJECTION, SOLUTION INTRAVENOUS at 06:50

## 2021-08-27 RX ADMIN — SODIUM CHLORIDE SCH MLS/HR: 9 INJECTION, SOLUTION INTRAVENOUS at 18:28

## 2021-08-27 RX ADMIN — PANTOPRAZOLE SODIUM SCH: 40 TABLET, DELAYED RELEASE ORAL at 06:58

## 2021-08-27 RX ADMIN — ACETAMINOPHEN PRN MG: 650 SUPPOSITORY RECTAL at 22:09

## 2021-08-27 RX ADMIN — SODIUM CHLORIDE SCH MLS/HR: 900 INJECTION, SOLUTION INTRAVENOUS at 20:52

## 2021-08-27 RX ADMIN — SODIUM CHLORIDE SCH MLS/HR: 900 INJECTION, SOLUTION INTRAVENOUS at 12:36

## 2021-08-27 RX ADMIN — ACETAMINOPHEN PRN MG: 650 SUPPOSITORY RECTAL at 16:58

## 2021-08-27 RX ADMIN — FOLIC ACID SCH: 1 TABLET ORAL at 09:40

## 2021-08-27 NOTE — P.PN
Subjective


Progress Note Date: 08/27/21





Patient was seen for a follow-up.  Patient's friend was also present today.  

Patient continues to be very encephalopathic.  Restless, does not communicate 

much.  Patient states just a few words, like "yes", "no", "all right".  Patient 

not able to tell me his name.  Patient's severely encephalopathic, appears 

slightly worse.





Objective





- Vital Signs


Vital signs: 


                                   Vital Signs











Temp  99.7 F H  08/27/21 08:15


 


Pulse  128 H  08/27/21 08:15


 


Resp  24   08/27/21 08:15


 


BP  138/83   08/27/21 08:15


 


Pulse Ox  90 L  08/27/21 08:15








                                 Intake & Output











 08/26/21 08/27/21 08/27/21





 18:59 06:59 18:59


 


Intake Total 0  


 


Output Total 350  400


 


Balance -350  -400


 


Weight  61.5 kg 61.5 kg


 


Intake:   


 


  Oral 0  


 


Output:   


 


  Urine 350  400


 


Other:   


 


  Voiding Method External Catheter External Catheter External Catheter


 


  # Voids 2 1 1


 


  # Bowel Movements  1 1














- Exam





Patient continues to be significantly encephalopathic.  Patient somewhat 

restless, does not answer to any question.  Not able to tell me his name or name

any object.  Patient makes minimal eye contact.  Pupils are round and reacting. 

Visual fields could not be tested.  Face is symmetric.  Patient moves all 4 

extremities equally. Extraocular muscles are intact.   Tone is equal 

bilaterally.  Left ankle is painful.





- Labs


CBC & Chem 7: 


                                 08/27/21 07:39





                                 08/27/21 07:39


Labs: 


                  Abnormal Lab Results - Last 24 Hours (Table)











  08/27/21 08/27/21 08/27/21 Range/Units





  00:40 07:39 07:39 


 


WBC    11.5 H  (3.8-10.6)  k/uL


 


RBC    3.53 L  (4.30-5.90)  m/uL


 


Hgb    12.1 L  (13.0-17.5)  gm/dL


 


Hct    35.3 L  (39.0-53.0)  %


 


Neutrophils #    9.1 H  (1.3-7.7)  k/uL


 


PT   13.8 H   (9.0-12.0)  sec


 


INR   1.4 H   (<1.2)  


 


ABG pH  7.50 H    (7.35-7.45)  


 


ABG pO2  76 L    ()  mmHg


 


ABG HCO3  27 H    (21-25)  mmol/L


 


ABG Total CO2  28 H    (19-24)  mmol/L


 


ABG O2 Saturation  97.1 H    (94-97)  %


 


Chloride     ()  mmol/L


 


BUN     (9-20)  mg/dL


 


Creatinine     (0.66-1.25)  mg/dL


 


Glucose     (74-99)  mg/dL


 


Calcium     (8.4-10.2)  mg/dL


 


Total Protein     (6.3-8.2)  g/dL


 


Albumin     (3.5-5.0)  g/dL














  08/27/21 Range/Units





  07:39 


 


WBC   (3.8-10.6)  k/uL


 


RBC   (4.30-5.90)  m/uL


 


Hgb   (13.0-17.5)  gm/dL


 


Hct   (39.0-53.0)  %


 


Neutrophils #   (1.3-7.7)  k/uL


 


PT   (9.0-12.0)  sec


 


INR   (<1.2)  


 


ABG pH   (7.35-7.45)  


 


ABG pO2   ()  mmHg


 


ABG HCO3   (21-25)  mmol/L


 


ABG Total CO2   (19-24)  mmol/L


 


ABG O2 Saturation   (94-97)  %


 


Chloride  110 H  ()  mmol/L


 


BUN  4 L  (9-20)  mg/dL


 


Creatinine  0.52 L  (0.66-1.25)  mg/dL


 


Glucose  105 H  (74-99)  mg/dL


 


Calcium  7.6 L  (8.4-10.2)  mg/dL


 


Total Protein  5.2 L  (6.3-8.2)  g/dL


 


Albumin  2.4 L  (3.5-5.0)  g/dL








                      Microbiology - Last 24 Hours (Table)











 08/26/21 09:05 Blood Culture - Preliminary





 Blood    No Growth after 24 hours














Assessment and Plan


Assessment: 





* 55-year-old male with history of alcoholism, was found at laying with fecal 

  matter with multiple bruises, scratches and pressure sores over dependent 

  areas, indicating patient has been on the floor for fairly long period of 

  time.  Last period of contact with his friends was on Friday, 3 days prior to 

  arrival.   Patient has not improved clinically since arrival, continues to be 

  significantly delirious/encephalopathic.  Probable alcohol 

  withdrawal/DTs/alcoholic hallucinosis, rule out infectious process.  Patient 

  now has developed temperature 101.4.  Rule out meningitis encephalitis.


* Hypertension


* History of alcoholism


* History of marijuana use.


Plan: 





* Patient is not showing signs of improvement, appears slightly worse.  Patient 

  has not developed fever.  Await lumbar puncture.  Patient has been empirically

  started on acyclovir, vancomycin and ceftriaxone meningeal dose.  Need to 

  closely watch renal functions for acyclovir.


* Await lumbar puncture.


* EEG was performed, which was abnormal due to background slowing of moderate to

  severe degree.  This is suggestive of generalized cerebral dysfunction, as can

  be seen with toxic metabolic encephalopathy or due to diffuse structural brain

  abnormality.  No epileptiform activity was seen.  


* Carotid Doppler was technically difficult due to patient's inability to hold 

  still.  No hemodynamically significant ICA stenosis identified on either side.

   Antegrade flow in both vertebral arteries.


* B12 582, folate 5.7, TSH 1.77, RPR nonreactive.  We will start folate 

  replacement.  B6 and MMA pending.


* Thiamine, folate.  [facility] protocol.


* Left ankle x-ray negative for fracture.


* Await MRI of the brain with and without contrast (MRI scanner was down 

  yesterday, could not get done).


* We will follow clinically.





Update 2:43 PM:


Spinal fluid examination revealed glucose 64 (40-70), proteins 226 (12-60), 

total WBC count in CSF 18, out of its 15% monocytes and 85% polynuclear's.  

Patient probably has bacterial meningitis.  Patient already has been started on 

ceftriaxone and vancomycin as of yesterday.  Also empirically covered by 

acyclovir.  Patient is being transferred to ICU.


MRI of the brain revealed mild atrophy.  Mild subependymal white matter 

increased signal around the lateral ventricles measuring up to 5 mm in 

thickness.  No evidence of cortical infarct.

## 2021-08-27 NOTE — MR
EXAMINATION TYPE: MR brain wo/w con

 

DATE OF EXAM: 8/27/2021

 

COMPARISON: Fall. Altered mental status.

 

HISTORY: AMS, fall, rule out encephalitis.

 

CONTRAST: 

Standard multiplanar, multisequence MRI departmental protocol utilizing 10 mL intravenous Gadavist ga
dolinium contrast.  

 

There is cerebral cortical atrophy. There is no mass effect nor midline shift. There is no evidence o
f intracranial hemorrhage. Diffusion images show no evidence of an acute infarct.

 

There is minimal linear increased signal in the periventricular white matter. This measures up to 5 m
m in thickness. Exam limited slightly by motion. I see no evidence of lacunar infarct. The brainstem 
appears intact. Sella turcica appears normal. Corpus callosum is intact. There is no evidence of post
erior fossa mass.

 

There is normal enhancement of the venous sinuses. I see no pathologic enhancement.

 

IMPRESSION:

Mild atrophy. Mild subependymal white matter increased signal around the lateral ventricles measuring
 up to 5 mm in thickness. No evidence of cortical infarct.

## 2021-08-27 NOTE — XR
EXAMINATION TYPE: XR ankle complete bilateral

 

DATE OF EXAM: 8/27/2021

 

COMPARISON: NONE

 

HISTORY: Ankle pain

 

TECHNIQUE: 3 views each ankle

 

FINDINGS: There is bilateral plantar and Achilles calcaneal spur formation. Spurring is more on the l
eft side. Ankle mortise appears intact bilaterally. There is soft tissue swelling around the left ank
le. Subtalar joints appear intact. There is some spurring of the anterior malleolus of the right ankl
e.

 

IMPRESSION: Soft tissue swelling. Calcaneal spurring. No fracture seen.

## 2021-08-27 NOTE — P.CONS
History of Present Illness





- Reason for Consult


Consult date: 08/26/21


Fever


Requesting physician: Stephanie Horton





- Chief Complaint


mental status changes x few days





- History of Present Illness


History of present illness : Patient is a 55-year-old  male with 

multiple comorbidities history of alcoholism the patient was brought to the ER 3

days ago with the patient was found to be delirious and confused by his neighbor

patient was lying on the floor in his own feces and he had a bunch of alcohol 

bottles lying on him patient was noticed to have multiple bruises and abrasion 

on his lower extremity and hematoma to the left eye patient on presentation to 

the hospital was afebrile however has been running a low-grade fever since y

esterday and did spike a fever of 101.4 degree for height today patient did have

a normal white count with no lymphopenia kidney function has been normal AST was

elevated as well as CK on admission urine was negative urine drug screen was 

positive for benzo and marijuana patient did have a chest x-ray with minimal 

subsegmental atelectasis right lung base without any change repeat x-ray with 

right patchy basilar infiltrate and atelectasis patient was started on Rocephin 

vancomycin and acyclovir with concern for possible meningitis/encephalitis 

infectious disease was consulted for further management anesthesia has been 

consulted for LP most information has been obtained from review the chart and 

talking to nursing staff and the patient himself is unable to provide any 

history














Review of system:


Positive point has been mentioned in HPI complete review could not be obtained 

because of  underlying mental status.








Past medical history : Reviewed, documented below


Past surgical history : Reviewed, documented below


Social history: Reviewed, documented below


Medications: Reviewed, as documented below





GENERAL DESCRIPTION: Middle-aged male lying in bed, no distress. No tachypnea or

accessory muscle of respiration use.


HEENT: Shows Pallor , no scleral icterus. Oral mucous membrane is dry.


NECK: Trachea central, no thyromegaly.


LUNGS: Unlabored breathing.  Decreased breath sound at the base. No wheeze or 

crackle.


HEART: S1, S2, regular rate and rhythm.


ABDOMEN: Soft, no tenderness , guarding or rigidity


EXTREMITIES: No edema of feet.


SKIN: No rash, no masses palpable.  Multiple bruises


NEUROLOGICAL: The patient is lethargic orientation could not determine 





LABS AND RADIOLOGY: Reviewed results see below





Assessment : Patient presented to hospital with mental status changes in this 

patient who do have history of alcoholism and drug use now with a fever concern 

for possible aspiration pneumonia underlying meningitis/encephalitis not 

entirely excluded








Plan: 1-await LP and CSF will be sent for Gram stain and cell count glucose 

protein HSV DNA by PCR


2-vancomycin pharmacy to dose her with a target trough of 15 while watching her 

kidney function and Vanco trough closely.


3-Rocephin 2 g every 12 and acyclovir 10 mg/kg every 8 hour


We will follow on clinical condition and cultures to further adjust medication 

if needed


Thank you for this consultation we will follow the patient along with you








Past Medical History


Past Medical History: GERD/Reflux, Hypertension, Liver Disease


Additional Past Medical History / Comment(s): seasonal allergies


History of Any Multi-Drug Resistant Organisms: None Reported


Past Surgical History: Hernia Repair


Additional Past Surgical History / Comment(s): umbilical hernia repair 2013


Past Anesthesia/Blood Transfusion Reactions: Postoperative Nausea & Vomiting 

(PONV)


Past Psychological History: No Psychological Hx Reported


Smoking Status: Never smoker


Past Alcohol Use History: Heavy


Past Drug Use History: Prescription Drug Abuse





- Past Family History


  ** Mother


Family Medical History: Cancer, Diabetes Mellitus, Hypertension


Additional Family Medical History / Comment(s): nonHodgkins lymphoma





  ** Father


Family Medical History: Hypertension





Medications and Allergies


                                Home Medications











 Medication  Instructions  Recorded  Confirmed  Type


 


Naltrexone HCl [Revia] 50 mg PO DAILY 07/17/19 08/23/21 History


 


traZODone HCL 50 mg PO HS PRN 07/17/19 08/23/21 History


 


Gabapentin [Neurontin] 100 mg PO TID 08/23/21 08/23/21 History


 


Omeprazole 40 mg PO DAILY 08/23/21 08/23/21 History


 


Promethazine HCl 12.5 mg PO DAILY PRN 08/23/21 08/23/21 History


 


Vilazodone HCl [Viibryd] 40 mg PO DAILY 08/23/21 08/23/21 History


 


amLODIPine BESYLATE/BENAZEPRIL 1 cap PO BID 08/23/21 08/23/21 History





[amLODIPine BESYLATE/BENAZEPRIL    





5-20 MG]    


 


tiZANidine HCL 4 mg PO TID 08/23/21 08/23/21 History








                                    Allergies











Allergy/AdvReac Type Severity Reaction Status Date / Time


 


bee venom protein (honey bee) Allergy  Anaphylaxis Verified 08/23/21 18:27


 


Penicillins Allergy  Unknown Verified 08/24/21 03:38


 


Sulfa (Sulfonamide Allergy  Swelling Verified 08/23/21 18:27





Antibiotics)     


 


codeine AdvReac  Nausea & Verified 08/23/21 18:27





   Vomiting  














Physical Exam


Vitals: 


                                   Vital Signs











  Temp Pulse Resp BP Pulse Ox


 


 08/26/21 08:00  101.4 F H  122 H  22  139/84  93 L


 


 08/26/21 03:19  98.2 F  110 H  20  127/81  98


 


 08/26/21 01:21   111 H   


 


 08/25/21 23:29  99.9 F H  111 H  18  144/87  98


 


 08/25/21 20:00  99.8 F H  110 H  18  135/79  93 L


 


 08/25/21 16:00  98.7 F  100  16  128/78  94 L


 


 08/25/21 14:00   107 H  16  


 


 08/25/21 12:00  98.9 F  107 H  16  119/82  94 L








                                Intake and Output











 08/25/21 08/26/21 08/26/21





 22:59 06:59 14:59


 


Intake Total 120  0


 


Output Total 400 500 


 


Balance -280 -500 0


 


Intake:   


 


  Oral 120  0


 


Output:   


 


  Urine 400 500 


 


Other:   


 


  Voiding Method External Catheter External Catheter External Catheter


 


  # Bowel Movements  1 














Results


CBC & Chem 7: 


                                 08/26/21 07:00





                                 08/26/21 07:00


Labs: 


                  Abnormal Lab Results - Last 24 Hours (Table)











  08/25/21 08/25/21 08/25/21 Range/Units





  12:51 16:46 19:03 


 


RBC     (4.30-5.90)  m/uL


 


Hgb     (13.0-17.5)  gm/dL


 


Hct     (39.0-53.0)  %


 


MCV     (80.0-100.0)  fL


 


Potassium     (3.5-5.1)  mmol/L


 


Chloride     ()  mmol/L


 


BUN     (9-20)  mg/dL


 


Creatinine     (0.66-1.25)  mg/dL


 


Glucose     (74-99)  mg/dL


 


POC Glucose (mg/dL)  109 H  106 H   (75-99)  mg/dL


 


Calcium     (8.4-10.2)  mg/dL


 


Urine Protein    Trace H  (Negative)  


 


Urine Glucose (UA)    Trace H  (Negative)  


 


U Benzodiazepines Scrn    Detected H  (NotDetected)  


 


U Marijuana (THC) Screen    Detected H  (NotDetected)  














  08/26/21 08/26/21 Range/Units





  07:00 07:00 


 


RBC   3.57 L  (4.30-5.90)  m/uL


 


Hgb   12.2 L  (13.0-17.5)  gm/dL


 


Hct   36.0 L  (39.0-53.0)  %


 


MCV   100.8 H  (80.0-100.0)  fL


 


Potassium  2.9 L   (3.5-5.1)  mmol/L


 


Chloride  112 H   ()  mmol/L


 


BUN  <2 L   (9-20)  mg/dL


 


Creatinine  0.55 L   (0.66-1.25)  mg/dL


 


Glucose  124 H   (74-99)  mg/dL


 


POC Glucose (mg/dL)    (75-99)  mg/dL


 


Calcium  7.9 L   (8.4-10.2)  mg/dL


 


Urine Protein    (Negative)  


 


Urine Glucose (UA)    (Negative)  


 


U Benzodiazepines Scrn    (NotDetected)  


 


U Marijuana (THC) Screen    (NotDetected) ICD code needed  ICD-9 is 69.7  ICD-10 is z30.430

## 2021-08-27 NOTE — P.PN
Subjective


Progress Note Date: 08/26/21





Patient was seen for a follow-up.  Patient's friend was also present today.  

Patient continues to be very encephalopathic.  Restless, does not communicate 

much.  Patient states just a few words, like "yes", "no", "all right".  Patient 

not able to tell me his name.





Objective





- Vital Signs


Vital signs: 


                                   Vital Signs











Temp  99.7 F H  08/27/21 08:15


 


Pulse  128 H  08/27/21 08:15


 


Resp  24   08/27/21 08:15


 


BP  138/83   08/27/21 08:15


 


Pulse Ox  90 L  08/27/21 08:15








                                 Intake & Output











 08/26/21 08/27/21 08/27/21





 18:59 06:59 18:59


 


Intake Total 0  


 


Output Total 350  


 


Balance -350  


 


Weight  61.5 kg 


 


Intake:   


 


  Oral 0  


 


Output:   


 


  Urine 350  


 


Other:   


 


  Voiding Method External Catheter External Catheter 


 


  # Voids 2 1 


 


  # Bowel Movements  1 














- Exam





Patient continues to be significantly encephalopathic.  Only states yes, no or 

"all right".  Patient makes eye contact.  He did not speak much otherwise.  

Sometimes he may say a phrase.  Patient's pupils are round and reacting, face is

symmetric.  Visual fields could not be tested.  Extraocular muscles are intact. 

Patient moves all 4 extremities.  Tone is equal bilaterally.  Left ankle is 

painful.





- Labs


CBC & Chem 7: 


                                 08/27/21 07:39





                                 08/27/21 07:39


Labs: 


                  Abnormal Lab Results - Last 24 Hours (Table)











  08/26/21 08/27/21 08/27/21 Range/Units





  11:18 00:40 07:39 


 


WBC     (3.8-10.6)  k/uL


 


RBC     (4.30-5.90)  m/uL


 


Hgb     (13.0-17.5)  gm/dL


 


Hct     (39.0-53.0)  %


 


Neutrophils #     (1.3-7.7)  k/uL


 


PT    13.8 H  (9.0-12.0)  sec


 


INR    1.4 H  (<1.2)  


 


ABG pH  7.51 H  7.50 H   (7.35-7.45)  


 


ABG pCO2  33 L    (35-45)  mmHg


 


ABG pO2   76 L   ()  mmHg


 


ABG HCO3  26 H  27 H   (21-25)  mmol/L


 


ABG Total CO2   28 H   (19-24)  mmol/L


 


ABG O2 Saturation  97.9 H  97.1 H   (94-97)  %


 


Chloride     ()  mmol/L


 


BUN     (9-20)  mg/dL


 


Creatinine     (0.66-1.25)  mg/dL


 


Glucose     (74-99)  mg/dL


 


Calcium     (8.4-10.2)  mg/dL


 


Total Protein     (6.3-8.2)  g/dL


 


Albumin     (3.5-5.0)  g/dL














  08/27/21 08/27/21 Range/Units





  07:39 07:39 


 


WBC  11.5 H   (3.8-10.6)  k/uL


 


RBC  3.53 L   (4.30-5.90)  m/uL


 


Hgb  12.1 L   (13.0-17.5)  gm/dL


 


Hct  35.3 L   (39.0-53.0)  %


 


Neutrophils #  9.1 H   (1.3-7.7)  k/uL


 


PT    (9.0-12.0)  sec


 


INR    (<1.2)  


 


ABG pH    (7.35-7.45)  


 


ABG pCO2    (35-45)  mmHg


 


ABG pO2    ()  mmHg


 


ABG HCO3    (21-25)  mmol/L


 


ABG Total CO2    (19-24)  mmol/L


 


ABG O2 Saturation    (94-97)  %


 


Chloride   110 H  ()  mmol/L


 


BUN   4 L  (9-20)  mg/dL


 


Creatinine   0.52 L  (0.66-1.25)  mg/dL


 


Glucose   105 H  (74-99)  mg/dL


 


Calcium   7.6 L  (8.4-10.2)  mg/dL


 


Total Protein   5.2 L  (6.3-8.2)  g/dL


 


Albumin   2.4 L  (3.5-5.0)  g/dL














Assessment and Plan


Assessment: 





* 55-year-old male with history of alcoholism, was found at laying with fecal 

  matter with multiple bruises, scratches and pressure sores over dependent 

  areas, indicating patient has been on the floor for fairly long period of 

  time.  Last period of contact with his friends was on Friday, 3 days prior to 

  arrival.   Patient has not improved clinically since arrival, continues to be 

  significantly delirious/encephalopathic.  Probable alcohol 

  withdrawal/DTs/alcoholic hallucinosis, rule out infectious process.  Patient 

  now has developed temperature 101.4.  Rule out meningitis encephalitis.


* Hypertension


* History of alcoholism


* History of marijuana use.


Plan: 





* Patient is not showing signs of improvement.  Patient has not developed fever.

   Await lumbar puncture.  Patient has been empirically started on acyclovir, 

  vancomycin and ceftriaxone meningeal dose.  Need to closely watch renal 

  functions for acyclovir.


* Await lumbar puncture.


* EEG was performed, which was abnormal due to background slowing of moderate to

  severe degree.  This is suggestive of generalized cerebral dysfunction, as can

  be seen with toxic metabolic encephalopathy or due to diffuse structural brain

  abnormality.  No epileptiform activity was seen.  


* Carotid Doppler was technically difficult due to patient's inability to hold 

  still.  No hemodynamically significant ICA stenosis identified on either side.

   Antegrade flow in both vertebral arteries.


* B12 582, folate 5.7, TSH 1.77, RPR nonreactive.  We will start folate 

  replacement.  B6 and MMA pending.


* Thiamine, folate.  WA protocol.


* Psychiatry also following the patient.


* Telemetry monitoring showing sinus rhythm, sinus tachycardia with some ST 

  depression.


* Left ankle x-ray to rule out fracture.


* MRI of the brain with and without contrast.


* We will follow clinically.

## 2021-08-27 NOTE — US
EXAMINATION TYPE: US venous doppler duplex LE LT

 

DATE OF EXAM: 8/26/2021 3:52 PM

 

COMPARISON: NONE

 

CLINICAL HISTORY: r/o DVT. left swollen foot, no h/o dvt, patient in ETOH withdrawals, in restraints 
and is combative 

 

SIDE PERFORMED: Left  

 

TECHNIQUE:  The lower extremity deep venous system is examined utilizing real time linear array sonog
soniya with graded compression, doppler sonography and color-flow sonography.

 

VESSELS IMAGED:

Common Femoral Vein

Deep Femoral Vein

Greater Saphenous Vein *

Femoral Vein

Popliteal Vein

Small Saphenous Vein *

Proximal Calf Veins

(* superficial vessels)

 

 

 

Left Leg:  Negative for DVT

 

 

 

IMPRESSION: No evidence of deep vein thrombosis in the left leg.

## 2021-08-27 NOTE — P.PN
Subjective


Progress Note Date: 08/27/21





Patient was seen and evaluated by me this morning.  His overall condition is not

improving.  He remained encephalopathic and disorganized.  Patient is unable to 

answer questions appropriately.  Nursing staff informed me that his peripheral 

IV came out overnight.  Patient also still having low-grade fever and appeared 

tachypneic and tachycardic.





Objective





- Vital Signs


Vital signs: 


                                   Vital Signs











Temp  99.7 F H  08/27/21 08:15


 


Pulse  128 H  08/27/21 08:15


 


Resp  24   08/27/21 08:15


 


BP  138/83   08/27/21 08:15


 


Pulse Ox  90 L  08/27/21 08:15








                                 Intake & Output











 08/26/21 08/27/21 08/27/21





 18:59 06:59 18:59


 


Intake Total 0  


 


Output Total 350  


 


Balance -350  


 


Weight  61.5 kg 


 


Intake:   


 


  Oral 0  


 


Output:   


 


  Urine 350  


 


Other:   


 


  Voiding Method External Catheter External Catheter 


 


  # Voids 2 1 


 


  # Bowel Movements  1 














- Exam





General:  The patient is very confused and lethargic


Eye: there is normal conjunctiva bilaterally.  


Neck:  The neck is supple, there is no  JVD.  


Cardiovascular:  Normal  S1-S2, no S3-S4, no murmurs.


Respiratory: Lungs clear to auscultation bilaterally


Gastrointestinal: Abdomen is soft, nontender


Musculoskeletal:  There is +1 pedal edema.  


Skin:  Skin is warm and dry 





- Labs


CBC & Chem 7: 


                                 08/27/21 07:39





                                 08/27/21 07:39


Labs: 


                  Abnormal Lab Results - Last 24 Hours (Table)











  08/26/21 08/27/21 08/27/21 Range/Units





  11:18 00:40 07:39 


 


WBC     (3.8-10.6)  k/uL


 


RBC     (4.30-5.90)  m/uL


 


Hgb     (13.0-17.5)  gm/dL


 


Hct     (39.0-53.0)  %


 


Neutrophils #     (1.3-7.7)  k/uL


 


PT    13.8 H  (9.0-12.0)  sec


 


INR    1.4 H  (<1.2)  


 


ABG pH  7.51 H  7.50 H   (7.35-7.45)  


 


ABG pCO2  33 L    (35-45)  mmHg


 


ABG pO2   76 L   ()  mmHg


 


ABG HCO3  26 H  27 H   (21-25)  mmol/L


 


ABG Total CO2   28 H   (19-24)  mmol/L


 


ABG O2 Saturation  97.9 H  97.1 H   (94-97)  %


 


Chloride     ()  mmol/L


 


BUN     (9-20)  mg/dL


 


Creatinine     (0.66-1.25)  mg/dL


 


Glucose     (74-99)  mg/dL


 


Calcium     (8.4-10.2)  mg/dL


 


Total Protein     (6.3-8.2)  g/dL


 


Albumin     (3.5-5.0)  g/dL














  08/27/21 08/27/21 Range/Units





  07:39 07:39 


 


WBC  11.5 H   (3.8-10.6)  k/uL


 


RBC  3.53 L   (4.30-5.90)  m/uL


 


Hgb  12.1 L   (13.0-17.5)  gm/dL


 


Hct  35.3 L   (39.0-53.0)  %


 


Neutrophils #  9.1 H   (1.3-7.7)  k/uL


 


PT    (9.0-12.0)  sec


 


INR    (<1.2)  


 


ABG pH    (7.35-7.45)  


 


ABG pCO2    (35-45)  mmHg


 


ABG pO2    ()  mmHg


 


ABG HCO3    (21-25)  mmol/L


 


ABG Total CO2    (19-24)  mmol/L


 


ABG O2 Saturation    (94-97)  %


 


Chloride   110 H  ()  mmol/L


 


BUN   4 L  (9-20)  mg/dL


 


Creatinine   0.52 L  (0.66-1.25)  mg/dL


 


Glucose   105 H  (74-99)  mg/dL


 


Calcium   7.6 L  (8.4-10.2)  mg/dL


 


Total Protein   5.2 L  (6.3-8.2)  g/dL


 


Albumin   2.4 L  (3.5-5.0)  g/dL














Assessment and Plan


Assessment: 





This is a 55-year-old male was brought into the emergency room after he was 

found by one of the neighbors covered in feces but responsive with empty bottles

of alcohol around him be transferred to the hospital upon arrival he was cleaned

up and was found to be hallucinating suggestive of being in DTs for which she 

was started on benzodiazepines per CIWA scale.


CT of the head no acute pathology


CT of the face showed old left orbital fracture inferiorly


X-ray of the right elbow showed olecranon process spur with some soft tissue 

swelling


EKG showed sinus rhythm


Alcohol level was <10





Assessment: 





Alcohol abuse with alcohol withdrawal syndrome


Delirium tremens


Rhabdomyolysis


Encephalopathy with hallucinations





Plan


Benzodiazepines per CIWA scale


IV fluid hydration


Thiamine


Seizure precautions


Fall precautions


Computed tomography scan of the head showed no acute findings.


Consulted neurology and psychiatry for further evaluation


EEG was abnormal with generalized cerebral dysfunction and evidence of toxic 

metabolic encephalopathy





Acute toxo metabolic encephalopathy


Sepsis without septic shock


Concerns about meningitis


Started on broad spectrum antibiotic with vancomycin, ceftriaxone, and acyclovir


Blood gas showed no evidence of acute CO2 narcosis


Chest x-ray with questionable pneumonia


LP ordered


Intensive care consulted for possible transfer to ICU/intubation if needed





Poor living condition


Recurrent episodes of hospital admission for alcohol abuse


Jake. life stressor with possible major depression





Plan


We will reconsult psychiatry when mentation improves


 consultation





Macrocytic anemia mild


Most likely secondary to alcohol abuse


No reported GI bleeding





Multiple skin abrasions


Left eye bruising


No acute fractures identified





Hypernatremia 


Improved with IV fluid





 GI prophylaxis PPI





CODE STATUS: Full code


DVT prophylaxis: Mechanical


Discussed with: Patient, ER, RN


Anticipated length of stay  more than 2 midnights


Anticipated discharge place: Pending clinical course


A total of 65 minutes was spent on the care of this complex patient more than 

50% of the time was spent in counseling and care coordination.

## 2021-08-27 NOTE — P.PCN
Date of Procedure: 08/27/21


Procedure(s) Performed: 


Preoperative diagnosis: Meningitis


Post operative diagnoses: Meningitis


Procedure= lumbar puncture


Anesthesia local infiltration with lidocaine 1% 4 mL.


Condition: stable


Complication: none.


Description of the procedure procedure risk and benefits discussed with the  

family, consent signed.  Patient in his room ,placed in lateral position ( right

side down ), back prepped with chlorhexidine 3 times been local infiltration of 

the skin and subcutaneous tissue with lidocaine 1% 2 mL for skin and subcu 

interstitial frustrations at L4 5 levels then 20-gauge Quincke-type needle 

advanced slowly at L4- 5 interlaminar space there was positive cerebrospinal 

fluid (after 3 attemptes , patient was uncooperative and moving while I was 

doing the procedure because he was not found to command ) the CSF  was tinged 

with blood which is cleared slowly , no paresthesia ,total of 7-8 ML of cer

ebrospinal fluid collected in 3 different  tubes  2-2-1/2 mL in each,  then the 

needle removed and a Band-Aid  applied , further management as per primary team.

## 2021-08-27 NOTE — PN
PROGRESS NOTE



DATE OF SERVICE:

08/27/2021



REASON FOR FOLLOWUP:

Fever, concerning for aspiration pneumonia versus encephalitis.



INTERVAL HISTORY:

The patient's overall fever pattern has improved.  The patient is status post LP this

morning, which was mostly traumatic.  The patient is hemodynamically stable, still

having mentation _____ getting transferred to the ICU.  No vomiting, diarrhea or other

changes reported by the nursing staff.



PHYSICAL EXAMINATION:

Blood pressure 132/88 with a pulse of 126, temperature 100.  He is 96% on 2 L nasal

cannula.

GENERAL DESCRIPTION: General description is a middle-aged male lying in bed in no

distress.

RESPIRATORY SYSTEM: Unlabored breathing. Decreased breath sounds at the bases. No

wheeze.

HEART: S1, S2. Regular rate and rhythm.

ABDOMEN: Soft. No tenderness.

EXTREMITIES: No edema of the feet.



LABS:

Hemoglobin is 12.1, white count 11.5, BUN of _____ creatinine 0.52.  LP shows red blood

cells of 5175, _____ is 18. Glucose was normal at 69. Protein is 226.



DIAGNOSTIC IMPRESSION AND PLAN:

Patient with acute mental status changes in this patient admitted to hospital with

possible drug overdose.  Concern for possible aspiration pneumonia versus encephalitis.

Clinical picture not suggestive of meningitis. Encephalitis not entirely excluded.

Patient to continue with current antibiotic in addition to the acyclovir while waiting

for the culture to finalize.  Family at the bedside. Questions were answered.





MMODL / IJN: [id number] / Job#: [id number]

## 2021-08-27 NOTE — P.PN
Subjective


Progress Note Date: 08/27/21








 This a 55-year-old white male patient who was admitted to the hospital on Augus t 23, 2021 when he was found delirious and confused by his neighbor and his 

home.  Patient was found laying on the floor, in his own feces, he had a bunch 

alcohol bottles lying around him.  he had multiple bruises and abrasions in 

different stages of healing.  He has a hematoma to his left eye , and abrasions 

on his knees and lower extremities.  Tendon has a history of cullen abuse, eats 

marijuana edibles.  Drug screen was positive for marijuana, benzodiazepines.  

His alcohol level was less than 10.  Brain CT showed no acute intracranial 

abnormality, no fracture, cervical spine, additional spondylotic changes in the 

cervical spine.  CT of the facial bones showed evidence of old fracture of the 

floor of the left bony orbit, no acute fractures.  Initial chest x-ray showed 

minimal subsegmental atelectasis in the right lung base without change.  EKG 

showed sinus rhythm.  Initial blood work showed a white count of 7.8, hemoglobin

12.3, INR of 1.2, sodium was 153, potassium 3.4, chloride is 115, CO2 is 24, B1 

and creatinine 0.74, troponin was 0.016.  Patient was started on benzodiazepines

in the form of Ativan per [facility] protocol.  Neurology consultation was obtained.  

Psychiatric services are following.  Patient is on thiamine and folate per [facility] 

protocol.  ABG showed moderate to severe degree of slowing, consistent with 

toxic metabolic encephalopathy, no epileptiform discharges were seen.  This 

morning patient started spiking fevers with a temp of 101.4F, he is also 

requiring supplemental oxygen currently at 2 L with a pulse ox of 93-97%, he is 

receiving Valium and when necessary Ativan.  Quite lethargic, confused, he opens

eyes to voice, however he is not able to provide any meaningful verbal 

responses.  Today's chest x-ray has been reviewed showing right basilar patchy 

infiltrate and/or atelectasis.  Patient was placed on empiric antibiotics in the

form of Rocephin and vancomycin.  In view of altered mentation and fever lumbar 

puncture was requested however anesthesia was unable to perform it because 

Lovenox was given this morning.  We were consulted in view of depressed level of

consciousness related to benzodiazepines and possibility of needing to intubate 

the patient placement on mechanical ventilator.  Blood gas has been obtained 

showing pO2 of 85, pCO2 of 33, pH of 7.51 this was done on FiO2 of 28%, patient 

is breathing fairly comfortably, does not appear to be in any acute distress, 

head of the bed is up 35, he is currently on 2 L of oxygen, his pulse ox is 

97%.  No coughing or wheezing.  He continues on empiric antibiotics. 





On today's evaluation of a 8/27/ 2021, the patient is essentially the same.  No 

major improvement in his underlying mental status.  The patient remains 

encephalopathic.  He is unable to say.  This is 50 gases some few words such as 

yes or no.  Unable to follow any commands.  His been having also episodes of 

fever.  Based on all this, the patient underwent a lumbar puncture suspecting 

encephalitis.  No neck stiffness.  The lumbar puncture was completed and the 

patient was found to have elevated CSF protein.  The total white cell count was 

18.  The patient had 85% polys tear cells and 50% mononuclear cells.  The CSF 

protein was 226.  Glucose is a 69.  Patient was also covered with broad-spectrum

antibiotics including a combination of Rocephin and vancomycin.  The patient is 

also on acyclovir IV 1 g 3 times a day.  The white cell count is 11.5 with 

hemoglobin of 12.1.  INR is at 1.4.  BUN is at 4 with a creatinine of 0.5.  Rest

of the electrodes are within normal limits.  No seizure activity has been noted.

 EEG was done and was quite abnormal due to background slowing and moderate to 

severe slowing of the EEG activity suggestive of generalized cerebral 

dysfunction.  Neurology is on the case for now.  In terms of alcohol withdrawal,

delirium tremors, the patient is currently on Ativan based on the CIWA protocol.

 The patient is also on D5 half-normal saline today to 100 mL an hour.  The 

patient will be transferred to the intensive care unit for further monitoring.








Objective





- Vital Signs


Vital signs: 


                                   Vital Signs











Temp  101.4 F H  08/27/21 15:00


 


Pulse  134 H  08/27/21 15:30


 


Resp  59 H  08/27/21 15:30


 


BP  145/94   08/27/21 15:20


 


Pulse Ox  95   08/27/21 15:30








                                 Intake & Output











 08/26/21 08/27/21 08/27/21





 18:59 06:59 18:59


 


Intake Total 0  100


 


Output Total 350  480


 


Balance -350  -380


 


Weight  61.5 kg 61.5 kg


 


Intake:   


 


  Intake, IV Titration   100





  Amount   


 


    Dextrose 5%-0.45% NaCl 1,   100





    000 ml @ 100 mls/hr IV .   





    Q10H [facility] Rx#:[id number]   


 


  Oral 0  


 


Output:   


 


  Urine 350  480


 


Other:   


 


  Voiding Method External Catheter External Catheter External Catheter


 


  # Voids 2 1 1


 


  # Bowel Movements  1 1














- Exam











 GENERAL EXAM: Lethargic, confused, 55-year-old white male, 2 L of oxygen and 

the pulse ox 97%, patient has bruises and abrasions on his face and left eye, 

knees and feet comfortable in no apparent distress.


HEAD: Normocephalic/atraumatic.


EYES: Normal reaction of pupils, equal size.  Conjunctiva pink, sclera white.


NOSE: Clear with pink turbinates.


THROAT: No erythema or exudates.


NECK: No masses, no JVD, no thyroid enlargement, no adenopathy.


CHEST: No chest wall deformity.  Symmetrical expansion. 


LUNGS: Equal air entry with dim breath sounds, no crackles


CVS: Regular rate and rhythm, normal S1 and S2, no gallops, no murmurs, no rubs


ABDOMEN: Soft, nontender.  No hepatosplenomegaly, normal bowel sounds, no 

guarding or rigidity.


EXTREMITIES: No clubbing, no edema, no cyanosis, 2+ pulses and upper and lower 

extremities.


MUSCULOSKELETAL: Muscle strength and tone normal.


SPINE: No scoliosis or deformity


SKIN: No rashes


CENTRAL NERVOUS SYSTEM: Confused No focal deficits, tone is normal in all 4 

extremities.





- Labs


CBC & Chem 7: 


                                 08/27/21 07:39





                                 08/27/21 07:39


Labs: 


                  Abnormal Lab Results - Last 24 Hours (Table)











  08/27/21 08/27/21 08/27/21 Range/Units





  00:40 07:39 07:39 


 


WBC    11.5 H  (3.8-10.6)  k/uL


 


RBC    3.53 L  (4.30-5.90)  m/uL


 


Hgb    12.1 L  (13.0-17.5)  gm/dL


 


Hct    35.3 L  (39.0-53.0)  %


 


Neutrophils #    9.1 H  (1.3-7.7)  k/uL


 


PT   13.8 H   (9.0-12.0)  sec


 


INR   1.4 H   (<1.2)  


 


ABG pH  7.50 H    (7.35-7.45)  


 


ABG pO2  76 L    ()  mmHg


 


ABG HCO3  27 H    (21-25)  mmol/L


 


ABG Total CO2  28 H    (19-24)  mmol/L


 


ABG O2 Saturation  97.1 H    (94-97)  %


 


Chloride     ()  mmol/L


 


BUN     (9-20)  mg/dL


 


Creatinine     (0.66-1.25)  mg/dL


 


Glucose     (74-99)  mg/dL


 


Calcium     (8.4-10.2)  mg/dL


 


Total Protein     (6.3-8.2)  g/dL


 


Albumin     (3.5-5.0)  g/dL


 


CSF RBC     (0-10)  u/L


 


CSF Tot Nucleated Cells     (0-5)  u/L


 


CSF Total Protein     (12-60)  mg/dL














  08/27/21 08/27/21 Range/Units





  07:39 11:35 


 


WBC    (3.8-10.6)  k/uL


 


RBC    (4.30-5.90)  m/uL


 


Hgb    (13.0-17.5)  gm/dL


 


Hct    (39.0-53.0)  %


 


Neutrophils #    (1.3-7.7)  k/uL


 


PT    (9.0-12.0)  sec


 


INR    (<1.2)  


 


ABG pH    (7.35-7.45)  


 


ABG pO2    ()  mmHg


 


ABG HCO3    (21-25)  mmol/L


 


ABG Total CO2    (19-24)  mmol/L


 


ABG O2 Saturation    (94-97)  %


 


Chloride  110 H   ()  mmol/L


 


BUN  4 L   (9-20)  mg/dL


 


Creatinine  0.52 L   (0.66-1.25)  mg/dL


 


Glucose  105 H   (74-99)  mg/dL


 


Calcium  7.6 L   (8.4-10.2)  mg/dL


 


Total Protein  5.2 L   (6.3-8.2)  g/dL


 


Albumin  2.4 L   (3.5-5.0)  g/dL


 


CSF RBC   5175 H  (0-10)  u/L


 


CSF Tot Nucleated Cells   18 H*  (0-5)  u/L


 


CSF Total Protein   226 H  (12-60)  mg/dL








                      Microbiology - Last 24 Hours (Table)











 08/27/21 11:35 CSF Gram Stain - Preliminary





 Cerebral Spinal Fluid CSF Culture - Preliminary


 


 08/26/21 09:05 Blood Culture - Preliminary





 Blood    No Growth after 24 hours














Assessment and Plan


Plan: 








1 altered mental status, encephalopathy.  Consider delirium tremors.  Consider 

underlying encephalitis based on abnormal CSF findings with elevated protein and

some mild elevation of the white cell count.  The patient is currently on the 

CIWA protocol.  The patient is also on a combination of antibiotics including IV

Rocephin, vancomycin and acyclovir.  HSV by PCR in the CSF was also sent and the

results of the pending.  Cultures still pending.





2 fever





3 acute hypoxic respiratory failure with some limited extension fixation of the 

lung bases.  Consider aspiration.  2 L.





4 Alcoholism





5 Very Poor Baseline Performance and Functional Status with Multiple Falls at 

Home Related to Alcoholism





6 Multiple Skin Abrasions Involving the Lower Extremities in Addition to 

Abnormal Was Involving the Left Eye.  CAT Scan of the Face Showed No Evidence of

Any Fractures.





7 Hypertension





8 History of Hypernatremia Related to Intravascular Depletion and Dehydration, 

Recovered





Plan





Transfer this patient intensive care unit for further monitoring and higher 

level of care


Continue same antibiotic coverage pending cultures of the CSF.  No clear 

evidence of any bacterial meningitis.  Could be encephalitis/bilateral.  I 

suspect alteration of mental status essentially related to chronic alcoholism 

and withdrawal.  His presentation is more consistent with delirium tremens due 

to chronic alcoholism and the patient with her incentive of alcohol recently.  

As such, we'll continue the course.  We'll continue thiamine.  We'll continue 

with the CIWA protocol.  Hemodynamically stable.  Pigmented aspiration.  Monitor

fever pattern.   aspiration precautions.  We'll continue to follow.  Repeat 

chest x-ray in the morning.

## 2021-08-28 LAB
ALBUMIN SERPL-MCNC: 2.1 G/DL (ref 3.5–5)
ALP SERPL-CCNC: 49 U/L (ref 38–126)
ALT SERPL-CCNC: 24 U/L (ref 4–49)
ANION GAP SERPL CALC-SCNC: 5 MMOL/L
AST SERPL-CCNC: 64 U/L (ref 17–59)
BASOPHILS # BLD AUTO: 0.1 K/UL (ref 0–0.2)
BASOPHILS NFR BLD AUTO: 0 %
BUN SERPL-SCNC: 6 MG/DL (ref 9–20)
CALCIUM SPEC-MCNC: 7.4 MG/DL (ref 8.4–10.2)
CELL CNT PNL FLD: 100
CHLORIDE SERPL-SCNC: 112 MMOL/L (ref 98–107)
CO2 SERPL-SCNC: 23 MMOL/L (ref 22–30)
DEPRECATED POLYS # FLD: 98 %
EOSINOPHIL # BLD AUTO: 0.2 K/UL (ref 0–0.7)
EOSINOPHIL NFR BLD AUTO: 2 %
ERYTHROCYTE [DISTWIDTH] IN BLOOD BY AUTOMATED COUNT: 3.65 M/UL (ref 4.3–5.9)
ERYTHROCYTE [DISTWIDTH] IN BLOOD: 14.6 % (ref 11.5–15.5)
GLUCOSE BLD-MCNC: 101 MG/DL (ref 75–99)
GLUCOSE BLD-MCNC: 114 MG/DL (ref 75–99)
GLUCOSE BLD-MCNC: 97 MG/DL (ref 75–99)
GLUCOSE SERPL-MCNC: 115 MG/DL (ref 74–99)
HCT VFR BLD AUTO: 37.4 % (ref 39–53)
HGB BLD-MCNC: 12.3 GM/DL (ref 13–17.5)
LYMPHOCYTES # SPEC AUTO: 1.2 K/UL (ref 1–4.8)
LYMPHOCYTES NFR SPEC AUTO: 10 %
MCH RBC QN AUTO: 33.8 PG (ref 25–35)
MCHC RBC AUTO-ENTMCNC: 32.9 G/DL (ref 31–37)
MCV RBC AUTO: 102.5 FL (ref 80–100)
MONOCYTES # BLD AUTO: 0.7 K/UL (ref 0–1)
MONOCYTES NFR BLD AUTO: 6 %
MONONUC CELLS # FLD: 2 %
NEUTROPHILS # BLD AUTO: 9.4 K/UL (ref 1.3–7.7)
NEUTROPHILS NFR BLD AUTO: 80 %
NUC CELL # FLD: 1500 /UL
PLATELET # BLD AUTO: 193 K/UL (ref 150–450)
POTASSIUM SERPL-SCNC: 3.9 MMOL/L (ref 3.5–5.1)
PROT SERPL-MCNC: 4.8 G/DL (ref 6.3–8.2)
SODIUM SERPL-SCNC: 140 MMOL/L (ref 137–145)
WBC # BLD AUTO: 11.8 K/UL (ref 3.8–10.6)

## 2021-08-28 PROCEDURE — 0S9G3ZZ DRAINAGE OF LEFT ANKLE JOINT, PERCUTANEOUS APPROACH: ICD-10-PCS

## 2021-08-28 RX ADMIN — SODIUM CHLORIDE SCH MLS/HR: 9 INJECTION, SOLUTION INTRAVENOUS at 08:25

## 2021-08-28 RX ADMIN — SODIUM CHLORIDE SCH MLS/HR: 9 INJECTION, SOLUTION INTRAVENOUS at 18:07

## 2021-08-28 RX ADMIN — FOLIC ACID SCH: 1 TABLET ORAL at 08:28

## 2021-08-28 RX ADMIN — POTASSIUM CHLORIDE SCH MLS/HR: 14.9 INJECTION, SOLUTION INTRAVENOUS at 14:58

## 2021-08-28 RX ADMIN — ACETAMINOPHEN PRN MG: 650 SUPPOSITORY RECTAL at 23:38

## 2021-08-28 RX ADMIN — Medication SCH MG: at 06:30

## 2021-08-28 RX ADMIN — CEFEPIME HYDROCHLORIDE SCH MLS/HR: 2 INJECTION, POWDER, FOR SOLUTION INTRAVENOUS at 23:14

## 2021-08-28 RX ADMIN — CEFEPIME HYDROCHLORIDE SCH MLS/HR: 2 INJECTION, POWDER, FOR SOLUTION INTRAVENOUS at 17:16

## 2021-08-28 RX ADMIN — Medication SCH: at 16:58

## 2021-08-28 RX ADMIN — SODIUM CHLORIDE SCH MLS/HR: 900 INJECTION, SOLUTION INTRAVENOUS at 04:30

## 2021-08-28 RX ADMIN — SODIUM CHLORIDE SCH MLS/HR: 9 INJECTION, SOLUTION INTRAVENOUS at 01:00

## 2021-08-28 RX ADMIN — POTASSIUM CHLORIDE SCH MLS/HR: 14.9 INJECTION, SOLUTION INTRAVENOUS at 23:14

## 2021-08-28 RX ADMIN — PANTOPRAZOLE SODIUM SCH MG: 40 TABLET, DELAYED RELEASE ORAL at 06:30

## 2021-08-28 RX ADMIN — SODIUM CHLORIDE SCH MLS/HR: 900 INJECTION, SOLUTION INTRAVENOUS at 12:35

## 2021-08-28 RX ADMIN — POTASSIUM CHLORIDE SCH MLS/HR: 14.9 INJECTION, SOLUTION INTRAVENOUS at 01:02

## 2021-08-28 NOTE — MR
EXAMINATION TYPE: MR lumbar spine wo/w con

 

DATE OF EXAM: 8/28/2021

 

COMPARISON: None

 

HISTORY: Backpain, rule out discitis osteomyelitis.

 

CONTRAST: 

Standard multiplanar, multisequence MRI departmental protocol utilizing 10 mL intravenous Gadavist ga
dolinium contrast.  

 

Lumbar vertebra have normal alignment. There is decreased signal in the disks throughout the lumbar s
pine. There is no compression fracture. There is no significant disc space narrowing. There is poster
ior disc bulging at levels from L1 to L4. Exam limited by motion. No increased signal seen in the dis
edwin suggest discitis. There is some subcutaneous edema over the entire lumbar spine. There is no lumb
ar paraspinal mass. There is mild lateral recess stenosis due to facet arthropathy at L4-5 and L3-4 a
nd L2-3.

 

Contrast images show no pathologic enhancement. The lumbar neural foramina are fairly well-maintained
.

 

IMPRESSION:

No evidence of discitis. No significant disc space narrowing. No fracture. Multilevel mild facet arth
ropathy and mild lateral recess stenosis. Multilevel mild posterior disc bulging without spinal steno
sis.

## 2021-08-28 NOTE — PN
PROGRESS NOTE



DATE OF SERVICE:

08/28/2021



REASON FOR FOLLOWUP:

Fever, question of encephalitis versus left ankle infection.



INTERVAL HISTORY:

The patient did spike another fever this afternoon of 101.4 degrees Fahrenheit.  The

patient is hemodynamically stable, not on any pressor support.  The patient is

currently on 2 L nasal cannula.  The patient remains lethargic; no agitation has been

noticed.  No vomiting, diarrhea or other changes reported by the nursing staff.



PHYSICAL EXAMINATION:

Blood pressure 137/82 with a pulse of 122 temperature 101.4.  He is 98% on 2 L nasal

cannula.

GENERAL DESCRIPTION: General description is a middle-aged male lying in bed in no

distress.

RESPIRATORY SYSTEM: Unlabored breathing. Decreased breath sounds at the bases. No

wheeze.

HEART: S1, S2. Regular rate and rhythm.

ABDOMEN: Soft. No tenderness.

Left ankle did have swelling and tenderness to touch.



LABS:

Hemoglobin is 12.3, white count 11.8. _____ 64, BUN of 16, creatinine 0.46. Synovial

fluid is mostly bloody.



DIAGNOSTIC IMPRESSION AND PLAN:

Patient with a fever with initial concern for encephalitis/meningitis.  However,

glucose was normal. Patient's HSV DNA by PCR came back negative. That makes it less

likely to be herpes encephalitis. _____ fever could be related to his alcoholism;

however, underlying bacterial infection not entirely excluded. Will recheck his

inflammatory markers, start the patient on cefepime and zinc while waiting for the

culture to finalize and monitor his clinical course closely.





MMODL / IJN: [id number] / Job#: [id number]

## 2021-08-28 NOTE — XR
EXAMINATION TYPE: XR chest 1V portable

 

DATE OF EXAM: 8/28/2021

 

COMPARISON: Chest x-ray 8/26/2021

 

HISTORY: Pneumonia

 

TECHNIQUE: Single frontal view of the chest is obtained.

 

FINDINGS:  The right costophrenic angle is not included on the exam. Cardiac mediastinal silhouette i
s stable. No evident pneumothorax or pleural effusion. There are overlying leads. Some patchy basilar
 density suspected.

 

IMPRESSION:  Subsegmental basilar atelectatic change. Follow-up PA and lateral chest x-ray may be of 
benefit when stable.

## 2021-08-28 NOTE — P.PN
Subjective


Progress Note Date: 08/28/21








 This a 55-year-old white male patient who was admitted to the hospital on Augus t 23, 2021 when he was found delirious and confused by his neighbor and his 

home.  Patient was found laying on the floor, in his own feces, he had a bunch 

alcohol bottles lying around him.  he had multiple bruises and abrasions in 

different stages of healing.  He has a hematoma to his left eye , and abrasions 

on his knees and lower extremities.  Tendon has a history of cullen abuse, eats 

marijuana edibles.  Drug screen was positive for marijuana, benzodiazepines.  

His alcohol level was less than 10.  Brain CT showed no acute intracranial 

abnormality, no fracture, cervical spine, additional spondylotic changes in the 

cervical spine.  CT of the facial bones showed evidence of old fracture of the 

floor of the left bony orbit, no acute fractures.  Initial chest x-ray showed 

minimal subsegmental atelectasis in the right lung base without change.  EKG 

showed sinus rhythm.  Initial blood work showed a white count of 7.8, hemoglobin

12.3, INR of 1.2, sodium was 153, potassium 3.4, chloride is 115, CO2 is 24, B1 

and creatinine 0.74, troponin was 0.016.  Patient was started on benzodiazepines

in the form of Ativan per [facility] protocol.  Neurology consultation was obtained.  

Psychiatric services are following.  Patient is on thiamine and folate per [facility] 

protocol.  ABG showed moderate to severe degree of slowing, consistent with 

toxic metabolic encephalopathy, no epileptiform discharges were seen.  This 

morning patient started spiking fevers with a temp of 101.4F, he is also 

requiring supplemental oxygen currently at 2 L with a pulse ox of 93-97%, he is 

receiving Valium and when necessary Ativan.  Quite lethargic, confused, he opens

eyes to voice, however he is not able to provide any meaningful verbal 

responses.  Today's chest x-ray has been reviewed showing right basilar patchy 

infiltrate and/or atelectasis.  Patient was placed on empiric antibiotics in the

form of Rocephin and vancomycin.  In view of altered mentation and fever lumbar 

puncture was requested however anesthesia was unable to perform it because 

Lovenox was given this morning.  We were consulted in view of depressed level of

consciousness related to benzodiazepines and possibility of needing to intubate 

the patient placement on mechanical ventilator.  Blood gas has been obtained 

showing pO2 of 85, pCO2 of 33, pH of 7.51 this was done on FiO2 of 28%, patient 

is breathing fairly comfortably, does not appear to be in any acute distress, 

head of the bed is up 35, he is currently on 2 L of oxygen, his pulse ox is 

97%.  No coughing or wheezing.  He continues on empiric antibiotics. 





On today's evaluation of a 8/27/ 2021, the patient is essentially the same.  No 

major improvement in his underlying mental status.  The patient remains 

encephalopathic.  He is unable to say.  This is 50 gases some few words such as 

yes or no.  Unable to follow any commands.  His been having also episodes of 

fever.  Based on all this, the patient underwent a lumbar puncture suspecting 

encephalitis.  No neck stiffness.  The lumbar puncture was completed and the 

patient was found to have elevated CSF protein.  The total white cell count was 

18.  The patient had 85% polys tear cells and 50% mononuclear cells.  The CSF 

protein was 226.  Glucose is a 69.  Patient was also covered with broad-spectrum

antibiotics including a combination of Rocephin and vancomycin.  The patient is 

also on acyclovir IV 1 g 3 times a day.  The white cell count is 11.5 with 

hemoglobin of 12.1.  INR is at 1.4.  BUN is at 4 with a creatinine of 0.5.  Rest

of the electrodes are within normal limits.  No seizure activity has been noted.

 EEG was done and was quite abnormal due to background slowing and moderate to 

severe slowing of the EEG activity suggestive of generalized cerebral 

dysfunction.  Neurology is on the case for now.  In terms of alcohol withdrawal,

delirium tremors, the patient is currently on Ativan based on the CIWA protocol.

 The patient is also on D5 half-normal saline today to 100 mL an hour.  The 

patient will be transferred to the intensive care unit for further monitoring.








2021, the patient is still encephalopathic.  Slightly more rested and more awake

compared to yesterday.  Nevertheless he remains encephalopathic.  No neck 

stiffness.  No headaches.  MRI of the brain was done and showed no acute 

abnormalities.  As stated earlier, his CSF evaluation was not consistent with 

bacterial meningitis.  HSV by PCR still pending.  He remains on vancomycin.  He 

remains on Rocephin.  He remains on acyclovir.  He is receiving Ativan per CIWA 

protocol.  He is on D5 half-normal saline at the rate of 100 mL an hour.  

Hemodynamically stable.  Pulse ox is 98% on 2 L of oxygen by nasal cannula.  

Chest x-ray shows no evidence of any pneumonia.  Note that his urine drug screen

was positive for benzodiazepines and marijuana.  His blood work from today shows

a WD second of 11.8 with hemoglobin of 12.3.  Platelets is at 193.  Electrolytes

are normal.  Renal functions are normal.  Hepatic function is normal.  Albumin 

is at 2.1 with a protein of 4.8.





Objective





- Vital Signs


Vital signs: 


                                   Vital Signs











Temp  99.5 F   08/28/21 08:00


 


Pulse  115 H  08/28/21 08:00


 


Resp  28 H  08/28/21 08:00


 


BP  121/93   08/28/21 08:00


 


Pulse Ox  99   08/28/21 08:00








                                 Intake & Output











 08/27/21 08/28/21 08/28/21





 18:59 06:59 18:59


 


Intake Total 200 2000 200


 


Output Total 655 625 105


 


Balance -455 1375 95


 


Weight 61.5 kg 102.9 kg 


 


Intake:   


 


  IV  1900 200


 


    Acyclovir Sodium 1,000 mg  500 





    In Sodium Chloride 0.9%   





    250 ml @ 270 mls/hr IVPB   





    Q8HR@0400,1200,2000 SHELBY   





    Rx#:[id number]   


 


    Dextrose 5%-0.45% NaCl 1,  1100 200





    000 ml @ 100 mls/hr IV .   





    Q10H SHELBY Rx#:[id number]   


 


    Vancomycin 1,500 mg In  250 





    Sodium Chloride 0.9% 250   





    ml @ 125 mls/hr IVPB Q8H   





    SHELBY Rx#:[id number]   


 


    cefTRIAXone 2 gm In  50 





    Sodium Chloride 0.9% 50   





    ml @ 100 mls/hr IVPB   





    Q12HR SHELBY Rx#:[id number]   


 


  Intake, IV Titration 200 100 





  Amount   


 


    Dextrose 5%-0.45% NaCl 1, 200 100 





    000 ml @ 100 mls/hr IV .   





    Q10H SHELBY Rx#:[id number]   


 


Output:   


 


  Urine 655 625 105


 


Other:   


 


  Voiding Method External Catheter Indwelling Catheter 


 


  # Voids 1  


 


  # Bowel Movements 1 1 1














- Exam











 GENERAL EXAM: Lethargic, confused, 55-year-old white male, 2 L of oxygen and 

the pulse ox 97%, patient has bruises and abrasions on his face and left eye, 

knees and feet comfortable in no apparent distress.


HEAD: Normocephalic/atraumatic.


EYES: Normal reaction of pupils, equal size.  Conjunctiva pink, sclera white.


NOSE: Clear with pink turbinates.


THROAT: No erythema or exudates.


NECK: No masses, no JVD, no thyroid enlargement, no adenopathy.


CHEST: No chest wall deformity.  Symmetrical expansion. 


LUNGS: Equal air entry with dim breath sounds, no crackles


CVS: Regular rate and rhythm, normal S1 and S2, no gallops, no murmurs, no rubs


ABDOMEN: Soft, nontender.  No hepatosplenomegaly, normal bowel sounds, no 

guarding or rigidity.


EXTREMITIES: No clubbing, no edema, no cyanosis, 2+ pulses and upper and lower 

extremities.


MUSCULOSKELETAL: Muscle strength and tone normal.


SPINE: No scoliosis or deformity


SKIN: No rashes


CENTRAL NERVOUS SYSTEM: Confused No focal deficits, tone is normal in all 4 

extremities.





- Labs


CBC & Chem 7: 


                                 08/28/21 06:46





                                 08/28/21 04:38


Labs: 


                  Abnormal Lab Results - Last 24 Hours (Table)











  08/27/21 08/27/21 08/28/21 Range/Units





  11:35 21:27 03:07 


 


WBC     (3.8-10.6)  k/uL


 


RBC     (4.30-5.90)  m/uL


 


Hgb     (13.0-17.5)  gm/dL


 


Hct     (39.0-53.0)  %


 


MCV     (80.0-100.0)  fL


 


Neutrophils #     (1.3-7.7)  k/uL


 


Chloride     ()  mmol/L


 


BUN     (9-20)  mg/dL


 


Creatinine     (0.66-1.25)  mg/dL


 


Glucose     (74-99)  mg/dL


 


POC Glucose (mg/dL)   108 H  101 H  (75-99)  mg/dL


 


Calcium     (8.4-10.2)  mg/dL


 


AST     (17-59)  U/L


 


Total Protein     (6.3-8.2)  g/dL


 


Albumin     (3.5-5.0)  g/dL


 


CSF RBC  5175 H    (0-10)  u/L


 


CSF Tot Nucleated Cells  18 H*    (0-5)  u/L


 


CSF Total Protein  226 H    (12-60)  mg/dL














  08/28/21 08/28/21 Range/Units





  04:38 06:46 


 


WBC   11.8 H  (3.8-10.6)  k/uL


 


RBC   3.65 L  (4.30-5.90)  m/uL


 


Hgb   12.3 L  (13.0-17.5)  gm/dL


 


Hct   37.4 L  (39.0-53.0)  %


 


MCV   102.5 H  (80.0-100.0)  fL


 


Neutrophils #   9.4 H  (1.3-7.7)  k/uL


 


Chloride  112 H   ()  mmol/L


 


BUN  6 L   (9-20)  mg/dL


 


Creatinine  0.46 L   (0.66-1.25)  mg/dL


 


Glucose  115 H   (74-99)  mg/dL


 


POC Glucose (mg/dL)    (75-99)  mg/dL


 


Calcium  7.4 L   (8.4-10.2)  mg/dL


 


AST  64 H   (17-59)  U/L


 


Total Protein  4.8 L   (6.3-8.2)  g/dL


 


Albumin  2.1 L   (3.5-5.0)  g/dL


 


CSF RBC    (0-10)  u/L


 


CSF Tot Nucleated Cells    (0-5)  u/L


 


CSF Total Protein    (12-60)  mg/dL








                      Microbiology - Last 24 Hours (Table)











 08/27/21 11:35 CSF Gram Stain - Preliminary





 Cerebral Spinal Fluid CSF Culture - Preliminary


 


 08/26/21 09:05 Blood Culture - Preliminary





 Blood    No Growth after 24 hours














Assessment and Plan


Plan: 








1 altered mental status, encephalopathy.  Consider delirium tremors.  Consider 

underlying encephalitis based on abnormal CSF findings with elevated protein and

some mild elevation of the white cell count.  The patient is currently on the 

[facility] protocol.  The patient is also on a combination of antibiotics including IV

Rocephin, vancomycin and acyclovir.  HSV by PCR in the CSF was also sent and the

results of the pending.  Cultures still pending.  This is not consistent with 

bacterial meningitis and antibiotics can be discontinued.  Awaiting HSV by PCR. 

Neurologically, slightly improved compared to yesterday.  MRI of the brain was 

negative.





2 fever, episodic, none for now





3 acute hypoxic respiratory failure with some limited extension fixation of the 

lung bases.  Consider aspiration.  2 L.





4 Alcoholism





5 Very Poor Baseline Performance and Functional Status with Multiple Falls at 

Home Related to Alcoholism





6 Multiple Skin Abrasions Involving the Lower Extremities in Addition to 

Abnormal Was Involving the Left Eye.  CAT Scan of the Face Showed No Evidence of

Any Fractures.





7 Hypertension





8 History of Hypernatremia Related to Intravascular Depletion and Dehydration, 

Recovered





Plan





Discontinue the Rocephin and the vancomycin


Continue the IV acyclovir pending HSV by PCR in the CSF


Chest x-ray shows no evidence of an aspiration pneumonia and he remains on 2 L 

of oxygen by nasal cannula


Continue IV fluids


Ativan per [facility] protocol.


MRI of the brain was negative


Aspiration precautions


Keep in the ICU.


Long-term prognosis poor baseline above-mentioned comorbidities.  We'll continue

to follow.

## 2021-08-28 NOTE — P.PCN
Date of Procedure: 08/28/21


Preoperative Diagnosis: 


left ankle pain


left ankle bursitis 


cellulitis


Postoperative Diagnosis: 


same


Surgeon: Nick Huang


Assistant #1: Carson Meredith


Pathology: other (left ankle)


Disposition: ICU


Indications for Procedure: 


left ankle pain


left ankle bursitis


Operative Findings: 





6 cc serous fluid





Description of Procedure: 





The risk and benefits of the procedure were discussed with the patient today at 

bedside, he is in good understanding and would like to proceed.





A consent form was obtained prior to the procedure, a timeout was done at 

bedside with the nursing staff.  Appropriate paperwork was signed and dated.





Patient was in supine position, the ankle was prepped with 1 iodine swab and one

alcohol swab.  A 20-gauge needle was inserted at distal portion of tibialis 

anterior just medial to extensor hallucis tendon.  After anesthetization, I then

aspirated about 6 mL of joint fluid from ankle.  Patient tolerated procedure 

well, a bandage was placed after the aspiration.





Fluid was then sent to the lab for anaerobic and aerobic cultures, cell count, 

Gram stain and crystal analysis

## 2021-08-28 NOTE — P.PN
Subjective


Progress Note Date: 08/28/21





Patient's overall condition did not change compared to yesterday.  He is still 

encephalopathic and only responding to sternal rub by opening his eyes.  Patient

had multiple high-grade fevers throughout the day yesterday and last night





Objective





- Vital Signs


Vital signs: 


                                   Vital Signs











Temp  99.5 F   08/28/21 08:00


 


Pulse  121 H  08/28/21 11:00


 


Resp  30 H  08/28/21 11:00


 


BP  135/93   08/28/21 11:00


 


Pulse Ox  97   08/28/21 11:00








                                 Intake & Output











 08/27/21 08/28/21 08/28/21





 18:59 06:59 18:59


 


Intake Total 200 2000 700


 


Output Total 655 625 230


 


Balance -455 1375 470


 


Weight 61.5 kg 102.9 kg 


 


Intake:   


 


  IV  1900 700


 


    Acyclovir Sodium 1,000 mg  500 





    In Sodium Chloride 0.9%   





    250 ml @ 270 mls/hr IVPB   





    Q8HR@0400,1200,2000 SHELBY   





    Rx#:[id number]   


 


    Dextrose 5%-0.45% NaCl 1,  1100 400





    000 ml @ 100 mls/hr IV .   





    Q10H SHELBY Rx#:[id number]   


 


    Vancomycin 1,500 mg In  250 250





    Sodium Chloride 0.9% 250   





    ml @ 125 mls/hr IVPB Q8H   





    SHELBY Rx#:[id number]   


 


    cefTRIAXone 2 gm In  50 50





    Sodium Chloride 0.9% 50   





    ml @ 100 mls/hr IVPB   





    Q12HR SHELBY Rx#:[id number]   


 


  Intake, IV Titration 200 100 





  Amount   


 


    Dextrose 5%-0.45% NaCl 1, 200 100 





    000 ml @ 100 mls/hr IV .   





    Q10H SHELBY Rx#:[id number]   


 


Output:   


 


  Urine 655 625 230


 


Other:   


 


  Voiding Method External Catheter Indwelling Catheter 


 


  # Voids 1  


 


  # Bowel Movements 1 1 1














- Exam





General:  The patient is very confused and lethargic


Eye: there is normal conjunctiva bilaterally.  


Neck:  The neck is supple, there is no  JVD.  


Cardiovascular:  Normal  S1-S2, no S3-S4, no murmurs.


Respiratory: Lungs clear to auscultation bilaterally


Gastrointestinal: Abdomen is soft, nontender


Musculoskeletal:  There is +1 pedal edema.  


Skin:  Skin is warm and dry 





- Labs


CBC & Chem 7: 


                                 08/28/21 06:46





                                 08/28/21 04:38


Labs: 


                  Abnormal Lab Results - Last 24 Hours (Table)











  08/27/21 08/27/21 08/28/21 Range/Units





  11:35 21:27 03:07 


 


WBC     (3.8-10.6)  k/uL


 


RBC     (4.30-5.90)  m/uL


 


Hgb     (13.0-17.5)  gm/dL


 


Hct     (39.0-53.0)  %


 


MCV     (80.0-100.0)  fL


 


Neutrophils #     (1.3-7.7)  k/uL


 


Chloride     ()  mmol/L


 


BUN     (9-20)  mg/dL


 


Creatinine     (0.66-1.25)  mg/dL


 


Glucose     (74-99)  mg/dL


 


POC Glucose (mg/dL)   108 H  101 H  (75-99)  mg/dL


 


Calcium     (8.4-10.2)  mg/dL


 


AST     (17-59)  U/L


 


Total Protein     (6.3-8.2)  g/dL


 


Albumin     (3.5-5.0)  g/dL


 


CSF RBC  5175 H    (0-10)  u/L


 


CSF Tot Nucleated Cells  18 H*    (0-5)  u/L


 


CSF Total Protein  226 H    (12-60)  mg/dL














  08/28/21 08/28/21 08/28/21 Range/Units





  04:38 06:46 10:51 


 


WBC   11.8 H   (3.8-10.6)  k/uL


 


RBC   3.65 L   (4.30-5.90)  m/uL


 


Hgb   12.3 L   (13.0-17.5)  gm/dL


 


Hct   37.4 L   (39.0-53.0)  %


 


MCV   102.5 H   (80.0-100.0)  fL


 


Neutrophils #   9.4 H   (1.3-7.7)  k/uL


 


Chloride  112 H    ()  mmol/L


 


BUN  6 L    (9-20)  mg/dL


 


Creatinine  0.46 L    (0.66-1.25)  mg/dL


 


Glucose  115 H    (74-99)  mg/dL


 


POC Glucose (mg/dL)    114 H  (75-99)  mg/dL


 


Calcium  7.4 L    (8.4-10.2)  mg/dL


 


AST  64 H    (17-59)  U/L


 


Total Protein  4.8 L    (6.3-8.2)  g/dL


 


Albumin  2.1 L    (3.5-5.0)  g/dL


 


CSF RBC     (0-10)  u/L


 


CSF Tot Nucleated Cells     (0-5)  u/L


 


CSF Total Protein     (12-60)  mg/dL








                      Microbiology - Last 24 Hours (Table)











 08/26/21 09:05 Blood Culture - Preliminary





 Blood    No Growth after 48 hours


 


 08/27/21 19:00 Anaerobic Culture - Preliminary





 Knee - Right 


 


 08/27/21 19:00 Wound Culture - Preliminary





 Knee - Right 


 


 08/27/21 11:35 CSF Gram Stain - Preliminary





 Cerebral Spinal Fluid CSF Culture - Preliminary














Assessment and Plan


Assessment: 





This is a 55-year-old male was brought into the emergency room after he was 

found by one of the neighbors covered in feces but responsive with empty bottles

of alcohol around him be transferred to the hospital upon arrival he was cleaned

up and was found to be hallucinating suggestive of being in DTs for which she 

was started on benzodiazepines per CIWA scale.


CT of the head no acute pathology


CT of the face showed old left orbital fracture inferiorly


X-ray of the right elbow showed olecranon process spur with some soft tissue 

swelling


EKG showed sinus rhythm


Alcohol level was <10


Patient was transferred to the ICU for close monitoring on 8/27





Assessment: 





Alcohol abuse with alcohol withdrawal syndrome


Delirium tremens


Rhabdomyolysis


Encephalopathy with hallucinations





Plan


Benzodiazepines per CIWA scale


IV fluid hydration


Thiamine


Seizure precautions


Fall precautions


Computed tomography scan of the head showed no acute findings.


MRI of the brain was no acute finding


Consulted neurology and psychiatry for further evaluation


EEG was abnormal with generalized cerebral dysfunction and evidence of toxic 

metabolic encephalopathy





Acute toxo metabolic encephalopathy


Sepsis without septic shock


Concerns about meningitis, possibly viral


Started on broad spectrum antibiotic with vancomycin, ceftriaxone, and acyclovir


CSF fluid not consistent with bacterial meningitis.  Vancomycin and ceftriaxone 

discontinued by ICU staff


Awaiting HSV PCR


Blood gas showed no evidence of acute CO2 narcosis


Chest x-ray with questionable pneumonia





Poor living condition


Recurrent episodes of hospital admission for alcohol abuse


Jake. life stressor with possible major depression





Plan


We will reconsult psychiatry when mentation improves


 consultation





Macrocytic anemia mild


Most likely secondary to alcohol abuse


No reported GI bleeding





Multiple skin abrasions


Left eye bruising


No acute fractures identified





Hypernatremia 


Improved with IV fluid





 GI prophylaxis PPI





CODE STATUS: Full code


DVT prophylaxis: Mechanical


Discussed with: Patient, ER, RN


Anticipated length of stay  more than 2 midnights


Anticipated discharge place: Pending clinical course


A total of 65 minutes was spent on the care of this complex patient more than 

50% of the time was spent in counseling and care coordination.

## 2021-08-28 NOTE — P.PN
Progress Note - Text


Progress Note Date: 08/28/21





                                Laboratory Tests











  08/28/21





  11:53


 


Fluid Source  Synovial


 


Fluid Appearance  Bloody


 


Fluid RBC  [id number]


 


Fluid Nucleated Cells  1500


 


Fluid Polynuclear WBCs  98


 


Fluid Mononuclear WBCs  2


 


Synovial Crystals  Pending








Synovial counts are reviewed as above.  WBC are low.  Crystals pending.  Low 

suspicion for Septic arthritis based on this.  Will wait on Gram stain and 

culture of Left ankle fluid.  Favor a cellulitic reaction along with prolonged 

time down on that ankle.  Daughter states he has had pain for along time in both

ankles and swelling.  Swelling likely related to edema, redness and pain now 

more related to either pressure sores or cellulitis.  Again will wait on 

cultures for further recs.

## 2021-08-28 NOTE — P.CNOR
History of Present Illness





- [facility]


Consult date: 08/28/21


Consult reason: joint pain


History of present illness: 





55-year-old male who 4 days ago was found down in his home.  Most of the history

was obtained from chart review and his daughter over the phone as the patient is

unable and unreliable for history at this time.





Daughter and patient has a long-standing history of alcoholism for which he has 

been a functional alcoholic.  She states that he has had recent issues and has 

deteriorated since a divorce with his wife.  4 days ago he was found down in a 

pile of his own feces and unresponsive.  He was brought to the emergency 

department for evaluation.  Today the patient is unable to communicate very well

he does moan and groan with pain he opens his eyes to his name however he is not

oriented or alert.  He has pain with passive motion left and right ankles.  In 

speaking with her daughter she states that he has had pain in both of his ankles

for some time and he has had swelling in both of his ankles for some time for 

which he takes a medication to decrease the swelling presumably a diuretic.  She

does deny a history of gout however denies a history of arthritis or trauma.





Review of Systems





Unable to obtain due to the patient's mental status





Past Medical History


Past Medical History: GERD/Reflux, Hypertension, Liver Disease


Additional Past Medical History / Comment(s): seasonal allergies


History of Any Multi-Drug Resistant Organisms: None Reported


Past Surgical History: Hernia Repair


Additional Past Surgical History / Comment(s): umbilical hernia repair 2013


Past Anesthesia/Blood Transfusion Reactions: Postoperative Nausea & Vomiting 

(PONV)


Past Psychological History: No Psychological Hx Reported


Smoking Status: Never smoker


Past Alcohol Use History: Heavy


Past Drug Use History: Prescription Drug Abuse





- Past Family History


  ** Mother


Family Medical History: Cancer, Diabetes Mellitus, Hypertension


Additional Family Medical History / Comment(s): nonHodgkins lymphoma





  ** Father


Family Medical History: Hypertension





Medications and Allergies


                                Home Medications











 Medication  Instructions  Recorded  Confirmed  Type


 


Naltrexone HCl [Revia] 50 mg PO DAILY 07/17/19 08/23/21 History


 


traZODone HCL 50 mg PO HS PRN 07/17/19 08/23/21 History


 


Gabapentin [Neurontin] 100 mg PO TID 08/23/21 08/23/21 History


 


Omeprazole 40 mg PO DAILY 08/23/21 08/23/21 History


 


Promethazine HCl 12.5 mg PO DAILY PRN 08/23/21 08/23/21 History


 


Vilazodone HCl [Viibryd] 40 mg PO DAILY 08/23/21 08/23/21 History


 


amLODIPine BESYLATE/BENAZEPRIL 1 cap PO BID 08/23/21 08/23/21 History





[amLODIPine BESYLATE/BENAZEPRIL    





5-20 MG]    


 


tiZANidine HCL 4 mg PO TID 08/23/21 08/23/21 History








                                    Allergies











Allergy/AdvReac Type Severity Reaction Status Date / Time


 


bee venom protein (honey bee) Allergy  Anaphylaxis Verified 08/23/21 18:27


 


Penicillins Allergy  Unknown Verified 08/24/21 03:38


 


Sulfa (Sulfonamide Allergy  Swelling Verified 08/23/21 18:27





Antibiotics)     


 


codeine AdvReac  Nausea & Verified 08/23/21 18:27





   Vomiting  














Physical Examination


Osteopathic Statement: *.  No significant issues noted on an osteopathic 

structural exam other than those noted in the History and Physical/Consult.





Patient is alert and not oriented 3 appears appears in some distress unable to 

answer questions open eyes to sound and moans and groans for pain





Patient has multiple abrasions on his knees as well as elbows and hands seconda

ry to likely fall these are superficial in nature and scabbing





There is TTP about the right and left ankles.  The left seems to be worse there 

is pain with passive range of motion of the left ankle there is erythema 

surrounding the left ankle at this time which is mostly lateral-based.  The 

patient is laying on his left side with this portion down and so could be 

related to this however there is somewhat of a cellulitic reaction around this 

area with erythema and edema which is 2+ and pitting.  Patient also has edema 

throughout the right ankle as well which is 1+ and pitting.  There is no pain 

with passive range of motion of the right ankle.





Upper extremity and lower extremity muscle testing was unable to be performed to

localize as the patient does not cooperate however he does move all 4 

extremities with's good strength and spontaneity





There is painless range of motion with logroll of bilateral hips there is 

painless range of motion of bilateral knees there is minimally painful range of 

motion of the right ankle however exquisitely painful range of motion of the 

left ankle passively.  He has no pain with passive range of motion of any of the

major joints of his upper extremities bilaterally





They are intact to light touch sensation in L2 to S1 nerve distribution.  


DTR 2/4 all upper and lower extremities


Patient has palpable dorsalis pedis was posterior tibial pulses.  


Palpable Rad Ulnar pulses b/l





Compartments are soft and compressible.  





Patient shows a negative Homans





Cranial nerves II through XII are grossly intact.  





Special Testing: 


No clonus bilaterally lower extremities negative Hoffmans bilaterally negative 

Babinskis bilaterally








Results





X-rays of bilateral ankles are reviewed and these demonstrate no acute fracture 

or dislocation.  Bony alignment no abnormalities noted.  No evidence of soft 

tissue swelling other than some subcu edema noted.





CT of the head and neck is reviewed.  Patient does have some spondylotic changes

throughout the cervical spine C1 2 as well as occipital cervical joints appear 

stable.  No fracture or dislocations noted





MRI of the lumbar spine is pending at this time





- Labs


Labs: 


                  Abnormal Lab Results - Last 24 Hours (Table)











  08/27/21 08/27/21 08/28/21 Range/Units





  11:35 21:27 03:07 


 


WBC     (3.8-10.6)  k/uL


 


RBC     (4.30-5.90)  m/uL


 


Hgb     (13.0-17.5)  gm/dL


 


Hct     (39.0-53.0)  %


 


MCV     (80.0-100.0)  fL


 


Neutrophils #     (1.3-7.7)  k/uL


 


Chloride     ()  mmol/L


 


BUN     (9-20)  mg/dL


 


Creatinine     (0.66-1.25)  mg/dL


 


Glucose     (74-99)  mg/dL


 


POC Glucose (mg/dL)   108 H  101 H  (75-99)  mg/dL


 


Calcium     (8.4-10.2)  mg/dL


 


AST     (17-59)  U/L


 


Total Protein     (6.3-8.2)  g/dL


 


Albumin     (3.5-5.0)  g/dL


 


CSF RBC  5175 H    (0-10)  u/L


 


CSF Tot Nucleated Cells  18 H*    (0-5)  u/L


 


CSF Total Protein  226 H    (12-60)  mg/dL














  08/28/21 08/28/21 08/28/21 Range/Units





  04:38 06:46 10:51 


 


WBC   11.8 H   (3.8-10.6)  k/uL


 


RBC   3.65 L   (4.30-5.90)  m/uL


 


Hgb   12.3 L   (13.0-17.5)  gm/dL


 


Hct   37.4 L   (39.0-53.0)  %


 


MCV   102.5 H   (80.0-100.0)  fL


 


Neutrophils #   9.4 H   (1.3-7.7)  k/uL


 


Chloride  112 H    ()  mmol/L


 


BUN  6 L    (9-20)  mg/dL


 


Creatinine  0.46 L    (0.66-1.25)  mg/dL


 


Glucose  115 H    (74-99)  mg/dL


 


POC Glucose (mg/dL)    114 H  (75-99)  mg/dL


 


Calcium  7.4 L    (8.4-10.2)  mg/dL


 


AST  64 H    (17-59)  U/L


 


Total Protein  4.8 L    (6.3-8.2)  g/dL


 


Albumin  2.1 L    (3.5-5.0)  g/dL


 


CSF RBC     (0-10)  u/L


 


CSF Tot Nucleated Cells     (0-5)  u/L


 


CSF Total Protein     (12-60)  mg/dL








                      Microbiology - Last 24 Hours (Table)











 08/26/21 09:05 Blood Culture - Preliminary





 Blood    No Growth after 48 hours


 


 08/27/21 19:00 Anaerobic Culture - Preliminary





 Knee - Right 


 


 08/27/21 19:00 Wound Culture - Preliminary





 Knee - Right 


 


 08/27/21 11:35 CSF Gram Stain - Preliminary





 Cerebral Spinal Fluid CSF Culture - Preliminary








                                      H & H











  08/23/21 08/24/21 08/26/21 Range/Units





  16:34 08:27 07:00 


 


Hgb  12.3 L  12.2 L  12.2 L  (13.0-17.5)  gm/dL


 


Hct  37.3 L  37.4 L  36.0 L  (39.0-53.0)  %














  08/27/21 08/28/21 Range/Units





  07:39 06:46 


 


Hgb  12.1 L  12.3 L  (13.0-17.5)  gm/dL


 


Hct  35.3 L  37.4 L  (39.0-53.0)  %








                                   Coagulation











  08/23/21 08/27/21 Range/Units





  16:34 07:39 


 


INR  1.2 H  1.4 H  (<1.2)  











Result Diagrams: 


                                 08/28/21 06:46





                                 08/28/21 04:38





Assessment and Plan


Assessment: 





55-year-old male acute metabolic encephalopathy





Left ankle pain, cellulitis rule out septic arthritis





Right ankle pain





Multiple superficial abrasions





Status post fall from standing with likely blunt head trauma





multiple medical comorbidities





EtOH abuse








Plan: 





-Appreciate consultant and team management.  





-Primary medical management





-Plan to aspirate left ankle for synovial cultures as well as fluid analysis to 

rule out septic arthritis.  This appears to be more of a cellulitic reaction in 

the area however with the extremely painful passive range of motion would like 

to rule this out.





-Patient may need investigation of his spine for possible abscess, MRI is 

pending





-Obtain sed rate and CRP evaluation





-ID for antibiotic management





-Activity: Weightbearing as tolerated





-Pain control: [Adequate at this time]





-Meds: [reviewed]





-GI ppx: senna, Miralax





-DVT PPX: [OK to restart Heparin tonight]





-Hygiene: Maintaining good hygiene





-Log rolled every 2 hours





-Encourage IS 10x/hr





-Dispo: [Pending]

















I spoke with the daughter at length over the phone about patient's current 

condition as well as what is needed to be done.  She gives us consent for 

aspiration of the joint under urgent circumstances.  We discussed possible need 

for surgical intervention and also need for investigation of the spine as a 

possible source of infection she understood this.  We will be in contact with 

her.


Time with Patient: Greater than 30

## 2021-08-29 LAB
GLUCOSE BLD-MCNC: 124 MG/DL (ref 75–99)
GLUCOSE BLD-MCNC: 86 MG/DL (ref 75–99)
GLUCOSE BLD-MCNC: 86 MG/DL (ref 75–99)
GLUCOSE BLD-MCNC: 90 MG/DL (ref 75–99)
GLUCOSE BLD-MCNC: 96 MG/DL (ref 75–99)

## 2021-08-29 RX ADMIN — PANTOPRAZOLE SODIUM SCH: 40 TABLET, DELAYED RELEASE ORAL at 06:05

## 2021-08-29 RX ADMIN — CEFEPIME HYDROCHLORIDE SCH MLS/HR: 2 INJECTION, POWDER, FOR SOLUTION INTRAVENOUS at 08:11

## 2021-08-29 RX ADMIN — ACETAMINOPHEN PRN MG: 325 TABLET, FILM COATED ORAL at 08:02

## 2021-08-29 RX ADMIN — SODIUM CHLORIDE SCH MLS/HR: 9 INJECTION, SOLUTION INTRAVENOUS at 01:41

## 2021-08-29 RX ADMIN — Medication SCH: at 16:38

## 2021-08-29 RX ADMIN — SODIUM CHLORIDE SCH MLS/HR: 9 INJECTION, SOLUTION INTRAVENOUS at 16:39

## 2021-08-29 RX ADMIN — POTASSIUM CHLORIDE SCH: 14.9 INJECTION, SOLUTION INTRAVENOUS at 06:06

## 2021-08-29 RX ADMIN — SODIUM CHLORIDE SCH MLS/HR: 9 INJECTION, SOLUTION INTRAVENOUS at 23:48

## 2021-08-29 RX ADMIN — FOLIC ACID SCH: 1 TABLET ORAL at 10:47

## 2021-08-29 RX ADMIN — CEFEPIME HYDROCHLORIDE SCH MLS/HR: 2 INJECTION, POWDER, FOR SOLUTION INTRAVENOUS at 15:24

## 2021-08-29 RX ADMIN — ACETAMINOPHEN PRN MG: 325 TABLET, FILM COATED ORAL at 21:11

## 2021-08-29 RX ADMIN — ACETAMINOPHEN PRN MG: 325 TABLET, FILM COATED ORAL at 12:05

## 2021-08-29 RX ADMIN — CYANOCOBALAMIN SCH MCG: 1000 INJECTION, SOLUTION INTRAMUSCULAR; SUBCUTANEOUS at 14:23

## 2021-08-29 RX ADMIN — Medication SCH: at 06:06

## 2021-08-29 RX ADMIN — ACETAMINOPHEN PRN MG: 325 TABLET, FILM COATED ORAL at 16:39

## 2021-08-29 RX ADMIN — CEFEPIME HYDROCHLORIDE SCH MLS/HR: 2 INJECTION, POWDER, FOR SOLUTION INTRAVENOUS at 23:47

## 2021-08-29 RX ADMIN — ACETAMINOPHEN PRN MG: 650 SUPPOSITORY RECTAL at 04:13

## 2021-08-29 RX ADMIN — SODIUM CHLORIDE SCH MLS/HR: 9 INJECTION, SOLUTION INTRAVENOUS at 10:47

## 2021-08-29 NOTE — P.PN
Subjective


Progress Note Date: 08/29/21








 This a 55-year-old white male patient who was admitted to the hospital on Augus t 23, 2021 when he was found delirious and confused by his neighbor and his 

home.  Patient was found laying on the floor, in his own feces, he had a bunch 

alcohol bottles lying around him.  he had multiple bruises and abrasions in 

different stages of healing.  He has a hematoma to his left eye , and abrasions 

on his knees and lower extremities.  Tendon has a history of cullen abuse, eats 

marijuana edibles.  Drug screen was positive for marijuana, benzodiazepines.  

His alcohol level was less than 10.  Brain CT showed no acute intracranial 

abnormality, no fracture, cervical spine, additional spondylotic changes in the 

cervical spine.  CT of the facial bones showed evidence of old fracture of the 

floor of the left bony orbit, no acute fractures.  Initial chest x-ray showed 

minimal subsegmental atelectasis in the right lung base without change.  EKG 

showed sinus rhythm.  Initial blood work showed a white count of 7.8, hemoglobin

12.3, INR of 1.2, sodium was 153, potassium 3.4, chloride is 115, CO2 is 24, B1 

and creatinine 0.74, troponin was 0.016.  Patient was started on benzodiazepines

in the form of Ativan per [facility] protocol.  Neurology consultation was obtained.  

Psychiatric services are following.  Patient is on thiamine and folate per [facility] 

protocol.  ABG showed moderate to severe degree of slowing, consistent with 

toxic metabolic encephalopathy, no epileptiform discharges were seen.  This 

morning patient started spiking fevers with a temp of 101.4F, he is also 

requiring supplemental oxygen currently at 2 L with a pulse ox of 93-97%, he is 

receiving Valium and when necessary Ativan.  Quite lethargic, confused, he opens

eyes to voice, however he is not able to provide any meaningful verbal 

responses.  Today's chest x-ray has been reviewed showing right basilar patchy 

infiltrate and/or atelectasis.  Patient was placed on empiric antibiotics in the

form of Rocephin and vancomycin.  In view of altered mentation and fever lumbar 

puncture was requested however anesthesia was unable to perform it because 

Lovenox was given this morning.  We were consulted in view of depressed level of

consciousness related to benzodiazepines and possibility of needing to intubate 

the patient placement on mechanical ventilator.  Blood gas has been obtained 

showing pO2 of 85, pCO2 of 33, pH of 7.51 this was done on FiO2 of 28%, patient 

is breathing fairly comfortably, does not appear to be in any acute distress, 

head of the bed is up 35, he is currently on 2 L of oxygen, his pulse ox is 

97%.  No coughing or wheezing.  He continues on empiric antibiotics. 





On today's evaluation of a 8/27/ 2021, the patient is essentially the same.  No 

major improvement in his underlying mental status.  The patient remains 

encephalopathic.  He is unable to say.  This is 50 gases some few words such as 

yes or no.  Unable to follow any commands.  His been having also episodes of 

fever.  Based on all this, the patient underwent a lumbar puncture suspecting 

encephalitis.  No neck stiffness.  The lumbar puncture was completed and the 

patient was found to have elevated CSF protein.  The total white cell count was 

18.  The patient had 85% polys tear cells and 50% mononuclear cells.  The CSF 

protein was 226.  Glucose is a 69.  Patient was also covered with broad-spectrum

antibiotics including a combination of Rocephin and vancomycin.  The patient is 

also on acyclovir IV 1 g 3 times a day.  The white cell count is 11.5 with 

hemoglobin of 12.1.  INR is at 1.4.  BUN is at 4 with a creatinine of 0.5.  Rest

of the electrodes are within normal limits.  No seizure activity has been noted.

 EEG was done and was quite abnormal due to background slowing and moderate to 

severe slowing of the EEG activity suggestive of generalized cerebral 

dysfunction.  Neurology is on the case for now.  In terms of alcohol withdrawal,

delirium tremors, the patient is currently on Ativan based on the [facility] protocol.

 The patient is also on D5 half-normal saline today to 100 mL an hour.  The 

patient will be transferred to the intensive care unit for further monitoring.








8/28/2021, the patient is still encephalopathic.  Slightly more rested and more 

awake compared to yesterday.  Nevertheless he remains encephalopathic.  No neck 

stiffness.  No headaches.  MRI of the brain was done and showed no acute abno

rmalities.  As stated earlier, his CSF evaluation was not consistent with 

bacterial meningitis.  HSV by PCR still pending.  He remains on vancomycin.  He 

remains on Rocephin.  He remains on acyclovir.  He is receiving Ativan per [facility] 

protocol.  He is on D5 half-normal saline at the rate of 100 mL an hour.  

Hemodynamically stable.  Pulse ox is 98% on 2 L of oxygen by nasal cannula.  

Chest x-ray shows no evidence of any pneumonia.  Note that his urine drug screen

was positive for benzodiazepines and marijuana.  His blood work from today shows

a WD second of 11.8 with hemoglobin of 12.3.  Platelets is at 193.  Electrolytes

are normal.  Renal functions are normal.  Hepatic function is normal.  Albumin 

is at 2.1 with a protein of 4.8.





On today's evaluation of a 08/29/2021, the patient is breathing comfortably.  

The patient got transferred out of the intensive care unit.  Currently is on 

room air oxygen.  He remains somewhat encephalopathic.  No agitation.  No other 

significant respiratory distress.  No significant tachycardia.  Lumbar puncture 

came back negative for any bacterial growth.  Lumbar puncture was also negative 

for HSV by PCR.  Acyclovir was discontinued.  There was a concern that the 

patient may have a septic joint.  He was seen by orthopedic surgery.  He is left

ankle was drained.  A total of 60 mL of fluid was removed from the ankle and was

sent to lab.  Patient was kept on antibiotics and patient is currently on a 

combination of cefepime and vancomycin.  Cultures are all negative the white 

cell count.  The white cell count is at 11.8.  INR is at 1.4 with a PT of 13.8. 

His creatinine today is 0.7.  MRI of the lumbar spine was done and showed no 

evidence of any discitis.  No significant disc space narrowing.  There is 

evidence of no fracture.  There is evidence of multiple.  Mild joint facet 

arthropathy.





Objective





- Vital Signs


Vital signs: 


                                   Vital Signs











Temp  98.1 F   08/29/21 08:38


 


Pulse  111 H  08/29/21 07:47


 


Resp  28 H  08/29/21 07:45


 


BP  133/85   08/29/21 07:45


 


Pulse Ox  92 L  08/29/21 07:45








                                 Intake & Output











 08/28/21 08/29/21 08/29/21





 18:59 06:59 18:59


 


Intake Total 1600 300 


 


Output Total 845 890 


 


Balance 755 -590 


 


Weight  65.5 kg 


 


Intake:   


 


  IV 1600 300 


 


    ACETAMINOPHEN IV (For   





    ) 1,000 mg In Empty Bag 1   





    bag @ 400 mls/hr IVPB   





    ONCE ONE Rx#:[id number]   


 


    Dextrose 5%-0.45% NaCl 1, 1200 300 





    000 ml @ 100 mls/hr IV .   





    Q10H [facility] Rx#:[id number]   


 


    Vancomycin 1,500 mg In 250  





    Sodium Chloride 0.9% 250   





    ml @ 125 mls/hr IVPB Q8H   





    [facility] Rx#:[id number]   


 


    cefTRIAXone 2 gm In 50  





    Sodium Chloride 0.9% 50   





    ml @ 100 mls/hr IVPB   





    Q12HR [facility] Rx#:[id number]   


 


Output:   


 


  Urine 845 890 


 


Other:   


 


  Voiding Method Indwelling Catheter Indwelling Catheter Indwelling Catheter


 


  # Bowel Movements 1  














- Exam











 GENERAL EXAM: Lethargic, confused, 55-year-old white male, 2 L of oxygen and 

the pulse ox 97%, patient has bruises and abrasions on his face and left eye, 

knees and feet comfortable in no apparent distress.


HEAD: Normocephalic/atraumatic.


EYES: Normal reaction of pupils, equal size.  Conjunctiva pink, sclera white.


NOSE: Clear with pink turbinates.


THROAT: No erythema or exudates.


NECK: No masses, no JVD, no thyroid enlargement, no adenopathy.


CHEST: No chest wall deformity.  Symmetrical expansion. 


LUNGS: Equal air entry with dim breath sounds, no crackles


CVS: Regular rate and rhythm, normal S1 and S2, no gallops, no murmurs, no rubs


ABDOMEN: Soft, nontender.  No hepatosplenomegaly, normal bowel sounds, no 

guarding or rigidity.


EXTREMITIES: No clubbing, no edema, no cyanosis, 2+ pulses and upper and lower 

extremities.


MUSCULOSKELETAL: Muscle strength and tone normal.


SPINE: No scoliosis or deformity


SKIN: No rashes


CENTRAL NERVOUS SYSTEM: Confused No focal deficits, tone is normal in all 4 

extremities.





- Labs


CBC & Chem 7: 


                                 08/28/21 06:46





                                 08/29/21 07:34


Labs: 


                  Abnormal Lab Results - Last 24 Hours (Table)











  08/26/21 08/28/21 08/28/21 Range/Units





  07:00 04:38 06:48 


 


ESR    64 H  (0-15)  mm/hr


 


Creatinine     (0.66-1.25)  mg/dL


 


POC Glucose (mg/dL)     (75-99)  mg/dL


 


C-Reactive Protein   8.1 H   (<1.0)  mg/dL


 


Methylmalonic Acid  0.46 H    (<0.40)  umol/L














  08/28/21 08/29/21 08/29/21 Range/Units





  10:51 00:06 07:34 


 


ESR     (0-15)  mm/hr


 


Creatinine    0.37 L  (0.66-1.25)  mg/dL


 


POC Glucose (mg/dL)  114 H  124 H   (75-99)  mg/dL


 


C-Reactive Protein     (<1.0)  mg/dL


 


Methylmalonic Acid     (<0.40)  umol/L








                      Microbiology - Last 24 Hours (Table)











 08/28/21 11:55 Gram Stain - Preliminary





 Ankle - Left Wound Culture - Preliminary


 


 08/27/21 19:00 Gram Stain - Preliminary





 Knee - Right Wound Culture - Preliminary


 


 08/28/21 11:55 Anaerobic Culture - Preliminary





 Ankle - Left 


 


 08/27/21 11:35 CSF Gram Stain - Preliminary





 Cerebral Spinal Fluid CSF Culture - Preliminary


 


 08/26/21 09:05 Blood Culture - Preliminary





 Blood    No Growth after 48 hours


 


 08/27/21 19:00 Anaerobic Culture - Preliminary





 Knee - Right 














Assessment and Plan


Plan: 








1 altered mental status, encephalopathy.  Consider delirium tremors.  

Encephalitis/meningitis was considered.  The patient underwent lumbar puncture. 

The results were noted.  HSV by PCR is been negative.  Cultures been negative.  

MRI of the brain was negative.  Neurologist on the case.  No seizure activity.  

It is possible this is all related to chronic alcoholism/alcohol withdrawal and 

the patient is currently slightly more awake and alert compared to yesterday.





2 fever, episodic 





3 acute hypoxic respiratory failure with some limited atelectasis in lung bases,

currently on room air oxygen





4 Alcoholism





5 Very Poor Baseline Performance and Functional Status with Multiple Falls at 

Home Related to Alcoholism





6 multiple skin abrasions involving the lower extremity and ecchymosis around 

the left orbit/.  Noticeably fracture based on the CAT scan findings.  


7 Hypertension





8 hypernatremia, improved





9 left ankle swelling /cellulitis/questionable septic joint post drainage.  

Awaiting cultures.  Awaiting crystals evaluation.





Plan





Awaiting fluid cultures from the [facility]  of the left ankle


Disontinue the IV acyclovir  


Chest x-ray shows no evidence of an aspiration pneumonia and he remains onRA 02 


Continue IV fluids


Ativan per [facility] protocol.


MRI of the brain was negative


CT of the L spine negative


Aspiration precautions


Keep antibiotics


not ready for any feeding yet


Long-term prognosis poor baseline above-mentioned comorbidities.  We'll continue

to follow.

## 2021-08-29 NOTE — P.PN
Subjective


Progress Note Date: 08/29/21 08/29/2021: This is a Tele-neurology follow-up performed on the patient today on

08/29/2021.





Patient still very lethargic, encephalopathic, waking up more, but not answering

questions.  Per nursing report, he answered appropriately yes and when she asked

if he wants applesauce, he replied "no".  He continues to have pain in the left 

ankle, and both legs with any movement.  Per nurse, whenever she moves his legs,

hips down, he moans.  Patient does have 3+ pedal edema.  Patient is on CIWA 

protocol.  He has received 2 mg Ativan yesterday.  Nothing today.  No seizure-

like activity noted.  








Objective





- Vital Signs


Vital signs: 


                                   Vital Signs











Temp  100.8 F H  08/29/21 11:17


 


Pulse  104 H  08/29/21 11:17


 


Resp  24   08/29/21 11:17


 


BP  128/85   08/29/21 11:17


 


Pulse Ox  97   08/29/21 11:17








                                 Intake & Output











 08/28/21 08/29/21 08/29/21





 18:59 06:59 18:59


 


Intake Total 1600 300 0


 


Output Total 845 890 650


 


Balance 755 -590 -650


 


Weight  65.5 kg 


 


Intake:   


 


  IV 1600 300 


 


    ACETAMINOPHEN IV (For   





    ) 1,000 mg In Empty Bag 1   





    bag @ 400 mls/hr IVPB   





    [facility] Rx#:[id number]   


 


    Dextrose 5%-0.45% NaCl 1, 1200 300 





    000 ml @ 100 mls/hr IV .   





    Q10H [facility] Rx#:[id number]   


 


    Vancomycin 1,500 mg In 250  





    Sodium Chloride 0.9% 250   





    ml @ 125 mls/hr IVPB Q8H   





    [facility] Rx#:[id number]   


 


    cefTRIAXone 2 gm In 50  





    Sodium Chloride 0.9% 50   





    ml @ 100 mls/hr IVPB   





    Q12HR [facility] Rx#:[id number]   


 


  Oral   0


 


Output:   


 


  Urine 845 890 650


 


Other:   


 


  Voiding Method Indwelling Catheter Indwelling Catheter Indwelling Catheter


 


  # Bowel Movements 1  2














- Exam





Patient continues to be significantly encephalopathic.  Patient somewhat 

restless, does not answer to any question.  Not able to tell me his name or name

any object.  Patient makes minimal eye contact.  Pupils are round and reacting. 

Visual fields could not be tested.  Face is symmetric.  Patient moves all 4 

extremities equally.  Patient's movements in the legs elicits pain.  Tone is 

equal bilaterally. 





- Labs


CBC & Chem 7: 


                                 08/28/21 06:46





                                 08/29/21 07:34


Labs: 


                  Abnormal Lab Results - Last 24 Hours (Table)











  08/29/21 08/29/21 Range/Units





  00:06 07:34 


 


Creatinine   0.37 L  (0.66-1.25)  mg/dL


 


POC Glucose (mg/dL)  124 H   (75-99)  mg/dL








                      Microbiology - Last 24 Hours (Table)











 08/26/21 09:05 Blood Culture - Preliminary





 Blood    No Growth after 72 hours


 


 08/27/21 11:35 CSF Gram Stain - Preliminary





 Cerebral Spinal Fluid CSF Culture - Preliminary


 


 08/28/21 11:55 Gram Stain - Preliminary





 Ankle - Left Wound Culture - Preliminary


 


 08/27/21 19:00 Gram Stain - Preliminary





 Knee - Right Wound Culture - Preliminary


 


 08/28/21 11:55 Anaerobic Culture - Preliminary





 Ankle - Left 


 


 08/27/21 19:00 Anaerobic Culture - Preliminary





 Knee - Right 














Assessment and Plan


Assessment: 





* 55-year-old male with history of alcoholism, was found at laying with fecal ma

  tter with multiple bruises, scratches and pressure sores over dependent areas,

  indicating patient has been on the floor for fairly long period of time.  Last

  period of contact with his friends was on Friday, 3 days prior to arrival.   

  Patient has improved slightly as compared to yesterday.  Still continues to be

  significantly delirious/encephalopathic.  Probable alcohol 

  withdrawal/DTs/alcoholic hallucinosis, rule out infectious process.  Patient 

  now has developed temperature 101.4.  No definitive evidence of 

  meningitis/encephalitis based upon CSF results.


* Hypertension


* History of alcoholism


* History of marijuana use.


Plan: 





* Patient continues to be very encephalopathic.  Patient's spinal fluid was 

  traumatic because patient was not cooperating.  CSF shows very mild 

  leukocytosis (after correction for traumatic tap), and very elevated proteins.

   Cultures so far negative.  HSV-1 and 2 PCR in the CSF are negative.  

  Infectious disease following.  Acyclovir has been discontinued.  Antibiotics 

  have been tapered to control cryptogenic infection.  Currently on cefepime.  

  Patient had undergone aspiration of right knee and left ankle, both of which 

  have been negative for any growth.  No crystals seen in the sinonasal fluid.


* EEG was abnormal due to background slowing of moderate to severe degree.  This

  is suggestive of generalized cerebral dysfunction, as can be seen with toxic 

  metabolic encephalopathy or due to diffuse structural brain abnormality.  No 

  epileptiform activity was seen.  


* Carotid Doppler was technically difficult due to patient's inability to hold 

  still.  No hemodynamically significant ICA stenosis identified on either side.

   Antegrade flow in both vertebral arteries.


* B12 582 normal, however at MMA is elevated 0.46 (normal <0.40).  This may 

  indicate intracellular deficiency of B12.  Patient's folate 5.7, TSH 1.77, RPR

  nonreactive.  We will start folate and B12 replacement.  B6 still pending.


* Thiamine, folate.  CIWA protocol.


* Orthopedic consultation input appreciated.  Patient had undergone left ankle 

  and right knee joint aspiration.  40 as he is 10.  His


* MRI of the lumbar spine with and without contrast revealed no evidence of 

  discitis.  No significant disc space narrowing.  No fracture.  Multilevel mild

  facet arthropathy and mild lateral recess stenosis.  Multilevel mild posterior

  disc bulging without spinal stenosis.  


* Spinal fluid examination revealed glucose 64 (40-70), proteins 226 (12-60), 

  total WBC count in CSF 18, out of its 15% monocytes and 85% polynuclear's.  

  CSF RBC 5175, due to traumatic tap.  


* MRI of the brain revealed mild atrophy.  Mild subependymal white matter 

  increased signal around the lateral ventricles measuring up to 5 mm in 

  thickness.  No evidence of cortical infarct.


* Dr. Tong Ocampo Will resume neurology service from the morning.

## 2021-08-29 NOTE — P.PN
Subjective


Progress Note Date: 08/29/21





Patient was transferred out of ICU and was seen by me today on stepdown unit.  

Patient mentation is slightly better in terms of alertness but he still confused

and not answering questions appropriately.  He still having fevers 

intermittently.





Objective





- Vital Signs


Vital signs: 


                                   Vital Signs











Temp  98.1 F   08/29/21 08:38


 


Pulse  111 H  08/29/21 07:47


 


Resp  28 H  08/29/21 07:45


 


BP  133/85   08/29/21 07:45


 


Pulse Ox  92 L  08/29/21 07:45








                                 Intake & Output











 08/28/21 08/29/21 08/29/21





 18:59 06:59 18:59


 


Intake Total 1600 300 0


 


Output Total 845 890 


 


Balance 755 -590 0


 


Weight  65.5 kg 


 


Intake:   


 


  IV 1600 300 


 


    ACETAMINOPHEN IV (For   





    ) 1,000 mg In Empty Bag 1   





    bag @ 400 mls/hr IVPB   





    ONCE ONE Rx#:[id number]   


 


    Dextrose 5%-0.45% NaCl 1, 1200 300 





    000 ml @ 100 mls/hr IV .   





    Q10H [facility] Rx#:[id number]   


 


    Vancomycin 1,500 mg In 250  





    Sodium Chloride 0.9% 250   





    ml @ 125 mls/hr IVPB Q8H   





    [facility] Rx#:[id number]   


 


    cefTRIAXone 2 gm In 50  





    Sodium Chloride 0.9% 50   





    ml @ 100 mls/hr IVPB   





    Q12HR [facility] Rx#:[id number]   


 


  Oral   0


 


Output:   


 


  Urine 845 890 


 


Other:   


 


  Voiding Method Indwelling Catheter Indwelling Catheter Indwelling Catheter


 


  # Bowel Movements 1  2














- Exam





General:  The patient is very confused 


Eye: there is normal conjunctiva bilaterally.  


Neck:  The neck is supple, there is no  JVD.  


Cardiovascular:  Normal  S1-S2, no S3-S4, no murmurs.


Respiratory: Lungs clear to auscultation bilaterally


Gastrointestinal: Abdomen is soft, nontender


Musculoskeletal:  There is +1 pedal edema.  


Skin:  Skin is warm and dry 





- Labs


CBC & Chem 7: 


                                 08/28/21 06:46





                                 08/29/21 07:34


Labs: 


                  Abnormal Lab Results - Last 24 Hours (Table)











  08/26/21 08/28/21 08/28/21 Range/Units





  07:00 04:38 06:48 


 


ESR    64 H  (0-15)  mm/hr


 


Creatinine     (0.66-1.25)  mg/dL


 


POC Glucose (mg/dL)     (75-99)  mg/dL


 


C-Reactive Protein   8.1 H   (<1.0)  mg/dL


 


Methylmalonic Acid  0.46 H    (<0.40)  umol/L














  08/28/21 08/29/21 08/29/21 Range/Units





  10:51 00:06 07:34 


 


ESR     (0-15)  mm/hr


 


Creatinine    0.37 L  (0.66-1.25)  mg/dL


 


POC Glucose (mg/dL)  114 H  124 H   (75-99)  mg/dL


 


C-Reactive Protein     (<1.0)  mg/dL


 


Methylmalonic Acid     (<0.40)  umol/L








                      Microbiology - Last 24 Hours (Table)











 08/27/21 11:35 CSF Gram Stain - Preliminary





 Cerebral Spinal Fluid CSF Culture - Preliminary


 


 08/28/21 11:55 Gram Stain - Preliminary





 Ankle - Left Wound Culture - Preliminary


 


 08/27/21 19:00 Gram Stain - Preliminary





 Knee - Right Wound Culture - Preliminary


 


 08/28/21 11:55 Anaerobic Culture - Preliminary





 Ankle - Left 


 


 08/26/21 09:05 Blood Culture - Preliminary





 Blood    No Growth after 48 hours


 


 08/27/21 19:00 Anaerobic Culture - Preliminary





 Knee - Right 














Assessment and Plan


Assessment: 





This is a 55-year-old male was brought into the emergency room after he was 

found by one of the neighbors covered in feces but responsive with empty bottles

of alcohol around him be transferred to the hospital upon arrival he was cleaned

up and was found to be hallucinating suggestive of being in DTs for which she 

was started on benzodiazepines per CIWA scale.  Since admission, patient 

remained significantly encephalopathic with not much progress in his mentation.





CT of the head no acute pathology


CT of the face showed old left orbital fracture inferiorly


X-ray of the right elbow showed olecranon process spur with some soft tissue 

swelling


EKG showed sinus rhythm


Alcohol level was <10


Patient was transferred to the ICU for close monitoring on 8/27





Assessment: 





Alcohol abuse with alcohol withdrawal syndrome


Delirium tremens


Rhabdomyolysis


Encephalopathy with hallucinations





Plan


Benzodiazepines per CIWA scale


IV fluid hydration, Thiamine


Seizure precautions, Fall precautions


Computed tomography scan of the head showed no acute findings.


MRI of the brain was no acute finding


Consulted neurology and psychiatry for further evaluation


EEG was abnormal with generalized cerebral dysfunction and evidence of toxic 

metabolic encephalopathy





Acute toxo metabolic encephalopathy


Sepsis without septic shock


Concerns about meningitis, possibly viral


Started on broad spectrum antibiotic with vancomycin, ceftriaxone, and acyclovir

for suspected meningitis on presentation..  Antibiotic is been adjusted by 

infectious disease currently on IV cefepime and vancomycin.


CSF fluid not consistent with bacterial meningitis.  


HSV PCR negative


MRI of the lumbar spine with no evidence of discitis or prevertebral abscess


Blood gas showed no evidence of acute CO2 narcosis


Chest x-ray with questionable pneumonia





Poor living condition


Recurrent episodes of hospital admission for alcohol abuse


Jake. life stressor with possible major depression





Plan


We will reconsult psychiatry when mentation improves


 consultation





Macrocytic anemia mild


Most likely secondary to alcohol abuse


No reported GI bleeding





Multiple skin abrasions


Left eye bruising


No acute fractures identified





Hypernatremia 


Improved with IV fluid





 GI prophylaxis PPI





CODE STATUS: Full code


DVT prophylaxis: Mechanical


Discussed with: Patient, ER, RN


Anticipated length of stay  more than 2 midnights


Anticipated discharge place: Pending clinical course


A total of 65 minutes was spent on the care of this complex patient more than 

50% of the time was spent in counseling and care coordination.

## 2021-08-29 NOTE — PN
PROGRESS NOTE



DATE OF SERVICE:

08/29/2021



REASON FOR FOLLOWUP:

Fever and bacteremia.



INTERVAL HISTORY:

The patient is still running a fever of 100 to 100.7; however, the patient is

hemodynamically stable.  The patient is getting more awake and alert; however, did not

provide any history.  Currently on room air.  No vomiting or diarrhea reported by the

nursing staff.



PHYSICAL EXAMINATION:

Blood pressure 134/80 with a pulse of 102, temperature 100.3. He is 94% on room air.

GENERAL DESCRIPTION: General description is a middle-aged male lying in bed in no

distress.

RESPIRATORY SYSTEM: Unlabored breathing. Decreased breath sounds at the bases. No

wheeze.

HEART: S1, S2. Regular rate and rhythm.

ABDOMEN: Soft. No tenderness.

Left ankle area did have some swelling and tenderness to touch but no redness.



LABS:

Creatinine 0.37. Vancomycin trough was 20.1.  Blood culture with Gram-positive cocci in

clusters.



DIAGNOSTIC IMPRESSION AND PLAN:

Patient with a fever, now with evidence of Gram-positive bacteremia concerning for

possible left ankle cellulitis.  MRI of the lumbosacral spine did not show any evidence

of diskitis.  The patient is covered with vancomycin.  Blood cultures will be repeated

to document clearance of his bacteremia. Monitor his clinical course closely.  Recheck

his inflammatory markers tomorrow.





MMODL / IJN: [id number] / Job#: [id number]

## 2021-08-29 NOTE — P.PN
Subjective


Progress Note Date: 08/28/21 08/28/2021: This is a Tele-neurology follow-up performed on the patient today on

08/28/2021.


Patient still very lethargic, encephalopathic, not waking up.  He opens his 

eyes, but then drifts back to sleep.  No seizure-like activity noted.  Patient 

continues to have very tender left ankle.  He also moans when his right leg is 

moved, particularly elevated.   Restless, does not communicate much.  Patient 

states just a few words, like "yes", "no", "all right".  Patient not able to 

tell me his name.  Patient appears no better as compared to yesterday.





Objective





- Vital Signs


Vital signs: 


                                   Vital Signs











Temp  101.4 F H  08/28/21 12:00


 


Pulse  120 H  08/28/21 13:00


 


Resp  28 H  08/28/21 13:00


 


BP  139/92   08/28/21 13:00


 


Pulse Ox  96   08/28/21 13:00








                                 Intake & Output











 08/27/21 08/28/21 08/28/21





 18:59 06:59 18:59


 


Intake Total 200 2000 1000


 


Output Total 655 625 485


 


Balance -455 1375 515


 


Weight 61.5 kg 102.9 kg 


 


Intake:   


 


  IV  1900 1000


 


    Acyclovir Sodium 1,000 mg  500 





    In Sodium Chloride 0.9%   





    250 ml @ 270 mls/hr IVPB   





    Q8HR@0400,1200,2000 [facility]   





    Rx#:[id number]   


 


    Dextrose 5%-0.45% NaCl 1,  1100 700





    000 ml @ 100 mls/hr IV .   





    Q10H [facility] Rx#:[id number]   


 


    Vancomycin 1,500 mg In  250 250





    Sodium Chloride 0.9% 250   





    ml @ 125 mls/hr IVPB Q8H   





    [facility] Rx#:[id number]   


 


    cefTRIAXone 2 gm In  50 50





    Sodium Chloride 0.9% 50   





    ml @ 100 mls/hr IVPB   





    Q12HR [facility] Rx#:[id number]   


 


  Intake, IV Titration 200 100 





  Amount   


 


    Dextrose 5%-0.45% NaCl 1, 200 100 





    000 ml @ 100 mls/hr IV .   





    Q10H [facility] Rx#:[id number]   


 


Output:   


 


  Urine 655 625 485


 


Other:   


 


  Voiding Method External Catheter Indwelling Catheter 


 


  # Voids 1  


 


  # Bowel Movements 1 1 1














- Exam





Patient continues to be significantly encephalopathic.  Patient somewhat 

restless, does not answer to any question.  Not able to tell me his name or name

any object.  Patient makes minimal eye contact.  Pupils are round and reacting. 

Visual fields could not be tested.  Face is symmetric.  Patient moves all 4 

extremities equally.  His left ankle continues to be very painful and tender to 

touch.  Patient's right leg extension passively also produces severe pain.  His 

neck appears slightly stiff for forward flexion, but appears supple for side 

rotation.  Tone is equal bilaterally. 





- Labs


CBC & Chem 7: 


                                 08/28/21 06:46





                                 08/29/21 07:34


Labs: 


                  Abnormal Lab Results - Last 24 Hours (Table)











  08/27/21 08/27/21 08/28/21 Range/Units





  11:35 21:27 03:07 


 


WBC     (3.8-10.6)  k/uL


 


RBC     (4.30-5.90)  m/uL


 


Hgb     (13.0-17.5)  gm/dL


 


Hct     (39.0-53.0)  %


 


MCV     (80.0-100.0)  fL


 


Neutrophils #     (1.3-7.7)  k/uL


 


Chloride     ()  mmol/L


 


BUN     (9-20)  mg/dL


 


Creatinine     (0.66-1.25)  mg/dL


 


Glucose     (74-99)  mg/dL


 


POC Glucose (mg/dL)   108 H  101 H  (75-99)  mg/dL


 


Calcium     (8.4-10.2)  mg/dL


 


AST     (17-59)  U/L


 


C-Reactive Protein     (<1.0)  mg/dL


 


Total Protein     (6.3-8.2)  g/dL


 


Albumin     (3.5-5.0)  g/dL


 


CSF RBC  5175 H    (0-10)  u/L


 


CSF Tot Nucleated Cells  18 H*    (0-5)  u/L


 


CSF Total Protein  226 H    (12-60)  mg/dL














  08/28/21 08/28/21 08/28/21 Range/Units





  04:38 04:38 06:46 


 


WBC    11.8 H  (3.8-10.6)  k/uL


 


RBC    3.65 L  (4.30-5.90)  m/uL


 


Hgb    12.3 L  (13.0-17.5)  gm/dL


 


Hct    37.4 L  (39.0-53.0)  %


 


MCV    102.5 H  (80.0-100.0)  fL


 


Neutrophils #    9.4 H  (1.3-7.7)  k/uL


 


Chloride  112 H    ()  mmol/L


 


BUN  6 L    (9-20)  mg/dL


 


Creatinine  0.46 L    (0.66-1.25)  mg/dL


 


Glucose  115 H    (74-99)  mg/dL


 


POC Glucose (mg/dL)     (75-99)  mg/dL


 


Calcium  7.4 L    (8.4-10.2)  mg/dL


 


AST  64 H    (17-59)  U/L


 


C-Reactive Protein   8.1 H   (<1.0)  mg/dL


 


Total Protein  4.8 L    (6.3-8.2)  g/dL


 


Albumin  2.1 L    (3.5-5.0)  g/dL


 


CSF RBC     (0-10)  u/L


 


CSF Tot Nucleated Cells     (0-5)  u/L


 


CSF Total Protein     (12-60)  mg/dL














  08/28/21 Range/Units





  10:51 


 


WBC   (3.8-10.6)  k/uL


 


RBC   (4.30-5.90)  m/uL


 


Hgb   (13.0-17.5)  gm/dL


 


Hct   (39.0-53.0)  %


 


MCV   (80.0-100.0)  fL


 


Neutrophils #   (1.3-7.7)  k/uL


 


Chloride   ()  mmol/L


 


BUN   (9-20)  mg/dL


 


Creatinine   (0.66-1.25)  mg/dL


 


Glucose   (74-99)  mg/dL


 


POC Glucose (mg/dL)  114 H  (75-99)  mg/dL


 


Calcium   (8.4-10.2)  mg/dL


 


AST   (17-59)  U/L


 


C-Reactive Protein   (<1.0)  mg/dL


 


Total Protein   (6.3-8.2)  g/dL


 


Albumin   (3.5-5.0)  g/dL


 


CSF RBC   (0-10)  u/L


 


CSF Tot Nucleated Cells   (0-5)  u/L


 


CSF Total Protein   (12-60)  mg/dL








                      Microbiology - Last 24 Hours (Table)











 08/27/21 11:35 CSF Gram Stain - Preliminary





 Cerebral Spinal Fluid CSF Culture - Preliminary


 


 08/26/21 09:05 Blood Culture - Preliminary





 Blood    No Growth after 48 hours


 


 08/27/21 19:00 Anaerobic Culture - Preliminary





 Knee - Right 


 


 08/27/21 19:00 Wound Culture - Preliminary





 Knee - Right 














Assessment and Plan


Assessment: 





* 55-year-old male with history of alcoholism, was found at laying with fecal 

  matter with multiple bruises, scratches and pressure sores over dependent 

  areas, indicating patient has been on the floor for fairly long period of 

  time.  Last period of contact with his friends was on Friday, 3 days prior to 

  arrival.   Patient has not improved clinically since arrival, continues to be 

  significantly delirious/encephalopathic.  Probable alcohol 

  withdrawal/DTs/alcoholic hallucinosis, rule out infectious process.  Patient 

  now has developed temperature 101.4.  Rule out meningitis encephalitis.


* Hypertension


* History of alcoholism


* History of marijuana use.


Plan: 





* Patient continues to be very encephalopathic.  Patient's spinal fluid was 

  traumatic because patient was not cooperating.  CSF shows very mild 

  leukocytosis (after correction for traumatic tap), and very elevated proteins.

   Cultures so far negative.  HSV-1 and 2 PCR in the CSF are negative.  

  Infectious disease following.


* EEG was abnormal due to background slowing of moderate to severe degree.  This

  is suggestive of generalized cerebral dysfunction, as can be seen with toxic 

  metabolic encephalopathy or due to diffuse structural brain abnormality.  No 

  epileptiform activity was seen.  


* Carotid Doppler was technically difficult due to patient's inability to hold 

  still.  No hemodynamically significant ICA stenosis identified on either side.

   Antegrade flow in both vertebral arteries.


* B12 582 normal, however at MMA is elevated 0.46 (normal <0.40).  This may 

  indicate intracellular deficiency of B12.  Patient's folate 5.7, TSH 1.77, RPR

  nonreactive.  We will start folate and B12 replacement.  B6 still pending.


* Thiamine, folate.  [facility] protocol.


* Orthopedic consultation for severe left ankle pain, rule out infection.  


* MRI of the lumbar spine with and without contrast revealed no evidence of d

  iscitis.  No significant disc space narrowing.  No fracture.  Multilevel mild 

  facet arthropathy and mild lateral recess stenosis.  Multilevel mild posterior

  disc bulging without spinal stenosis.  


* Spinal fluid examination revealed glucose 64 (40-70), proteins 226 (12-60), 

  total WBC count in CSF 18, out of its 15% monocytes and 85% polynuclear's.  

  CSF RBC 5175, due to traumatic tap.  


* MRI of the brain revealed mild atrophy.  Mild subependymal white matter 

  increased signal around the lateral ventricles measuring up to 5 mm in 

  thickness.  No evidence of cortical infarct.

## 2021-08-29 NOTE — P.PN
Subjective


Progress Note Date: 08/29/21


Principal diagnosis: 


Left ankle pain, cellulitis





Patient was seen at bedside this morning has been transferred from ICU to the 

cardiac floor.  Patient seems somewhat more responsive this morning compared to 

yesterday.  Patient is lying semirecumbent bed.  While ankle is examined patient

does respond to pain.  When ankles is moved patient does moan in pain.  Patient 

denies chest pain, fever, shortness breath, nausea, vomiting, change in vision, 

loss of bowel/bladder control.








Objective





- Vital Signs


Vital signs: 


                                   Vital Signs











Temp  98.1 F   08/29/21 08:38


 


Pulse  111 H  08/29/21 07:47


 


Resp  28 H  08/29/21 07:45


 


BP  133/85   08/29/21 07:45


 


Pulse Ox  92 L  08/29/21 07:45








                                 Intake & Output











 08/28/21 08/29/21 08/29/21





 18:59 06:59 18:59


 


Intake Total 1600 300 


 


Output Total 845 890 


 


Balance 755 -590 


 


Weight  65.5 kg 


 


Intake:   


 


  IV 1600 300 


 


    ACETAMINOPHEN IV (For   





    ) 1,000 mg In Empty Bag 1   





    bag @ 400 mls/hr IVPB   





    ONCE ONE Rx#:[id number]   


 


    Dextrose 5%-0.45% NaCl 1, 1200 300 





    000 ml @ 100 mls/hr IV .   





    Q10H [facility] Rx#:[id number]   


 


    Vancomycin 1,500 mg In 250  





    Sodium Chloride 0.9% 250   





    ml @ 125 mls/hr IVPB Q8H   





    [facility] Rx#:[id number]   


 


    cefTRIAXone 2 gm In 50  





    Sodium Chloride 0.9% 50   





    ml @ 100 mls/hr IVPB   





    Q12HR [facility] Rx#:[id number]   


 


Output:   


 


  Urine 845 890 


 


Other:   


 


  Voiding Method Indwelling Catheter Indwelling Catheter Indwelling Catheter


 


  # Bowel Movements 1  














- Exam


left ankle:





Left ankle examined at bedside this morning.  When left ankle is dorsiflexed 

patient moans in pain.  The area of erythema seems to be less compared to 

yesterday around the ankle.  There is still some swelling around the foot and 

ankle.  DP pulses intact.  Cap refill under 3 seconds.








- Labs


CBC & Chem 7: 


                                 08/28/21 06:46





                                 08/29/21 07:34


Labs: 


                  Abnormal Lab Results - Last 24 Hours (Table)











  08/26/21 08/28/21 08/28/21 Range/Units





  07:00 04:38 06:48 


 


ESR    64 H  (0-15)  mm/hr


 


Creatinine     (0.66-1.25)  mg/dL


 


POC Glucose (mg/dL)     (75-99)  mg/dL


 


C-Reactive Protein   8.1 H   (<1.0)  mg/dL


 


Methylmalonic Acid  0.46 H    (<0.40)  umol/L














  08/28/21 08/29/21 08/29/21 Range/Units





  10:51 00:06 07:34 


 


ESR     (0-15)  mm/hr


 


Creatinine    0.37 L  (0.66-1.25)  mg/dL


 


POC Glucose (mg/dL)  114 H  124 H   (75-99)  mg/dL


 


C-Reactive Protein     (<1.0)  mg/dL


 


Methylmalonic Acid     (<0.40)  umol/L








                      Microbiology - Last 24 Hours (Table)











 08/28/21 11:55 Gram Stain - Preliminary





 Ankle - Left Wound Culture - Preliminary


 


 08/27/21 19:00 Gram Stain - Preliminary





 Knee - Right Wound Culture - Preliminary


 


 08/28/21 11:55 Anaerobic Culture - Preliminary





 Ankle - Left 


 


 08/27/21 11:35 CSF Gram Stain - Preliminary





 Cerebral Spinal Fluid CSF Culture - Preliminary


 


 08/26/21 09:05 Blood Culture - Preliminary





 Blood    No Growth after 48 hours


 


 08/27/21 19:00 Anaerobic Culture - Preliminary





 Knee - Right 














Assessment and Plan


Assessment: 


Left ankle pain, cellulitis rule out septic arthritis





Plan: 


1. Left ankle pain, cellulitis rule out septic arthritis -aspiration was 

performed yesterday the left ankle where 6 mL of serous joint fluid was aspir

ated and sent to lab for culture, Gram stain, crystals, aerobic and anaerobic 

cultures.  Results pending.  We will continue to follow patient while in 

hospital





2.  Appreciate medical management





3.  Pain management - stable at this time





4.  GI prophylaxis/DVT prophylaxis - Protonix; Mechanical - SCDs





5.  PT/OT - weightbearing as tolerated with assistance





6.  Appreciate consult





Time with Patient: Less than 30

## 2021-08-30 LAB
BASOPHILS # BLD AUTO: 0 K/UL (ref 0–0.2)
BASOPHILS NFR BLD AUTO: 1 %
EOSINOPHIL # BLD AUTO: 0.1 K/UL (ref 0–0.7)
EOSINOPHIL NFR BLD AUTO: 1 %
ERYTHROCYTE [DISTWIDTH] IN BLOOD BY AUTOMATED COUNT: 3.45 M/UL (ref 4.3–5.9)
ERYTHROCYTE [DISTWIDTH] IN BLOOD: 15 % (ref 11.5–15.5)
GLUCOSE BLD-MCNC: 100 MG/DL (ref 75–99)
GLUCOSE BLD-MCNC: 115 MG/DL (ref 75–99)
GLUCOSE BLD-MCNC: 85 MG/DL (ref 75–99)
GLUCOSE BLD-MCNC: 89 MG/DL (ref 75–99)
HCT VFR BLD AUTO: 34.4 % (ref 39–53)
HGB BLD-MCNC: 11.5 GM/DL (ref 13–17.5)
LYMPHOCYTES # SPEC AUTO: 0.9 K/UL (ref 1–4.8)
LYMPHOCYTES NFR SPEC AUTO: 11 %
MCH RBC QN AUTO: 33.4 PG (ref 25–35)
MCHC RBC AUTO-ENTMCNC: 33.4 G/DL (ref 31–37)
MCV RBC AUTO: 100 FL (ref 80–100)
MONOCYTES # BLD AUTO: 0.6 K/UL (ref 0–1)
MONOCYTES NFR BLD AUTO: 7 %
NEUTROPHILS # BLD AUTO: 6.7 K/UL (ref 1.3–7.7)
NEUTROPHILS NFR BLD AUTO: 78 %
PLATELET # BLD AUTO: 223 K/UL (ref 150–450)
WBC # BLD AUTO: 8.6 K/UL (ref 3.8–10.6)

## 2021-08-30 RX ADMIN — ACETAMINOPHEN PRN MG: 325 TABLET, FILM COATED ORAL at 23:49

## 2021-08-30 RX ADMIN — SODIUM CHLORIDE SCH MLS/HR: 9 INJECTION, SOLUTION INTRAVENOUS at 16:21

## 2021-08-30 RX ADMIN — PANTOPRAZOLE SODIUM SCH MG: 40 TABLET, DELAYED RELEASE ORAL at 10:41

## 2021-08-30 RX ADMIN — CEFEPIME HYDROCHLORIDE SCH MLS/HR: 2 INJECTION, POWDER, FOR SOLUTION INTRAVENOUS at 23:49

## 2021-08-30 RX ADMIN — CEFEPIME HYDROCHLORIDE SCH MLS/HR: 2 INJECTION, POWDER, FOR SOLUTION INTRAVENOUS at 10:39

## 2021-08-30 RX ADMIN — CYANOCOBALAMIN SCH MCG: 1000 INJECTION, SOLUTION INTRAMUSCULAR; SUBCUTANEOUS at 10:41

## 2021-08-30 RX ADMIN — SODIUM CHLORIDE SCH MLS/HR: 9 INJECTION, SOLUTION INTRAVENOUS at 23:49

## 2021-08-30 RX ADMIN — ACETAMINOPHEN PRN MG: 650 SUPPOSITORY RECTAL at 02:50

## 2021-08-30 RX ADMIN — ACETAMINOPHEN PRN MG: 325 TABLET, FILM COATED ORAL at 10:39

## 2021-08-30 RX ADMIN — CEFEPIME HYDROCHLORIDE SCH MLS/HR: 2 INJECTION, POWDER, FOR SOLUTION INTRAVENOUS at 16:18

## 2021-08-30 RX ADMIN — Medication SCH MG: at 16:18

## 2021-08-30 RX ADMIN — PYRIDOXINE HCL TAB 50 MG SCH MG: 50 TAB at 21:36

## 2021-08-30 RX ADMIN — SODIUM CHLORIDE SCH MLS/HR: 9 INJECTION, SOLUTION INTRAVENOUS at 10:38

## 2021-08-30 RX ADMIN — ACETAMINOPHEN PRN MG: 325 TABLET, FILM COATED ORAL at 16:16

## 2021-08-30 RX ADMIN — Medication SCH MG: at 10:41

## 2021-08-30 RX ADMIN — FOLIC ACID SCH MG: 1 TABLET ORAL at 10:40

## 2021-08-30 NOTE — P.PN
Subjective


Progress Note Date: 08/30/21


Principal diagnosis: 


 Altered mental status, aspiration pneumonia, acute EtOH withdrawal





This a 55-year-old white male patient who was admitted to the hospital on August 23, 2021 when he was found delirious and confused by his neighbor and his home. 

Patient was found laying on the floor, in his own feces, he had a bunch alcohol 

bottles lying around him.  he had multiple bruises and abrasions in different 

stages of healing.  He has a hematoma to his left eye , and abrasions on his 

knees and lower extremities.  Tendon has a history of cullen abuse, eats marijuana

edibles.  Drug screen was positive for marijuana, benzodiazepines.  His alcohol 

level was less than 10.  Brain CT showed no acute intracranial abnormality, no 

fracture, cervical spine, additional spondylotic changes in the cervical spine. 

CT of the facial bones showed evidence of old fracture of the floor of the left 

bony orbit, no acute fractures.  Initial chest x-ray showed minimal subsegmental

atelectasis in the right lung base without change.  EKG showed sinus rhythm.  

Initial blood work showed a white count of 7.8, hemoglobin 12.3, INR of 1.2, 

sodium was 153, potassium 3.4, chloride is 115, CO2 is 24, B1 and creatinine 

0.74, troponin was 0.016.  Patient was started on benzodiazepines in the form of

Ativan per CIWA protocol.  Neurology consultation was obtained.  Psychiatric 

services are following.  Patient is on thiamine and folate per CIWA protocol.  

ABG showed moderate to severe degree of slowing, consistent with toxic metabolic

encephalopathy, no epileptiform discharges were seen.  This morning patient 

started spiking fevers with a temp of 101.4F, he is also requiring supplemental

oxygen currently at 2 L with a pulse ox of 93-97%, he is receiving Valium and 

when necessary Ativan.  Quite lethargic, confused, he opens eyes to voice, 

however he is not able to provide any meaningful verbal responses.  Today's 

chest x-ray has been reviewed showing right basilar patchy infiltrate and/or 

atelectasis.  Patient was placed on empiric antibiotics in the form of Rocephin 

and vancomycin.  In view of altered mentation and fever lumbar puncture was 

requested however anesthesia was unable to perform it because Lovenox was given 

this morning.  We were consulted in view of depressed level of consciousness 

related to benzodiazepines and possibility of needing to intubate the patient 

placement on mechanical ventilator.  Blood gas has been obtained showing pO2 of 

85, pCO2 of 33, pH of 7.51 this was done on FiO2 of 28%, patient is breathing 

fairly comfortably, does not appear to be in any acute distress, head of the bed

is up 35, he is currently on 2 L of oxygen, his pulse ox is 97%.  No coughing 

or wheezing.  He continues on empiric antibiotics. 





On today's evaluation of a 8/27/ 2021, the patient is essentially the same.  No 

major improvement in his underlying mental status.  The patient remains 

encephalopathic.  He is unable to say.  This is 50 gases some few words such as 

yes or no.  Unable to follow any commands.  His been having also episodes of 

fever.  Based on all this, the patient underwent a lumbar puncture suspecting 

encephalitis.  No neck stiffness.  The lumbar puncture was completed and the 

patient was found to have elevated CSF protein.  The total white cell count was 

18.  The patient had 85% polys tear cells and 50% mononuclear cells.  The CSF 

protein was 226.  Glucose is a 69.  Patient was also covered with broad-spectrum

antibiotics including a combination of Rocephin and vancomycin.  The patient is 

also on acyclovir IV 1 g 3 times a day.  The white cell count is 11.5 with 

hemoglobin of 12.1.  INR is at 1.4.  BUN is at 4 with a creatinine of 0.5.  Rest

of the electrodes are within normal limits.  No seizure activity has been noted.

 EEG was done and was quite abnormal due to background slowing and moderate to 

severe slowing of the EEG activity suggestive of generalized cerebral 

dysfunction.  Neurology is on the case for now.  In terms of alcohol withdrawal,

delirium tremors, the patient is currently on Ativan based on the CIWA protocol.

 The patient is also on D5 half-normal saline today to 100 mL an hour.  The 

patient will be transferred to the intensive care unit for further monitoring.








8/28/2021, the patient is still encephalopathic.  Slightly more rested and more 

awake compared to yesterday.  Nevertheless he remains encephalopathic.  No neck 

stiffness.  No headaches.  MRI of the brain was done and showed no acute 

abnormalities.  As stated earlier, his CSF evaluation was not consistent with 

bacterial meningitis.  HSV by PCR still pending.  He remains on vancomycin.  He 

remains on Rocephin.  He remains on acyclovir.  He is receiving Ativan per CIWA 

protocol.  He is on D5 half-normal saline at the rate of 100 mL an hour.  Hemo

dynamically stable.  Pulse ox is 98% on 2 L of oxygen by nasal cannula.  Chest 

x-ray shows no evidence of any pneumonia.  Note that his urine drug screen was 

positive for benzodiazepines and marijuana.  His blood work from today shows a 

WD second of 11.8 with hemoglobin of 12.3.  Platelets is at 193.  Electrolytes 

are normal.  Renal functions are normal.  Hepatic function is normal.  Albumin 

is at 2.1 with a protein of 4.8.





On today's evaluation of a 08/29/2021, the patient is breathing comfortably.  

The patient got transferred out of the intensive care unit.  Currently is on 

room air oxygen.  He remains somewhat encephalopathic.  No agitation.  No other 

significant respiratory distress.  No significant tachycardia.  Lumbar puncture 

came back negative for any bacterial growth.  Lumbar puncture was also negative 

for HSV by PCR.  Acyclovir was discontinued.  There was a concern that the p

atient may have a septic joint.  He was seen by orthopedic surgery.  He is left 

ankle was drained.  A total of 60 mL of fluid was removed from the ankle and was

sent to lab.  Patient was kept on antibiotics and patient is currently on a 

combination of cefepime and vancomycin.  Cultures are all negative the white 

cell count.  The white cell count is at 11.8.  INR is at 1.4 with a PT of 13.8. 

His creatinine today is 0.7.  MRI of the lumbar spine was done and showed no 

evidence of any discitis.  No significant disc space narrowing.  There is 

evidence of no fracture.  There is evidence of multiple.  Mild joint facet 

arthropathy.





On 08/30/2021 patient seen in follow-up on selective care unit, he is much more 

awake today, is responding verbally, he knows he is in the hospital, he is 

oriented to person, denies any acute distress.  He denies heavy alcohol intake. 

He states he fell at home, and he was stone sober at the time.  Currently he is 

on room air, with pulse ox of 97%, vital signs have been stable, his been 

afebrile.  He remains on cefepime and vancomycin.  His chest x-ray on 08/20/2021

showed a subsegmental basilar atelectatic changes.  His CSF cultures have shown 

no growth after 3 days, blood culture from 08/20/2021 showed Staphylococcus 

epidermidis likely related to skin contamination, left ankle wound culture has 

shown no growth.  His vital signs have been stable, he denies any difficulty 

breathing, no cough, no phlegm production, no chest discomfort, today's labs 

show a white blood cell count of 8.6, hemoglobin of 11.5.  MRI of the spine 

showed no evidence of discitis, no disc space narrowing, no fracture.  There was

a multilevel mild facet arthropathy and mild lateral recess stenosis, those 

multilevel mild posterior disc bulging without spinal stenosis. 





Objective





- Vital Signs


Vital signs: 


                                   Vital Signs











Temp  97.9 F   08/30/21 12:05


 


Pulse  106 H  08/30/21 12:05


 


Resp  18   08/30/21 12:05


 


BP  117/80   08/30/21 12:05


 


Pulse Ox  97   08/30/21 12:05








                                 Intake & Output











 08/29/21 08/30/21 08/30/21





 18:59 06:59 18:59


 


Intake Total 0  120


 


Output Total 825 800 500


 


Balance -825 -800 -380


 


Weight  89 kg 89 kg


 


Intake:   


 


  Oral 0  120


 


Output:   


 


  Urine 825 800 500


 


    Uretheral (Puente) 175  


 


Other:   


 


  Voiding Method Indwelling Catheter Indwelling Catheter Indwelling Catheter


 


  # Bowel Movements 2  














- Exam


 GENERAL EXAM: Alert, much more responsive on today's exam, 55-year-old male, on

room air with a pulse ox of 97%, with bruising on his face, arms and legs and 

over left eye, oriented to person and place comfortable in no apparent distress.


HEAD: Normocephalic/atraumatic.


EYES: Normal reaction of pupils, equal size.  Conjunctiva pink, sclera white.


NOSE: Clear with pink turbinates.


THROAT: No erythema or exudates.


NECK: No masses, no JVD, no thyroid enlargement, no adenopathy.


CHEST: No chest wall deformity.  Symmetrical expansion. 


LUNGS: Equal air entry with no crackles, wheeze, rhonchi or dullness.


CVS: Regular rate and rhythm, normal S1 and S2, no gallops, no murmurs, no rubs


ABDOMEN: Soft, nontender.  No hepatosplenomegaly, normal bowel sounds, no 

guarding or rigidity.


EXTREMITIES: No clubbing, no edema, no cyanosis, 2+ pulses and upper and lower 

extremities.


MUSCULOSKELETAL: Muscle strength and tone normal.


SPINE: No scoliosis or deformity


SKIN: No rashes, abrasions and bruising on bilateral lower extremities, arms, 

and over the left eye


CENTRAL NERVOUS SYSTEM: Alert and oriented -2.  No focal deficits, tone is 

normal in all 4 extremities.


PSYCHIATRIC: Alert and oriented -2.  Appropriate affect.  Intact judgment and 

insight.











- Labs


CBC & Chem 7: 


                                 08/30/21 06:17





                                 08/29/21 07:34


Labs: 


                  Abnormal Lab Results - Last 24 Hours (Table)











  08/26/21 08/30/21 08/30/21 Range/Units





  07:00 06:17 06:17 


 


RBC   3.45 L   (4.30-5.90)  m/uL


 


Hgb   11.5 L   (13.0-17.5)  gm/dL


 


Hct   34.4 L   (39.0-53.0)  %


 


Lymphocytes #   0.9 L   (1.0-4.8)  k/uL


 


ESR   77 H   (0-15)  mm/hr


 


POC Glucose (mg/dL)     (75-99)  mg/dL


 


C-Reactive Protein    8.1 H  (<1.0)  mg/dL


 


Vitamin B6  3 L    (5-50)  ug/L














  08/30/21 Range/Units





  12:05 


 


RBC   (4.30-5.90)  m/uL


 


Hgb   (13.0-17.5)  gm/dL


 


Hct   (39.0-53.0)  %


 


Lymphocytes #   (1.0-4.8)  k/uL


 


ESR   (0-15)  mm/hr


 


POC Glucose (mg/dL)  115 H  (75-99)  mg/dL


 


C-Reactive Protein   (<1.0)  mg/dL


 


Vitamin B6   (5-50)  ug/L








                      Microbiology - Last 24 Hours (Table)











 08/28/21 11:55 Gram Stain - Final





 Ankle - Left Wound Culture - Final


 


 08/26/21 09:05 Blood Culture - Preliminary





 Blood    No Growth after 96 hours


 


 08/27/21 11:35 CSF Gram Stain - Preliminary





 Cerebral Spinal Fluid CSF Culture - Preliminary


 


 08/28/21 14:59 Blood Culture Gram Stain - Preliminary





 Blood Blood Culture - Preliminary





    Staphylococcus epidermidis


 


 08/28/21 14:59 Blood Culture - Final





 Blood 














Assessment and Plan


Plan: 


 Assessment:





#1.  Acute hypoxic respiratory failure related to atelectasis, and possibility 

of right basilar pneumonia is not completely excluded, chest x-ray showing right

basilar patchy infiltrate.  Currently patient is on room air, follow-up chest x-

ray showed subsegmental basilar atelectatic changes.  No clear evidence of 

pneumonia.  No pulmonary symptoms





#2.  Altered mental status, related to acute delirium tremens.  CT of the head 

and cervical spine showing no acute pathology, CSF cultures remain negative.  

HSV by PCR was negative.  Cultures remain negative, MRI of the brain was n

egative, no seizure activity.  Her mentation is much improved, and patient is 

oriented to person and place





#3.  History of prescription drug abuse, alcohol abuse





#4.  Falls at home, related to altered mental status





#5.  Multiple skin abrasions, including left eye bruising, with a CT of the face

showing no acute fractures





#6.  Hypernatremia related to dehydration free water deficit, improved with IV 

fluids





#7.  Hypertension





#8.  GERD/reflux





#9.  Anxiety





Plan:





Clinically patient has improved, hypoxia has resolved


Mentation is significantly improved, he is much more awake and alert, and 

responding appropriately


Chest x-ray has been reviewed, no clear evidence of pneumonia


Continues on antibiotics


All cultures remain negative thus far, with the exception of blood culture with 

staph epidermidis likely related to contamination


Maintain safety precautions


We'll continue to closely follow





I performed a history & physical examination of the patient and discussed their 

management with my nurse practitioner, Blanche Dobbins.  I reviewed the nurse 

practitioner's note and agree with the documented findings and plan of care.  

Lung sounds are positive for diffuse wheezes throughout the lung fields.  The 

findings and the impression was discussed with the patient.  I attest to the 

documentation by the nurse practitioner. 





Time with Patient: Less than 30

## 2021-08-30 NOTE — P.PN
Subjective


Progress Note Date: 08/30/21


I am seeing the patient for the first time.  Please refer Dr. Perez's note for 

further details.


Per the patient's nurse, he is doing much better today compared the last two 

days and is more awake and responsive.


He denies of headache currently, nausea or vomiting.  He denies of any diplopia.











Objective





- Vital Signs


Vital signs: 


                                   Vital Signs











Temp  99.0 F   08/30/21 16:05


 


Pulse  112 H  08/30/21 16:05


 


Resp  18   08/30/21 16:05


 


BP  119/77   08/30/21 16:05


 


Pulse Ox  97   08/30/21 16:05








                                 Intake & Output











 08/29/21 08/30/21 08/30/21





 18:59 06:59 18:59


 


Intake Total 0  920


 


Output Total 825 800 500


 


Balance -825 -800 420


 


Weight  89 kg 89 kg


 


Intake:   


 


  Intake, IV Titration   350





  Amount   


 


    Cefepime 2 gm In Sodium   100





    Chloride 0.9% 100 ml @ 25   





    mls/hr IVPB Q8HR [facility] Rx#   





    :[id number]   


 


    Vancomycin 1,500 mg In   250





    Sodium Chloride 0.9% 250   





    ml @ 125 mls/hr IVPB Q8H   





    SHELBY Rx#:[id number]   


 


  Oral 0  570


 


Output:   


 


  Urine 825 800 500


 


    Uretheral ([place]) 175  


 


Other:   


 


  Voiding Method Indwelling Catheter Indwelling Catheter Indwelling Catheter


 


  # Bowel Movements 2  














- Exam





GENERAL: The patient is lying in bed and is not in acute distress.





NEUROLOGICAL:


Higher mental function: The patient is awake, alert, oriented to self and stated

he was in the hospital.  He stated the year is 1927.  Patient is able to name 

objects correctly (pen, watch).  He seems somewhat slow in responding.  Patient 

is following commands.   No aphasia  from limited language.  No neglect.


Cranial nerves: The pupils are round, equal and reactive to light.  Visual 

fields are full to confrontation throughout.  Extraocular movement is hard to 

assess because of his cooperation but was looking to right and left.  Has 

echymosis around the eye (mostly upper). Facial sensation is normal to touch 

throughout.  The facial strength is normal throughout.  No dysarthria is noted. 


Motor: The strength is moving upper extremities above gravity without focality. 

While lower he was moving the left lower above gravity and did not move the 

right lower extremity.  Had antigravity of bilateral ankles.    Normal tone and 

bulk in upper and hard to assess tone in lower because of his pain..  


Cerebellum: Normal finger to nose.


Sensation: Sensation is normal to touch throughout.


Reflexes (right/left): 2+ uppers but did not allow me to test lowers because of 

his pain.  


Plantars are mute bilaterally. 





WORK-UP:


* MRI of the brain revealed mild atrophy.  Mild subependymal white matter 

  increased signal around the lateral ventricles measuring up to 5 mm in 

  thickness.  No evidence of cortical infarct.


* CerebroSpinal fluid examination revealed glucose 64 (40-70), proteins 226 (12-

  60), total WBC count in CSF 18, out of its 15% monocytes and 85% 

  polynuclear's.  CSF RBC 5175, due to traumatic tap.  


* Patient's spinal fluid was traumatic because patient was not cooperating.  CSF

  showsCSF shows very mild leukocytosis ( about 2-3 cells above normal after 

  correction for traumatic tap), and very elevated proteins.   Cultures so far 

  negative.  HSV-1 and 2 PCR in the CSF are negative.  Viral test are negative. 

  Infectious disease following.  Acyclovir has been discontinued.  Antibiotics 

  have been tapered to control cryptogenic infection.  Currently on cefepime 2gm

  q 8hr.  Patient had undergone aspiration of right knee and left ankle, both of

  which have been negative for any growth.  No crystals seen in the sinonasal 

  fluid.


* EEG was abnormal due to background slowing of moderate to severe degree.  This

  is suggestive of generalized cerebral dysfunction, as can be seen with toxic 

  metabolic encephalopathy or due to diffuse structural brain abnormality.  No 

  epileptiform activity was seen.  


* Carotid Doppler was technically difficult due to patient's inability to hold 

  still.  No hemodynamically significant ICA stenosis identified on either side.

   Antegrade flow in both vertebral arteries.


* CPK is 999 on 08/23 but 217 on 08/25--improved.


* B12 582 normal, however at MMA is elevated 0.46 (normal <0.40).  This may 

  indicate intracellular deficiency of B12.  Patient's folate 5.7, TSH 1.77, RPR

  nonreactive.  


* Ammonia level 12 (normal)


* MRI of the lumbar spine with and without contrast revealed no evidence of 

  discitis.  No significant disc space narrowing.  No fracture.  Multilevel mild

  facet arthropathy and mild lateral recess stenosis.  Multilevel mild posterior

  disc bulging without spinal stenosis.  








- Labs


CBC & Chem 7: 


                                 08/30/21 06:17





                                 08/29/21 07:34


Labs: 


                  Abnormal Lab Results - Last 24 Hours (Table)











  08/26/21 08/30/21 08/30/21 Range/Units





  07:00 06:17 06:17 


 


RBC    3.45 L  (4.30-5.90)  m/uL


 


Hgb    11.5 L  (13.0-17.5)  gm/dL


 


Hct    34.4 L  (39.0-53.0)  %


 


Lymphocytes #    0.9 L  (1.0-4.8)  k/uL


 


ESR    77 H  (0-15)  mm/hr


 


POC Glucose (mg/dL)     (75-99)  mg/dL


 


C-Reactive Protein     (<1.0)  mg/dL


 


Vitamin B6  3 L    (5-50)  ug/L


 


Procalcitonin   50.22 H   (0.02-0.09)  ng/mL














  08/30/21 08/30/21 Range/Units





  06:17 12:05 


 


RBC    (4.30-5.90)  m/uL


 


Hgb    (13.0-17.5)  gm/dL


 


Hct    (39.0-53.0)  %


 


Lymphocytes #    (1.0-4.8)  k/uL


 


ESR    (0-15)  mm/hr


 


POC Glucose (mg/dL)   115 H  (75-99)  mg/dL


 


C-Reactive Protein  8.1 H   (<1.0)  mg/dL


 


Vitamin B6    (5-50)  ug/L


 


Procalcitonin    (0.02-0.09)  ng/mL








                      Microbiology - Last 24 Hours (Table)











 08/28/21 14:59 Blood Culture Gram Stain - Preliminary





 Blood Blood Culture - Preliminary





    Staphylococcus epidermidis


 


 08/28/21 11:55 Anaerobic Culture - Preliminary





 Ankle - Left 


 


 08/27/21 19:00 Anaerobic Culture - Preliminary





 Knee - Right 


 


 08/28/21 11:55 Gram Stain - Final





 Ankle - Left Wound Culture - Final


 


 08/26/21 09:05 Blood Culture - Preliminary





 Blood    No Growth after 96 hours


 


 08/27/21 11:35 CSF Gram Stain - Preliminary





 Cerebral Spinal Fluid CSF Culture - Preliminary


 


 08/28/21 14:59 Blood Culture - Final





 Blood 














Assessment and Plan


Assessment: 





* 55-year-old male with history of alcoholism, was found at laying with fecal 

  matter with multiple bruises, scratches and pressure sores over dependent 

  areas, indicating patient has been on the floor for fairly long period of 

  time.  Last period of contact with his friends was on Friday, 3 days prior to 

  arrival.   Unsure exact etiology.  It was suspected Probable alcohol 

  withdrawal/DTs/alcoholic hallucinosis, rule out infectious process.  During 

  the hospital stay he developed fevers.  No definitive evidence of 

  meningitis/encephalitis based upon CSF results. ----Patient condition is 

  improving. 


* Low Vitamin B6 3 (normal is 5-50).


* Hypertension


* History of alcoholism


* History of marijuana use.





Plan: 





Acyclovir has been discontinued.  Antibiotics have been tapered to control 

cryptogenic infection.  Currently on cefepime 2gm q 8hr.  Patient had undergone 

aspiration of right knee and left ankle, both of which have been negative for 

any growth.  


Continue folic acid 1mg daily and Vitamin B12 1000mcg daily.


Continue Thiamine daily.


Started the patient on Vitamin B6 50mg bid and within 2-3month recommend, 

recommend getting levels again and modify medication appropriately.





The plan is discussed with the primary team and the patient's nurse.


Will follow-up with patient sporadically.





Tong Ocampo MD


Neuro-Hospitalist





Time with Patient: Less than 30

## 2021-08-30 NOTE — PN
PROGRESS NOTE



DATE OF SERVICE:

08/30/2021



REASON FOR FOLLOWUP:

Fever and bacteremia.



INTERVAL HISTORY:

The patient is afebrile.  The patient is more awake and alert today.  He is feeling

better, breathing comfortably, currently on room air.  Denies having any chest pain or

shortness of breath or cough. No abdominal pain.  Pain to the left ankle is currently

controlled or better.



PHYSICAL EXAMINATION:

Blood pressure 117/80 with a pulse of 106, temperature 97.9. He is 97% on room air.

GENERAL DESCRIPTION: General description is a middle-aged male lying in bed in no

distress.

RESPIRATORY SYSTEM: Unlabored breathing. Clear to auscultation anteriorly.

HEART: S1, S2. Regular rate and rhythm.

ABDOMEN: Soft. No tenderness.

Left ankle did have swelling.  No redness or drainage.



LABS:

Hemoglobin is 11.5, white count 8.6. Sed rate is _____. Blood culture with Staph epi.

Left ankle culture so far negative.



DIAGNOSTIC IMPRESSION AND PLAN:

Patient with a fever with concern for possibly related to his _____ versus left ankle

cellulitis. The patient did have positive blood culture with Staph epi; could be a

contaminant.  Blood culture has been repeated to document clearance of bacteremia.

Continue the vancomycin and cefepime and monitor his clinical course closely.  Continue

supportive care.





MMODL / IJN: [id number] / Job#: [id number]

## 2021-08-30 NOTE — P.PN
Subjective


Progress Note Date: 08/30/21





Patient mentation improved significantly today.  He is more alert.  He is 

oriented to himself and to the place.  He still having low-grade fever.





Objective





- Vital Signs


Vital signs: 


                                   Vital Signs











Temp  97.5 F L  08/30/21 06:43


 


Pulse  86   08/30/21 04:00


 


Resp  20   08/30/21 04:00


 


BP  110/68   08/30/21 04:00


 


Pulse Ox  95   08/30/21 07:45








                                 Intake & Output











 08/29/21 08/30/21 08/30/21





 18:59 06:59 18:59


 


Intake Total 0  120


 


Output Total 825 800 500


 


Balance -825 -800 -380


 


Weight  89 kg 


 


Intake:   


 


  Oral 0  120


 


Output:   


 


  Urine 825 800 500


 


    Uretheral ([place]) 175  


 


Other:   


 


  Voiding Method Indwelling Catheter Indwelling Catheter Indwelling Catheter


 


  # Bowel Movements 2  














- Exam





General:  The patient is is more alert today


Eye: there is normal conjunctiva bilaterally.  


Neck:  The neck is supple, there is no  JVD.  


Cardiovascular:  Normal  S1-S2, no S3-S4, no murmurs.


Respiratory: Lungs clear to auscultation bilaterally


Gastrointestinal: Abdomen is soft, nontender


Musculoskeletal:  There is +1 pedal edema.  


Skin:  Skin is warm and dry 





- Labs


CBC & Chem 7: 


                                 08/30/21 06:17





                                 08/29/21 07:34


Labs: 


                  Abnormal Lab Results - Last 24 Hours (Table)











  08/26/21 08/30/21 08/30/21 Range/Units





  07:00 06:17 06:17 


 


RBC   3.45 L   (4.30-5.90)  m/uL


 


Hgb   11.5 L   (13.0-17.5)  gm/dL


 


Hct   34.4 L   (39.0-53.0)  %


 


Lymphocytes #   0.9 L   (1.0-4.8)  k/uL


 


ESR   77 H   (0-15)  mm/hr


 


C-Reactive Protein    8.1 H  (<1.0)  mg/dL


 


Vitamin B6  3 L    (5-50)  ug/L








                      Microbiology - Last 24 Hours (Table)











 08/27/21 11:35 CSF Gram Stain - Preliminary





 Cerebral Spinal Fluid CSF Culture - Preliminary


 


 08/28/21 14:59 Blood Culture Gram Stain - Preliminary





 Blood Blood Culture - Preliminary





    Staphylococcus epidermidis


 


 08/28/21 11:55 Gram Stain - Preliminary





 Ankle - Left Wound Culture - Preliminary


 


 08/28/21 14:59 Blood Culture - Final





 Blood 


 


 08/26/21 09:05 Blood Culture - Preliminary





 Blood    No Growth after 72 hours














Assessment and Plan


Assessment: 





This is a 55-year-old male was brought into the emergency room after he was 

found by one of the neighbors covered in feces but responsive with empty bottles

of alcohol around him be transferred to the hospital upon arrival he was cleaned

up and was found to be hallucinating suggestive of being in DTs for which she 

was started on benzodiazepines per CIWA scale.  Since admission, patient 

remained significantly encephalopathic with not much progress in his mentation. 

On 8/30, patient appeared more alert and he is now oriented to himself and to 

the place.  He is able to drink ensure protein shake.





CT of the head no acute pathology


CT of the face showed old left orbital fracture inferiorly


X-ray of the right elbow showed olecranon process spur with some soft tissue 

swelling


EKG showed sinus rhythm


Alcohol level was <10


Patient was transferred to the ICU for close monitoring on 8/27





Assessment: 





Alcohol abuse with alcohol withdrawal syndrome


Delirium tremens


Rhabdomyolysis


Encephalopathy with hallucinations





Plan


Benzodiazepines per CIWA scale


IV fluid hydration, Thiamine


Seizure precautions, Fall precautions


Computed tomography scan of the head showed no acute findings.


MRI of the brain was no acute finding


Consulted neurology and psychiatry for further evaluation


EEG was abnormal with generalized cerebral dysfunction and evidence of toxic 

metabolic encephalopathy





Acute toxo metabolic encephalopathy


Sepsis without septic shock


Concerns about meningitis, ruled out


Bacteremia, contamination with staph epidermidis


Started on broad spectrum antibiotic with vancomycin, ceftriaxone, and acyclovir

for suspected meningitis on presentation.  Antibiotic is been adjusted by 

infectious disease currently on IV cefepime and vancomycin.


CSF fluid not consistent with bacterial meningitis.  


HSV PCR negative


MRI of the lumbar spine with no evidence of discitis or prevertebral abscess


Blood gas showed no evidence of acute CO2 narcosis


Chest x-ray with questionable pneumonia





Poor living condition


Recurrent episodes of hospital admission for alcohol abuse


Jake. life stressor with possible major depression





Plan


We will reconsult psychiatry when mentation improves


 consultation





Macrocytic anemia mild


Most likely secondary to alcohol abuse


No reported GI bleeding





Multiple skin abrasions


Left eye bruising


No acute fractures identified





Hypernatremia 


Improved with IV fluid





 GI prophylaxis PPI





CODE STATUS: Full code


DVT prophylaxis: Mechanical


Discussed with: Patient, ER, RN


Anticipated length of stay  more than 2 midnights


Anticipated discharge place: Pending clinical course


A total of 65 minutes was spent on the care of this complex patient more than 

50% of the time was spent in counseling and care coordination.

## 2021-08-30 NOTE — P.PN
Subjective


Progress Note Date: 08/30/21


Principal diagnosis: 


Left ankle pain, cellulitis





Patient was seen at bedside this morning on cardiac floor.  Patient is able to 

respond to verbal questions today.  Patient is lying semirecumbent in bed.  When

patient was asked to bend left ankle he moans in pain. Patient denies chest 

pain, fever, shortness breath, nausea, vomiting, change in vision, loss of 

bowel/bladder control.








Objective





- Vital Signs


Vital signs: 


                                   Vital Signs











Temp  97.9 F   08/30/21 10:38


 


Pulse  101 H  08/30/21 10:38


 


Resp  20   08/30/21 10:38


 


BP  114/80   08/30/21 10:38


 


Pulse Ox  97   08/30/21 10:38








                                 Intake & Output











 08/29/21 08/30/21 08/30/21





 18:59 06:59 18:59


 


Intake Total 0  120


 


Output Total 825 800 500


 


Balance -825 -800 -380


 


Weight  89 kg 89 kg


 


Intake:   


 


  Oral 0  120


 


Output:   


 


  Urine 825 800 500


 


    Uretheral ([place]) 175  


 


Other:   


 


  Voiding Method Indwelling Catheter Indwelling Catheter Indwelling Catheter


 


  # Bowel Movements 2  














- Exam


left ankle:





Left ankle examined at bedside this morning.  When left ankle is dorsiflexed 

patient moans in pain.  The area of erythema seems to be less compared to 

yesterday around the ankle.  There is still some swelling around the foot and 

ankle.  DP pulses intact.  Cap refill under 3 seconds.








- Labs


CBC & Chem 7: 


                                 08/30/21 06:17





                                 08/29/21 07:34


Labs: 


                  Abnormal Lab Results - Last 24 Hours (Table)











  08/26/21 08/30/21 08/30/21 Range/Units





  07:00 06:17 06:17 


 


RBC   3.45 L   (4.30-5.90)  m/uL


 


Hgb   11.5 L   (13.0-17.5)  gm/dL


 


Hct   34.4 L   (39.0-53.0)  %


 


Lymphocytes #   0.9 L   (1.0-4.8)  k/uL


 


ESR   77 H   (0-15)  mm/hr


 


C-Reactive Protein    8.1 H  (<1.0)  mg/dL


 


Vitamin B6  3 L    (5-50)  ug/L








                      Microbiology - Last 24 Hours (Table)











 08/28/21 11:55 Gram Stain - Final





 Ankle - Left Wound Culture - Final


 


 08/26/21 09:05 Blood Culture - Preliminary





 Blood    No Growth after 96 hours


 


 08/27/21 11:35 CSF Gram Stain - Preliminary





 Cerebral Spinal Fluid CSF Culture - Preliminary


 


 08/28/21 14:59 Blood Culture Gram Stain - Preliminary





 Blood Blood Culture - Preliminary





    Staphylococcus epidermidis


 


 08/28/21 14:59 Blood Culture - Final





 Blood 














Assessment and Plan


Assessment: 


Left ankle pain, cellulitis rule out septic arthritis





Plan: 


1. Left ankle pain, cellulitis rule out septic arthritis -aspiration was 

performed Saturday 06/28/2021 the left ankle where 6 mL of serous joint fluid 

was aspirated and sent to lab for culture, Gram stain, crystals, aerobic and 

anaerobic cultures.  Final results for Gram Stain and aerobic cultures of left 

ankle negative. Awaiting anaerobic culture final results. No urgent surgical 

intervention planned at this time from orthopedic standpoint.  Patient stable 

from an orthopedic standpoint. 





2.  Appreciate medical management





3.  Pain management - stable at this time





4.  GI prophylaxis/DVT prophylaxis - Protonix; Mechanical - SCDs





5.  PT/OT - weightbearing as tolerated with assistance





6.  Appreciate consult





Time with Patient: Less than 30

## 2021-08-31 LAB
ANION GAP SERPL CALC-SCNC: 9 MMOL/L
BASOPHILS # BLD AUTO: 0.1 K/UL (ref 0–0.2)
BASOPHILS NFR BLD AUTO: 1 %
BUN SERPL-SCNC: 4 MG/DL (ref 9–20)
CALCIUM SPEC-MCNC: 8.3 MG/DL (ref 8.4–10.2)
CHLORIDE SERPL-SCNC: 109 MMOL/L (ref 98–107)
CO2 SERPL-SCNC: 22 MMOL/L (ref 22–30)
EOSINOPHIL # BLD AUTO: 0.1 K/UL (ref 0–0.7)
EOSINOPHIL NFR BLD AUTO: 1 %
ERYTHROCYTE [DISTWIDTH] IN BLOOD BY AUTOMATED COUNT: 3.56 M/UL (ref 4.3–5.9)
ERYTHROCYTE [DISTWIDTH] IN BLOOD: 15.1 % (ref 11.5–15.5)
GLUCOSE BLD-MCNC: 106 MG/DL (ref 75–99)
GLUCOSE BLD-MCNC: 85 MG/DL (ref 75–99)
GLUCOSE BLD-MCNC: 95 MG/DL (ref 75–99)
GLUCOSE BLD-MCNC: 96 MG/DL (ref 75–99)
GLUCOSE SERPL-MCNC: 90 MG/DL (ref 74–99)
HCT VFR BLD AUTO: 35.4 % (ref 39–53)
HGB BLD-MCNC: 12.1 GM/DL (ref 13–17.5)
LYMPHOCYTES # SPEC AUTO: 1.1 K/UL (ref 1–4.8)
LYMPHOCYTES NFR SPEC AUTO: 9 %
MCH RBC QN AUTO: 33.9 PG (ref 25–35)
MCHC RBC AUTO-ENTMCNC: 34.1 G/DL (ref 31–37)
MCV RBC AUTO: 99.3 FL (ref 80–100)
MONOCYTES # BLD AUTO: 0.9 K/UL (ref 0–1)
MONOCYTES NFR BLD AUTO: 8 %
NEUTROPHILS # BLD AUTO: 9.2 K/UL (ref 1.3–7.7)
NEUTROPHILS NFR BLD AUTO: 80 %
PLATELET # BLD AUTO: 267 K/UL (ref 150–450)
POTASSIUM SERPL-SCNC: 3.1 MMOL/L (ref 3.5–5.1)
SODIUM SERPL-SCNC: 140 MMOL/L (ref 137–145)
WBC # BLD AUTO: 11.5 K/UL (ref 3.8–10.6)

## 2021-08-31 RX ADMIN — SODIUM CHLORIDE SCH MLS/HR: 900 INJECTION, SOLUTION INTRAVENOUS at 19:54

## 2021-08-31 RX ADMIN — POTASSIUM CHLORIDE SCH MLS/HR: 7.46 INJECTION, SOLUTION INTRAVENOUS at 15:17

## 2021-08-31 RX ADMIN — POTASSIUM CHLORIDE SCH MLS/HR: 7.46 INJECTION, SOLUTION INTRAVENOUS at 11:38

## 2021-08-31 RX ADMIN — IOPAMIDOL PRN ML: 612 INJECTION, SOLUTION INTRAVENOUS at 15:24

## 2021-08-31 RX ADMIN — POTASSIUM CHLORIDE SCH MLS/HR: 7.46 INJECTION, SOLUTION INTRAVENOUS at 14:11

## 2021-08-31 RX ADMIN — FOLIC ACID SCH MG: 1 TABLET ORAL at 08:17

## 2021-08-31 RX ADMIN — SODIUM CHLORIDE SCH MLS/HR: 9 INJECTION, SOLUTION INTRAVENOUS at 09:26

## 2021-08-31 RX ADMIN — CEFEPIME HYDROCHLORIDE SCH MLS/HR: 2 INJECTION, POWDER, FOR SOLUTION INTRAVENOUS at 17:02

## 2021-08-31 RX ADMIN — Medication SCH MG: at 06:48

## 2021-08-31 RX ADMIN — CYANOCOBALAMIN SCH MCG: 1000 INJECTION, SOLUTION INTRAMUSCULAR; SUBCUTANEOUS at 08:17

## 2021-08-31 RX ADMIN — CEFEPIME HYDROCHLORIDE SCH MLS/HR: 2 INJECTION, POWDER, FOR SOLUTION INTRAVENOUS at 08:18

## 2021-08-31 RX ADMIN — PYRIDOXINE HCL TAB 50 MG SCH MG: 50 TAB at 08:17

## 2021-08-31 RX ADMIN — PYRIDOXINE HCL TAB 50 MG SCH MG: 50 TAB at 19:54

## 2021-08-31 RX ADMIN — IOPAMIDOL PRN ML: 612 INJECTION, SOLUTION INTRAVENOUS at 14:27

## 2021-08-31 RX ADMIN — SODIUM CHLORIDE SCH MLS/HR: 9 INJECTION, SOLUTION INTRAVENOUS at 19:54

## 2021-08-31 RX ADMIN — SODIUM CHLORIDE SCH MLS/HR: 9 INJECTION, SOLUTION INTRAVENOUS at 12:42

## 2021-08-31 RX ADMIN — POTASSIUM CHLORIDE SCH MLS/HR: 7.46 INJECTION, SOLUTION INTRAVENOUS at 12:42

## 2021-08-31 RX ADMIN — PANTOPRAZOLE SODIUM SCH MG: 40 TABLET, DELAYED RELEASE ORAL at 06:48

## 2021-08-31 RX ADMIN — CEFEPIME HYDROCHLORIDE SCH MLS/HR: 2 INJECTION, POWDER, FOR SOLUTION INTRAVENOUS at 23:21

## 2021-08-31 NOTE — CT
EXAMINATION TYPE: CT lower extremity LT wo con

 

DATE OF EXAM: 8/31/2021

 

COMPARISON: None

 

HISTORY: Left ankle pain.

 

CT DLP: 446.6 mGycm

Automated exposure control for dose reduction was used. Unenhanced CT of the left ankle and foot was 
performed with bone and soft tissue window settings reviewed. 3-D reconstruction was performed at a [employer] workstation.

 

FINDINGS: 

Moderate plantar and dorsal calcaneal spurs identified.

 

Ankle mortise is intact.

 

Well-corticated tiny ossific density adjacent to the medial malleolar tip may reflect nonacute small 
avulsion fracture versus unfused ossicle. Irregularity of the tip of the lateral malleolus is felt to
 reflect healed avulsion fracture. No acute fractures identified about the left ankle.

 

Mild soft tissue swelling noted about the medial and lateral malleoli.

 

Mild degenerative change of the midfoot. No fracture of the midfoot seen. Mild soft tissue swelling a
bout the dorsum of the foot. No fracture of the left foot identified.

 

IMPRESSION: 

1. Chronic changes as noted. Soft tissue swelling. Calcaneal spurring.

## 2021-08-31 NOTE — P.PN
Subjective


Progress Note Date: 08/31/21


Principal diagnosis: 


 Altered mental status, aspiration pneumonia, acute EtOH withdrawal





This a 55-year-old white male patient who was admitted to the hospital on August 23, 2021 when he was found delirious and confused by his neighbor and his home. 

Patient was found laying on the floor, in his own feces, he had a bunch alcohol 

bottles lying around him.  he had multiple bruises and abrasions in different 

stages of healing.  He has a hematoma to his left eye , and abrasions on his 

knees and lower extremities.  Tendon has a history of cullen abuse, eats marijuana

edibles.  Drug screen was positive for marijuana, benzodiazepines.  His alcohol 

level was less than 10.  Brain CT showed no acute intracranial abnormality, no 

fracture, cervical spine, additional spondylotic changes in the cervical spine. 

CT of the facial bones showed evidence of old fracture of the floor of the left 

bony orbit, no acute fractures.  Initial chest x-ray showed minimal subsegmental

atelectasis in the right lung base without change.  EKG showed sinus rhythm.  

Initial blood work showed a white count of 7.8, hemoglobin 12.3, INR of 1.2, 

sodium was 153, potassium 3.4, chloride is 115, CO2 is 24, B1 and creatinine 

0.74, troponin was 0.016.  Patient was started on benzodiazepines in the form of

Ativan per [facility]WA protocol.  Neurology consultation was obtained.  Psychiatric 

services are following.  Patient is on thiamine and folate per [facility]WA protocol.  

ABG showed moderate to severe degree of slowing, consistent with toxic metabolic

encephalopathy, no epileptiform discharges were seen.  This morning patient 

started spiking fevers with a temp of 101.4F, he is also requiring supplemental

oxygen currently at 2 L with a pulse ox of 93-97%, he is receiving Valium and 

when necessary Ativan.  Quite lethargic, confused, he opens eyes to voice, 

however he is not able to provide any meaningful verbal responses.  Today's 

chest x-ray has been reviewed showing right basilar patchy infiltrate and/or 

atelectasis.  Patient was placed on empiric antibiotics in the form of Rocephin 

and vancomycin.  In view of altered mentation and fever lumbar puncture was 

requested however anesthesia was unable to perform it because Lovenox was given 

this morning.  We were consulted in view of depressed level of consciousness 

related to benzodiazepines and possibility of needing to intubate the patient 

placement on mechanical ventilator.  Blood gas has been obtained showing pO2 of 

85, pCO2 of 33, pH of 7.51 this was done on FiO2 of 28%, patient is breathing 

fairly comfortably, does not appear to be in any acute distress, head of the bed

is up 35, he is currently on 2 L of oxygen, his pulse ox is 97%.  No coughing 

or wheezing.  He continues on empiric antibiotics. 





On today's evaluation of a 8/27/ 2021, the patient is essentially the same.  No 

major improvement in his underlying mental status.  The patient remains 

encephalopathic.  He is unable to say.  This is 50 gases some few words such as 

yes or no.  Unable to follow any commands.  His been having also episodes of 

fever.  Based on all this, the patient underwent a lumbar puncture suspecting 

encephalitis.  No neck stiffness.  The lumbar puncture was completed and the 

patient was found to have elevated CSF protein.  The total white cell count was 

18.  The patient had 85% polys tear cells and 50% mononuclear cells.  The CSF 

protein was 226.  Glucose is a 69.  Patient was also covered with broad-spectrum

antibiotics including a combination of Rocephin and vancomycin.  The patient is 

also on acyclovir IV 1 g 3 times a day.  The white cell count is 11.5 with 

hemoglobin of 12.1.  INR is at 1.4.  BUN is at 4 with a creatinine of 0.5.  Rest

of the electrodes are within normal limits.  No seizure activity has been noted.

 EEG was done and was quite abnormal due to background slowing and moderate to 

severe slowing of the EEG activity suggestive of generalized cerebral 

dysfunction.  Neurology is on the case for now.  In terms of alcohol withdrawal,

delirium tremors, the patient is currently on Ativan based on the [facility]WA protocol.

 The patient is also on D5 half-normal saline today to 100 mL an hour.  The 

patient will be transferred to the intensive care unit for further monitoring.








8/28/2021, the patient is still encephalopathic.  Slightly more rested and more 

awake compared to yesterday.  Nevertheless he remains encephalopathic.  No neck 

stiffness.  No headaches.  MRI of the brain was done and showed no acute 

abnormalities.  As stated earlier, his CSF evaluation was not consistent with 

bacterial meningitis.  HSV by PCR still pending.  He remains on vancomycin.  He 

remains on Rocephin.  He remains on acyclovir.  He is receiving Ativan per [facility]WA 

protocol.  He is on D5 half-normal saline at the rate of 100 mL an hour.  Hemo

dynamically stable.  Pulse ox is 98% on 2 L of oxygen by nasal cannula.  Chest 

x-ray shows no evidence of any pneumonia.  Note that his urine drug screen was 

positive for benzodiazepines and marijuana.  His blood work from today shows a 

WD second of 11.8 with hemoglobin of 12.3.  Platelets is at 193.  Electrolytes 

are normal.  Renal functions are normal.  Hepatic function is normal.  Albumin 

is at 2.1 with a protein of 4.8.





On today's evaluation of a 08/29/2021, the patient is breathing comfortably.  

The patient got transferred out of the intensive care unit.  Currently is on 

room air oxygen.  He remains somewhat encephalopathic.  No agitation.  No other 

significant respiratory distress.  No significant tachycardia.  Lumbar puncture 

came back negative for any bacterial growth.  Lumbar puncture was also negative 

for HSV by PCR.  Acyclovir was discontinued.  There was a concern that the p

atient may have a septic joint.  He was seen by orthopedic surgery.  He is left 

ankle was drained.  A total of 60 mL of fluid was removed from the ankle and was

sent to lab.  Patient was kept on antibiotics and patient is currently on a 

combination of cefepime and vancomycin.  Cultures are all negative the white 

cell count.  The white cell count is at 11.8.  INR is at 1.4 with a PT of 13.8. 

His creatinine today is 0.7.  MRI of the lumbar spine was done and showed no 

evidence of any discitis.  No significant disc space narrowing.  There is 

evidence of no fracture.  There is evidence of multiple.  Mild joint facet 

arthropathy.





On 08/30/2021 patient seen in follow-up on selective care unit, he is much more 

awake today, is responding verbally, he knows he is in the hospital, he is 

oriented to person, denies any acute distress.  He denies heavy alcohol intake. 

He states he fell at home, and he was stone sober at the time.  Currently he is 

on room air, with pulse ox of 97%, vital signs have been stable, his been 

afebrile.  He remains on cefepime and vancomycin.  His chest x-ray on 08/20/2021

showed a subsegmental basilar atelectatic changes.  His CSF cultures have shown 

no growth after 3 days, blood culture from 08/20/2021 showed Staphylococcus 

epidermidis likely related to skin contamination, left ankle wound culture has 

shown no growth.  His vital signs have been stable, he denies any difficulty 

breathing, no cough, no phlegm production, no chest discomfort, today's labs 

show a white blood cell count of 8.6, hemoglobin of 11.5.  MRI of the spine 

showed no evidence of discitis, no disc space narrowing, no fracture.  There was

a multilevel mild facet arthropathy and mild lateral recess stenosis, those 

multilevel mild posterior disc bulging without spinal stenosis.


 


on 08/31/2021  patient seen in follow-up on selective care unit, he is awake and

alert, in no acute distress, room air pulse ox is 97%, he is breathing 

comfortably,  no cough, no complaints of chest discomfort, normal production, 

still is having  fevers, T-max in the last 24 hours is 102F.  his follow-up 

blood culture from  08/29/2021  showed no growth,  patient remains on cefepime 

and vancomycin.  today's labs have been reviewed, self liquid silk on is to 

11.5, hemoglobin is 12.1. sodium is 140, potassium 3.1, this was replaced per 

protocol, chloride is 10 9, BUN is 4, creatinine 0.36, pro calcitonin level is 

50.  patient is a combination of cefepime and vancomycin.  orthopedic surgery is

following, patient has left ankle pain , CT of the left lower extremity showed 

soft tissue swelling and calcaneal spurring.  no altered mentation,  patient 

awake and alert and oriented times 3. 





Objective





- Vital Signs


Vital signs: 


                                   Vital Signs











Temp  98.4 F   08/31/21 11:40


 


Pulse  118 H  08/31/21 11:40


 


Resp  18   08/31/21 11:40


 


BP  127/66   08/31/21 11:40


 


Pulse Ox  97   08/31/21 11:40








                                 Intake & Output











 08/30/21 08/31/21 08/31/21





 18:59 06:59 18:59


 


Intake Total 920  


 


Output Total 800 1390 


 


Balance 120 -1390 


 


Weight 89 kg 94 kg 


 


Intake:   


 


  Intake, IV Titration 350  





  Amount   


 


    Cefepime 2 gm In Sodium 100  





    Chloride 0.9% 100 ml @ 25   





    mls/hr IVPB Q8HR [facility] Rx#   





    :[id number]   


 


    Vancomycin 1,500 mg In 250  





    Sodium Chloride 0.9% 250   





    ml @ 125 mls/hr IVPB Q8H   





    [facility] Rx#:[id number]   


 


  Oral 570  


 


Output:   


 


  Urine 800 1390 


 


Other:   


 


  Voiding Method Indwelling Catheter Indwelling Catheter Indwelling Catheter


 


  # Bowel Movements   1














- Exam


 GENERAL EXAM: Alert, much more responsive on today's exam, 55-year-old male, on

room air with a pulse ox of 97%, with bruising on his face, arms and legs and 

over left eye, oriented to person and place comfortable in no apparent distress.


HEAD: Normocephalic/atraumatic.


EYES: Normal reaction of pupils, equal size.  Conjunctiva pink, sclera white.


NOSE: Clear with pink turbinates.


THROAT: No erythema or exudates.


NECK: No masses, no JVD, no thyroid enlargement, no adenopathy.


CHEST: No chest wall deformity.  Symmetrical expansion. 


LUNGS: Equal air entry with no crackles, wheeze, rhonchi or dullness.


CVS: Regular rate and rhythm, normal S1 and S2, no gallops, no murmurs, no rubs


ABDOMEN: Soft, nontender.  No hepatosplenomegaly, normal bowel sounds, no 

guarding or rigidity.


EXTREMITIES: No clubbing, no edema, no cyanosis, 2+ pulses and upper and lower 

extremities.


MUSCULOSKELETAL: Muscle strength and tone normal.


SPINE: No scoliosis or deformity


SKIN: No rashes, abrasions and bruising on bilateral lower extremities, arms, 

and over the left eye


CENTRAL NERVOUS SYSTEM: Alert and oriented -2.  No focal deficits, tone is 

normal in all 4 extremities.


PSYCHIATRIC: Alert and oriented -2.  Appropriate affect.  Intact judgment and 

insight.











- Labs


CBC & Chem 7: 


                                 08/31/21 08:02





                                 08/31/21 08:02


Labs: 


                  Abnormal Lab Results - Last 24 Hours (Table)











  08/30/21 08/30/21 08/30/21 Range/Units





  06:17 12:05 16:38 


 


WBC     (3.8-10.6)  k/uL


 


RBC     (4.30-5.90)  m/uL


 


Hgb     (13.0-17.5)  gm/dL


 


Hct     (39.0-53.0)  %


 


Neutrophils #     (1.3-7.7)  k/uL


 


Potassium     (3.5-5.1)  mmol/L


 


Chloride     ()  mmol/L


 


BUN     (9-20)  mg/dL


 


Creatinine     (0.66-1.25)  mg/dL


 


POC Glucose (mg/dL)   115 H  100 H  (75-99)  mg/dL


 


Calcium     (8.4-10.2)  mg/dL


 


Procalcitonin  50.22 H    (0.02-0.09)  ng/mL














  08/31/21 08/31/21 08/31/21 Range/Units





  00:57 08:02 08:02 


 


WBC   11.5 H   (3.8-10.6)  k/uL


 


RBC   3.56 L   (4.30-5.90)  m/uL


 


Hgb   12.1 L   (13.0-17.5)  gm/dL


 


Hct   35.4 L   (39.0-53.0)  %


 


Neutrophils #   9.2 H   (1.3-7.7)  k/uL


 


Potassium    3.1 L  (3.5-5.1)  mmol/L


 


Chloride    109 H  ()  mmol/L


 


BUN    4 L  (9-20)  mg/dL


 


Creatinine    0.34 L  (0.66-1.25)  mg/dL


 


POC Glucose (mg/dL)  106 H    (75-99)  mg/dL


 


Calcium    8.3 L  (8.4-10.2)  mg/dL


 


Procalcitonin     (0.02-0.09)  ng/mL














  08/31/21 Range/Units





  08:02 


 


WBC   (3.8-10.6)  k/uL


 


RBC   (4.30-5.90)  m/uL


 


Hgb   (13.0-17.5)  gm/dL


 


Hct   (39.0-53.0)  %


 


Neutrophils #   (1.3-7.7)  k/uL


 


Potassium   (3.5-5.1)  mmol/L


 


Chloride   ()  mmol/L


 


BUN   (9-20)  mg/dL


 


Creatinine  0.36 L  (0.66-1.25)  mg/dL


 


POC Glucose (mg/dL)   (75-99)  mg/dL


 


Calcium   (8.4-10.2)  mg/dL


 


Procalcitonin   (0.02-0.09)  ng/mL








                      Microbiology - Last 24 Hours (Table)











 08/26/21 09:05 Blood Culture - Preliminary





 Blood    No Growth after 120 hours


 


 08/29/21 17:40 Blood Culture - Preliminary





 Blood    No Growth after 24 hours


 


 08/28/21 14:59 Blood Culture Gram Stain - Preliminary





 Blood Blood Culture - Preliminary





    Staphylococcus epidermidis


 


 08/28/21 11:55 Anaerobic Culture - Preliminary





 Ankle - Left 


 


 08/27/21 19:00 Anaerobic Culture - Preliminary





 Knee - Right 


 


 08/28/21 11:55 Gram Stain - Final





 Ankle - Left Wound Culture - Final


 


 08/27/21 11:35 CSF Gram Stain - Preliminary





 Cerebral Spinal Fluid CSF Culture - Preliminary














Assessment and Plan


Plan: 


 Assessment:





#1.  Acute hypoxic respiratory failure related to atelectasis, and possibility 

of right basilar pneumonia is not completely excluded, chest x-ray showing right

basilar patchy infiltrate.  Currently patient is on room air, follow-up chest x-

ray showed subsegmental basilar atelectatic changes.  No clear evidence of 

pneumonia.  No pulmonary symptoms





#2.  Altered mental status, related to acute delirium tremens.  CT of the head 

and cervical spine showing no acute pathology, CSF cultures remain negative.  

HSV by PCR was negative.  Cultures remain negative, MRI of the brain was 

negative, no seizure activity.  Her mentation is much improved, and patient is 

oriented to person and place





#3.  History of prescription drug abuse, alcohol abuse





#4.  Falls at home, related to altered mental status





#5.  Multiple skin abrasions, including left eye bruising, with a CT of the face

showing no acute fractures





#6.  Hypernatremia related to dehydration free water deficit, improved with IV 

fluids





#7.  Hypertension





#8.  GERD/reflux





#9.  Anxiety





Plan:





 continue antibiotics  per ID service recommendations


 no clear evidence of pneumonia


 however procalcitonin level was significantly elevated at 50 with no clear 

source of infection


 blood cultures have been reviewed,  CSF cultures are negative,


 left ankle imaging reviewed, orthopedic surgeries following


 Breathing comfortably, encourage deep breathing and coughing, and incentive 

spirometry use


 we'll continue to follow





I performed a history & physical examination of the patient and discussed their 

management with my nurse practitioner, Blanche Dobbins.  I reviewed the nurse 

practitioner's note and agree with the documented findings and plan of care.  

Lung sounds are positive for diffuse wheezes throughout the lung fields.  The 

findings and the impression was discussed with the patient.  I attest to the 

documentation by the nurse practitioner. 





Time with Patient: Less than 30

## 2021-08-31 NOTE — CT
EXAMINATION TYPE: CT abdomen pelvis w con

 

DATE OF EXAM: 8/31/2021

 

COMPARISON: 7/17/2019

 

HISTORY: Fever.

 

CT DLP: 2417.6 mGycm

Automated exposure control for dose reduction was used.

 

CONTRAST: 

Performed with IV Contrast, patient injected with 100ml mL of Isovue 300.

 

There is bilateral pleural effusions. Heart size is fairly normal. There is no pericardial effusion. 
There is some fatty infiltration of the liver.

 

There is some mild atelectasis at the left and right lung bases.

 

Spleen is intact. There is no pancreatic mass. Gallbladder is somewhat contracted. Stomach is intact.
 The bile ducts are not dilated.

 

There is no adrenal mass. Kidneys have normal size and contour. There is satisfactory contrast opacif
ication of the kidneys. There is no hydronephrosis. Delayed images show normal renal excretion. There
 is no retroperitoneal adenopathy. There is Puente catheter in the urinary bladder. Bladder is empty. 
There is no inguinal hernia. There is no evidence of a pelvic mass. There is oral contrast material d
own to the rectum. There is no evidence of bowel obstruction. There is contrast in the appendix which
 appears normal.

 

There is no mesenteric edema. There is no ascites or free air. There is some subcutaneous edema aroun
d the entire abdomen.

 

The lumbar vertebra have normal alignment. There is no compression fracture. Posterior elements are i
ntact. The bony pelvis is intact. There is some sclerosis in the left femoral head consistent with so
me chronic avascular necrosis.

 

IMPRESSION:

Fatty infiltration of the liver. Pleural effusions and basilar atelectasis. Elevated right diaphragm 
could relate to diaphragm paralysis. Subcutaneous edema around the abdomen. Congestive heart failure 
is possible. These abnormalities appear new compared to old exam.

 

Avascular necrosis left femoral head unchanged. No acute abnormality within the abdomen pelvis.

## 2021-08-31 NOTE — P.PN
<Pedrito Zayas - Last Filed: 08/31/21 12:28>





Subjective


Progress Note Date: 08/31/21





Hospital course:





Patient is a 55-year-old male with a past medical history of EtOH abuse.  He p

resented to the hospital on 8/23/21 after being found delirious and confused by 

his neighbor at home.  Patient was reportedly found laying on the floor covered 

in his own feces with empty alcohol bottles surrounding him.  Patient presented 

to the hospital with multiple bruises and abrasions in different stages of 

healing.  UDS positive for marijuana and benzodiazepines.  EtOH level was less 

than 10.  Patient was initially found to have hypernatremia with sodium of 152, 

hyponatremia with potassium of 3.2, hyperchloremia with chloride of 114 and 

elevated creatinine kinase of 999.  He was admitted for acute metabolic 

encephalopathy with hallucinations and EtOH withdrawal, rhabdomyolysis, and se

psis without sepsis shock.  Patient has underwent multiple labs and imaging see 

below.  Currently admitted under our services with consultation to pulmonology, 

infectious disease, neurology, and orthopedic surgery.








Labs and imaging:





Initial blood culture showing no results after 96 hours, CSF fluid negative 

wound culture right knee negatives wound culture left ankle preliminary results 

negative, 1 out of 2 blood culture results repeated on 8/28/21 positive for 

Staphylococcus epidermidis, possibly contaminated, and repeat blood culture 

drawn on 8/29/21 showing no growth after 24 hours.





TSH 1.770.





Pro-calcitonin 50.22 and CRP of 8.1.





Chest x-ray completed 8/23/21 revealed minimal subsegmental atelectasis at the 

right lung base without change.





CT head and cervical spine completed 8/23/21 negative for acute intercranial 

abnormality with spondylitic changes to cervical spine with no acute fractures 

or abnormalities.





CT facial bones without contrast showing evidence of old blowout fracture on the

floor of the left bony orbit with reported displacement of 4 mm and is negative 

for acute fractures.





X-ray right elbow olecranon process spur formation with posterior soft tissue 

swelling negative for acute fractures.





Bilateral carotid Dopplers negative for hemodynamically significant internal 

carotid artery stenosis





Venous Doppler left lower extremity negative for DVT.





X-ray left ankle negative for acute fracture.





MRI brain with and without contrast showing mild atrophy with mild subependymal 

white matter increased signal around the lateral ventricles measuring up to 5 mm

in thickness no evidence of cortical infarct.





MRI lumbar spine with and without contrast showing no evidence of discitis, no 

significant disc space narrowing or acute fractures.  Multilevel mild facet 

arthroplasty and mild lateral recess stenosis with multilevel mild posterior 

disc bulging without spinal stenosis.





CT left lower extremity showing chronic changes with soft tissue swelling and 

calcaneal spurring, negative for acute fracture.








Physical exam:





8/31/21: Patient seen and fully evaluated at the bedside this morning.  Patient 

alert to self only upon examination, he was confused to time, place, and s

ituation.  Even with direction, patient was unable to state that he was in the 

hospital.  He continues to be febrile with high temperature over the past 24 

hours of 102.0F.  He remains on vancomycin and cefepime. Infectious disease, 

pulmonary, neurology, and orthopedic surgery following.  Pro-calcitonin 50.22 

and CRP of 8.1. 





Vital signs reviewed and stable. 


General: Nontoxic, no distress and appears stated age.  


Derm: Skin warm and dry, normal coloration for ethnicity.  Multiple bruises and 

abrasions hovering upper and lower extremities in different stages of healing.


Head: Atraumatic, normocephalic and symmetric.  


Eyes: No lid lag, and anicteric sclera.  Slight swelling and bruising 

surrounding left orbital region.


Mouth: No lip lesions, mucus membranes moist


Cardiovascular: Regular rhythm and tachycardic rate, no murmur, positive 

posterior tibial pulses bilaterally, and cap refill < 2 seconds.  


Lungs: Respirations even, regular, and unlabored on room air. Lungs CTA 

bilaterally, no rhonchi, no rales, no wheezing, and no accessory muscle usage. 


GI/: soft, nontender to palpation, no guarding, no appreciable organomegaly.  

Puente catheter in place.


Ext: ROM intact. No gross muscle atrophy, no edema, no contractures


Neuro/Psych: Speech clear, face symmetrical and CN II-XII grossly intact.  

Patient able to follow limited commands, but remains alert to self only.  Pupils

equal and round. 








Assessment and Plan of Care:





Alcohol abuse with alcohol withdrawal syndrome


Delirium tremens


Rhabdomyolysis


Acute Metabolic Encephalopathy with hallucinations





-Benzodiazepines per CIWA scale


-IV fluid hydration, Thiamine 100 mg twice daily


-Seizure precautions, Fall precautions


-Computed tomography scan of the head showed no acute findings.


-MRI of the brain was no acute finding


-Consulted neurology and psychiatry for further evaluation


-EEG was abnormal with generalized cerebral dysfunction and evidence of toxic 

metabolic encephalopathy


-Treatment of underlying infectious process, continue IV antibiotics cefepime 

and vancomycin pending further recommendations by infectious disease





Acute metabolic encephalopathy


Sepsis without septic shock


Fever, unclear etiology


Concerns about meningitis, ruled out


Bacteremia, contamination with staph epidermidis


Concerns of RLL Aspiration Pneumonia, questionable





-Initial blood culture showing no results after 96 hours, CSF fluid negative 

wound culture right knee negatives wound culture left ankle preliminary results 

negative, 1 out of 2 blood culture results repeated on 8/28/21 positive for 

Staphylococcus epidermidis, possibly contaminated, and repeat blood culture 

drawn on 8/29/21 showing no growth after 24 hours.


-TSH 1.770.


-Pro-calcitonin 50.22 and CRP of 8.1.


-Started on broad spectrum antibiotic with vancomycin, ceftriaxone, and 

acyclovir for suspected meningitis on presentation.  Antibiotic is been adjusted

by infectious disease currently on IV cefepime and vancomycin.


-CSF fluid not consistent with bacterial meningitis.  


-HSV PCR negative


-MRI of the lumbar spine with no evidence of discitis or prevertebral abscess


-Blood gas showed no evidence of acute CO2 narcosis


-Chest x-ray with questionable pneumonia


-Change oral thiamine to IV thiamine 100 mg twice daily.


-Treatment of underlying infectious process, continue IV antibiotics cefepime 

and vancomycin pending further recommendations by infectious disease





Poor living condition


Recurrent episodes of hospital admission for alcohol abuse


Major life stressors with possible major depression





-We will reconsult psychiatry when mentation improves


- consultation





Macrocytic anemia mild, Most likely secondary to alcohol abuse


No reported GI bleeding





-Multiple skin abrasions and bruising in multiple stages of healing


-Left eye bruising


-No acute fractures identified





Hypernatremia, resolved status post administration of IV fluids. 








CODE STATUS: Full code


DVT prophylaxis: SCDs


Discussed with: Patient and RN


Anticipated discharge date: Clinical course to determine


Anticipated discharge place: Home


A total of 45 minutes was spent on the care of this complex patient more than 

50% of the time was spent in counseling and care coordination.





Objective





- Vital Signs


Vital signs: 


                                   Vital Signs











Temp  98.8 F   08/31/21 08:14


 


Pulse  109 H  08/31/21 08:14


 


Resp  18   08/31/21 08:14


 


BP  132/79   08/31/21 08:14


 


Pulse Ox  97   08/31/21 08:14








                                 Intake & Output











 08/30/21 08/31/21 08/31/21





 18:59 06:59 18:59


 


Intake Total 920  


 


Output Total 800 1390 


 


Balance 120 -1390 


 


Weight 89 kg 94 kg 


 


Intake:   


 


  Intake, IV Titration 350  





  Amount   


 


    Cefepime 2 gm In Sodium 100  





    Chloride 0.9% 100 ml @ 25   





    mls/hr IVPB Q8HR [facility] Rx#   





    :[id number]   


 


    Vancomycin 1,500 mg In 250  





    Sodium Chloride 0.9% 250   





    ml @ 125 mls/hr IVPB Q8H   





    [facility] Rx#:[id number]   


 


  Oral 570  


 


Output:   


 


  Urine 800 1390 


 


Other:   


 


  Voiding Method Indwelling Catheter Indwelling Catheter Indwelling Catheter


 


  # Bowel Movements   1














- Labs


CBC & Chem 7: 


                                 08/31/21 08:02





                                 08/31/21 08:02


Labs: 


                  Abnormal Lab Results - Last 24 Hours (Table)











  08/30/21 08/30/21 08/30/21 Range/Units





  06:17 12:05 16:38 


 


WBC     (3.8-10.6)  k/uL


 


RBC     (4.30-5.90)  m/uL


 


Hgb     (13.0-17.5)  gm/dL


 


Hct     (39.0-53.0)  %


 


Neutrophils #     (1.3-7.7)  k/uL


 


Potassium     (3.5-5.1)  mmol/L


 


Chloride     ()  mmol/L


 


BUN     (9-20)  mg/dL


 


Creatinine     (0.66-1.25)  mg/dL


 


POC Glucose (mg/dL)   115 H  100 H  (75-99)  mg/dL


 


Calcium     (8.4-10.2)  mg/dL


 


Procalcitonin  50.22 H    (0.02-0.09)  ng/mL














  08/31/21 08/31/21 08/31/21 Range/Units





  00:57 08:02 08:02 


 


WBC   11.5 H   (3.8-10.6)  k/uL


 


RBC   3.56 L   (4.30-5.90)  m/uL


 


Hgb   12.1 L   (13.0-17.5)  gm/dL


 


Hct   35.4 L   (39.0-53.0)  %


 


Neutrophils #   9.2 H   (1.3-7.7)  k/uL


 


Potassium    3.1 L  (3.5-5.1)  mmol/L


 


Chloride    109 H  ()  mmol/L


 


BUN    4 L  (9-20)  mg/dL


 


Creatinine    0.34 L  (0.66-1.25)  mg/dL


 


POC Glucose (mg/dL)  106 H    (75-99)  mg/dL


 


Calcium    8.3 L  (8.4-10.2)  mg/dL


 


Procalcitonin     (0.02-0.09)  ng/mL














  08/31/21 Range/Units





  08:02 


 


WBC   (3.8-10.6)  k/uL


 


RBC   (4.30-5.90)  m/uL


 


Hgb   (13.0-17.5)  gm/dL


 


Hct   (39.0-53.0)  %


 


Neutrophils #   (1.3-7.7)  k/uL


 


Potassium   (3.5-5.1)  mmol/L


 


Chloride   ()  mmol/L


 


BUN   (9-20)  mg/dL


 


Creatinine  0.36 L  (0.66-1.25)  mg/dL


 


POC Glucose (mg/dL)   (75-99)  mg/dL


 


Calcium   (8.4-10.2)  mg/dL


 


Procalcitonin   (0.02-0.09)  ng/mL








                      Microbiology - Last 24 Hours (Table)











 08/29/21 17:40 Blood Culture - Preliminary





 Blood    No Growth after 24 hours


 


 08/28/21 14:59 Blood Culture Gram Stain - Preliminary





 Blood Blood Culture - Preliminary





    Staphylococcus epidermidis


 


 08/28/21 11:55 Anaerobic Culture - Preliminary





 Ankle - Left 


 


 08/27/21 19:00 Anaerobic Culture - Preliminary





 Knee - Right 


 


 08/28/21 11:55 Gram Stain - Final





 Ankle - Left Wound Culture - Final


 


 08/26/21 09:05 Blood Culture - Preliminary





 Blood    No Growth after 96 hours


 


 08/27/21 11:35 CSF Gram Stain - Preliminary





 Cerebral Spinal Fluid CSF Culture - Preliminary














<Laura Caraballo - Last Filed: 08/31/21 19:48>





Subjective


Patient seen and examined independently.  Patient was also seen by Pedrito Zayas NP and case was discussed.  I am in agreement with subjective, physical exam, 

assessment and plan as written above and amended below.





Excoriation is pleasantly confused, he is picking at his skin and unable to sit 

still.  He is thinking she is in [place].  He denies any pain, nausea, or 

vomiting.





General: non toxic, no distress, appears at stated age


Derm: Multiple excoriations and lacerations bilateral upper and lower 

extremities, multiple bruises in various stages of healing throughout his body 

warm, dry


Head: atraumatic, normocephalic, symmetric


Eyes: EOMI, no lid lag, anicteric sclera


Mouth: no lip lesion, mucus membranes moist


Cardiovascular: S1S2 reg, no murmur, positive posterior tibial pulse bilateral, 


Lungs: CTA bilateral, no rhonchi, no rales , no accessory muscle use


Abdominal: soft,  nontender to palpation, no guarding, no appreciable 

organomegaly


Ext: no gross muscle atrophy, no edema, no contractures


Neuro:  CN II-XI grossly intact, no focal neuro deficits, faint tremor noted 

bilaterally


Psych: Alert and oriented to person








Objective





- Vital Signs


Vital signs: 


                                   Vital Signs











Temp  98.6 F   08/31/21 15:56


 


Pulse  115 H  08/31/21 15:56


 


Resp  18   08/31/21 15:56


 


BP  135/83   08/31/21 15:56


 


Pulse Ox  97   08/31/21 15:56








                                 Intake & Output











 08/31/21 08/31/21 09/01/21





 06:59 18:59 06:59


 


Intake Total  240 


 


Output Total 1390 1300 


 


Balance -1390 -1060 


 


Weight 94 kg  


 


Intake:   


 


  Oral  240 


 


Output:   


 


  Urine 1390 1300 


 


    Uretheral (Puente)  700 


 


Other:   


 


  Voiding Method Indwelling Catheter Indwelling Catheter 


 


  # Bowel Movements  1 














- Labs


CBC & Chem 7: 


                                 08/31/21 08:02





                                 08/31/21 08:02


Labs: 


                  Abnormal Lab Results - Last 24 Hours (Table)











  08/31/21 08/31/21 08/31/21 Range/Units





  00:57 08:02 08:02 


 


WBC   11.5 H   (3.8-10.6)  k/uL


 


RBC   3.56 L   (4.30-5.90)  m/uL


 


Hgb   12.1 L   (13.0-17.5)  gm/dL


 


Hct   35.4 L   (39.0-53.0)  %


 


Neutrophils #   9.2 H   (1.3-7.7)  k/uL


 


Potassium    3.1 L  (3.5-5.1)  mmol/L


 


Chloride    109 H  ()  mmol/L


 


BUN    4 L  (9-20)  mg/dL


 


Creatinine    0.34 L  (0.66-1.25)  mg/dL


 


POC Glucose (mg/dL)  106 H    (75-99)  mg/dL


 


Calcium    8.3 L  (8.4-10.2)  mg/dL














  08/31/21 Range/Units





  08:02 


 


WBC   (3.8-10.6)  k/uL


 


RBC   (4.30-5.90)  m/uL


 


Hgb   (13.0-17.5)  gm/dL


 


Hct   (39.0-53.0)  %


 


Neutrophils #   (1.3-7.7)  k/uL


 


Potassium   (3.5-5.1)  mmol/L


 


Chloride   ()  mmol/L


 


BUN   (9-20)  mg/dL


 


Creatinine  0.36 L  (0.66-1.25)  mg/dL


 


POC Glucose (mg/dL)   (75-99)  mg/dL


 


Calcium   (8.4-10.2)  mg/dL








                      Microbiology - Last 24 Hours (Table)











 08/27/21 19:00 Gram Stain - Final





 Knee - Right Wound Culture - Final


 


 08/27/21 11:35 CSF Gram Stain - Final





 Cerebral Spinal Fluid CSF Culture - Final


 


 08/26/21 09:05 Blood Culture - Preliminary





 Blood    No Growth after 120 hours


 


 08/29/21 17:40 Blood Culture - Preliminary





 Blood    No Growth after 24 hours


 


 08/28/21 14:59 Blood Culture Gram Stain - Preliminary





 Blood Blood Culture - Preliminary





    Staphylococcus epidermidis

## 2021-08-31 NOTE — P.PN
Subjective


Progress Note Date: 08/31/21


Principal diagnosis: 


Left ankle pain, cellulitis





Patient was seen at bedside this morning on cardiac floor.  Patient is able to 

respond to verbal questions today.  Patient is lying semirecumbent in bed.  When

patient was asked to bend left ankle he moans in pain. Patient denies chest 

pain, fever, shortness breath, nausea, vomiting, change in vision, loss of 

bowel/bladder control.








Objective





- Vital Signs


Vital signs: 


                                   Vital Signs











Temp  98.4 F   08/31/21 11:40


 


Pulse  118 H  08/31/21 11:40


 


Resp  18   08/31/21 11:40


 


BP  127/66   08/31/21 11:40


 


Pulse Ox  97   08/31/21 11:40








                                 Intake & Output











 08/30/21 08/31/21 08/31/21





 18:59 06:59 18:59


 


Intake Total 920  


 


Output Total 800 1390 700


 


Balance 120 -1390 -700


 


Weight 89 kg 94 kg 


 


Intake:   


 


  Intake, IV Titration 350  





  Amount   


 


    Cefepime 2 gm In Sodium 100  





    Chloride 0.9% 100 ml @ 25   





    mls/hr IVPB Q8HR [facility] Rx#   





    :[id number]   


 


    Vancomycin 1,500 mg In 250  





    Sodium Chloride 0.9% 250   





    ml @ 125 mls/hr IVPB Q8H   





    [facility] Rx#:[id number]   


 


  Oral 570  


 


Output:   


 


  Urine 800 1390 700


 


    Uretheral (Puente)   700


 


Other:   


 


  Voiding Method Indwelling Catheter Indwelling Catheter Indwelling Catheter


 


  # Bowel Movements   1














- Exam


left ankle:





Left ankle examined at bedside this morning.  When left ankle is dorsiflexed 

patient moans in pain.  The area of erythema seems to be less compared to 

yesterday around the ankle.  There is still some swelling around the foot and 

ankle.  DP pulses intact.  Cap refill under 3 seconds.








- Labs


CBC & Chem 7: 


                                 08/31/21 08:02





                                 08/31/21 08:02


Labs: 


                  Abnormal Lab Results - Last 24 Hours (Table)











  08/30/21 08/30/21 08/31/21 Range/Units





  06:17 16:38 00:57 


 


WBC     (3.8-10.6)  k/uL


 


RBC     (4.30-5.90)  m/uL


 


Hgb     (13.0-17.5)  gm/dL


 


Hct     (39.0-53.0)  %


 


Neutrophils #     (1.3-7.7)  k/uL


 


Potassium     (3.5-5.1)  mmol/L


 


Chloride     ()  mmol/L


 


BUN     (9-20)  mg/dL


 


Creatinine     (0.66-1.25)  mg/dL


 


POC Glucose (mg/dL)   100 H  106 H  (75-99)  mg/dL


 


Calcium     (8.4-10.2)  mg/dL


 


Procalcitonin  50.22 H    (0.02-0.09)  ng/mL














  08/31/21 08/31/21 08/31/21 Range/Units





  08:02 08:02 08:02 


 


WBC  11.5 H    (3.8-10.6)  k/uL


 


RBC  3.56 L    (4.30-5.90)  m/uL


 


Hgb  12.1 L    (13.0-17.5)  gm/dL


 


Hct  35.4 L    (39.0-53.0)  %


 


Neutrophils #  9.2 H    (1.3-7.7)  k/uL


 


Potassium   3.1 L   (3.5-5.1)  mmol/L


 


Chloride   109 H   ()  mmol/L


 


BUN   4 L   (9-20)  mg/dL


 


Creatinine   0.34 L  0.36 L  (0.66-1.25)  mg/dL


 


POC Glucose (mg/dL)     (75-99)  mg/dL


 


Calcium   8.3 L   (8.4-10.2)  mg/dL


 


Procalcitonin     (0.02-0.09)  ng/mL








                      Microbiology - Last 24 Hours (Table)











 08/27/21 19:00 Gram Stain - Final





 Knee - Right Wound Culture - Final


 


 08/27/21 11:35 CSF Gram Stain - Final





 Cerebral Spinal Fluid CSF Culture - Final


 


 08/26/21 09:05 Blood Culture - Preliminary





 Blood    No Growth after 120 hours


 


 08/29/21 17:40 Blood Culture - Preliminary





 Blood    No Growth after 24 hours


 


 08/28/21 14:59 Blood Culture Gram Stain - Preliminary





 Blood Blood Culture - Preliminary





    Staphylococcus epidermidis


 


 08/28/21 11:55 Anaerobic Culture - Preliminary





 Ankle - Left 


 


 08/27/21 19:00 Anaerobic Culture - Preliminary





 Knee - Right 


 


 08/28/21 11:55 Gram Stain - Final





 Ankle - Left Wound Culture - Final














Assessment and Plan


Assessment: 


Left ankle pain, cellulitis





Plan: 


1. Left ankle pain, cellulitis -aspiration was performed Saturday 06/28/2021 the

left ankle where 6 mL of serous joint fluid was aspirated and sent to lab for 

culture, Gram stain, crystals, aerobic and anaerobic cultures.  Final results 

for Gram Stain and aerobic cultures of left ankle negative.anaerobic culture 

negative to this point. CT lower extremity negative and shows some soft tissue 

swelling and calcaneal spurring. No urgent surgical intervention planned at this

time from orthopedic standpoint.  Patient stable from an orthopedic standpoint. 

Orthopedics is signing off at this time.  Please do not hesitate to contact us 

if you have any further questions





2.  Appreciate medical management





3.  Pain management - stable at this time





4.  GI prophylaxis/DVT prophylaxis - Protonix; Mechanical - SCDs





5.  PT/OT - weightbearing as tolerated with assistance





6.  Appreciate consult





Time with Patient: Less than 30

## 2021-08-31 NOTE — P.PN
Subjective


Progress Note Date: 08/31/21





pt s/e this AM.  He is much more alert able to answer questions.  When inquiring

about his ankle, he states he fell 2 weeks ago on this foot as he tripped over 

the sidewalk and fell.  He was able to ambulate on it after, although minimally.

 He has not walked on it recently that he can remember.  He states no loss of 

feeling in it.  States no tingling only pain on the top of the foot with motion.

 Denies any f/c/sob/cp overnight. 





Objective





- Vital Signs


Vital signs: 


                                   Vital Signs











Temp  99.8 F H  08/31/21 04:00


 


Pulse  105 H  08/31/21 04:00


 


Resp  18   08/31/21 04:00


 


BP  126/81   08/31/21 04:00


 


Pulse Ox  96   08/31/21 04:00








                                 Intake & Output











 08/30/21 08/31/21 08/31/21





 18:59 06:59 18:59


 


Intake Total 920  


 


Output Total 800 1390 


 


Balance 120 -1390 


 


Weight 89 kg 94 kg 


 


Intake:   


 


  Intake, IV Titration 350  





  Amount   


 


    Cefepime 2 gm In Sodium 100  





    Chloride 0.9% 100 ml @ 25   





    mls/hr IVPB Q8HR [facility] Rx#   





    :[id number]   


 


    Vancomycin 1,500 mg In 250  





    Sodium Chloride 0.9% 250   





    ml @ 125 mls/hr IVPB Q8H   





    [facility] Rx#:[id number]   


 


  Oral 570  


 


Output:   


 


  Urine 800 1390 


 


Other:   


 


  Voiding Method Indwelling Catheter Indwelling Catheter 














- Exam





On exam, the erythema of the lateral left ankle is much better.   to

touch of the skin lending to possible cellulitis.  He has bruising about the 

foot and dorsal foot.  He has ttp of the dorsal foot over most aspects, not 

entirely localized at this time.  He does have pain with ankle motion passively,

but states it is in the top of the foot.  Actively he is able to fire EHL/FHL 

without any issues.  SILT L2-S1.  2/4 DP/PT pulses.  Compartment soft and 

compressive. 





- Constitutional


General appearance: Present: cooperative, disheveled





- Labs


CBC & Chem 7: 


                                 08/30/21 06:17





                                 08/29/21 07:34


Labs: 


                  Abnormal Lab Results - Last 24 Hours (Table)











  08/26/21 08/30/21 08/30/21 Range/Units





  07:00 06:17 06:17 


 


ESR    77 H  (0-15)  mm/hr


 


POC Glucose (mg/dL)     (75-99)  mg/dL


 


Vitamin B6  3 L    (5-50)  ug/L


 


Procalcitonin   50.22 H   (0.02-0.09)  ng/mL














  08/30/21 08/30/21 08/31/21 Range/Units





  12:05 16:38 00:57 


 


ESR     (0-15)  mm/hr


 


POC Glucose (mg/dL)  115 H  100 H  106 H  (75-99)  mg/dL


 


Vitamin B6     (5-50)  ug/L


 


Procalcitonin     (0.02-0.09)  ng/mL








                      Microbiology - Last 24 Hours (Table)











 08/29/21 17:40 Blood Culture - Preliminary





 Blood    No Growth after 24 hours


 


 08/28/21 14:59 Blood Culture Gram Stain - Preliminary





 Blood Blood Culture - Preliminary





    Staphylococcus epidermidis


 


 08/28/21 11:55 Anaerobic Culture - Preliminary





 Ankle - Left 


 


 08/27/21 19:00 Anaerobic Culture - Preliminary





 Knee - Right 


 


 08/28/21 11:55 Gram Stain - Final





 Ankle - Left Wound Culture - Final


 


 08/26/21 09:05 Blood Culture - Preliminary





 Blood    No Growth after 96 hours


 


 08/27/21 11:35 CSF Gram Stain - Preliminary





 Cerebral Spinal Fluid CSF Culture - Preliminary














Assessment and Plan


Assessment: 





55-year-old male acute metabolic encephalopathy





Left ankle pain, cellulitis rule out septic arthritis





Right ankle pain





Multiple superficial abrasions





Status post fall from standing with likely blunt head trauma





multiple medical comorbidities





EtOH abuse








Plan: 





-Appreciate consultant and team management.  





-Primary medical management





-Plan to aspirate left ankle for synovial cultures as well as fluid analysis to 

rule out septic arthritis.  This appears to be more of a cellulitic reaction in 

the area however with the extremely painful passive range of motion would like 

to rule this out.





-MRI of L spine neg for infective process





-Will order CT of left ankle and foot to r/o fracture. 





-Will get fracture boot for pt to keep in dorsiflexion and to immobilize and 

help with ambulation





-Trend labs





-ID for antibiotic management





-Activity: Weightbearing as tolerated





-Pain control: [Adequate at this time]





-Meds: [reviewed]





-GI ppx: senna, Miralax





-DVT PPX: [OK to restart Heparin tonight]





-Hygiene: Maintaining good hygiene





-Log rolled every 2 hours





-Encourage IS 10x/hr





-Dispo: [Pending]

## 2021-08-31 NOTE — PN
PROGRESS NOTE



DATE OF SERVICE:

08/31/2021



REASON FOR FOLLOWUP:

Fever and bacteremia.



INTERVAL HISTORY:

The patient did spike another fever last night of 102 degrees Fahrenheit.  The patient

is afebrile this morning.  The patient is more awake and alert.  The patient denies

having any chest pain or shortness of breath or cough.  No nausea, no vomiting.  No

abdominal pain.  Some pain to the left ankle area, but he mentioned it is getting

better.



PHYSICAL EXAMINATION:

Blood pressure 127/56, pulse of 118, temperature 98.4. He is 97% on room air.

GENERAL DESCRIPTION: General description is a middle-aged male lying in bed in no

distress.

RESPIRATORY SYSTEM: Unlabored breathing. Clear to auscultation anteriorly.

HEART: S1, S2. Regular rate and rhythm.

ABDOMEN: Soft. No tenderness.

Left ankle did have some swelling. No significant redness or drainage.



LABS:

The patient did have a CT of the left leg; some soft tissue swelling, chronic changes.



DIAGNOSTIC IMPRESSION AND PLAN:

Patient with a persistent fever in this patient who did have extensive workup,

including lumbar spine MRI, brain MRI, lower extremity CT and x-rays which have not

been suggestive of any pneumonia in this patient who does have a history of _____. We

will obtain a CT of abdomen and pelvis with contrast to rule out any intraabdominal

pathology.  Continue with current antibiotics and monitor his clinical course closely.

Continue with supportive care.





SHANNANL / ESTHERN: [id number] / Job#: [id number]

## 2021-08-31 NOTE — P.PN
Subjective


Progress Note Date: 08/31/21


I am seeing the patient at bedside and is accompanied by his friend (Elise).  

Who stated that patient does not have history of IV drug use but does have 

alcohol use.


She seen the patient last 1 month prior to the incident.  Per the nurse he has 

multiple falls.  She feels the patient currently is doing better compared to his

initial presentation.


He denies of headaches, denies any new neck pain or lower back pain.  He was 

inconsistent about having old neck pain or lower back pain but denies any new 

pain.


In the last 24 to 48 hours, patient has been spiking fever with Tmax of 102F on 

8/30/21 at 23:32.


ESR 64 on 0 828 and the repeated ESR 77 on 08/30/2021.








Objective





- Vital Signs


Vital signs: 


                                   Vital Signs











Temp  98.6 F   08/31/21 15:56


 


Pulse  115 H  08/31/21 15:56


 


Resp  18   08/31/21 15:56


 


BP  135/83   08/31/21 15:56


 


Pulse Ox  97   08/31/21 15:56








                                 Intake & Output











 08/30/21 08/31/21 08/31/21





 18:59 06:59 18:59


 


Intake Total 920  


 


Output Total 800 1390 1300


 


Balance 120 -1390 -1300


 


Weight 89 kg 94 kg 


 


Intake:   


 


  Intake, IV Titration 350  





  Amount   


 


    Cefepime 2 gm In Sodium 100  





    Chloride 0.9% 100 ml @ 25   





    mls/hr IVPB Q8HR [facility] Rx#   





    :[id number]   


 


    Vancomycin 1,500 mg In 250  





    Sodium Chloride 0.9% 250   





    ml @ 125 mls/hr IVPB Q8H   





    [facility] Rx#:[id number]   


 


  Oral 570  


 


Output:   


 


  Urine 800 1390 1300


 


    Uretheral ([place])   700


 


Other:   


 


  Voiding Method Indwelling Catheter Indwelling Catheter Indwelling Catheter


 


  # Bowel Movements   1














- Exam





GENERAL: The patient is lying in bed and is moderate acute distress.


INTEGUMENTARY: Multiple skin abrasion.


MUSCULOSKELETAL: Pain with flexion of the right knee.  Has bruises of both knee





NEUROLOGICAL:


Higher mental function: The patient is awake, alert, oriented to self.  He was 

able to state the year correctly and regarding the month he repeated it was 

2021.  He stated he was in [place].  Patient is able to name objects correctly 

(pen, watch).  He seems somewhat slow in responding.  Patient is following 

simple commands.   No aphasia  from limited language.  No neglect.


Cranial nerves: The pupils are round, equal and reactive to light.  Visual 

fields are full to confrontation throughout.  Extraocular movement is tracking 

to the right and left and no appreciable nystagmus.  Has echymosis around the 

eye (mostly upper).   The facial strength is normal throughout.  No dysarthria 

is noted. 


Motor: The strength is moving upper extremities above gravity without focality 

and seems about 5- while the lower are limited because of pain.  The left lower 

extremity he was able to bend the knee while the right he was in severe pain 

attempting to bend the knee. Had antigravity of bilateral ankles.    Normal tone

and bulk in upper and hard to assess tone in lower because of his pain..  


Cerebellum: Normal finger to nose bilaterally


Sensation: Sensation is normal to touch throughout.


Reflexes (right/left): Brachioradialis are 3+, biceps are 2-3+ bilaterally, 

triceps are 2+.  Lowers are to assess because of pain.    


Plantars are mute bilaterally. 





WORK-UP:


* CPK is 999 on 08/23 but 217 on 08/25--improved.


* B12 582 normal, however at MMA is elevated 0.46 (normal <0.40).  This may 

  indicate intracellular deficiency of B12.  Patient's folate 5.7, TSH 1.77, RPR

  nonreactive.  


* Low Vitamin B6 3 (normal is 5-50).


* Ammonia level 12 (normal)


* ESR 64 on 0 828 and the repeated ESR 77 on 08/30/2021.


* MRI of the brain on 08/27/21 revealed mild atrophy.  Mild subependymal white 

  matter increased signal around the lateral ventricles measuring up to 5 mm in 

  thickness.  No evidence of cortical infarct.


* MRI of the lumbar spine with and without contrast 08/28/2021 revealed no 

  evidence of discitis.  No significant disc space narrowing.  No fracture.  

  Multilevel mild facet arthropathy and mild lateral recess stenosis.  

  Multilevel mild posterior disc bulging without spinal stenosis.  


* CerebroSpinal fluid examination revealed glucose 64 (40-70), proteins 226 (12-

  60), total WBC count in CSF 18, out of its 15% monocytes and 85% p

  olynuclear's.  CSF RBC 5175, due to traumatic tap.  


* Patient's spinal fluid was traumatic because patient was not cooperating.  CSF

  showsCSF shows very mild leukocytosis ( about 2-3 cells above normal after 

  correction for traumatic tap), and very elevated proteins.   Cultures  

  negative.  HSV-1 and 2 PCR in the CSF are negative.  Viral test are negative. 

  Infectious disease following.  Acyclovir has been discontinued.  Antibiotics 

  have been tapered to control cryptogenic infection.  Currently on cefepime 2gm

  q 8hr.  Patient had undergone aspiration of right knee and left ankle, both of

  which have been negative for any growth.  No crystals seen in the sinonasal 

  fluid.


* Routine EEG on 08/26/21  was reported as abnormal due to background slowing of

  moderate to severe degree.  This is suggestive of generalized cerebral 

  dysfunction, as can be seen with toxic metabolic encephalopathy or due to 

  diffuse structural brain abnormality.  No epileptiform activity was seen.  


* Carotid Doppler was technically difficult due to patient's inability to hold 

  still.  No hemodynamically significant ICA stenosis identified on either side.

   Antegrade flow in both vertebral arteries.


* CT of facial bone on 08/23/2021 is reported as there is evidence of old bone 

  out fracture of the floor of the left bony orbits.  No acute fracture seen.


* Left Lower extremity CT 08/31/2021: is reported as chronic changes as noted.  

  Soft tissue swelling.  Calcaneal is spurring.








- Labs


CBC & Chem 7: 


                                 08/31/21 08:02





                                 08/31/21 08:02


Labs: 


                  Abnormal Lab Results - Last 24 Hours (Table)











  08/30/21 08/30/21 08/31/21 Range/Units





  06:17 16:38 00:57 


 


WBC     (3.8-10.6)  k/uL


 


RBC     (4.30-5.90)  m/uL


 


Hgb     (13.0-17.5)  gm/dL


 


Hct     (39.0-53.0)  %


 


Neutrophils #     (1.3-7.7)  k/uL


 


Potassium     (3.5-5.1)  mmol/L


 


Chloride     ()  mmol/L


 


BUN     (9-20)  mg/dL


 


Creatinine     (0.66-1.25)  mg/dL


 


POC Glucose (mg/dL)   100 H  106 H  (75-99)  mg/dL


 


Calcium     (8.4-10.2)  mg/dL


 


Procalcitonin  50.22 H    (0.02-0.09)  ng/mL














  08/31/21 08/31/21 08/31/21 Range/Units





  08:02 08:02 08:02 


 


WBC  11.5 H    (3.8-10.6)  k/uL


 


RBC  3.56 L    (4.30-5.90)  m/uL


 


Hgb  12.1 L    (13.0-17.5)  gm/dL


 


Hct  35.4 L    (39.0-53.0)  %


 


Neutrophils #  9.2 H    (1.3-7.7)  k/uL


 


Potassium   3.1 L   (3.5-5.1)  mmol/L


 


Chloride   109 H   ()  mmol/L


 


BUN   4 L   (9-20)  mg/dL


 


Creatinine   0.34 L  0.36 L  (0.66-1.25)  mg/dL


 


POC Glucose (mg/dL)     (75-99)  mg/dL


 


Calcium   8.3 L   (8.4-10.2)  mg/dL


 


Procalcitonin     (0.02-0.09)  ng/mL








                      Microbiology - Last 24 Hours (Table)











 08/27/21 19:00 Gram Stain - Final





 Knee - Right Wound Culture - Final


 


 08/27/21 11:35 CSF Gram Stain - Final





 Cerebral Spinal Fluid CSF Culture - Final


 


 08/26/21 09:05 Blood Culture - Preliminary





 Blood    No Growth after 120 hours


 


 08/29/21 17:40 Blood Culture - Preliminary





 Blood    No Growth after 24 hours


 


 08/28/21 14:59 Blood Culture Gram Stain - Preliminary





 Blood Blood Culture - Preliminary





    Staphylococcus epidermidis


 


 08/28/21 11:55 Anaerobic Culture - Preliminary





 Ankle - Left 


 


 08/27/21 19:00 Anaerobic Culture - Preliminary





 Knee - Right 














Assessment and Plan


Assessment: 





* 55-year-old male with history of alcoholism, was found at laying with fecal 

  matter with multiple bruises, scratches and pressure sores over dependent 

  areas, indicating patient has been on the floor for fairly long period of 

  time.  Last period of contact with his friends was on Friday, 3 days prior to 

  arrival.   


* Fevers of unknown etiology.  MRI Brain is negative.  No definitive evidence of

  meningitis/encephalitis based upon CSF results. CSF culture are negative.  HSV

  PCR negative and viral are negative.


* Altered mental status.  Rule out infectious process (unknown source 

  yet)--mentation is slowy improving. 


* Low Vitamin B6 3 (normal is 5-50).


* Multiple skin abrasions including left eye bruising


* History of multiple falls


* Hypertension


* History of alcoholism


* History of marijuana use.





Plan: 





I will get repeat MRI Brain and will get MRI cervical spine w/ and w/o 

especially since patient since has altered mental status, has spiking of fevers 

and has hyperreflexia of uppers.


Currently on cefepime 2gm q 8hr and will defer antibiotic management to the 

primary team.


Continue folic acid 1mg daily and Vitamin B12 1000mcg daily.


Continue Thiamine daily.


Continue Vitamin B6 50mg bid and within 2-3month recommend getting repeat levels

again and modify medication appropriately if needed.


Infection disease is on board.


Orthopedic team is on board.


Will defer the rest of medical management to the primary team.





The plan is discussed with the patient's nurse.





Tong Ocampo MD


Neuro-Hospitalist





Time with Patient: Greater than 30

## 2021-09-01 LAB
ANION GAP SERPL CALC-SCNC: 11 MMOL/L
BUN SERPL-SCNC: 5 MG/DL (ref 9–20)
CALCIUM SPEC-MCNC: 8 MG/DL (ref 8.4–10.2)
CHLORIDE SERPL-SCNC: 107 MMOL/L (ref 98–107)
CO2 SERPL-SCNC: 20 MMOL/L (ref 22–30)
ERYTHROCYTE [DISTWIDTH] IN BLOOD BY AUTOMATED COUNT: 3.43 M/UL (ref 4.3–5.9)
ERYTHROCYTE [DISTWIDTH] IN BLOOD: 15.3 % (ref 11.5–15.5)
GLUCOSE BLD-MCNC: 101 MG/DL (ref 75–99)
GLUCOSE BLD-MCNC: 102 MG/DL (ref 75–99)
GLUCOSE BLD-MCNC: 109 MG/DL (ref 75–99)
GLUCOSE BLD-MCNC: 89 MG/DL (ref 75–99)
GLUCOSE BLD-MCNC: 91 MG/DL (ref 75–99)
GLUCOSE SERPL-MCNC: 88 MG/DL (ref 74–99)
HCT VFR BLD AUTO: 35 % (ref 39–53)
HGB BLD-MCNC: 11.7 GM/DL (ref 13–17.5)
MAGNESIUM SPEC-SCNC: 1.8 MG/DL (ref 1.6–2.3)
MCH RBC QN AUTO: 34 PG (ref 25–35)
MCHC RBC AUTO-ENTMCNC: 33.3 G/DL (ref 31–37)
MCV RBC AUTO: 102.2 FL (ref 80–100)
PLATELET # BLD AUTO: 332 K/UL (ref 150–450)
POTASSIUM SERPL-SCNC: 3.4 MMOL/L (ref 3.5–5.1)
SODIUM SERPL-SCNC: 138 MMOL/L (ref 137–145)
WBC # BLD AUTO: 15.9 K/UL (ref 3.8–10.6)

## 2021-09-01 RX ADMIN — CEFEPIME HYDROCHLORIDE SCH MLS/HR: 2 INJECTION, POWDER, FOR SOLUTION INTRAVENOUS at 07:55

## 2021-09-01 RX ADMIN — SODIUM CHLORIDE SCH MLS/HR: 900 INJECTION, SOLUTION INTRAVENOUS at 21:05

## 2021-09-01 RX ADMIN — CYANOCOBALAMIN SCH MCG: 1000 INJECTION, SOLUTION INTRAMUSCULAR; SUBCUTANEOUS at 07:55

## 2021-09-01 RX ADMIN — SODIUM CHLORIDE SCH MLS/HR: 9 INJECTION, SOLUTION INTRAVENOUS at 21:05

## 2021-09-01 RX ADMIN — SODIUM CHLORIDE SCH MLS/HR: 9 INJECTION, SOLUTION INTRAVENOUS at 12:03

## 2021-09-01 RX ADMIN — PYRIDOXINE HCL TAB 50 MG SCH MG: 50 TAB at 07:54

## 2021-09-01 RX ADMIN — PYRIDOXINE HCL TAB 50 MG SCH MG: 50 TAB at 21:05

## 2021-09-01 RX ADMIN — PANTOPRAZOLE SODIUM SCH MG: 40 TABLET, DELAYED RELEASE ORAL at 05:59

## 2021-09-01 RX ADMIN — SODIUM CHLORIDE SCH MLS/HR: 9 INJECTION, SOLUTION INTRAVENOUS at 05:59

## 2021-09-01 RX ADMIN — NYSTATIN SCH APPLIC: 100000 OINTMENT TOPICAL at 15:44

## 2021-09-01 RX ADMIN — SODIUM CHLORIDE SCH MLS/HR: 900 INJECTION, SOLUTION INTRAVENOUS at 08:26

## 2021-09-01 RX ADMIN — NYSTATIN SCH APPLIC: 100000 OINTMENT TOPICAL at 21:06

## 2021-09-01 RX ADMIN — FOLIC ACID SCH MG: 1 TABLET ORAL at 07:54

## 2021-09-01 NOTE — P.PN
Subjective


Progress Note Date: 09/01/21


Principal diagnosis: 


 Altered mental status, aspiration pneumonia, acute EtOH withdrawal





This a 55-year-old white male patient who was admitted to the hospital on August 23, 2021 when he was found delirious and confused by his neighbor and his home. 

Patient was found laying on the floor, in his own feces, he had a bunch alcohol 

bottles lying around him.  he had multiple bruises and abrasions in different 

stages of healing.  He has a hematoma to his left eye , and abrasions on his 

knees and lower extremities.  Tendon has a history of cullen abuse, eats marijuana

edibles.  Drug screen was positive for marijuana, benzodiazepines.  His alcohol 

level was less than 10.  Brain CT showed no acute intracranial abnormality, no 

fracture, cervical spine, additional spondylotic changes in the cervical spine. 

CT of the facial bones showed evidence of old fracture of the floor of the left 

bony orbit, no acute fractures.  Initial chest x-ray showed minimal subsegmental

atelectasis in the right lung base without change.  EKG showed sinus rhythm.  

Initial blood work showed a white count of 7.8, hemoglobin 12.3, INR of 1.2, 

sodium was 153, potassium 3.4, chloride is 115, CO2 is 24, B1 and creatinine 

0.74, troponin was 0.016.  Patient was started on benzodiazepines in the form of

Ativan per CIWA protocol.  Neurology consultation was obtained.  Psychiatric 

services are following.  Patient is on thiamine and folate per CIWA protocol.  

ABG showed moderate to severe degree of slowing, consistent with toxic metabolic

encephalopathy, no epileptiform discharges were seen.  This morning patient 

started spiking fevers with a temp of 101.4F, he is also requiring supplemental

oxygen currently at 2 L with a pulse ox of 93-97%, he is receiving Valium and 

when necessary Ativan.  Quite lethargic, confused, he opens eyes to voice, 

however he is not able to provide any meaningful verbal responses.  Today's 

chest x-ray has been reviewed showing right basilar patchy infiltrate and/or 

atelectasis.  Patient was placed on empiric antibiotics in the form of Rocephin 

and vancomycin.  In view of altered mentation and fever lumbar puncture was 

requested however anesthesia was unable to perform it because Lovenox was given 

this morning.  We were consulted in view of depressed level of consciousness 

related to benzodiazepines and possibility of needing to intubate the patient 

placement on mechanical ventilator.  Blood gas has been obtained showing pO2 of 

85, pCO2 of 33, pH of 7.51 this was done on FiO2 of 28%, patient is breathing 

fairly comfortably, does not appear to be in any acute distress, head of the bed

is up 35, he is currently on 2 L of oxygen, his pulse ox is 97%.  No coughing 

or wheezing.  He continues on empiric antibiotics. 





On today's evaluation of a 8/27/ 2021, the patient is essentially the same.  No 

major improvement in his underlying mental status.  The patient remains 

encephalopathic.  He is unable to say.  This is 50 gases some few words such as 

yes or no.  Unable to follow any commands.  His been having also episodes of 

fever.  Based on all this, the patient underwent a lumbar puncture suspecting 

encephalitis.  No neck stiffness.  The lumbar puncture was completed and the 

patient was found to have elevated CSF protein.  The total white cell count was 

18.  The patient had 85% polys tear cells and 50% mononuclear cells.  The CSF 

protein was 226.  Glucose is a 69.  Patient was also covered with broad-spectrum

antibiotics including a combination of Rocephin and vancomycin.  The patient is 

also on acyclovir IV 1 g 3 times a day.  The white cell count is 11.5 with 

hemoglobin of 12.1.  INR is at 1.4.  BUN is at 4 with a creatinine of 0.5.  Rest

of the electrodes are within normal limits.  No seizure activity has been noted.

 EEG was done and was quite abnormal due to background slowing and moderate to 

severe slowing of the EEG activity suggestive of generalized cerebral 

dysfunction.  Neurology is on the case for now.  In terms of alcohol withdrawal,

delirium tremors, the patient is currently on Ativan based on the CIWA protocol.

 The patient is also on D5 half-normal saline today to 100 mL an hour.  The 

patient will be transferred to the intensive care unit for further monitoring.








8/28/2021, the patient is still encephalopathic.  Slightly more rested and more 

awake compared to yesterday.  Nevertheless he remains encephalopathic.  No neck 

stiffness.  No headaches.  MRI of the brain was done and showed no acute 

abnormalities.  As stated earlier, his CSF evaluation was not consistent with 

bacterial meningitis.  HSV by PCR still pending.  He remains on vancomycin.  He 

remains on Rocephin.  He remains on acyclovir.  He is receiving Ativan per CIWA 

protocol.  He is on D5 half-normal saline at the rate of 100 mL an hour.  Hemo

dynamically stable.  Pulse ox is 98% on 2 L of oxygen by nasal cannula.  Chest 

x-ray shows no evidence of any pneumonia.  Note that his urine drug screen was 

positive for benzodiazepines and marijuana.  His blood work from today shows a 

WD second of 11.8 with hemoglobin of 12.3.  Platelets is at 193.  Electrolytes 

are normal.  Renal functions are normal.  Hepatic function is normal.  Albumin 

is at 2.1 with a protein of 4.8.





On today's evaluation of a 08/29/2021, the patient is breathing comfortably.  

The patient got transferred out of the intensive care unit.  Currently is on 

room air oxygen.  He remains somewhat encephalopathic.  No agitation.  No other 

significant respiratory distress.  No significant tachycardia.  Lumbar puncture 

came back negative for any bacterial growth.  Lumbar puncture was also negative 

for HSV by PCR.  Acyclovir was discontinued.  There was a concern that the p

atient may have a septic joint.  He was seen by orthopedic surgery.  He is left 

ankle was drained.  A total of 60 mL of fluid was removed from the ankle and was

sent to lab.  Patient was kept on antibiotics and patient is currently on a 

combination of cefepime and vancomycin.  Cultures are all negative the white 

cell count.  The white cell count is at 11.8.  INR is at 1.4 with a PT of 13.8. 

His creatinine today is 0.7.  MRI of the lumbar spine was done and showed no 

evidence of any discitis.  No significant disc space narrowing.  There is 

evidence of no fracture.  There is evidence of multiple.  Mild joint facet 

arthropathy.





On 08/30/2021 patient seen in follow-up on selective care unit, he is much more 

awake today, is responding verbally, he knows he is in the hospital, he is 

oriented to person, denies any acute distress.  He denies heavy alcohol intake. 

He states he fell at home, and he was stone sober at the time.  Currently he is 

on room air, with pulse ox of 97%, vital signs have been stable, his been 

afebrile.  He remains on cefepime and vancomycin.  His chest x-ray on 08/20/2021

showed a subsegmental basilar atelectatic changes.  His CSF cultures have shown 

no growth after 3 days, blood culture from 08/20/2021 showed Staphylococcus 

epidermidis likely related to skin contamination, left ankle wound culture has 

shown no growth.  His vital signs have been stable, he denies any difficulty 

breathing, no cough, no phlegm production, no chest discomfort, today's labs 

show a white blood cell count of 8.6, hemoglobin of 11.5.  MRI of the spine 

showed no evidence of discitis, no disc space narrowing, no fracture.  There was

a multilevel mild facet arthropathy and mild lateral recess stenosis, those 

multilevel mild posterior disc bulging without spinal stenosis.


 


on 08/31/2021  patient seen in follow-up on selective care unit, he is awake and

alert, in no acute distress, room air pulse ox is 97%, he is breathing 

comfortably,  no cough, no complaints of chest discomfort, normal production, 

still is having  fevers, T-max in the last 24 hours is 102F.  his follow-up 

blood culture from  08/29/2021  showed no growth,  patient remains on cefepime 

and vancomycin.  today's labs have been reviewed, self liquid silk on is to 

11.5, hemoglobin is 12.1. sodium is 140, potassium 3.1, this was replaced per 

protocol, chloride is 10 9, BUN is 4, creatinine 0.36, pro calcitonin level is 

50.  patient is a combination of cefepime and vancomycin.  orthopedic surgery is

following, patient has left ankle pain , CT of the left lower extremity showed 

soft tissue swelling and calcaneal spurring.  no altered mentation,  patient 

awake and alert and oriented times 3.





On 09/01/2021 patient seen in follow-up.  he is resting in bed, in no acute 

distress.  Denies any difficulty breathing, room air pulse ox is 99%, his been 

afebrile, vital signs have been stable.  No fever or chills.  No cough, no chest

discomfort.  Continues on antibiotics, his pro calcitonin level quite elevated 

at 50.22.  No clear evidence of pneumonia.  Blood culture from 08/28/2021 shows 

Staphylococcus epidermidis.  Today's labs have been reviewed, showing white 

blood cell count of 15.9, hemoglobin of 11.7, potassium is 3.4, CO2 is 20, 

tested electrolytes were within normal limits, B1 is 5, creatinine 0.30.  His 

appetite has been poor, and he is been consuming very little of his meals.





Objective





- Vital Signs


Vital signs: 


                                   Vital Signs











Temp  98.8 F   09/01/21 11:47


 


Pulse  110 H  09/01/21 11:47


 


Resp  16   09/01/21 11:47


 


BP  119/86   09/01/21 11:47


 


Pulse Ox  99   09/01/21 11:47








                                 Intake & Output











 08/31/21 09/01/21 09/01/21





 18:59 06:59 18:59


 


Intake Total 240  


 


Output Total 1300 1380 


 


Balance -1060 -1380 


 


Weight  106 kg 106 kg


 


Intake:   


 


  Oral 240  


 


Output:   


 


  Urine 1300 1380 


 


    Uretheral (Puente) 700  


 


Other:   


 


  Voiding Method Indwelling Catheter Indwelling Catheter Indwelling Catheter


 


  # Bowel Movements 1  














- Exam


 GENERAL EXAM: Alert, much more responsive on today's exam, 55-year-old male, on

room air with a pulse ox of 97%, with bruising on his face, arms and legs and 

over left eye, oriented to person and place comfortable in no apparent distress.


HEAD: Normocephalic/atraumatic.


EYES: Normal reaction of pupils, equal size.  Conjunctiva pink, sclera white.


NOSE: Clear with pink turbinates.


THROAT: No erythema or exudates.


NECK: No masses, no JVD, no thyroid enlargement, no adenopathy.


CHEST: No chest wall deformity.  Symmetrical expansion. 


LUNGS: Equal air entry with no crackles, wheeze, rhonchi or dullness.


CVS: Regular rate and rhythm, normal S1 and S2, no gallops, no murmurs, no rubs


ABDOMEN: Soft, nontender.  No hepatosplenomegaly, normal bowel sounds, no 

guarding or rigidity.


EXTREMITIES: No clubbing, no edema, no cyanosis, 2+ pulses and upper and lower 

extremities.


MUSCULOSKELETAL: Muscle strength and tone normal.


SPINE: No scoliosis or deformity


SKIN: No rashes, abrasions and bruising on bilateral lower extremities, arms, 

and over the left eye


CENTRAL NERVOUS SYSTEM: Alert and oriented -2.  No focal deficits, tone is 

normal in all 4 extremities.


PSYCHIATRIC: Alert and oriented -2.  Appropriate affect.  Intact judgment and 

insight.











- Labs


CBC & Chem 7: 


                                 09/01/21 06:41





                                 09/01/21 06:41


Labs: 


                  Abnormal Lab Results - Last 24 Hours (Table)











  09/01/21 09/01/21 09/01/21 Range/Units





  01:13 06:41 06:41 


 


WBC   15.9 H   (3.8-10.6)  k/uL


 


RBC   3.43 L   (4.30-5.90)  m/uL


 


Hgb   11.7 L   (13.0-17.5)  gm/dL


 


Hct   35.0 L   (39.0-53.0)  %


 


MCV   102.2 H   (80.0-100.0)  fL


 


Potassium    3.4 L  (3.5-5.1)  mmol/L


 


Carbon Dioxide    20 L  (22-30)  mmol/L


 


BUN    5 L  (9-20)  mg/dL


 


Creatinine    0.30 L  (0.66-1.25)  mg/dL


 


POC Glucose (mg/dL)  101 H    (75-99)  mg/dL


 


Calcium    8.0 L  (8.4-10.2)  mg/dL














  09/01/21 Range/Units





  11:38 


 


WBC   (3.8-10.6)  k/uL


 


RBC   (4.30-5.90)  m/uL


 


Hgb   (13.0-17.5)  gm/dL


 


Hct   (39.0-53.0)  %


 


MCV   (80.0-100.0)  fL


 


Potassium   (3.5-5.1)  mmol/L


 


Carbon Dioxide   (22-30)  mmol/L


 


BUN   (9-20)  mg/dL


 


Creatinine   (0.66-1.25)  mg/dL


 


POC Glucose (mg/dL)  109 H  (75-99)  mg/dL


 


Calcium   (8.4-10.2)  mg/dL








                      Microbiology - Last 24 Hours (Table)











 08/26/21 09:05 Blood Culture - Final





 Blood    No Growth after 144 hours


 


 08/29/21 17:40 Blood Culture - Preliminary





 Blood    No Growth after 48 hours


 


 08/27/21 19:00 Gram Stain - Final





 Knee - Right Wound Culture - Final


 


 08/27/21 11:35 CSF Gram Stain - Final





 Cerebral Spinal Fluid CSF Culture - Final














Assessment and Plan


Plan: 


 Assessment:





#1.  Acute hypoxic respiratory failure related to atelectasis, and possibility 

of right basilar pneumonia is not completely excluded, chest x-ray showing right

basilar patchy infiltrate.  Currently patient is on room air, follow-up chest x-

ray showed subsegmental basilar atelectatic changes.  No clear evidence of 

pneumonia.  No pulmonary symptoms





#2.  Altered mental status, related to acute delirium tremens.  CT of the head 

and cervical spine showing no acute pathology, CSF cultures remain negative.  

HSV by PCR was negative.  Cultures remain negative, MRI of the brain was 

negative, no seizure activity.  Her mentation is much improved, and patient is 

oriented to person and place





#3.  History of prescription drug abuse, alcohol abuse





#4.  Falls at home, related to altered mental status





#5.  Multiple skin abrasions, including left eye bruising, with a CT of the face

showing no acute fractures





#6.  Hypernatremia related to dehydration free water deficit, improved with IV 

fluids





#7.  Hypertension





#8.  GERD/reflux





#9.  Anxiety





Plan:





Breathing comfortably, encourage deep breathing and coughing, and incentive 

spirometry use


He is on room air, maintaining stable O2 saturations


CT of the abdomen and pelvis reviewed, and lower lung cuts showed pleural 

effusions and basilar atelectasis and possibility of right hemidiaphragm 

paralysis


Vital signs are stable, no fever, continues on antibiotics


Maintain aspiration precautions





I performed a history & physical examination of the patient and discussed their 

management with my nurse practitioner, Blanche Dobbins.  I reviewed the nurse 

practitioner's note and agree with the documented findings and plan of care.  

Lung sounds are positive for diffuse wheezes throughout the lung fields.  The 

findings and the impression was discussed with the patient.  I attest to the 

documentation by the nurse practitioner. 





Time with Patient: Less than 30

## 2021-09-01 NOTE — P.PN
Subjective


Progress Note Date: 09/01/21





Patient seen and examined this morning.  He is slightly confused his he states 

he needs to get out of bed and take his ALLERGY pills.  He states that his ankle

feels much better however on the left side but he is still having pain in it.  

He denies any fevers or chills overnight.





Objective





- Vital Signs


Vital signs: 


                                   Vital Signs











Temp  99.1 F   09/01/21 07:53


 


Pulse  104 H  09/01/21 07:53


 


Resp  16   09/01/21 07:53


 


BP  138/83   09/01/21 07:53


 


Pulse Ox  98   09/01/21 07:53








                                 Intake & Output











 08/31/21 09/01/21 09/01/21





 18:59 06:59 18:59


 


Intake Total 240  


 


Output Total 1300 1380 


 


Balance -1060 -1380 


 


Weight  106 kg 


 


Intake:   


 


  Oral 240  


 


Output:   


 


  Urine 1300 1380 


 


    Uretheral (Puente) 700  


 


Other:   


 


  Voiding Method Indwelling Catheter Indwelling Catheter 


 


  # Bowel Movements 1  














- Exam





On exam, the erythema of the lateral left ankle continues to improve.  Still 

tender to touch of the skin lending to possible cellulitis.  He has bruising 

about the foot and dorsal foot.  He has ttp of the dorsal foot over most 

aspects, not entirely localized at this time.  He does have pain with ankle 

motion passively, but states it is in the top of the foot.  Actively he is able 

to fire EHL/FHL without any issues.  SILT L2-S1.  2/4 DP/PT pulses.  Compartment

soft and compressive. 





- Labs


CBC & Chem 7: 


                                 09/01/21 06:41





                                 09/01/21 06:41


Labs: 


                  Abnormal Lab Results - Last 24 Hours (Table)











  08/31/21 08/31/21 08/31/21 Range/Units





  08:02 08:02 08:02 


 


WBC  11.5 H    (3.8-10.6)  k/uL


 


RBC  3.56 L    (4.30-5.90)  m/uL


 


Hgb  12.1 L    (13.0-17.5)  gm/dL


 


Hct  35.4 L    (39.0-53.0)  %


 


MCV     (80.0-100.0)  fL


 


Neutrophils #  9.2 H    (1.3-7.7)  k/uL


 


Potassium   3.1 L   (3.5-5.1)  mmol/L


 


Chloride   109 H   ()  mmol/L


 


Carbon Dioxide     (22-30)  mmol/L


 


BUN   4 L   (9-20)  mg/dL


 


Creatinine   0.34 L  0.36 L  (0.66-1.25)  mg/dL


 


POC Glucose (mg/dL)     (75-99)  mg/dL


 


Calcium   8.3 L   (8.4-10.2)  mg/dL














  09/01/21 09/01/21 09/01/21 Range/Units





  01:13 06:41 06:41 


 


WBC   15.9 H   (3.8-10.6)  k/uL


 


RBC   3.43 L   (4.30-5.90)  m/uL


 


Hgb   11.7 L   (13.0-17.5)  gm/dL


 


Hct   35.0 L   (39.0-53.0)  %


 


MCV   102.2 H   (80.0-100.0)  fL


 


Neutrophils #     (1.3-7.7)  k/uL


 


Potassium    3.4 L  (3.5-5.1)  mmol/L


 


Chloride     ()  mmol/L


 


Carbon Dioxide    20 L  (22-30)  mmol/L


 


BUN    5 L  (9-20)  mg/dL


 


Creatinine    0.30 L  (0.66-1.25)  mg/dL


 


POC Glucose (mg/dL)  101 H    (75-99)  mg/dL


 


Calcium    8.0 L  (8.4-10.2)  mg/dL








                      Microbiology - Last 24 Hours (Table)











 08/29/21 17:40 Blood Culture - Preliminary





 Blood    No Growth after 48 hours


 


 08/27/21 19:00 Gram Stain - Final





 Knee - Right Wound Culture - Final


 


 08/27/21 11:35 CSF Gram Stain - Final





 Cerebral Spinal Fluid CSF Culture - Final


 


 08/26/21 09:05 Blood Culture - Preliminary





 Blood    No Growth after 120 hours














- Imaging and Cardiology





CT of the ankle and foot are reviewed no acute fracture or dislocation is noted 

soft tissue swelling is noted about the ankle osteoarthritic changes however no 

acute findings.





Assessment and Plan


Assessment: 





55-year-old male acute metabolic encephalopathy





Left ankle pain, cellulitis rule out septic arthritis





Right ankle pain





Multiple superficial abrasions





Status post fall from standing with likely blunt head trauma





multiple medical comorbidities





EtOH abuse








Plan: 





-Appreciate consultant and team management.  





-Primary medical management





-Plan to aspirate left ankle for synovial cultures as well as fluid analysis to 

rule out septic arthritis.  This appears to be more of a cellulitic reaction in 

the area however with the extremely painful passive range of motion would like 

to rule this out.





-MRI of L spine neg for infective process





-CT negative for fracture





-Will get fracture boot for pt to keep in dorsiflexion and to immobilize and 

help with ambulation





-Recommend Reese boots or dorsiflexion boots while in bed to prevent contracture





-Trend labs





-ID for antibiotic management





-Activity: Weightbearing as tolerated in fracture boot





-Pain control: [Adequate at this time]





-Meds: [reviewed]





-GI ppx: senna, Miralax





-DVT PPX: [OK to restart Heparin tonight]





-Hygiene: Maintaining good hygiene





-Log rolled every 2 hours





-Encourage IS 10x/hr





-No urgent or emergent orthopedic surgical intervention at this time





-Dispo: [Pending]

## 2021-09-01 NOTE — MR
EXAMINATION TYPE: MR brain wo con

 

DATE OF EXAM: 9/1/2021

 

COMPARISON: None

 

HISTORY: Altered mental status

 

CONTRAST:  Performed utilizing 0 mL intravenous Gadavist gadolinium contrast.  

 

TECHNIQUE: Multiplanar, multiecho imaging on a 3.0 Padmini magnet is performed through the brain.  Stud
y is performed within 24 hours of arrival to the hospital. Motion artifact degrades the T2 sequence. 
Patient refused contrast to complete the exam.

 

The craniovertebral junction is normal.  The pituitary is normal.  

 

Diffusion-weighted imaging is performed.  No abnormal hyperintensity is present to suggest an acute i
ntracranial infarct or acute ischemic change.

 

Significant signal abnormality within the brain is not identified. May be difficult to exclude some m
ild periventricular white matter ischemic-type change. 

 

Ventricles and sulci are appropriate for the patient age.

 

 

IMPRESSIONS:

1. Limited examination due to patient's pain.

2. No obvious significant intracranial abnormality.

## 2021-09-01 NOTE — CT
EXAMINATION TYPE: CT chest wo con

 

DATE OF EXAM: 9/1/2021

 

COMPARISON: None

 

HISTORY: Acute encephalopathy, acute ETOH withdrawl

 

CT DLP: 511.10 mGycm, Automated exposure control for dose reduction was used.

 

CONTRAST: Performed injected with 0 mL of Isovue 300.

 

TECHNIQUE: Axial images were obtained at 5 mm thick sections.  Reconstructed images are reviewed on [facility] computer in the coronal plane. 

 

FINDINGS: Portion of the thyroid visualized is normal.

 

There is some increased linear density within the lateral left apex may be some atelectasis. Underlyi
ng mass measuring 0.5 x 1.1 cm is not excluded. Series 4 image 14.

 

There is some pleural-based thickening along the posterior-lateral right lung margin measuring 2.1 x 
0.8 cm. Series 4 image 53.

 

Small bilateral pleural effusions are present.

 

No enlarged mediastinal or hilar adenopathy is evident.   The ascending aorta diameter at the level o
f the main pulmonary artery is 3.6 cm.  The main pulmonary artery diameter at the bifurcation is 3.1 
cm.

 

Limited CT sections are obtained through the upper abdomen. There is moderate fatty infiltration of [facility] liver. Upper abdomen is otherwise unremarkable.

 

IMPRESSIONS:

1. Small left apical density. An additional left lateral lung base thickening may be present. Atelect
asis and underlying mass should be considered.

2. No evidence of pneumonia or aspiration pneumonia.

3. Moderate fatty infiltration through the the liver

## 2021-09-01 NOTE — P.PN
Subjective


Progress Note Date: 09/01/21


The patient is seen at bedside and per his nurse his mentation is improving 

compared to his initial presentation.


Tmax in 24 hours is 99.7 on 08/31/21 at around 23:18.








Objective





- Vital Signs


Vital signs: 


                                   Vital Signs











Temp  98.8 F   09/01/21 11:47


 


Pulse  110 H  09/01/21 14:00


 


Resp  16   09/01/21 14:00


 


BP  119/86   09/01/21 11:47


 


Pulse Ox  99   09/01/21 11:47








                                 Intake & Output











 08/31/21 09/01/21 09/01/21





 18:59 06:59 18:59


 


Intake Total 240  960


 


Output Total 1300 1380 


 


Balance -1060 -1380 960


 


Weight  106 kg 106 kg


 


Intake:   


 


  Oral 240  960


 


Output:   


 


  Urine 1300 1380 


 


    Uretheral ([place]) 700  


 


Other:   


 


  Voiding Method Indwelling Catheter Indwelling Catheter Indwelling Catheter


 


  # Bowel Movements 1  3














- Exam





GENERAL: The patient is lying in bed and is moderate acute distress.


INTEGUMENTARY: Multiple skin abrasion.


MUSCULOSKELETAL: Pain with flexion of the right knee.  Has bruises of both knee





NEUROLOGICAL:


Higher mental function: The patient is awake, alert, oriented to self and place.

 He stated the year is 2022 and the month is August.  Patient is able to name 

objects correctly (pen, watch).  He seems to be responding a bit faster today 

compared to yesterday (but continues to be somewhat slow).    Patient is 

following simple commands.   No aphasia  from limited language.  No neglect.


Cranial nerves: The pupils are round, equal and reactive to light.  Visual 

fields are full to confrontation throughout.  Extraocular movement is tracking 

to the right and left and no appreciable nystagmus.  Has echymosis around the 

eye (mostly upper).   The facial strength is normal throughout.  No dysarthria 

is noted. 


Motor: The strength is moving upper extremities above gravity without focality 

and seems about 5- while the lower are limited because of pain.  The left lower 

extremity he was able to bend the knee while the right he was in severe pain 

attempting to bend the knee. Had antigravity of bilateral ankles.    Normal tone

and bulk in upper and hard to assess tone in lower because of his pain..  


Cerebellum: Normal finger to nose bilaterally


Sensation: Sensation is normal to touch throughout.


Reflexes (right/left): Brachioradialis are 3+, biceps are 2-3+ bilaterally, 

triceps are 2+.  Lowers are to assess because of pain.    


Plantars are mute bilaterally. 





WORK-UP:


* CPK is 999 on 08/23 but 217 on 08/25--improved.


* B12 582 normal, however at MMA is elevated 0.46 (normal <0.40).  This may 

  indicate intracellular deficiency of B12.  Patient's folate 5.7, TSH 1.77, RPR

  nonreactive.  


* Low Vitamin B6 3 (normal is 5-50).


* Ammonia level 12 (normal)


* ESR 64 on 0 828 and the repeated ESR 77 on 08/30/2021.


* MRI of the brain on 08/27/21 revealed mild atrophy.  Mild subependymal white 

  matter increased signal around the lateral ventricles measuring up to 5 mm in 

  thickness.  No evidence of cortical infarct.


* REPEAT MRI brain w/o (09/01/21): I ordered MRI the brain with and without but 

  because the patient was in so much pain that was the study was limited and it 

  was done only without.  The MR the brain is reported as limited examination 

  due to patient's pain.  No obvious significant intracranial abnormality.  


* MRI of the lumbar spine with and without contrast 08/28/2021 revealed no 

  evidence of discitis.  No significant disc space narrowing.  No fracture.  

  Multilevel mild facet arthropathy and mild lateral recess stenosis.  

  Multilevel mild posterior disc bulging without spinal stenosis.  


* CerebroSpinal fluid examination revealed glucose 64 (40-70), proteins 226 (12-

  60), total WBC count in CSF 18, out of its 15% monocytes and 85% 

  polynuclear's.  CSF RBC 5175, due to traumatic tap.  


* Patient's spinal fluid was traumatic because patient was not cooperating.  CSF

  showsCSF shows very mild leukocytosis ( about 2-3 cells above normal after 

  correction for traumatic tap), and very elevated proteins.   Cultures  

  negative.  HSV-1 and 2 PCR in the CSF are negative.  Viral test are negative. 

  Infectious disease following.  


* Patient had undergone aspiration of right knee and left ankle, both of which 

  have been negative for any growth.  No crystals seen in the sinonasal fluid.


* Routine EEG on 08/26/21  was reported as abnormal due to background slowing of

  moderate to severe degree.  This is suggestive of generalized cerebral 

  dysfunction, as can be seen with toxic metabolic encephalopathy or due to 

  diffuse structural brain abnormality.  No epileptiform activity was seen.  


* Carotid Doppler was technically difficult due to patient's inability to hold 

  still.  No hemodynamically significant ICA stenosis identified on either side.

   Antegrade flow in both vertebral arteries.


* CT of facial bone on 08/23/2021 is reported as there is evidence of old bone 

  out fracture of the floor of the left bony orbits.  No acute fracture seen.


* Left Lower extremity CT 08/31/2021: is reported as chronic changes as noted.  

  Soft tissue swelling.  Calcaneal is spurring.


* CT of the abdomen/pelvis: Was reported as fatty infiltration of the liver.  

  Pleural effusion and basilar atelectasis.  Elevated right diaphragm could 

  relate to diaphragm paralysis.  Subcutaneous edema around the abdomen.  

  Congestive heart failure as possible.  These abnormality.  New compared to old

  exam.  Avascular necrosis of left femoral head on changed.  No acute 

  abnormality within the abdomen pelvis


* CT of the chest is reported as small left apical density.  Additional left la

  teral long-based thickening may be present.  At the clinic today says an 

  underlying mass should be considered.  No evidence of pneumonia or aspiration 

  pneumonia.  Moderate fatty infiltration to the liver.








- Labs


CBC & Chem 7: 


                                 09/01/21 06:41





                                 09/01/21 06:41


Labs: 


                  Abnormal Lab Results - Last 24 Hours (Table)











  09/01/21 09/01/21 09/01/21 Range/Units





  01:13 06:41 06:41 


 


WBC   15.9 H   (3.8-10.6)  k/uL


 


RBC   3.43 L   (4.30-5.90)  m/uL


 


Hgb   11.7 L   (13.0-17.5)  gm/dL


 


Hct   35.0 L   (39.0-53.0)  %


 


MCV   102.2 H   (80.0-100.0)  fL


 


Potassium    3.4 L  (3.5-5.1)  mmol/L


 


Carbon Dioxide    20 L  (22-30)  mmol/L


 


BUN    5 L  (9-20)  mg/dL


 


Creatinine    0.30 L  (0.66-1.25)  mg/dL


 


POC Glucose (mg/dL)  101 H    (75-99)  mg/dL


 


Calcium    8.0 L  (8.4-10.2)  mg/dL














  09/01/21 Range/Units





  11:38 


 


WBC   (3.8-10.6)  k/uL


 


RBC   (4.30-5.90)  m/uL


 


Hgb   (13.0-17.5)  gm/dL


 


Hct   (39.0-53.0)  %


 


MCV   (80.0-100.0)  fL


 


Potassium   (3.5-5.1)  mmol/L


 


Carbon Dioxide   (22-30)  mmol/L


 


BUN   (9-20)  mg/dL


 


Creatinine   (0.66-1.25)  mg/dL


 


POC Glucose (mg/dL)  109 H  (75-99)  mg/dL


 


Calcium   (8.4-10.2)  mg/dL








                      Microbiology - Last 24 Hours (Table)











 08/28/21 11:55 Anaerobic Culture - Final





 Ankle - Left 


 


 08/27/21 19:00 Anaerobic Culture - Final





 Knee - Right 


 


 08/28/21 14:59 Blood Culture Gram Stain - Final





 Blood Blood Culture - Final





    Staphylococcus epidermidis


 


 08/26/21 09:05 Blood Culture - Final





 Blood    No Growth after 144 hours


 


 08/29/21 17:40 Blood Culture - Preliminary





 Blood    No Growth after 48 hours


 


 08/27/21 19:00 Gram Stain - Final





 Knee - Right Wound Culture - Final


 


 08/27/21 11:35 CSF Gram Stain - Final





 Cerebral Spinal Fluid CSF Culture - Final














Assessment and Plan


Assessment: 





* 55-year-old male with history of alcoholism, was found at laying with fecal 

  matter with multiple bruises, scratches and pressure sores over dependent 

  areas, indicating patient has been on the floor for fairly long period of 

  time.  Last period of contact with his friends was on Friday, 3 days prior to 

  arrival.   


* Fevers of unknown etiology.  MRI Brain is negative.  No definitive evidence of

  meningitis/encephalitis based upon CSF results. CSF culture are negative.  HSV

  PCR negative and viral are negative.


* Encpehalopathy of unknown etiology.  Rule out infectious process (unknown 

  source yet)--mentation is slowly improving. 


* Low Vitamin B6 3 (normal is 5-50).


* Multiple skin abrasions including left eye bruising


* History of multiple falls


* Hypertension


* History of alcoholism


* History of marijuana use.





Plan: 





MRI cervical spine and thoracic spine is ordered and is pending (since source 

unknown source of fevers).


Will defer antibiotic management to the I.D. team. 


Continue folic acid 1mg daily and Vitamin B12 1000mcg daily.


Continue Thiamine daily.


Continue Vitamin B6 50mg bid and within 2-3month recommend getting repeat levels

again and modify medication appropriately if needed.


Infection disease is on board.


Pulmonary team is on board.


Orthopedic team is on board.


Will defer the rest of medical management to the primary team.





The plan is discussed with the patient's primary team and his nurse.





Tong Ocampo MD


Neuro-Hospitalist





Time with Patient: Less than 30

## 2021-09-01 NOTE — P.PN
<Pedrito Zayas - Last Filed: 09/01/21 12:16>





Subjective


Progress Note Date: 09/01/21





Hospital course:





Patient is a 55-year-old male with a past medical history of EtOH abuse.  He p

resented to the hospital on 8/23/21 after being found delirious and confused by 

his neighbor at home.  Patient was reportedly found laying on the floor covered 

in his own feces with empty alcohol bottles surrounding him.  Patient presented 

to the hospital with multiple bruises and abrasions in different stages of 

healing.  UDS positive for marijuana and benzodiazepines.  EtOH level was less 

than 10.  Patient was initially found to have hypernatremia with sodium of 152, 

hyponatremia with potassium of 3.2, hyperchloremia with chloride of 114 and 

elevated creatinine kinase of 999.  He was admitted for acute metabolic 

encephalopathy with hallucinations and EtOH withdrawal, rhabdomyolysis, and se

psis without sepsis shock.  Patient has underwent multiple labs and imaging see 

below.  Currently admitted under our services with consultation to pulmonology, 

infectious disease, neurology, and orthopedic surgery.








Labs and imaging:





Initial blood culture showing no results after 144 hours, CSF fluid negative 

wound culture right knee negatives wound culture left ankle preliminary results 

negative, 1 out of 2 blood culture results repeated on 8/28/21 positive for 

Staphylococcus epidermidis, possibly contaminated, and repeat blood culture 

drawn on 8/29/21 showing no growth after 24 hours.





TSH 1.770.





Pro-calcitonin 50.22 and CRP of 8.1.





Chest x-ray completed 8/23/21 revealed minimal subsegmental atelectasis at the 

right lung base without change.





CT head and cervical spine completed 8/23/21 negative for acute intercranial 

abnormality with spondylitic changes to cervical spine with no acute fractures 

or abnormalities.





CT facial bones without contrast showing evidence of old blowout fracture on the

floor of the left bony orbit with reported displacement of 4 mm and is negative 

for acute fractures.





X-ray right elbow olecranon process spur formation with posterior soft tissue 

swelling negative for acute fractures.





Bilateral carotid Dopplers negative for hemodynamically significant internal 

carotid artery stenosis





Venous Doppler left lower extremity negative for DVT.





X-ray left ankle negative for acute fracture.





MRI brain with and without contrast showing mild atrophy with mild subependymal 

white matter increased signal around the lateral ventricles measuring up to 5 mm

in thickness no evidence of cortical infarct.





MRI lumbar spine with and without contrast showing no evidence of discitis, no 

significant disc space narrowing or acute fractures.  Multilevel mild facet 

arthroplasty and mild lateral recess stenosis with multilevel mild posterior 

disc bulging without spinal stenosis.





CT left lower extremity showing chronic changes with soft tissue swelling and 

calcaneal spurring, negative for acute fracture.





CT abdomen and pelvis showed fatty infiltration of the liver, pleural effusions 

and basilar atelectasis with elevated right diaphragm, subcutaneous edema around

the abdomen, CHF is not ruled out, and avascular necrosis of left femoral head 

unchanged with no acute abnormalities reported in the pelvis.








Physical exam:





8/31/21: Patient seen and fully evaluated at the bedside this morning.  Patient 

alert to self only upon examination, he was confused to time, place, and 

situation.  Even with direction, patient was unable to state that he was in the 

hospital.  He continues to be febrile with high temperature over the past 24 

hours of 102.0F.  He remains on vancomycin and cefepime. Infectious disease, 

pulmonary, neurology, and orthopedic surgery following.  Pro-calcitonin 50.22 

and CRP of 8.1. 





9/1/21: Patient was seen and fully evaluated at the bedside this morning he was 

sitting up on the side of the bed working with physical therapy.  The patient 

had no difficulties sitting at the edge of the bed and was able to stand with 

assistance for 2 minutes prior to sitting back down.  Patient reports continued 

pain in his lower extremities increased upon standing.  He was much more alert 

this morning.  Patient able to state full name, spell his first name and then 

spell it backwards, read the clock to tell us what time it was, was able to 

state that he was in the hospital and it is 2021.  He denied having any 

headache, lightheadedness, dizziness, chest pain, palpitations, shortness of 

breath, abdominal pain, nausea, vomiting, or any other complaints at this time 

other than pain in his lower extremities.  Morning labs reviewed in patient 

positive for hypokalemia with potassium of 3.4.





Vital signs reviewed and stable. 


General: Nontoxic, no distress and appears stated age.  


Derm: Skin warm and dry, normal coloration for ethnicity.  Multiple bruises and 

abrasions hovering upper and lower extremities in different stages of healing. 

red rash to buttocks


Head: Atraumatic, normocephalic and symmetric.  


Eyes: No lid lag, and anicteric sclera.  Slight swelling and bruising surroun

ding left orbital region.


Mouth: No lip lesions, mucus membranes moist


Cardiovascular: Regular rhythm and tachycardic rate, no murmur, positive 

posterior tibial pulses bilaterally, and cap refill < 2 seconds.  


Lungs: Respirations even, regular, and unlabored on room air. Lungs CTA 

bilaterally, no rhonchi, no rales, no wheezing, and no accessory muscle usage. 


GI/: soft, nontender to palpation, no guarding, no appreciable organomegaly.  

Puente catheter in place.


Ext: ROM intact. No gross muscle atrophy, no edema, no contractures


Neuro/Psych: Speech clear, face symmetrical and CN II-XII grossly intact.  

Patient able to follow limited commands, but remains alert to self only.  Pupils

equal and round. 








Assessment and Plan of Care:





Alcohol abuse with alcohol withdrawal syndrome


Delirium tremens


Rhabdomyolysis


Acute Metabolic Encephalopathy with hallucinations





-Benzodiazepines per CIWA scale


-IV fluid hydration, Thiamine 100 mg twice daily


-Seizure precautions, Fall precautions


-Computed tomography scan of the head showed no acute findings.


-MRI of the brain was no acute finding


-Consulted neurology and psychiatry for further evaluation


-EEG was abnormal with generalized cerebral dysfunction and evidence of toxic 

metabolic encephalopathy


-Treatment of underlying infectious process, continue IV antibiotics cefepime 

and vancomycin pending further recommendations by infectious disease





Acute metabolic encephalopathy, improving


Sepsis without septic shock


Fever, unclear etiology


Concerns about meningitis, ruled out


Bacteremia, contamination with staph epidermidis


Concerns of RLL Aspiration Pneumonia, questionable





-Initial blood culture showing no results [id number] hours, CSF fluid negative 

wound culture right knee negatives wound culture left ankle preliminary results 

negative, 1 out of 2 blood culture results repeated on 8/28/21 positive for 

Staphylococcus epidermidis, possibly contaminated, and repeat blood culture 

drawn on 8/29/21 showing no growth after 24 hours.


-TSH 1.770.


-Pro-calcitonin 50.22 and CRP of 8.1.


-Started on broad spectrum antibiotic with vancomycin, ceftriaxone, and 

acyclovir for suspected meningitis on presentation.  Antibiotic is been adjusted

by infectious disease currently on IV cefepime and vancomycin.


-CSF fluid not consistent with bacterial meningitis.  


-HSV PCR negative


-MRI of the lumbar spine with no evidence of discitis or prevertebral abscess


-Blood gas showed no evidence of acute CO2 narcosis


-Chest x-ray with questionable pneumonia


-CT abdomen and pelvis showed fatty infiltration of the liver, pleural effusions

and basilar atelectasis with elevated right diaphragm, subcutaneous edema around

the abdomen, CHF is not ruled out, and avascular necrosis of left femoral head 

unchanged with no acute abnormalities reported in the pelvis.


-Changed oral thiamine to IV thiamine 100 mg twice daily.


-Treatment of underlying infectious process, continue IV antibiotics cefepime 

and vancomycin pending further recommendations by infectious disease


-Orders place for MRI brain with and without contrast, CT chest, and MRI of 

cervical and thoracic spine with and without contrast.





Hypokalemia


-Potassium 3.4, replaced.


-We will continue to monitor with repeat a.m. labs.





Poor living condition


Recurrent episodes of hospital admission for alcohol abuse


Major life stressors with possible major depression





-We will reconsult psychiatry when mentation improves


- consultation





Macrocytic anemia mild, Most likely secondary to alcohol abuse


No reported GI bleeding





-Multiple skin abrasions and bruising in multiple stages of healing


-Left eye bruising


-No acute fractures identified





Hypernatremia, resolved status post administration of IV fluids. 








CODE STATUS: Full code


DVT prophylaxis: SCDs


Discussed with: Patient and RN


Anticipated discharge date: Clinical course to determine


Anticipated discharge place: Home


A total of 45 minutes was spent on the care of this complex patient more than 

50% of the time was spent in counseling and care coordination.





Objective





- Vital Signs


Vital signs: 


                                   Vital Signs











Temp  99.1 F   09/01/21 07:53


 


Pulse  104 H  09/01/21 08:00


 


Resp  16   09/01/21 08:00


 


BP  138/83   09/01/21 07:53


 


Pulse Ox  98   09/01/21 07:53








                                 Intake & Output











 08/31/21 09/01/21 09/01/21





 18:59 06:59 18:59


 


Intake Total 240  


 


Output Total 1300 1380 


 


Balance -1060 -1380 


 


Weight  106 kg 


 


Intake:   


 


  Oral 240  


 


Output:   


 


  Urine 1300 1380 


 


    Uretheral (Puente) 700  


 


Other:   


 


  Voiding Method Indwelling Catheter Indwelling Catheter Indwelling Catheter


 


  # Bowel Movements 1  














- Labs


CBC & Chem 7: 


                                 09/01/21 06:41





                                 09/01/21 06:41


Labs: 


                  Abnormal Lab Results - Last 24 Hours (Table)











  09/01/21 09/01/21 09/01/21 Range/Units





  01:13 06:41 06:41 


 


WBC   15.9 H   (3.8-10.6)  k/uL


 


RBC   3.43 L   (4.30-5.90)  m/uL


 


Hgb   11.7 L   (13.0-17.5)  gm/dL


 


Hct   35.0 L   (39.0-53.0)  %


 


MCV   102.2 H   (80.0-100.0)  fL


 


Potassium    3.4 L  (3.5-5.1)  mmol/L


 


Carbon Dioxide    20 L  (22-30)  mmol/L


 


BUN    5 L  (9-20)  mg/dL


 


Creatinine    0.30 L  (0.66-1.25)  mg/dL


 


POC Glucose (mg/dL)  101 H    (75-99)  mg/dL


 


Calcium    8.0 L  (8.4-10.2)  mg/dL








                      Microbiology - Last 24 Hours (Table)











 08/29/21 17:40 Blood Culture - Preliminary





 Blood    No Growth after 48 hours


 


 08/27/21 19:00 Gram Stain - Final





 Knee - Right Wound Culture - Final


 


 08/27/21 11:35 CSF Gram Stain - Final





 Cerebral Spinal Fluid CSF Culture - Final


 


 08/26/21 09:05 Blood Culture - Preliminary





 Blood    No Growth after 120 hours














<Laura Caraballo - Last Filed: 09/01/21 15:30>





Subjective


Patient seen and examined independently.  Patient was also seen by Pedrito Zayas NP and case was discussed.  I am in agreement with subjective, physical exam, 

assessment and plan as written above and amended below.





He is much more awake today.  He states he is having some low back pain, denies 

headache, denies nausea, vomiting, cough, shortness of breath.





Case discussed with Tong Ocampo who feels that encephalopathy is due to 

infection. Will CT chest to evaluate for possible abscess/emphyema with ETOH w/d

and MRI Brain and Thoracic spine to rule out discitits


Patient has been improving with last fever 8/31 at 0200





General: non toxic, no distress, appears at stated age


Derm: warm, dry, multiple arease of abraisians nb/l LE around Knees and multiple

areas of ecchymosis in various stages of healing. 


Gluteal cleft with erythema, appears moist, some slough.


Head: atraumatic, normocephalic, symmetric


Eyes: EOMI, no lid lag, anicteric sclera


Mouth: no lip lesion, mucus membranes moist


Cardiovascular: S1S2 reg, no murmur, positive posterior tibial pulse bilateral, 


Lungs: CTA bilateral, no rhonchi, no rales , no accessory muscle use


Abdominal: soft,  nontender to palpation, no guarding, no appreciable 

organomegaly


Ext: no gross muscle atrophy, no edema, no contractures


Neuro:  CN II-XI grossly intact, no focal neuro deficits


Psych: Alert, oriented to self and hospital, appropriate affect 








Objective





- Vital Signs


Vital signs: 


                                   Vital Signs











Temp  98.8 F   09/01/21 11:47


 


Pulse  110 H  09/01/21 14:00


 


Resp  16   09/01/21 14:00


 


BP  119/86   09/01/21 11:47


 


Pulse Ox  99   09/01/21 11:47








                                 Intake & Output











 08/31/21 09/01/21 09/01/21





 18:59 06:59 18:59


 


Intake Total 240  960


 


Output Total 1300 1380 


 


Balance -1060 -1380 960


 


Weight  106 kg 106 kg


 


Intake:   


 


  Oral 240  960


 


Output:   


 


  Urine 1300 1380 


 


    Uretheral (Puente) 700  


 


Other:   


 


  Voiding Method Indwelling Catheter Indwelling Catheter Indwelling Catheter


 


  # Bowel Movements 1  3














- Labs


CBC & Chem 7: 


                                 09/01/21 06:41





                                 09/01/21 06:41


Labs: 


                  Abnormal Lab Results - Last 24 Hours (Table)











  09/01/21 09/01/21 09/01/21 Range/Units





  01:13 06:41 06:41 


 


WBC   15.9 H   (3.8-10.6)  k/uL


 


RBC   3.43 L   (4.30-5.90)  m/uL


 


Hgb   11.7 L   (13.0-17.5)  gm/dL


 


Hct   35.0 L   (39.0-53.0)  %


 


MCV   102.2 H   (80.0-100.0)  fL


 


Potassium    3.4 L  (3.5-5.1)  mmol/L


 


Carbon Dioxide    20 L  (22-30)  mmol/L


 


BUN    5 L  (9-20)  mg/dL


 


Creatinine    0.30 L  (0.66-1.25)  mg/dL


 


POC Glucose (mg/dL)  101 H    (75-99)  mg/dL


 


Calcium    8.0 L  (8.4-10.2)  mg/dL














  09/01/21 Range/Units





  11:38 


 


WBC   (3.8-10.6)  k/uL


 


RBC   (4.30-5.90)  m/uL


 


Hgb   (13.0-17.5)  gm/dL


 


Hct   (39.0-53.0)  %


 


MCV   (80.0-100.0)  fL


 


Potassium   (3.5-5.1)  mmol/L


 


Carbon Dioxide   (22-30)  mmol/L


 


BUN   (9-20)  mg/dL


 


Creatinine   (0.66-1.25)  mg/dL


 


POC Glucose (mg/dL)  109 H  (75-99)  mg/dL


 


Calcium   (8.4-10.2)  mg/dL








                      Microbiology - Last 24 Hours (Table)











 08/28/21 11:55 Anaerobic Culture - Final





 Ankle - Left 


 


 08/27/21 19:00 Anaerobic Culture - Final





 Knee - Right 


 


 08/28/21 14:59 Blood Culture Gram Stain - Final





 Blood Blood Culture - Final





    Staphylococcus epidermidis


 


 08/26/21 09:05 Blood Culture - Final





 Blood    No Growth after 144 hours


 


 08/29/21 17:40 Blood Culture - Preliminary





 Blood    No Growth after 48 hours


 


 08/27/21 19:00 Gram Stain - Final





 Knee - Right Wound Culture - Final


 


 08/27/21 11:35 CSF Gram Stain - Final





 Cerebral Spinal Fluid CSF Culture - Final

## 2021-09-01 NOTE — PN
PROGRESS NOTE



DATE OF SERVICE:

09/01/2021



REASON FOR FOLLOWUP:

Fever, possible left lower extremity cellulitis.



INTERVAL HISTORY:

The patient's overall fever pattern has improved.  He did have a low-grade fever of

99.7 today.  The patient is breathing comfortably on room air.  Denies having any chest

pain, shortness of breath or cough.  No abdominal pain.  Complaining of some pain in

the left lower extremity, but no worsening and no diarrhea.



PHYSICAL EXAMINATION:

Blood pressure 138/87, pulse of 114, temperature 99.7.  He is 97% on room air.

GENERAL DESCRIPTION: General description is a middle-aged male lying in bed in no

distress.

RESPIRATORY SYSTEM: Unlabored breathing. Clear to auscultation anteriorly.

HEART: S1, S2. Regular rate and rhythm.

ABDOMEN: Soft. No tenderness.

Left leg with some swelling. No significant redness or drainage.



LABS:

Hemoglobin is 9.3, white count 15.9, BUN of 5, creatinine 0.30.  CT of abdomen and

pelvis done yesterday did not show any acute abnormality.



DIAGNOSTIC IMPRESSION AND PLAN:

Patient with fever in this patient who did have extensive workup with initial concern

for left ankle septic arthritis.  However, those cultures have been negative. CT of

abdomen and pelvis did not show any acute abnormality.  MRI of the lumbar spine was

negative. We will discontinue the cefepime, continue the patient on vancomycin and

monitor his clinical course closely.





MMODL / IJN: [id number] / Job#: [id number]

## 2021-09-02 LAB
ANION GAP SERPL CALC-SCNC: 9 MMOL/L
BUN SERPL-SCNC: 5 MG/DL (ref 9–20)
CALCIUM SPEC-MCNC: 8.2 MG/DL (ref 8.4–10.2)
CHLORIDE SERPL-SCNC: 108 MMOL/L (ref 98–107)
CO2 SERPL-SCNC: 21 MMOL/L (ref 22–30)
ERYTHROCYTE [DISTWIDTH] IN BLOOD BY AUTOMATED COUNT: 3.31 M/UL (ref 4.3–5.9)
ERYTHROCYTE [DISTWIDTH] IN BLOOD: 15.7 % (ref 11.5–15.5)
GLUCOSE BLD-MCNC: 100 MG/DL (ref 75–99)
GLUCOSE BLD-MCNC: 80 MG/DL (ref 75–99)
GLUCOSE BLD-MCNC: 89 MG/DL (ref 75–99)
GLUCOSE SERPL-MCNC: 95 MG/DL (ref 74–99)
HCO3 BLDV-SCNC: 21 MMOL/L (ref 24–28)
HCT VFR BLD AUTO: 32.6 % (ref 39–53)
HGB BLD-MCNC: 11.3 GM/DL (ref 13–17.5)
MAGNESIUM SPEC-SCNC: 1.8 MG/DL (ref 1.6–2.3)
MCH RBC QN AUTO: 34.1 PG (ref 25–35)
MCHC RBC AUTO-ENTMCNC: 34.6 G/DL (ref 31–37)
MCV RBC AUTO: 98.4 FL (ref 80–100)
PCO2 BLDV: 25 MMHG (ref 37–51)
PH BLDV: 7.54 [PH] (ref 7.31–7.41)
PLATELET # BLD AUTO: 451 K/UL (ref 150–450)
POTASSIUM SERPL-SCNC: 4 MMOL/L (ref 3.5–5.1)
SODIUM SERPL-SCNC: 138 MMOL/L (ref 137–145)
WBC # BLD AUTO: 15.4 K/UL (ref 3.8–10.6)

## 2021-09-02 RX ADMIN — FOLIC ACID SCH MG: 1 TABLET ORAL at 09:52

## 2021-09-02 RX ADMIN — SODIUM CHLORIDE SCH MLS/HR: 9 INJECTION, SOLUTION INTRAVENOUS at 11:51

## 2021-09-02 RX ADMIN — SODIUM CHLORIDE SCH MLS/HR: 9 INJECTION, SOLUTION INTRAVENOUS at 21:44

## 2021-09-02 RX ADMIN — SODIUM CHLORIDE SCH MLS/HR: 9 INJECTION, SOLUTION INTRAVENOUS at 06:46

## 2021-09-02 RX ADMIN — ACETAMINOPHEN PRN MG: 325 TABLET, FILM COATED ORAL at 11:51

## 2021-09-02 RX ADMIN — SODIUM CHLORIDE SCH MLS/HR: 900 INJECTION, SOLUTION INTRAVENOUS at 09:52

## 2021-09-02 RX ADMIN — PYRIDOXINE HCL TAB 50 MG SCH MG: 50 TAB at 09:52

## 2021-09-02 RX ADMIN — PYRIDOXINE HCL TAB 50 MG SCH MG: 50 TAB at 21:45

## 2021-09-02 RX ADMIN — ACETAMINOPHEN PRN MG: 325 TABLET, FILM COATED ORAL at 21:57

## 2021-09-02 RX ADMIN — NYSTATIN SCH APPLIC: 100000 OINTMENT TOPICAL at 09:51

## 2021-09-02 RX ADMIN — NYSTATIN SCH APPLIC: 100000 OINTMENT TOPICAL at 16:29

## 2021-09-02 RX ADMIN — NYSTATIN SCH APPLIC: 100000 OINTMENT TOPICAL at 21:55

## 2021-09-02 RX ADMIN — PANTOPRAZOLE SODIUM SCH MG: 40 TABLET, DELAYED RELEASE ORAL at 06:46

## 2021-09-02 RX ADMIN — SODIUM CHLORIDE SCH MLS/HR: 900 INJECTION, SOLUTION INTRAVENOUS at 21:41

## 2021-09-02 NOTE — P.PN
Subjective


Progress Note Date: 09/02/21


Principal diagnosis: 


Left ankle pain, cellulitis





Patient was seen at bedside this morning on cardiac floor.  Patient is able to 

respond to verbal questions today.  Patient is lying semirecumbent in bed.  When

patient was asked to bend left ankle he moans in pain. Patient denies chest 

pain, fever, shortness breath, nausea, vomiting, change in vision, loss of 

bowel/bladder control.








Objective





- Vital Signs


Vital signs: 


                                   Vital Signs











Temp  99.8 F H  09/02/21 04:00


 


Pulse  110 H  09/02/21 04:00


 


Resp  18   09/02/21 04:00


 


BP  124/75   09/02/21 04:00


 


Pulse Ox  96   09/02/21 04:00








                                 Intake & Output











 09/01/21 09/02/21 09/02/21





 18:59 06:59 18:59


 


Intake Total 960  


 


Output Total  1200 


 


Balance 960 -1200 


 


Weight 106 kg 60 kg 


 


Intake:   


 


  Oral 960  


 


Output:   


 


  Urine  1200 


 


Other:   


 


  Voiding Method Indwelling Catheter Indwelling Catheter 


 


  # Bowel Movements 3 1 














- Exam


left ankle:





Left ankle examined at bedside this morning.  When left ankle is dorsiflexed 

patient moans in pain.  The area of erythema seems to be less compared to 

yesterday around the ankle.  There is still some swelling around the foot and 

ankle.  DP pulses intact.  Cap refill under 3 seconds.








- Labs


CBC & Chem 7: 


                                 09/01/21 06:41





                                 09/01/21 06:41


Labs: 


                  Abnormal Lab Results - Last 24 Hours (Table)











  09/01/21 09/01/21 Range/Units





  11:38 16:51 


 


POC Glucose (mg/dL)  109 H  102 H  (75-99)  mg/dL








                      Microbiology - Last 24 Hours (Table)











 08/29/21 17:40 Blood Culture - Preliminary





 Blood    No Growth after 72 hours


 


 08/28/21 11:55 Anaerobic Culture - Final





 Ankle - Left 


 


 08/27/21 19:00 Anaerobic Culture - Final





 Knee - Right 


 


 08/28/21 14:59 Blood Culture Gram Stain - Final





 Blood Blood Culture - Final





    Staphylococcus epidermidis


 


 08/26/21 09:05 Blood Culture - Final





 Blood    No Growth after 144 hours














Assessment and Plan


Assessment: 


Left ankle pain, cellulitis





Plan: 


1. Left ankle pain, cellulitis -aspiration was performed Saturday 06/28/2021 the

left ankle where 6 mL of serous joint fluid was aspirated and sent to lab for 

culture, Gram stain, crystals, aerobic and anaerobic cultures.  Final results 

for Gram Stain and aerobic cultures of left ankle negative.anaerobic culture 

negative. CT lower extremity negative and shows some soft tissue swelling and 

calcaneal spurring. No urgent surgical intervention planned at this time from 

orthopedic standpoint.  Patient stable from an orthopedic standpoint. 

Orthopedics is signing off at this time.  Please do not hesitate to contact us 

if you have any further questions





2.  Appreciate medical management





3.  Pain management - stable at this time





4.  GI prophylaxis/DVT prophylaxis - Protonix; Mechanical - SCDs





5.  PT/OT - weightbearing as tolerated with assistance





6.  Appreciate consult





Time with Patient: Less than 30

## 2021-09-02 NOTE — PN
PROGRESS NOTE



DATE OF SERVICE:

09/02/2021



REASON FOR FOLLOWUP:

Fever, possible cellulitis.



INTERVAL HISTORY:

The patient is afebrile.  The patient is currently breathing comfortably on room air.

Denies having any chest pain, shortness of breath or cough.  No abdominal pain.  Did

have some diarrhea.



PHYSICAL EXAMINATION:

Blood pressure is 125/86, pulse of 130, temperature 99. He is 98% on room air.

GENERAL DESCRIPTION: General description is a middle-aged male lying in bed in no

distress.

RESPIRATORY SYSTEM: Unlabored breathing. Clear to auscultation anteriorly.

HEART: S1, S2. Regular rate and rhythm.

ABDOMEN: Soft. No tenderness.



LABS:

Hemoglobin is 11._____, white count 15.4, BUN of 5, creatinine 0.31.



DIAGNOSTIC IMPRESSION AND PLAN:

Patient with a fever and elevated white count in this patient who did have extensive

workup, including CT of abdomen and pelvis, lower extremity CT, MRI of the lumbar

spine, MRI of the _____ chest, without evidence for any obvious focus of infection.

Patient is currently on antibiotic. Vancomycin to continue while monitoring his

clinical course closely.  Continue with supportive care.





MMODL / IJN: [id number] / Job#: [id number]

## 2021-09-02 NOTE — P.PN
<Pedrito Zayas - Last Filed: 09/02/21 15:05>





Subjective


Progress Note Date: 09/02/21





Hospital course:





Patient is a 55-year-old male with a past medical history of EtOH abuse.  He p

resented to the hospital on 8/23/21 after being found delirious and confused by 

his neighbor at home.  Patient was reportedly found laying on the floor covered 

in his own feces with empty alcohol bottles surrounding him.  Patient presented 

to the hospital with multiple bruises and abrasions in different stages of 

healing.  UDS positive for marijuana and benzodiazepines.  EtOH level was less 

than 10.  Patient was initially found to have hypernatremia with sodium of 152, 

hyponatremia with potassium of 3.2, hyperchloremia with chloride of 114 and 

elevated creatinine kinase of 999.  He was admitted for acute metabolic 

encephalopathy with hallucinations and EtOH withdrawal, rhabdomyolysis, and se

psis without sepsis shock.  Patient has underwent multiple labs and imaging see 

below.  Currently admitted under our services with consultation to pulmonology, 

infectious disease, neurology, and orthopedic surgery.








Labs and imaging:





Initial blood culture showing no results after 144 hours, CSF fluid negative 

wound culture right knee negatives wound culture left ankle preliminary results 

negative, 1 out of 2 blood culture results repeated on 8/28/21 positive for 

Staphylococcus epidermidis, possibly contaminated, and repeat blood culture 

drawn on 8/29/21 showing no growth after 24 hours.





TSH 1.770.





Pro-calcitonin 50.22 and CRP of 8.1.





Chest x-ray completed 8/23/21 revealed minimal subsegmental atelectasis at the 

right lung base without change.





CT head and cervical spine completed 8/23/21 negative for acute intercranial 

abnormality with spondylitic changes to cervical spine with no acute fractures 

or abnormalities.





CT facial bones without contrast showing evidence of old blowout fracture on the

floor of the left bony orbit with reported displacement of 4 mm and is negative 

for acute fractures.





X-ray right elbow olecranon process spur formation with posterior soft tissue 

swelling negative for acute fractures.





Bilateral carotid Dopplers negative for hemodynamically significant internal 

carotid artery stenosis





Venous Doppler left lower extremity negative for DVT.





X-ray left ankle negative for acute fracture.





MRI brain with and without contrast showing mild atrophy with mild subependymal 

white matter increased signal around the lateral ventricles measuring up to 5 mm

in thickness no evidence of cortical infarct.





MRI lumbar spine with and without contrast showing no evidence of discitis, no 

significant disc space narrowing or acute fractures.  Multilevel mild facet 

arthroplasty and mild lateral recess stenosis with multilevel mild posterior 

disc bulging without spinal stenosis.





CT left lower extremity showing chronic changes with soft tissue swelling and 

calcaneal spurring, negative for acute fracture.





CT chest, abdomen and pelvis showed fatty infiltration of the liver, pleural 

effusions and basilar atelectasis with elevated right diaphragm, subcutaneous 

edema around the abdomen, CHF is not ruled out, and avascular necrosis of left 

femoral head unchanged with no acute abnormalities reported in the pelvis.








Physical exam:





8/31/21: Patient seen and fully evaluated at the bedside this morning.  Patient 

alert to self only upon examination, he was confused to time, place, and 

situation.  Even with direction, patient was unable to state that he was in the 

hospital.  He continues to be febrile with high temperature over the past 24 

hours of 102.0F.  He remains on vancomycin and cefepime. Infectious disease, 

pulmonary, neurology, and orthopedic surgery following.  Pro-calcitonin 50.22 

and CRP of 8.1. 





9/1/21: Patient was seen and fully evaluated at the bedside this morning he was 

sitting up on the side of the bed working with physical therapy.  The patient 

had no difficulties sitting at the edge of the bed and was able to stand with 

assistance for 2 minutes prior to sitting back down.  Patient reports continued 

pain in his lower extremities increased upon standing.  He was much more alert 

this morning.  Patient able to state full name, spell his first name and then 

spell it backwards, read the clock to tell us what time it was, was able to 

state that he was in the hospital and it is 2021.  He denied having any 

headache, lightheadedness, dizziness, chest pain, palpitations, shortness of 

breath, abdominal pain, nausea, vomiting, or any other complaints at this time 

other than pain in his lower extremities.  Morning labs reviewed in patient p

ositive for hypokalemia with potassium of 3.4.





9/2/21: Patient was seen and fully evaluated the bedside this morning.  His 

condition seemed to decline from yesterday as he was much more alert and 

oriented yesterday when compared to today, patient currently alert to person and

place only but confused to time and situation.  He is having visual 

hallucinations as he states he sees his father standing outside his window. 

Physical therapy reported patient was not even able to stand today and upon 

sitting him up, he just threw himself backwards multiple times.  Patient  was 

also noted to have abdominal pursed lipped breathing, his respirations were 24-

28 breaths per minute and oxygen saturation was maintained 97% on room air.  

Skin was warm and dry. Previous fevers have improved and patient's highest 

temperature over the past 48 hours was 99.8F.  When asked the patient whether 

or not he was experiencing shortness of breath, chest pain, palpitations, 

abdominal pain, nausea, or any other pain or discomfort..he denied.  However he 

continued to have pursed lipped breathing with use of abdominal muscles.  This 

is a new finding and RN at bedside stated patient was not doing earlier this 

morning. Orders placed for repeat CXR, VBG, D-dimer, repeat Procalcitonin, CRP, 

CBC, and BMP. Discussed assessment findings with pulmonary, recommended D-dimer 

and further evaluation of lower extremities. Hemodynamically pt is stable. 

Abdominal pursed lipped breathing spontaneously subsided after approximately 20 

minutes and respirations back to baseline regular, even, and unlabored. Pt 

remains tachycardic with .  VBG revealed patient in respiratory alkalosis 

with pH 7.54, pCO2 25, and bicarb of 21.  D-dimer elevated at 3.30.  Order 

placed for stat CTA chest to rule out PE.








Vital signs reviewed and stable with the exception of mild tachycardia with a 

heart rate of 112. 





General: Nontoxic, no distress and appears stated age.  


Derm: Skin warm and dry, normal coloration for ethnicity.  Multiple bruises and 

abrasions covering upper and lower extremities in different stages of healing. 

red rash to buttocks


Head: Atraumatic, normocephalic and symmetric.  


Eyes: No lid lag, and anicteric sclera.  Slight swelling and bruising 

surrounding left orbital region.


Mouth: No lip lesions, mucus membranes moist


Cardiovascular: Regular rhythm and tachycardic rate, no murmur, positive 

posterior tibial pulses bilaterally, and cap refill < 2 seconds.  


Lungs: Respirations even, regular, and unlabored on room air. Lungs CTA 

bilaterally, no rhonchi, no rales, no wheezing, and no accessory muscle usage. 


GI/: soft, nontender to palpation, no guarding, no appreciable organomegaly.  

Puente catheter in place.


Ext: ROM intact. No gross muscle atrophy, no edema, no contractures. erythema 

and swelling to BLE worse on left. 


Neuro/Psych: Speech clear, face symmetrical and CN II-XII grossly intact.  

Patient able to follow limited commands, alert to person and place only today, 

confused to time and situation.  Pupils equal and round.  Patient with visual 

hallucinations reporting that he sees has father standing in his window.








Assessment and Plan of Care:





Alcohol abuse with alcohol withdrawal syndrome


Delirium tremens


Rhabdomyolysis


Acute Metabolic Encephalopathy with hallucinations





-Benzodiazepines per CIWA scale


-IV fluid hydration, Thiamine 100 mg twice daily


-Seizure precautions, Fall precautions


-Computed tomography scan of the head showed no acute findings.


-MRI of the brain was no acute finding


-Consulted neurology and psychiatry for further evaluation


-EEG was abnormal with generalized cerebral dysfunction and evidence of toxic 

metabolic encephalopathy


-Treatment of underlying infectious process, continue IV antibiotics cefepime 

and vancomycin pending further recommendations by infectious disease





Acute metabolic encephalopathy, improving


Sepsis without septic shock


Fever, unclear etiology


Concerns about meningitis, ruled out


Bacteremia, contamination with staph epidermidis


Concerns of RLL Aspiration Pneumonia, questionable





-Initial blood culture showing no results [id number] hours, CSF fluid negative 

wound culture right knee negatives wound culture left ankle preliminary results 

negative, 1 out of 2 blood culture results repeated on 8/28/21 positive for 

Staphylococcus epidermidis, possibly contaminated, and repeat blood culture 

drawn on 8/29/21 showing no growth after 24 hours.


-TSH 1.770.


-Pro-calcitonin 50.22 and CRP of 8.1.


-Started on broad spectrum antibiotic with vancomycin, ceftriaxone, and 

acyclovir for suspected meningitis on presentation.  Antibiotic is been adjusted

by infectious disease currently on IV cefepime and vancomycin.


-CSF fluid not consistent with bacterial meningitis.  


-HSV PCR negative


-MRI of the lumbar spine with no evidence of discitis or prevertebral abscess


-Blood gas showed no evidence of acute CO2 narcosis


-Chest x-ray with questionable pneumonia


-CT abdomen and pelvis showed fatty infiltration of the liver, pleural effusions

and basilar atelectasis with elevated right diaphragm, subcutaneous edema around

the abdomen, CHF is not ruled out, and avascular necrosis of left femoral head 

unchanged with no acute abnormalities reported in the pelvis.


-Changed oral thiamine to IV thiamine 100 mg twice daily.


-Treatment of underlying infectious process, continue IV antibiotics cefepime 

and vancomycin pending further recommendations by infectious disease


-MRI of cervical and thoracic spine with and without contrast.





Hypokalemia, resolved


-We will continue to monitor with repeat a.m. labs.





Poor living condition


Recurrent episodes of hospital admission for alcohol abuse


Major life stressors with possible major depression





-We will reconsult psychiatry when mentation improves


- consultation





Macrocytic anemia mild, Most likely secondary to alcohol abuse


No reported GI bleeding





-Multiple skin abrasions and bruising in multiple stages of healing


-Left eye bruising


-No acute fractures identified





Hypernatremia, resolved status post administration of IV fluids. 


-We will continue to monitor with repeat a.m. labs.








CODE STATUS: Full code


DVT prophylaxis: SCDs


Discussed with: Patient and RN


Anticipated discharge date: Clinical course to determine


Anticipated discharge place: Home


A total of 45 minutes was spent on the care of this complex patient more than 

50% of the time was spent in counseling and care coordination.





Objective





- Vital Signs


Vital signs: 


                                   Vital Signs











Temp  99.8 F H  09/02/21 04:00


 


Pulse  110 H  09/02/21 04:00


 


Resp  18   09/02/21 04:00


 


BP  124/75   09/02/21 04:00


 


Pulse Ox  96   09/02/21 04:00








                                 Intake & Output











 09/01/21 09/02/21 09/02/21





 18:59 06:59 18:59


 


Intake Total 960  


 


Output Total  1200 


 


Balance 960 -1200 


 


Weight 106 kg 60 kg 


 


Intake:   


 


  Oral 960  


 


Output:   


 


  Urine  1200 


 


Other:   


 


  Voiding Method Indwelling Catheter Indwelling Catheter 


 


  # Bowel Movements 3 1 














- Labs


CBC & Chem 7: 


                                 09/02/21 10:25





                                 09/02/21 10:25


Labs: 


                  Abnormal Lab Results - Last 24 Hours (Table)











  09/01/21 09/01/21 Range/Units





  11:38 16:51 


 


POC Glucose (mg/dL)  109 H  102 H  (75-99)  mg/dL








                      Microbiology - Last 24 Hours (Table)











 08/29/21 17:40 Blood Culture - Preliminary





 Blood    No Growth after 72 hours


 


 08/28/21 11:55 Anaerobic Culture - Final





 Ankle - Left 


 


 08/27/21 19:00 Anaerobic Culture - Final





 Knee - Right 


 


 08/28/21 14:59 Blood Culture Gram Stain - Final





 Blood Blood Culture - Final





    Staphylococcus epidermidis


 


 08/26/21 09:05 Blood Culture - Final





 Blood    No Growth after 144 hours














<Laura Caraballo - Last Filed: 09/02/21 18:55>





Subjective


Patient seen and examined independently.  Patient was also seen by Pedrito Zaays NP and case was discussed.  I am in agreement with subjective, physical exam, 

assessment and plan as written above and amended below.





Patient is significantly confused.  He denies any pain, no nausea or vomiting.  

Confused and attempting to drink applesauce out of.





General: non toxic, no distress, appears at stated age


Derm: Excoriation bilateral lower extremities, multiple bruises in various 

stages of healing warm, dry


Head: atraumatic, normocephalic, symmetric


Eyes: EOMI, no lid lag, anicteric sclera


Mouth: no lip lesion, mucus membranes moist


Cardiovascular: S1S2 reg, no murmur, positive posterior tibial pulse bilateral, 


Lungs: Decreased breath sounds bilateral, no rhonchi, no rales , no accessory 

muscle use


Abdominal: soft,  nontender to palpation, no guarding, no appreciable 

organomegaly


Ext: no gross muscle atrophy, no edema, no contractures


Neuro:  CN II-XI grossly intact, no focal neuro deficits


Psych: Alert, oriented to self, moving all of her bed and appears fidgety








Objective





- Vital Signs


Vital signs: 


                                   Vital Signs











Temp  98.8 F   09/02/21 16:00


 


Pulse  105 H  09/02/21 16:00


 


Resp  18   09/02/21 16:00


 


BP  135/89   09/02/21 16:00


 


Pulse Ox  98   09/02/21 16:00








                                 Intake & Output











 09/01/21 09/02/21 09/02/21





 18:59 06:59 18:59


 


Intake Total 960  570


 


Output Total  1200 


 


Balance 960 -1200 570


 


Weight 106 kg 60 kg 


 


Intake:   


 


  Intake, IV Titration   50





  Amount   


 


    Thiamine 100 mg In Sodium   50





    Chloride 0.9% 50 ml @   





    100 mls/hr IVPB Q12HR [facility]   





    Rx#:[id number]   


 


  Oral 960  520


 


Output:   


 


  Urine  1200 


 


Other:   


 


  Voiding Method Indwelling Catheter Indwelling Catheter Indwelling Catheter


 


  # Bowel Movements 3 1 3














- Labs


CBC & Chem 7: 


                                 09/02/21 10:25





                                 09/02/21 10:25


Labs: 


                  Abnormal Lab Results - Last 24 Hours (Table)











  09/02/21 09/02/21 09/02/21 Range/Units





  10:25 10:25 10:25 


 


WBC  15.4 H    (3.8-10.6)  k/uL


 


RBC  3.31 L    (4.30-5.90)  m/uL


 


Hgb  11.3 L    (13.0-17.5)  gm/dL


 


Hct  32.6 L    (39.0-53.0)  %


 


RDW  15.7 H    (11.5-15.5)  %


 


Plt Count  451 H    (150-450)  k/uL


 


D-Dimer     (<0.60)  mg/L FEU


 


VBG pH    7.54 H  (7.31-7.41)  


 


VBG pCO2    25 L  (37-51)  mmHg


 


VBG HCO3    21 L  (24-28)  mmol/L


 


Chloride   108 H   ()  mmol/L


 


Carbon Dioxide   21 L   (22-30)  mmol/L


 


BUN   5 L   (9-20)  mg/dL


 


Creatinine   0.31 L   (0.66-1.25)  mg/dL


 


POC Glucose (mg/dL)     (75-99)  mg/dL


 


Calcium   8.2 L   (8.4-10.2)  mg/dL


 


C-Reactive Protein     (<1.0)  mg/dL














  09/02/21 09/02/21 09/02/21 Range/Units





  11:07 11:07 11:41 


 


WBC     (3.8-10.6)  k/uL


 


RBC     (4.30-5.90)  m/uL


 


Hgb     (13.0-17.5)  gm/dL


 


Hct     (39.0-53.0)  %


 


RDW     (11.5-15.5)  %


 


Plt Count     (150-450)  k/uL


 


D-Dimer  3.30 H    (<0.60)  mg/L FEU


 


VBG pH     (7.31-7.41)  


 


VBG pCO2     (37-51)  mmHg


 


VBG HCO3     (24-28)  mmol/L


 


Chloride     ()  mmol/L


 


Carbon Dioxide     (22-30)  mmol/L


 


BUN     (9-20)  mg/dL


 


Creatinine     (0.66-1.25)  mg/dL


 


POC Glucose (mg/dL)    100 H  (75-99)  mg/dL


 


Calcium     (8.4-10.2)  mg/dL


 


C-Reactive Protein   5.1 H   (<1.0)  mg/dL








                      Microbiology - Last 24 Hours (Table)











 08/29/21 17:40 Blood Culture - Preliminary





 Blood    No Growth after 72 hours


 


 08/28/21 11:55 Anaerobic Culture - Final





 Ankle - Left

## 2021-09-02 NOTE — XR
EXAMINATION TYPE: XR chest 1V portable

 

DATE OF EXAM: 9/2/2021

 

CLINICAL HISTORY: Difficulty breathing and weakness.  

 

TECHNIQUE: Single AP portable upright view of the chest is obtained.

 

COMPARISON: Chest CT from one day earlier and older studies

 

FINDINGS:  Persistent low lung volumes and elevated right hemidiaphragm. Persistent bibasilar linear 
scarring and/or atelectasis. No pneumothorax seen bilaterally. No enlarging pleural effusions present
. Cardiac silhouette size stable and upper limits of normal. Osseous structures are intact.

 

IMPRESSION: Low lung volumes and elevated right hemidiaphragm with bibasilar linear scarring and/or a
telectasis. No new suspicious focal infiltrate.

## 2021-09-02 NOTE — CT
EXAMINATION TYPE: CT chest angio for PE

 

DATE OF EXAM: 9/2/2021

 

COMPARISON: Chest CT from yesterday.

 

HISTORY: respiratory alkalosis with elevated d-dimer

 

CT DLP: 834.8 mGycm

Automated exposure control for dose reduction was used.

 

CONTRAST: 

CTA Chest for pulmonary embolism performed with with IV Contrast, patient injected with 100 mL of Iso
vesta 370. MIP images are created and reviewed.

 

FINDINGS:

 

LUNGS: Persistent small bilateral pleural effusions and associated compressive atelectasis in the bas
es. Some respiratory motion artifact on current study. Persistent irregularity approximately 1.9 cm f
ocus of groundglass opacity in the periphery left upper lobe axial image 31. Elevated right hemidiaph
ragm redemonstrated.

 

MEDIASTINUM: There is suboptimal study with near equal contrast in the right and left heart systems b
ut no convincing CT evidence for acute pulmonary embolism.  There are no new greater than 1 cm hilar 
or mediastinal lymph nodes. Heart size stable and within normal limits.  No pericardial effusion is s
een.  

 

OTHER:  Diffuse fatty infiltration of liver redemonstrated. Multilevel spurring thoracic spine again 
seen. Scoliotic curvature redemonstrated.

 

IMPRESSION: Suboptimal study without CT evidence for acute pulmonary embolism. Lateral left upper sanjuanita
g groundglass opacity could reflect acute infectious process. No significant change from one day deandra
ier. Stable small bilateral pleural effusions noted.

## 2021-09-02 NOTE — P.PN
Subjective


Progress Note Date: 09/02/21


Principal diagnosis: 


 Altered mental status, aspiration pneumonia, acute EtOH withdrawal





This a 55-year-old white male patient who was admitted to the hospital on August 23, 2021 when he was found delirious and confused by his neighbor and his home. 

Patient was found laying on the floor, in his own feces, he had a bunch alcohol 

bottles lying around him.  he had multiple bruises and abrasions in different 

stages of healing.  He has a hematoma to his left eye , and abrasions on his 

knees and lower extremities.  Tendon has a history of cullen abuse, eats marijuana

edibles.  Drug screen was positive for marijuana, benzodiazepines.  His alcohol 

level was less than 10.  Brain CT showed no acute intracranial abnormality, no 

fracture, cervical spine, additional spondylotic changes in the cervical spine. 

CT of the facial bones showed evidence of old fracture of the floor of the left 

bony orbit, no acute fractures.  Initial chest x-ray showed minimal subsegmental

atelectasis in the right lung base without change.  EKG showed sinus rhythm.  

Initial blood work showed a white count of 7.8, hemoglobin 12.3, INR of 1.2, 

sodium was 153, potassium 3.4, chloride is 115, CO2 is 24, B1 and creatinine 

0.74, troponin was 0.016.  Patient was started on benzodiazepines in the form of

Ativan per [facility] protocol.  Neurology consultation was obtained.  Psychiatric 

services are following.  Patient is on thiamine and folate per [facility]WA protocol.  

ABG showed moderate to severe degree of slowing, consistent with toxic metabolic

encephalopathy, no epileptiform discharges were seen.  This morning patient 

started spiking fevers with a temp of 101.4F, he is also requiring supplemental

oxygen currently at 2 L with a pulse ox of 93-97%, he is receiving Valium and 

when necessary Ativan.  Quite lethargic, confused, he opens eyes to voice, 

however he is not able to provide any meaningful verbal responses.  Today's 

chest x-ray has been reviewed showing right basilar patchy infiltrate and/or 

atelectasis.  Patient was placed on empiric antibiotics in the form of Rocephin 

and vancomycin.  In view of altered mentation and fever lumbar puncture was 

requested however anesthesia was unable to perform it because Lovenox was given 

this morning.  We were consulted in view of depressed level of consciousness 

related to benzodiazepines and possibility of needing to intubate the patient 

placement on mechanical ventilator.  Blood gas has been obtained showing pO2 of 

85, pCO2 of 33, pH of 7.51 this was done on FiO2 of 28%, patient is breathing 

fairly comfortably, does not appear to be in any acute distress, head of the bed

is up 35, he is currently on 2 L of oxygen, his pulse ox is 97%.  No coughing 

or wheezing.  He continues on empiric antibiotics. 





On today's evaluation of a 8/27/ 2021, the patient is essentially the same.  No 

major improvement in his underlying mental status.  The patient remains 

encephalopathic.  He is unable to say.  This is 50 gases some few words such as 

yes or no.  Unable to follow any commands.  His been having also episodes of 

fever.  Based on all this, the patient underwent a lumbar puncture suspecting 

encephalitis.  No neck stiffness.  The lumbar puncture was completed and the 

patient was found to have elevated CSF protein.  The total white cell count was 

18.  The patient had 85% polys tear cells and 50% mononuclear cells.  The CSF 

protein was 226.  Glucose is a 69.  Patient was also covered with broad-spectrum

antibiotics including a combination of Rocephin and vancomycin.  The patient is 

also on acyclovir IV 1 g 3 times a day.  The white cell count is 11.5 with 

hemoglobin of 12.1.  INR is at 1.4.  BUN is at 4 with a creatinine of 0.5.  Rest

of the electrodes are within normal limits.  No seizure activity has been noted.

 EEG was done and was quite abnormal due to background slowing and moderate to 

severe slowing of the EEG activity suggestive of generalized cerebral 

dysfunction.  Neurology is on the case for now.  In terms of alcohol withdrawal,

delirium tremors, the patient is currently on Ativan based on the [facility] protocol.

 The patient is also on D5 half-normal saline today to 100 mL an hour.  The 

patient will be transferred to the intensive care unit for further monitoring.








8/28/2021, the patient is still encephalopathic.  Slightly more rested and more 

awake compared to yesterday.  Nevertheless he remains encephalopathic.  No neck 

stiffness.  No headaches.  MRI of the brain was done and showed no acute 

abnormalities.  As stated earlier, his CSF evaluation was not consistent with 

bacterial meningitis.  HSV by PCR still pending.  He remains on vancomycin.  He 

remains on Rocephin.  He remains on acyclovir.  He is receiving Ativan per [facility] 

protocol.  He is on D5 half-normal saline at the rate of 100 mL an hour.  Hemo

dynamically stable.  Pulse ox is 98% on 2 L of oxygen by nasal cannula.  Chest 

x-ray shows no evidence of any pneumonia.  Note that his urine drug screen was 

positive for benzodiazepines and marijuana.  His blood work from today shows a 

WD second of 11.8 with hemoglobin of 12.3.  Platelets is at 193.  Electrolytes 

are normal.  Renal functions are normal.  Hepatic function is normal.  Albumin 

is at 2.1 with a protein of 4.8.





On today's evaluation of a 08/29/2021, the patient is breathing comfortably.  

The patient got transferred out of the intensive care unit.  Currently is on 

room air oxygen.  He remains somewhat encephalopathic.  No agitation.  No other 

significant respiratory distress.  No significant tachycardia.  Lumbar puncture 

came back negative for any bacterial growth.  Lumbar puncture was also negative 

for HSV by PCR.  Acyclovir was discontinued.  There was a concern that the p

atient may have a septic joint.  He was seen by orthopedic surgery.  He is left 

ankle was drained.  A total of 60 mL of fluid was removed from the ankle and was

sent to lab.  Patient was kept on antibiotics and patient is currently on a 

combination of cefepime and vancomycin.  Cultures are all negative the white 

cell count.  The white cell count is at 11.8.  INR is at 1.4 with a PT of 13.8. 

His creatinine today is 0.7.  MRI of the lumbar spine was done and showed no 

evidence of any discitis.  No significant disc space narrowing.  There is 

evidence of no fracture.  There is evidence of multiple.  Mild joint facet 

arthropathy.





On 08/30/2021 patient seen in follow-up on selective care unit, he is much more 

awake today, is responding verbally, he knows he is in the hospital, he is 

oriented to person, denies any acute distress.  He denies heavy alcohol intake. 

He states he fell at home, and he was stone sober at the time.  Currently he is 

on room air, with pulse ox of 97%, vital signs have been stable, his been 

afebrile.  He remains on cefepime and vancomycin.  His chest x-ray on 08/20/2021

showed a subsegmental basilar atelectatic changes.  His CSF cultures have shown 

no growth after 3 days, blood culture from 08/20/2021 showed Staphylococcus 

epidermidis likely related to skin contamination, left ankle wound culture has 

shown no growth.  His vital signs have been stable, he denies any difficulty 

breathing, no cough, no phlegm production, no chest discomfort, today's labs 

show a white blood cell count of 8.6, hemoglobin of 11.5.  MRI of the spine 

showed no evidence of discitis, no disc space narrowing, no fracture.  There was

a multilevel mild facet arthropathy and mild lateral recess stenosis, those 

multilevel mild posterior disc bulging without spinal stenosis.


 


on 08/31/2021  patient seen in follow-up on selective care unit, he is awake and

alert, in no acute distress, room air pulse ox is 97%, he is breathing 

comfortably,  no cough, no complaints of chest discomfort, normal production, 

still is having  fevers, T-max in the last 24 hours is 102F.  his follow-up 

blood culture from  08/29/2021  showed no growth,  patient remains on cefepime 

and vancomycin.  today's labs have been reviewed, self liquid silk on is to 

11.5, hemoglobin is 12.1. sodium is 140, potassium 3.1, this was replaced per 

protocol, chloride is 10 9, BUN is 4, creatinine 0.36, pro calcitonin level is 

50.  patient is a combination of cefepime and vancomycin.  orthopedic surgery is

following, patient has left ankle pain , CT of the left lower extremity showed 

soft tissue swelling and calcaneal spurring.  no altered mentation,  patient 

awake and alert and oriented times 3.





On 09/01/2021 patient seen in follow-up.  he is resting in bed, in no acute 

distress.  Denies any difficulty breathing, room air pulse ox is 99%, his been 

afebrile, vital signs have been stable.  No fever or chills.  No cough, no chest

discomfort.  Continues on antibiotics, his pro calcitonin level quite elevated 

at 50.22.  No clear evidence of pneumonia.  Blood culture from 08/28/2021 shows 

Staphylococcus epidermidis.  Today's labs have been reviewed, showing white 

blood cell count of 15.9, hemoglobin of 11.7, potassium is 3.4, CO2 is 20, 

tested electrolytes were within normal limits, B1 is 5, creatinine 0.30.  His 

appetite has been poor, and he is been consuming very little of his meals.





On 09/02/2021 patient is seen in follow-up on selective care unit, he is awake 

and alert, in no acute distress, he sitting up and he did do bed, he is working 

with physical therapy, he is breathing comfortably, room air pulse ox is 98%, T-

max in the last 24 hours was 99.8F, blood pressure is been stable.  Patient had

left ankle cellulitis aspirated would removal of 6 mL of serous joint fluid.  

Remains on vancomycin, blood cultures were positive for Staphylococcus aureus.  

From pulmonary perspective he denies any cough, no phlegm production.  CT chest 

showed no evidence of pneumonia or aspiration pneumonia, small left apical 

density in the follow-up was recommended, small pleural effusions. No new labs 

today





Objective





- Vital Signs


Vital signs: 


                                   Vital Signs











Temp  98.9 F   09/02/21 08:00


 


Pulse  112 H  09/02/21 08:00


 


Resp  18   09/02/21 08:00


 


BP  156/68   09/02/21 08:00


 


Pulse Ox  98   09/02/21 08:00








                                 Intake & Output











 09/01/21 09/02/21 09/02/21





 18:59 06:59 18:59


 


Intake Total 960  


 


Output Total  1200 


 


Balance 960 -1200 


 


Weight 106 kg 60 kg 


 


Intake:   


 


  Oral 960  


 


Output:   


 


  Urine  1200 


 


Other:   


 


  Voiding Method Indwelling Catheter Indwelling Catheter Indwelling Catheter


 


  # Bowel Movements 3 1 














- Exam


 GENERAL EXAM: Alert, much more responsive on today's exam, 55-year-old male, on

room air with a pulse ox of 97%, with bruising on his face, arms and legs and 

over left eye, oriented to person and place comfortable in no apparent distress.


HEAD: Normocephalic/atraumatic.


EYES: Normal reaction of pupils, equal size.  Conjunctiva pink, sclera white.


NOSE: Clear with pink turbinates.


THROAT: No erythema or exudates.


NECK: No masses, no JVD, no thyroid enlargement, no adenopathy.


CHEST: No chest wall deformity.  Symmetrical expansion. 


LUNGS: Equal air entry with no crackles, wheeze, rhonchi or dullness.


CVS: Regular rate and rhythm, normal S1 and S2, no gallops, no murmurs, no rubs


ABDOMEN: Soft, nontender.  No hepatosplenomegaly, normal bowel sounds, no 

guarding or rigidity.


EXTREMITIES: No clubbing, no edema, no cyanosis, 2+ pulses and upper and lower 

extremities.


MUSCULOSKELETAL: Muscle strength and tone normal.


SPINE: No scoliosis or deformity


SKIN: No rashes, abrasions and bruising on bilateral lower extremities, arms, 

and over the left eye


CENTRAL NERVOUS SYSTEM: Alert and oriented -2.  No focal deficits, tone is 

normal in all 4 extremities.


PSYCHIATRIC: Alert and oriented -2.  Appropriate affect.  Intact judgment and 

insight.











- Labs


CBC & Chem 7: 


                                 09/01/21 06:41





                                 09/01/21 06:41


Labs: 


                  Abnormal Lab Results - Last 24 Hours (Table)











  09/01/21 09/01/21 Range/Units





  11:38 16:51 


 


POC Glucose (mg/dL)  109 H  102 H  (75-99)  mg/dL








                      Microbiology - Last 24 Hours (Table)











 08/29/21 17:40 Blood Culture - Preliminary





 Blood    No Growth after 72 hours


 


 08/28/21 11:55 Anaerobic Culture - Final





 Ankle - Left 


 


 08/27/21 19:00 Anaerobic Culture - Final





 Knee - Right 


 


 08/28/21 14:59 Blood Culture Gram Stain - Final





 Blood Blood Culture - Final





    Staphylococcus epidermidis


 


 08/26/21 09:05 Blood Culture - Final





 Blood    No Growth after 144 hours














Assessment and Plan


Plan: 


 Assessment:





#1.  Acute hypoxic respiratory failure related to atelectasis, and possibility 

of right basilar pneumonia is not completely excluded, chest x-ray showing right

basilar patchy infiltrate.  Currently patient is on room air, follow-up chest x-

ray showed subsegmental basilar atelectatic changes.  No clear evidence of 

pneumonia.  No pulmonary symptoms





#2.  Altered mental status, related to acute delirium tremens.  CT of the head 

and cervical spine showing no acute pathology, CSF cultures remain negative.  

HSV by PCR was negative.  Cultures remain negative, MRI of the brain was ne

gative, no seizure activity.  Her mentation is much improved, and patient is 

oriented to person and place





#3.  History of prescription drug abuse, alcohol abuse





#4.  Falls at home, related to altered mental status





#5.  Multiple skin abrasions, including left eye bruising, with a CT of the face

showing no acute fractures





#6.  Hypernatremia related to dehydration free water deficit, improved with IV 

fluids





#7.  Hypertension





#8.  GERD/reflux





#9.  Anxiety





#10.  Left ankle cellulitis, status post aspiration





#11.  Blood culture positive for Staphylococcus epidermidis, likely contaminant.

 Remains on vancomycin





#12.  Small left apical density seen on the CT chest without contrast dated 09 /01/2021 possibly related to atelectasis and underlying mass is being 

considered.  This will be followed on an outpatient basis





Plan:





Stable from pulmonary perspective


No cough, no phlegm production,


No fever


Patient is on room air


CT of the chest has been reviewed


This will be followed up on an outpatient basis after discharge








I performed a history & physical examination of the patient and discussed their 

management with my nurse practitioner, Blanche Dobbins.  I reviewed the nurse 

practitioner's note and agree with the documented findings and plan of care.  

Lung sounds are positive for diffuse wheezes throughout the lung fields.  The 

findings and the impression was discussed with the patient.  I attest to the 

documentation by the nurse practitioner. 





Time with Patient: Less than 30

## 2021-09-03 LAB
ANION GAP SERPL CALC-SCNC: 12 MMOL/L
BUN SERPL-SCNC: 4 MG/DL (ref 9–20)
CALCIUM SPEC-MCNC: 8.4 MG/DL (ref 8.4–10.2)
CHLORIDE SERPL-SCNC: 107 MMOL/L (ref 98–107)
CO2 SERPL-SCNC: 17 MMOL/L (ref 22–30)
ERYTHROCYTE [DISTWIDTH] IN BLOOD BY AUTOMATED COUNT: 3.43 M/UL (ref 4.3–5.9)
ERYTHROCYTE [DISTWIDTH] IN BLOOD: 15.7 % (ref 11.5–15.5)
GLUCOSE BLD-MCNC: 104 MG/DL (ref 75–99)
GLUCOSE BLD-MCNC: 107 MG/DL (ref 75–99)
GLUCOSE BLD-MCNC: 97 MG/DL (ref 75–99)
GLUCOSE BLD-MCNC: 99 MG/DL (ref 75–99)
GLUCOSE SERPL-MCNC: 96 MG/DL (ref 74–99)
HCT VFR BLD AUTO: 34.7 % (ref 39–53)
HGB BLD-MCNC: 11.1 GM/DL (ref 13–17.5)
MAGNESIUM SPEC-SCNC: 1.8 MG/DL (ref 1.6–2.3)
MCH RBC QN AUTO: 32.4 PG (ref 25–35)
MCHC RBC AUTO-ENTMCNC: 32 G/DL (ref 31–37)
MCV RBC AUTO: 101.1 FL (ref 80–100)
PLATELET # BLD AUTO: 498 K/UL (ref 150–450)
POTASSIUM SERPL-SCNC: 4.9 MMOL/L (ref 3.5–5.1)
SODIUM SERPL-SCNC: 136 MMOL/L (ref 137–145)
WBC # BLD AUTO: 14.2 K/UL (ref 3.8–10.6)

## 2021-09-03 RX ADMIN — NYSTATIN SCH APPLIC: 100000 OINTMENT TOPICAL at 17:55

## 2021-09-03 RX ADMIN — PYRIDOXINE HCL TAB 50 MG SCH MG: 50 TAB at 09:54

## 2021-09-03 RX ADMIN — SODIUM CHLORIDE SCH MLS/HR: 9 INJECTION, SOLUTION INTRAVENOUS at 22:58

## 2021-09-03 RX ADMIN — PANTOPRAZOLE SODIUM SCH MG: 40 TABLET, DELAYED RELEASE ORAL at 06:13

## 2021-09-03 RX ADMIN — NYSTATIN SCH APPLIC: 100000 OINTMENT TOPICAL at 22:59

## 2021-09-03 RX ADMIN — NYSTATIN SCH APPLIC: 100000 OINTMENT TOPICAL at 09:54

## 2021-09-03 RX ADMIN — MEGESTROL ACETATE SCH MG: 40 SUSPENSION ORAL at 12:07

## 2021-09-03 RX ADMIN — SODIUM CHLORIDE SCH MLS/HR: 9 INJECTION, SOLUTION INTRAVENOUS at 16:40

## 2021-09-03 RX ADMIN — SODIUM CHLORIDE SCH MLS/HR: 9 INJECTION, SOLUTION INTRAVENOUS at 08:36

## 2021-09-03 RX ADMIN — HEPARIN SODIUM SCH UNIT: 5000 INJECTION INTRAVENOUS; SUBCUTANEOUS at 22:53

## 2021-09-03 RX ADMIN — HEPARIN SODIUM SCH UNIT: 5000 INJECTION INTRAVENOUS; SUBCUTANEOUS at 16:33

## 2021-09-03 RX ADMIN — PYRIDOXINE HCL TAB 50 MG SCH MG: 50 TAB at 21:11

## 2021-09-03 RX ADMIN — SODIUM CHLORIDE SCH MLS/HR: 900 INJECTION, SOLUTION INTRAVENOUS at 07:59

## 2021-09-03 RX ADMIN — SODIUM CHLORIDE SCH MLS/HR: 900 INJECTION, SOLUTION INTRAVENOUS at 21:11

## 2021-09-03 RX ADMIN — FOLIC ACID SCH MG: 1 TABLET ORAL at 09:54

## 2021-09-03 NOTE — P.PN
Subjective


Progress Note Date: 09/03/21


Principal diagnosis: 


 Altered mental status, aspiration pneumonia, acute EtOH withdrawal





This a 55-year-old white male patient who was admitted to the hospital on August 23, 2021 when he was found delirious and confused by his neighbor and his home. 

Patient was found laying on the floor, in his own feces, he had a bunch alcohol 

bottles lying around him.  he had multiple bruises and abrasions in different 

stages of healing.  He has a hematoma to his left eye , and abrasions on his 

knees and lower extremities.  Tendon has a history of cullen abuse, eats marijuana

edibles.  Drug screen was positive for marijuana, benzodiazepines.  His alcohol 

level was less than 10.  Brain CT showed no acute intracranial abnormality, no 

fracture, cervical spine, additional spondylotic changes in the cervical spine. 

CT of the facial bones showed evidence of old fracture of the floor of the left 

bony orbit, no acute fractures.  Initial chest x-ray showed minimal subsegmental

atelectasis in the right lung base without change.  EKG showed sinus rhythm.  

Initial blood work showed a white count of 7.8, hemoglobin 12.3, INR of 1.2, 

sodium was 153, potassium 3.4, chloride is 115, CO2 is 24, B1 and creatinine 

0.74, troponin was 0.016.  Patient was started on benzodiazepines in the form of

Ativan per CIWA protocol.  Neurology consultation was obtained.  Psychiatric 

services are following.  Patient is on thiamine and folate per CIWA protocol.  

ABG showed moderate to severe degree of slowing, consistent with toxic metabolic

encephalopathy, no epileptiform discharges were seen.  This morning patient 

started spiking fevers with a temp of 101.4F, he is also requiring supplemental

oxygen currently at 2 L with a pulse ox of 93-97%, he is receiving Valium and 

when necessary Ativan.  Quite lethargic, confused, he opens eyes to voice, 

however he is not able to provide any meaningful verbal responses.  Today's 

chest x-ray has been reviewed showing right basilar patchy infiltrate and/or 

atelectasis.  Patient was placed on empiric antibiotics in the form of Rocephin 

and vancomycin.  In view of altered mentation and fever lumbar puncture was 

requested however anesthesia was unable to perform it because Lovenox was given 

this morning.  We were consulted in view of depressed level of consciousness 

related to benzodiazepines and possibility of needing to intubate the patient 

placement on mechanical ventilator.  Blood gas has been obtained showing pO2 of 

85, pCO2 of 33, pH of 7.51 this was done on FiO2 of 28%, patient is breathing 

fairly comfortably, does not appear to be in any acute distress, head of the bed

is up 35, he is currently on 2 L of oxygen, his pulse ox is 97%.  No coughing 

or wheezing.  He continues on empiric antibiotics. 





On today's evaluation of a 8/27/ 2021, the patient is essentially the same.  No 

major improvement in his underlying mental status.  The patient remains 

encephalopathic.  He is unable to say.  This is 50 gases some few words such as 

yes or no.  Unable to follow any commands.  His been having also episodes of 

fever.  Based on all this, the patient underwent a lumbar puncture suspecting 

encephalitis.  No neck stiffness.  The lumbar puncture was completed and the 

patient was found to have elevated CSF protein.  The total white cell count was 

18.  The patient had 85% polys tear cells and 50% mononuclear cells.  The CSF 

protein was 226.  Glucose is a 69.  Patient was also covered with broad-spectrum

antibiotics including a combination of Rocephin and vancomycin.  The patient is 

also on acyclovir IV 1 g 3 times a day.  The white cell count is 11.5 with 

hemoglobin of 12.1.  INR is at 1.4.  BUN is at 4 with a creatinine of 0.5.  Rest

of the electrodes are within normal limits.  No seizure activity has been noted.

 EEG was done and was quite abnormal due to background slowing and moderate to 

severe slowing of the EEG activity suggestive of generalized cerebral 

dysfunction.  Neurology is on the case for now.  In terms of alcohol withdrawal,

delirium tremors, the patient is currently on Ativan based on the CIWA protocol.

 The patient is also on D5 half-normal saline today to 100 mL an hour.  The 

patient will be transferred to the intensive care unit for further monitoring.








8/28/2021, the patient is still encephalopathic.  Slightly more rested and more 

awake compared to yesterday.  Nevertheless he remains encephalopathic.  No neck 

stiffness.  No headaches.  MRI of the brain was done and showed no acute 

abnormalities.  As stated earlier, his CSF evaluation was not consistent with 

bacterial meningitis.  HSV by PCR still pending.  He remains on vancomycin.  He 

remains on Rocephin.  He remains on acyclovir.  He is receiving Ativan per CIWA 

protocol.  He is on D5 half-normal saline at the rate of 100 mL an hour.  Hemo

dynamically stable.  Pulse ox is 98% on 2 L of oxygen by nasal cannula.  Chest 

x-ray shows no evidence of any pneumonia.  Note that his urine drug screen was 

positive for benzodiazepines and marijuana.  His blood work from today shows a 

WD second of 11.8 with hemoglobin of 12.3.  Platelets is at 193.  Electrolytes 

are normal.  Renal functions are normal.  Hepatic function is normal.  Albumin 

is at 2.1 with a protein of 4.8.





On today's evaluation of a 08/29/2021, the patient is breathing comfortably.  

The patient got transferred out of the intensive care unit.  Currently is on 

room air oxygen.  He remains somewhat encephalopathic.  No agitation.  No other 

significant respiratory distress.  No significant tachycardia.  Lumbar puncture 

came back negative for any bacterial growth.  Lumbar puncture was also negative 

for HSV by PCR.  Acyclovir was discontinued.  There was a concern that the p

atient may have a septic joint.  He was seen by orthopedic surgery.  He is left 

ankle was drained.  A total of 60 mL of fluid was removed from the ankle and was

sent to lab.  Patient was kept on antibiotics and patient is currently on a 

combination of cefepime and vancomycin.  Cultures are all negative the white 

cell count.  The white cell count is at 11.8.  INR is at 1.4 with a PT of 13.8. 

His creatinine today is 0.7.  MRI of the lumbar spine was done and showed no 

evidence of any discitis.  No significant disc space narrowing.  There is 

evidence of no fracture.  There is evidence of multiple.  Mild joint facet 

arthropathy.





On 08/30/2021 patient seen in follow-up on selective care unit, he is much more 

awake today, is responding verbally, he knows he is in the hospital, he is 

oriented to person, denies any acute distress.  He denies heavy alcohol intake. 

He states he fell at home, and he was stone sober at the time.  Currently he is 

on room air, with pulse ox of 97%, vital signs have been stable, his been 

afebrile.  He remains on cefepime and vancomycin.  His chest x-ray on 08/20/2021

showed a subsegmental basilar atelectatic changes.  His CSF cultures have shown 

no growth after 3 days, blood culture from 08/20/2021 showed Staphylococcus 

epidermidis likely related to skin contamination, left ankle wound culture has 

shown no growth.  His vital signs have been stable, he denies any difficulty 

breathing, no cough, no phlegm production, no chest discomfort, today's labs 

show a white blood cell count of 8.6, hemoglobin of 11.5.  MRI of the spine 

showed no evidence of discitis, no disc space narrowing, no fracture.  There was

a multilevel mild facet arthropathy and mild lateral recess stenosis, those 

multilevel mild posterior disc bulging without spinal stenosis.


 


on 08/31/2021  patient seen in follow-up on selective care unit, he is awake and

alert, in no acute distress, room air pulse ox is 97%, he is breathing 

comfortably,  no cough, no complaints of chest discomfort, normal production, 

still is having  fevers, T-max in the last 24 hours is 102F.  his follow-up 

blood culture from  08/29/2021  showed no growth,  patient remains on cefepime 

and vancomycin.  today's labs have been reviewed, self liquid silk on is to 

11.5, hemoglobin is 12.1. sodium is 140, potassium 3.1, this was replaced per 

protocol, chloride is 10 9, BUN is 4, creatinine 0.36, pro calcitonin level is 

50.  patient is a combination of cefepime and vancomycin.  orthopedic surgery is

following, patient has left ankle pain , CT of the left lower extremity showed 

soft tissue swelling and calcaneal spurring.  no altered mentation,  patient 

awake and alert and oriented times 3.





On 09/01/2021 patient seen in follow-up.  he is resting in bed, in no acute 

distress.  Denies any difficulty breathing, room air pulse ox is 99%, his been 

afebrile, vital signs have been stable.  No fever or chills.  No cough, no chest

discomfort.  Continues on antibiotics, his pro calcitonin level quite elevated 

at 50.22.  No clear evidence of pneumonia.  Blood culture from 08/28/2021 shows 

Staphylococcus epidermidis.  Today's labs have been reviewed, showing white 

blood cell count of 15.9, hemoglobin of 11.7, potassium is 3.4, CO2 is 20, 

tested electrolytes were within normal limits, B1 is 5, creatinine 0.30.  His 

appetite has been poor, and he is been consuming very little of his meals.





On 09/02/2021 patient is seen in follow-up on selective care unit, he is awake 

and alert, in no acute distress, he sitting up and he did do bed, he is working 

with physical therapy, he is breathing comfortably, room air pulse ox is 98%, T-

max in the last 24 hours was 99.8F, blood pressure is been stable.  Patient had

left ankle cellulitis aspirated would removal of 6 mL of serous joint fluid.  

Remains on vancomycin, blood cultures were positive for Staphylococcus aureus.  

From pulmonary perspective he denies any cough, no phlegm production.  CT chest 

showed no evidence of pneumonia or aspiration pneumonia, small left apical 

density in the follow-up was recommended, small pleural effusions. No new labs 

today





On 09/03/2021 patient seen in follow-up on selective care unit, he is resting in

bed, he is laying flat in bed, doesn't appear to be any distress, denies any 

shortness of breath, no cough, breathing comfortably, he is alert and oriented 

to person and place, disoriented to month and year, but knew the president.  

This to be less confused on today's exam, lung sounds are clear, no rhonchi, no 

wheezing.  Vital signs have been stable overnight, her pulse ox is 98%, T-max 

was 99.6 in the last 24 hours.  Yesterday his d-dimer came back slightly 

elevated at 3.3, and chest CTA was completed showing no clear evidence of 

pulmonary embolism, but this was a suboptimal study.  Lung windows showed 

persistent small bilateral pleural effusions and associated compressive 

atelectasis at the bases.  There was elevation of the right hemidiaphragm again 

noted.  Anpersistent irregularity measuring approximately 1.9 cm of ground glass

opacity in the periphery of the left upper lobe.  Remains on antibiotics in the 

form of vancomycin for left ankle cellulitis.  The left ankle has been drained 

by orthopedic surgery, there is some minimal redness, no warmth.  Follow blood 

cultures have been negative at the 96 hour jam.  I'll pro calcitonin is down to

10.4.  He is down to 5.1.  ID service is following.





Objective





- Vital Signs


Vital signs: 


                                   Vital Signs











Temp  99.3 F   09/03/21 08:00


 


Pulse  113 H  09/03/21 08:00


 


Resp  18   09/03/21 08:00


 


BP  140/91   09/03/21 08:00


 


Pulse Ox  98   09/03/21 08:00








                                 Intake & Output











 09/02/21 09/03/21 09/03/21





 18:59 06:59 18:59


 


Intake Total 570 240 180


 


Output Total  1150 


 


Balance 570 -910 180


 


Weight  104 kg 104 kg


 


Intake:   


 


  Intake, IV Titration 50  





  Amount   


 


    Thiamine 100 mg In Sodium 50  





    Chloride 0.9% 50 ml @   





    100 mls/hr IVPB Q12HR [facility]   





    Rx#:[id number]   


 


  Oral 520 240 180


 


Output:   


 


  Urine  1150 


 


Other:   


 


  Voiding Method Indwelling Catheter Indwelling Catheter Diaper


 


  # Bowel Movements 3 2 














- Exam


 GENERAL EXAM: Alert, much more responsive on today's exam, 55-year-old male, on

room air with a pulse ox of 97%, with bruising on his face, arms and legs and 

over left eye, oriented to person and place comfortable in no apparent distress.


HEAD: Normocephalic/atraumatic.


EYES: Normal reaction of pupils, equal size.  Conjunctiva pink, sclera white.


NOSE: Clear with pink turbinates.


THROAT: No erythema or exudates.


NECK: No masses, no JVD, no thyroid enlargement, no adenopathy.


CHEST: No chest wall deformity.  Symmetrical expansion. 


LUNGS: Equal air entry with no crackles, wheeze, rhonchi or dullness.


CVS: Regular rate and rhythm, normal S1 and S2, no gallops, no murmurs, no rubs


ABDOMEN: Soft, nontender.  No hepatosplenomegaly, normal bowel sounds, no 

guarding or rigidity.


EXTREMITIES: No clubbing, no edema, no cyanosis, 2+ pulses and upper and lower 

extremities.


MUSCULOSKELETAL: Muscle strength and tone normal.


SPINE: No scoliosis or deformity


SKIN: No rashes, abrasions and bruising on bilateral lower extremities, arms, 

and over the left eye


CENTRAL NERVOUS SYSTEM: Alert and oriented -2.  No focal deficits, tone is 

normal in all 4 extremities.


PSYCHIATRIC: Alert and oriented -2.  Appropriate affect.  Intact judgment and 

insight.











- Labs


CBC & Chem 7: 


                                 09/03/21 07:37





                                 09/03/21 06:36


Labs: 


                  Abnormal Lab Results - Last 24 Hours (Table)











  09/02/21 09/02/21 09/02/21 Range/Units





  11:07 11:07 11:07 


 


WBC     (3.8-10.6)  k/uL


 


RBC     (4.30-5.90)  m/uL


 


Hgb     (13.0-17.5)  gm/dL


 


Hct     (39.0-53.0)  %


 


MCV     (80.0-100.0)  fL


 


RDW     (11.5-15.5)  %


 


Plt Count     (150-450)  k/uL


 


D-Dimer  3.30 H    (<0.60)  mg/L FEU


 


Sodium     (137-145)  mmol/L


 


Carbon Dioxide     (22-30)  mmol/L


 


BUN     (9-20)  mg/dL


 


Creatinine     (0.66-1.25)  mg/dL


 


POC Glucose (mg/dL)     (75-99)  mg/dL


 


C-Reactive Protein   5.1 H   (<1.0)  mg/dL


 


Procalcitonin    10.40 H  (0.02-0.09)  ng/mL














  09/02/21 09/03/21 09/03/21 Range/Units





  11:41 06:10 06:36 


 


WBC     (3.8-10.6)  k/uL


 


RBC     (4.30-5.90)  m/uL


 


Hgb     (13.0-17.5)  gm/dL


 


Hct     (39.0-53.0)  %


 


MCV     (80.0-100.0)  fL


 


RDW     (11.5-15.5)  %


 


Plt Count     (150-450)  k/uL


 


D-Dimer     (<0.60)  mg/L FEU


 


Sodium    136 L  (137-145)  mmol/L


 


Carbon Dioxide    17 L  (22-30)  mmol/L


 


BUN    4 L  (9-20)  mg/dL


 


Creatinine    0.30 L  (0.66-1.25)  mg/dL


 


POC Glucose (mg/dL)  100 H  104 H   (75-99)  mg/dL


 


C-Reactive Protein     (<1.0)  mg/dL


 


Procalcitonin     (0.02-0.09)  ng/mL














  09/03/21 Range/Units





  07:37 


 


WBC  14.2 H  (3.8-10.6)  k/uL


 


RBC  3.43 L  (4.30-5.90)  m/uL


 


Hgb  11.1 L  (13.0-17.5)  gm/dL


 


Hct  34.7 L  (39.0-53.0)  %


 


MCV  101.1 H  (80.0-100.0)  fL


 


RDW  15.7 H  (11.5-15.5)  %


 


Plt Count  498 H  (150-450)  k/uL


 


D-Dimer   (<0.60)  mg/L FEU


 


Sodium   (137-145)  mmol/L


 


Carbon Dioxide   (22-30)  mmol/L


 


BUN   (9-20)  mg/dL


 


Creatinine   (0.66-1.25)  mg/dL


 


POC Glucose (mg/dL)   (75-99)  mg/dL


 


C-Reactive Protein   (<1.0)  mg/dL


 


Procalcitonin   (0.02-0.09)  ng/mL








                      Microbiology - Last 24 Hours (Table)











 08/29/21 17:40 Blood Culture - Preliminary





 Blood    No Growth after 96 hours














Assessment and Plan


Plan: 


 Assessment:





#1.  Acute hypoxic respiratory failure related to atelectasis, and possibility 

of right basilar pneumonia is not completely excluded, chest x-ray showing right

basilar patchy infiltrate.  Currently patient is on room air, follow-up chest x-

ray showed subsegmental basilar atelectatic changes.  No clear evidence of 

pneumonia.  No pulmonary symptoms





#2.  Altered mental status, related to acute delirium tremens and acute toxic 

metabolic encephalopathy.  CT of the head and cervical spine showing no acute 

pathology, CSF cultures remain negative.  HSV by PCR was negative.  Cultures 

remain negative, MRI of the brain was negative, no seizure activity.  Her 

mentation is much improved, and patient is oriented to person and place





#3.  History of prescription drug abuse, alcohol abuse





#4.  Falls at home, related to altered mental status





#5.  Multiple skin abrasions, including left eye bruising, with a CT of the face

showing no acute fractures





#6.  Hypernatremia related to dehydration free water deficit, improved with IV 

fluids





#7.  Hypertension





#8.  GERD/reflux





#9.  Anxiety





#10.  Left ankle cellulitis, status post aspiration





#11.  Blood culture positive for Staphylococcus epidermidis, likely contaminant.

 Remains on vancomycin





#12.  Small left apical density seen on the CT chest without contrast dated 

09/01/2021 possibly related to atelectasis and underlying mass is being 

considered.  This will be followed on an outpatient basis





Plan:





CTA chest showed no clear evidence of pulmonary embolism


Stable from pulmonary perspective


No acute events overnight


Overall patient is generally debilitated, deconditioned


Encouraged patient to sit upright, and use incentive spirometer


Physical therapy swallowing


Today's labs have been noted


Procalcitonin level is improving


No fever or chills


Pulmonary service will sign follow-up on as-needed basis


We do need to see him in follow-up in regards to the left apical density seen in

the CT chest, and schedule follow-up imaging.





I performed a history & physical examination of the patient and discussed their 

management with my nurse practitioner, Blanche Dobbins.  I reviewed the nurse 

practitioner's note and agree with the documented findings and plan of care.  

Lung sounds are positive for diffuse wheezes throughout the lung fields.  The 

findings and the impression was discussed with the patient.  I attest to the 

documentation by the nurse practitioner. 





Time with Patient: Less than 30

## 2021-09-03 NOTE — PN
PROGRESS NOTE



DATE OF SERVICE:

09/03/2021



REASON FOR FOLLOWUP:

Possible left lower extremity cellulitis.



INTERVAL HISTORY:

Patient overall feels better and has improved.  The patient is breathing comfortably on

room air.  The patient remains to be pleasantly confused, not able to provide any

history.  No vomiting, diarrhea or other changes reported by nursing staff.

Complaining of some pain to the left ankle area.



PHYSICAL EXAMINATION:

Blood pressure 135/90 with a pulse of 115, temperature 99.3.  He is 96% on room air.

General description is a middle-aged male lying in bed in no distress. Respiratory

system unlabored breathing, clear to auscultation anteriorly. Heart S1, S2.  Regular

rate and rhythm. Left leg did have some swelling no significant redness.  Did have

tenderness on movement of the left leg area.



LABS:

Hemoglobin 11, white count 14.2, BUN of 4, creatinine 0.30.  Culture has been negative.



DIAGNOSTIC IMPRESSION AND PLAN:

Patient with fever, did have elevated white count in this patient did have extensive

workup but no clear focus of infection.  He has been having left ankle pain, which has

been aspirated.  Those cultures have been negative.  Because of the positive

cellulitis, covered with Vanco and transition to oral Zyvox on discharge for short

course. Close outpatient followup.





MMODL / IJN: [id number] / Job#: [id number]

## 2021-09-03 NOTE — P.PN
<Pedrito Zayas - Last Filed: 09/03/21 15:29>





Subjective


Progress Note Date: 09/03/21





Hospital course:





Patient is a 55-year-old male with a past medical history of EtOH abuse.  He p

resented to the hospital on 8/23/21 after being found delirious and confused by 

his neighbor at home.  Patient was reportedly found laying on the floor covered 

in his own feces with empty alcohol bottles surrounding him.  Patient presented 

to the hospital with multiple bruises and abrasions in different stages of 

healing.  UDS positive for marijuana and benzodiazepines.  EtOH level was less 

than 10.  Patient was initially found to have hypernatremia with sodium of 152, 

hyponatremia with potassium of 3.2, hyperchloremia with chloride of 114 and 

elevated creatinine kinase of 999.  He was admitted for acute metabolic 

encephalopathy with hallucinations and EtOH withdrawal, rhabdomyolysis, and se

psis without sepsis shock.  Patient has underwent multiple labs and imaging see 

below.  Currently admitted under our services with consultation to pulmonology, 

infectious disease, neurology, and orthopedic surgery.








Labs and imaging:





Initial blood culture showing no results after 144 hours, CSF fluid negative 

wound culture right knee negatives wound culture left ankle preliminary results 

negative, 1 out of 2 blood culture results repeated on 8/28/21 positive for 

Staphylococcus epidermidis, possibly contaminated, and repeat blood culture 

drawn on 8/29/21 showing no growth after 24 hours.





TSH 1.770.





Pro-calcitonin 50.22 and CRP of 8.1.





Chest x-ray completed 8/23/21 revealed minimal subsegmental atelectasis at the 

right lung base without change.





CT head and cervical spine completed 8/23/21 negative for acute intercranial 

abnormality with spondylitic changes to cervical spine with no acute fractures 

or abnormalities.





CT facial bones without contrast showing evidence of old blowout fracture on the

floor of the left bony orbit with reported displacement of 4 mm and is negative 

for acute fractures.





X-ray right elbow olecranon process spur formation with posterior soft tissue 

swelling negative for acute fractures.





Bilateral carotid Dopplers negative for hemodynamically significant internal 

carotid artery stenosis





Venous Doppler left lower extremity negative for DVT.





X-ray left ankle negative for acute fracture.





MRI brain with and without contrast showing mild atrophy with mild subependymal 

white matter increased signal around the lateral ventricles measuring up to 5 mm

in thickness no evidence of cortical infarct.





MRI lumbar spine with and without contrast showing no evidence of discitis, no 

significant disc space narrowing or acute fractures.  Multilevel mild facet 

arthroplasty and mild lateral recess stenosis with multilevel mild posterior 

disc bulging without spinal stenosis.





CT left lower extremity showing chronic changes with soft tissue swelling and 

calcaneal spurring, negative for acute fracture.





CT chest, abdomen and pelvis showed fatty infiltration of the liver, pleural 

effusions and basilar atelectasis with elevated right diaphragm, subcutaneous 

edema around the abdomen, CHF is not ruled out, and avascular necrosis of left 

femoral head unchanged with no acute abnormalities reported in the pelvis.





Chest CTA negative for acute pulmonary embolism.  Lateral left upper lung 

groundglass opacity possibly representing an acute infectious process with 

stable small bilateral pleural effusions.








Physical exam:





8/31/21: Patient seen and fully evaluated at the bedside this morning.  Patient 

alert to self only upon examination, he was confused to time, place, and 

situation.  Even with direction, patient was unable to state that he was in the 

hospital.  He continues to be febrile with high temperature over the past 24 

hours of 102.0F.  He remains on vancomycin and cefepime. Infectious disease, 

pulmonary, neurology, and orthopedic surgery following.  Pro-calcitonin 50.22 

and CRP of 8.1. 





9/1/21: Patient was seen and fully evaluated at the bedside this morning he was 

sitting up on the side of the bed working with physical therapy.  The patient 

had no difficulties sitting at the edge of the bed and was able to stand with 

assistance for 2 minutes prior to sitting back down.  Patient reports continued 

pain in his lower extremities increased upon standing.  He was much more alert 

this morning.  Patient able to state full name, spell his first name and then 

spell it backwards, read the clock to tell us what time it was, was able to 

state that he was in the hospital and it is 2021.  He denied having any 

headache, lightheadedness, dizziness, chest pain, palpitations, shortness of 

breath, abdominal pain, nausea, vomiting, or any other complaints at this time 

other than pain in his lower extremities.  Morning labs reviewed in patient 

positive for hypokalemia with potassium of 3.4.





9/2/21: Patient was seen and fully evaluated the bedside this morning.  His 

condition seemed to decline from yesterday as he was much more alert and 

oriented yesterday when compared to today, patient currently alert to person and

place only but confused to time and situation.  He is having visual 

hallucinations as he states he sees his father standing outside his window. 

Physical therapy reported patient was not even able to stand today and upon 

sitting him up, he just threw himself backwards multiple times.  Patient  was 

also noted to have abdominal pursed lipped breathing, his respirations were 24-

28 breaths per minute and oxygen saturation was maintained 97% on room air.  

Skin was warm and dry. Previous fevers have improved and patient's highest 

temperature over the past 48 hours was 99.8F.  When asked the patient whether 

or not he was experiencing shortness of breath, chest pain, palpitations, 

abdominal pain, nausea, or any other pain or discomfort..he denied.  However he 

continued to have pursed lipped breathing with use of abdominal muscles.  This 

is a new finding and RN at bedside stated patient was not doing earlier this 

morning. Orders placed for repeat CXR, VBG, D-dimer, repeat Procalcitonin, CRP, 

CBC, and BMP. Discussed assessment findings with pulmonary, recommended D-dimer 

and further evaluation of lower extremities. Hemodynamically pt is stable. 

Abdominal pursed lipped breathing spontaneously subsided after approximately 20 

minutes and respirations back to baseline regular, even, and unlabored. Pt 

remains tachycardic with .  VBG revealed patient in respiratory alkalosis 

with pH 7.54, pCO2 25, and bicarb of 21.  D-dimer elevated at 3.30.  Order [facility]

ed for stat CTA chest to rule out PE.





9/3/21: CBC and BMP showing no significant abnormalities this morning with the 

exception of leukocytosis with WBC count of 14.2 improved from yesterday and 

thrombocytosis with platelet count of 498,000.  Patient placed on heparin per 

DVT prophylaxis at this time.  He remains on IV antibiotic vancomycin.  This 

morning patient alert to person and place but remains confused to time and 

situation.  Patient appears to be waxing and waning with improvements in 

setbacks.  Vital signs stable with the exception of persistent tachycardia with 

heart rate of 110.  Patient's temperature remained stable with highest temp of 9

9.6 over the past 24 hours.  Patient denies having any headache, 

lightheadedness, dizziness, chest pain, palpitations, or shortness of breath.  

Patient did report continued visual hallucinations stating he saw a small girl 

in his room. 








General: Nontoxic, no distress and appears stated age.  


Derm: Skin warm and dry, normal coloration for ethnicity.  Multiple bruises and 

abrasions covering upper and lower extremities in different stages of healing. 

red rash to buttocks


Head: Atraumatic, normocephalic and symmetric.  


Eyes: No lid lag, and anicteric sclera.  Slight swelling and bruising 

surrounding left orbital region.


Mouth: No lip lesions, mucus membranes moist


Cardiovascular: Regular rhythm and tachycardic rate, no murmur, positive 

posterior tibial pulses bilaterally, and cap refill < 2 seconds.  


Lungs: Respirations even, regular, and unlabored on room air. Lungs CTA 

bilaterally, no rhonchi, no rales, no wheezing, and no accessory muscle usage. 


GI/: soft, nontender to palpation, no guarding, no appreciable organomegaly.  

Puente catheter in place.


Ext: ROM intact. No gross muscle atrophy, no edema, no contractures. erythema 

and swelling to BLE worse on left. 


Neuro/Psych: Speech clear, face symmetrical and CN II-XII grossly intact.  

Patient able to follow limited commands, alert to person and place only today, 

confused to time and situation.  Pupils equal and round.  Patient with visual 

hallucinations reporting that he sees and was talking to a little girl in his 

room.








Assessment and Plan of Care:





Alcohol abuse with alcohol withdrawal syndrome


Delirium tremens


Rhabdomyolysis


Acute Metabolic Encephalopathy with hallucinations





-Benzodiazepines per CIWA scale


-IV fluid hydration, Thiamine 100 mg twice daily


-Seizure precautions, Fall precautions


-Computed tomography scan of the head showed no acute findings.


-MRI of the brain was no acute finding


-Consulted neurology and psychiatry for further evaluation


-EEG was abnormal with generalized cerebral dysfunction and evidence of toxic 

metabolic encephalopathy


-Treatment of underlying infectious process, continue IV antibiotics cefepime 

and vancomycin pending further recommendations by infectious disease





Acute metabolic encephalopathy, improving


Sepsis without septic shock


Fever, unclear etiology


Concerns about meningitis, ruled out


Bacteremia, contamination with staph epidermidis


Concerns of RLL Aspiration Pneumonia, questionable





-Initial Pro-calcitonin 50.22 and CRP of 8.1, improving with repeat pro-

calcitonin 10.40 and CRP 5.1.


-Continue IV antibiotics: Vancomycin


-Blood culture showing no growth after 144 hours


-CSF fluid not consistent with bacterial meningitis.  


-HSV PCR negative


-MRI of the lumbar spine with no evidence of discitis or prevertebral abscess


-Blood gas showed no evidence of acute CO2 narcosis


-Changed oral thiamine to IV thiamine 100 mg twice daily.


-Treatment of underlying infectious process, continue IV antibiotics cefepime 

and vancomycin pending further recommendations by infectious disease





Hypokalemia, resolved


-We will continue to monitor with repeat a.m. labs.





Poor living condition


Recurrent episodes of hospital admission for alcohol abuse


Major life stressors with possible major depression





-Consult placed to psychiatry


- consultation





Macrocytic anemia mild, Most likely secondary to alcohol abuse


No reported GI bleeding





-Multiple skin abrasions and bruising in multiple stages of healing


-Left eye bruising


-No acute fractures identified





Hypernatremia, resolved status post administration of IV fluids. 


-We will continue to monitor with repeat a.m. labs.








CODE STATUS: Full code


DVT prophylaxis: SCDs


Discussed with: Patient and RN


Anticipated discharge date: Clinical course to determine


Anticipated discharge place: Home


A total of 45 minutes was spent on the care of this complex patient more than 

50% of the time was spent in counseling and care coordination.





Objective





- Vital Signs


Vital signs: 


                                   Vital Signs











Temp  99.3 F   09/03/21 08:00


 


Pulse  115 H  09/03/21 11:52


 


Resp  18   09/03/21 11:52


 


BP  135/91   09/03/21 11:52


 


Pulse Ox  96   09/03/21 11:52








                                 Intake & Output











 09/02/21 09/03/21 09/03/21





 18:59 06:59 18:59


 


Intake Total 570 240 455


 


Output Total  1150 


 


Balance 570 -910 455


 


Weight  104 kg 104 kg


 


Intake:   


 


  Intake, IV Titration 50  175





  Amount   


 


    Thiamine 100 mg In Sodium 50  50





    Chloride 0.9% 50 ml @   





    100 mls/hr IVPB Q12HR SHELBY   





    Rx#:[id number]   


 


    Vancomycin 1,500 mg In   125





    Sodium Chloride 0.9% 250   





    ml @ 125 mls/hr IVPB Q8H   





    SHELBY Rx#:[id number]   


 


  Oral 520 240 280


 


Output:   


 


  Urine  1150 


 


Other:   


 


  Voiding Method Indwelling Catheter Indwelling Catheter Diaper


 


  # Voids   1


 


  # Bowel Movements 3 2 














- Labs


CBC & Chem 7: 


                                 09/03/21 07:37





                                 09/03/21 06:36


Labs: 


                  Abnormal Lab Results - Last 24 Hours (Table)











  09/02/21 09/02/21 09/03/21 Range/Units





  11:07 11:07 06:10 


 


WBC     (3.8-10.6)  k/uL


 


RBC     (4.30-5.90)  m/uL


 


Hgb     (13.0-17.5)  gm/dL


 


Hct     (39.0-53.0)  %


 


MCV     (80.0-100.0)  fL


 


RDW     (11.5-15.5)  %


 


Plt Count     (150-450)  k/uL


 


Sodium     (137-145)  mmol/L


 


Carbon Dioxide     (22-30)  mmol/L


 


BUN     (9-20)  mg/dL


 


Creatinine     (0.66-1.25)  mg/dL


 


POC Glucose (mg/dL)    104 H  (75-99)  mg/dL


 


C-Reactive Protein  5.1 H    (<1.0)  mg/dL


 


Procalcitonin   10.40 H   (0.02-0.09)  ng/mL














  09/03/21 09/03/21 Range/Units





  06:36 07:37 


 


WBC   14.2 H  (3.8-10.6)  k/uL


 


RBC   3.43 L  (4.30-5.90)  m/uL


 


Hgb   11.1 L  (13.0-17.5)  gm/dL


 


Hct   34.7 L  (39.0-53.0)  %


 


MCV   101.1 H  (80.0-100.0)  fL


 


RDW   15.7 H  (11.5-15.5)  %


 


Plt Count   498 H  (150-450)  k/uL


 


Sodium  136 L   (137-145)  mmol/L


 


Carbon Dioxide  17 L   (22-30)  mmol/L


 


BUN  4 L   (9-20)  mg/dL


 


Creatinine  0.30 L   (0.66-1.25)  mg/dL


 


POC Glucose (mg/dL)    (75-99)  mg/dL


 


C-Reactive Protein    (<1.0)  mg/dL


 


Procalcitonin    (0.02-0.09)  ng/mL








                      Microbiology - Last 24 Hours (Table)











 08/29/21 17:40 Blood Culture - Preliminary





 Blood    No Growth after 96 hours














<Laura Caraballo - Last Filed: 09/03/21 16:33>





Subjective


Patient seen and examined independently.  Patient was also seen by Pedrito Zayas NP and case was discussed.  I am in agreement with subjective, physical exam, 

assessment and plan as written above and amended below.





Patient with possible pneumonia on CT of the chest.  Would recommend he wear his

fevers were coming from possible aspiration event.  We'll continue cefepime and 

vancomycin, anticipate that we'll complete a seven-day course.  Pro-calcitonin 

is improving.  Patient still significantly confused.  He will need subacute 

rehabilitation on discharge.  Anticipate discharge early next week.








General: non toxic, no distress, appears at stated age


Derm: Multiple abrasions bilateral lower extremities, multiple areas of 

ecchymoses in various stages of healing.


Head: atraumatic, normocephalic, symmetric


Eyes: EOMI, no lid lag, anicteric sclera


Mouth: no lip lesion, mucus membranes moist


Cardiovascular: S1S2 reg, no murmur, positive posterior tibial pulse bilateral, 


Lungs: Decreased breath sounds bilateral bilateral, no rhonchi, no rales , no 

accessory muscle use


Abdominal: soft,  nontender to palpation, no guarding, no appreciable 

organomegaly


Ext: no gross muscle atrophy, no edema, no contractures


Neuro:  CN II-XI grossly intact, no focal neuro deficits, positive tremors


Psych: Alert to self and hospital, believes it is 2020.








Objective





- Vital Signs


Vital signs: 


                                   Vital Signs











Temp  99.3 F   09/03/21 08:00


 


Pulse  115 H  09/03/21 14:00


 


Resp  18   09/03/21 14:00


 


BP  135/91   09/03/21 11:52


 


Pulse Ox  96   09/03/21 11:52








                                 Intake & Output











 09/02/21 09/03/21 09/03/21





 18:59 06:59 18:59


 


Intake Total 570 240 635


 


Output Total  1150 


 


Balance 570 -910 635


 


Weight  104 kg 104 kg


 


Intake:   


 


  Intake, IV Titration 50  175





  Amount   


 


    Thiamine 100 mg In Sodium 50  50





    Chloride 0.9% 50 ml @   





    100 mls/hr IVPB Q12HR SHELBY   





    Rx#:[id number]   


 


    Vancomycin 1,500 mg In   125





    Sodium Chloride 0.9% 250   





    ml @ 125 mls/hr IVPB Q8H   





    SHELBY Rx#:[id number]   


 


  Oral 520 240 460


 


Output:   


 


  Urine  1150 


 


Other:   


 


  Voiding Method Indwelling Catheter Indwelling Catheter Diaper


 


  # Voids   1


 


  # Bowel Movements 3 2 














- Labs


CBC & Chem 7: 


                                 09/03/21 07:37





                                 09/03/21 06:36


Labs: 


                  Abnormal Lab Results - Last 24 Hours (Table)











  09/02/21 09/03/21 09/03/21 Range/Units





  11:07 06:10 06:36 


 


WBC     (3.8-10.6)  k/uL


 


RBC     (4.30-5.90)  m/uL


 


Hgb     (13.0-17.5)  gm/dL


 


Hct     (39.0-53.0)  %


 


MCV     (80.0-100.0)  fL


 


RDW     (11.5-15.5)  %


 


Plt Count     (150-450)  k/uL


 


Sodium    136 L  (137-145)  mmol/L


 


Carbon Dioxide    17 L  (22-30)  mmol/L


 


BUN    4 L  (9-20)  mg/dL


 


Creatinine    0.30 L  (0.66-1.25)  mg/dL


 


POC Glucose (mg/dL)   104 H   (75-99)  mg/dL


 


Procalcitonin  10.40 H    (0.02-0.09)  ng/mL














  09/03/21 Range/Units





  07:37 


 


WBC  14.2 H  (3.8-10.6)  k/uL


 


RBC  3.43 L  (4.30-5.90)  m/uL


 


Hgb  11.1 L  (13.0-17.5)  gm/dL


 


Hct  34.7 L  (39.0-53.0)  %


 


MCV  101.1 H  (80.0-100.0)  fL


 


RDW  15.7 H  (11.5-15.5)  %


 


Plt Count  498 H  (150-450)  k/uL


 


Sodium   (137-145)  mmol/L


 


Carbon Dioxide   (22-30)  mmol/L


 


BUN   (9-20)  mg/dL


 


Creatinine   (0.66-1.25)  mg/dL


 


POC Glucose (mg/dL)   (75-99)  mg/dL


 


Procalcitonin   (0.02-0.09)  ng/mL








                      Microbiology - Last 24 Hours (Table)











 08/29/21 17:40 Blood Culture - Preliminary





 Blood    No Growth after 96 hours

## 2021-09-03 NOTE — P.PN
Subjective


Progress Note Date: 09/03/21


The patient is seen at bedside and per nurse he is about the same today compared

to yesterday.  It seems the patient has been slowing improving.


Patient denies of headaches or any focal weakness.  No further fever.  His last 

Tmax in last 72 hours is on 09/02/2021 at around  4am off 99.8F


MRI the cervical and thoracic was ordered about 2 days ago but the patient did 

not have it since he cannot tolerate exam.








Objective





- Vital Signs


Vital signs: 


                                   Vital Signs











Temp  99.3 F   09/03/21 08:00


 


Pulse  112 H  09/03/21 16:00


 


Resp  18   09/03/21 16:00


 


BP  136/83   09/03/21 16:00


 


Pulse Ox  98   09/03/21 16:00








                                 Intake & Output











 09/02/21 09/03/21 09/03/21





 18:59 06:59 18:59


 


Intake Total 570 240 635


 


Output Total  1150 


 


Balance 570 -910 635


 


Weight  104 kg 104 kg


 


Intake:   


 


  Intake, IV Titration 50  175





  Amount   


 


    Thiamine 100 mg In Sodium 50  50





    Chloride 0.9% 50 ml @   





    100 mls/hr IVPB Q12HR [facility]   





    Rx#:[id number]   


 


    Vancomycin 1,500 mg In   125





    Sodium Chloride 0.9% 250   





    ml @ 125 mls/hr IVPB Q8H   





    [facility] Rx#:[id number]   


 


  Oral 520 240 460


 


Output:   


 


  Urine  1150 


 


Other:   


 


  Voiding Method Indwelling Catheter Indwelling Catheter Diaper


 


  # Voids   1


 


  # Bowel Movements 3 2 














- Exam





GENERAL: The patient is lying in bed and is moderate acute distress.


INTEGUMENTARY: Multiple skin abrasion.


MUSCULOSKELETAL: Pain with flexion of the right knee.  Has bruises of both knee





NEUROLOGICAL:


Higher mental function: The patient is awake, alert, oriented to self, place and

time. Patient is able to name objects correctly (pen, watch).  He seems to be 

responding a bit faster today (but continues to be somewhat slow).    Patient is

following simple commands.   No aphasia  from limited language.  No neglect.


Cranial nerves: The pupils are round, equal and reactive to light.  Visual 

fields are full to confrontation throughout.  Extraocular movement is tracking 

to the right and left and no appreciable nystagmus.  Has echymosis around the 

eye (mostly upper).   The facial strength is normal throughout.  No dysarthria 

is noted. 


Motor: The strength is moving upper extremities above gravity without focality 

and seems about 5- while the lower are limited because of pain.  The left lower 

extremity he was able to bend the knee while the right he was in severe pain 

attempting to bend the knee. Had antigravity of bilateral ankles.    Normal tone

and bulk in upper and hard to assess tone in lower because of his pain..  


Cerebellum: Normal finger to nose bilaterally


Sensation: Sensation is normal to touch throughout.


Reflexes (right/left): Brachioradialis are 3+, biceps are 2-3+ bilaterally, 

triceps are 2+.  Lowers are to assess because of pain.    


Plantars are mute bilaterally. 





WORK-UP:


* CPK is 999 on 08/23 but 217 on 08/25--improved.


* B12 582 normal, however at MMA is elevated 0.46 (normal <0.40).  This may 

  indicate intracellular deficiency of B12.  Patient's folate 5.7, TSH 1.77, RPR

  nonreactive.  


* Low Vitamin B6 3 (normal is 5-50).


* Ammonia level 12 (normal)


* ESR 64 on 0 828 and the repeated ESR 77 on 08/30/2021.


* MRI of the brain on 08/27/21 revealed mild atrophy.  Mild subependymal white 

  matter increased signal around the lateral ventricles measuring up to 5 mm in 

  thickness.  No evidence of cortical infarct.


* REPEAT MRI brain w/o (09/01/21): I ordered MRI the brain with and without but 

  because the patient was in so much pain that was the study was limited and it 

  was done only without.  The MR the brain is reported as limited examination 

  due to patient's pain.  No obvious significant intracranial abnormality.  


* MRI of the lumbar spine with and without contrast 08/28/2021 revealed no 

  evidence of discitis.  No significant disc space narrowing.  No fracture.  

  Multilevel mild facet arthropathy and mild lateral recess stenosis.  

  Multilevel mild posterior disc bulging without spinal stenosis.  


* CerebroSpinal fluid examination revealed glucose 64 (40-70), proteins 226 (12-

  60), total WBC count in CSF 18, out of its 15% monocytes and 85% 

  polynuclear's.  CSF RBC 5175, due to traumatic tap.  


* Patient's spinal fluid was traumatic because patient was not cooperating.  CSF

  showsCSF shows very mild leukocytosis ( about 2-3 cells above normal after 

  correction for traumatic tap), and very elevated proteins.   Cultures  

  negative.  HSV-1 and 2 PCR in the CSF are negative.  Viral test are negative. 

  Infectious disease following.  


* Patient had undergone aspiration of right knee and left ankle, both of which 

  have been negative for any growth.  No crystals seen in the sinonasal fluid.


* Routine EEG on 08/26/21  was reported as abnormal due to background slowing of

  moderate to severe degree.  This is suggestive of generalized cerebral 

  dysfunction, as can be seen with toxic metabolic encephalopathy or due to diff

  use structural brain abnormality.  No epileptiform activity was seen.  


* Carotid Doppler was technically difficult due to patient's inability to hold 

  still.  No hemodynamically significant ICA stenosis identified on either side.

   Antegrade flow in both vertebral arteries.


* CT of facial bone on 08/23/2021 is reported as there is evidence of old bone 

  out fracture of the floor of the left bony orbits.  No acute fracture seen.


* Left Lower extremity CT 08/31/2021: is reported as chronic changes as noted.  

  Soft tissue swelling.  Calcaneal is spurring.


* CT of the abdomen/pelvis: Was reported as fatty infiltration of the liver.  

  Pleural effusion and basilar atelectasis.  Elevated right diaphragm could 

  relate to diaphragm paralysis.  Subcutaneous edema around the abdomen.  

  Congestive heart failure as possible.  These abnormality.  New compared to old

  exam.  Avascular necrosis of left femoral head on changed.  No acute 

  abnormality within the abdomen pelvis


* CT of the chest is reported as small left apical density.  Additional left 

  lateral long-based thickening may be present.  At the clinic today says an 

  underlying mass should be considered.  No evidence of pneumonia or aspiration 

  pneumonia.  Moderate fatty infiltration to the liver.


* CT of the chest is reported as suboptimal study without CT chest of for acute 

  pulmonary embolism.  Lateral left upper lung groundglass opacity could reflect

  acute infection process.  No significant change from one day earlier.  Stable 

  small bilateral pleural effusion noted.








- Labs


CBC & Chem 7: 


                                 09/03/21 07:37





                                 09/03/21 06:36


Labs: 


                  Abnormal Lab Results - Last 24 Hours (Table)











  09/02/21 09/03/21 09/03/21 Range/Units





  11:07 06:10 06:36 


 


WBC     (3.8-10.6)  k/uL


 


RBC     (4.30-5.90)  m/uL


 


Hgb     (13.0-17.5)  gm/dL


 


Hct     (39.0-53.0)  %


 


MCV     (80.0-100.0)  fL


 


RDW     (11.5-15.5)  %


 


Plt Count     (150-450)  k/uL


 


Sodium    136 L  (137-145)  mmol/L


 


Carbon Dioxide    17 L  (22-30)  mmol/L


 


BUN    4 L  (9-20)  mg/dL


 


Creatinine    0.30 L  (0.66-1.25)  mg/dL


 


POC Glucose (mg/dL)   104 H   (75-99)  mg/dL


 


Procalcitonin  10.40 H    (0.02-0.09)  ng/mL














  09/03/21 Range/Units





  07:37 


 


WBC  14.2 H  (3.8-10.6)  k/uL


 


RBC  3.43 L  (4.30-5.90)  m/uL


 


Hgb  11.1 L  (13.0-17.5)  gm/dL


 


Hct  34.7 L  (39.0-53.0)  %


 


MCV  101.1 H  (80.0-100.0)  fL


 


RDW  15.7 H  (11.5-15.5)  %


 


Plt Count  498 H  (150-450)  k/uL


 


Sodium   (137-145)  mmol/L


 


Carbon Dioxide   (22-30)  mmol/L


 


BUN   (9-20)  mg/dL


 


Creatinine   (0.66-1.25)  mg/dL


 


POC Glucose (mg/dL)   (75-99)  mg/dL


 


Procalcitonin   (0.02-0.09)  ng/mL








                      Microbiology - Last 24 Hours (Table)











 08/29/21 17:40 Blood Culture - Preliminary





 Blood    No Growth after 96 hours














Assessment and Plan


Assessment: 





* 55-year-old male with history of alcoholism, was found at laying with fecal 

  matter with multiple bruises, scratches and pressure sores over dependent 

  areas, indicating patient has been on the floor for fairly long period of 

  time.  Last period of contact with his friends was on Friday, 3 days prior to 

  arrival.   


* Fevers of unknown etiology.  MRI Brain is negative.  No definitive evidence of

  meningitis/encephalitis based upon CSF results. CSF culture are negative.  HSV

  PCR negative and viral are negative.


* Encpehalopathy of unknown etiology.  Rule out infectious process (unknown 

  source yet).  There might be a component of alcohol withdrawl --mentation is 

  slowly improving. 


* Low Vitamin B6 3 (normal is 5-50).


* Multiple skin abrasions including left eye bruising


* History of multiple falls


* Hypertension


* History of alcoholism


* History of marijuana use.





Plan: 





MRI the cervical and thoracic was ordered about 2 days ago but the patient did 

not have it since he cannot tolerate exam (to find out source of fever).  It was

discontinued since could not tolerate it and since no further fevers.  


Will defer antibiotic management to the I.D. team. 


Continue folic acid 1mg daily and Vitamin B12 1000mcg daily.


Continue Thiamine daily.


Continue Vitamin B6 50mg bid and within 2-3month recommend getting repeat levels

again and modify medication appropriately if needed.


Infection disease is on board.


Pulmonary team is on board.


Will defer the rest of medical management to the primary team.





The plan is discussed with the patient's primary team and his nurse.


Will follow-up with patient sporadically.





Tong Ocampo MD


Neuro-Hospitalist





Time with Patient: Less than 30

## 2021-09-03 NOTE — P.PN
Subjective


Progress Note Date: 09/03/21


Principal diagnosis: 


Left ankle pain, cellulitis





Patient was seen at bedside this morning on cardiac floor.  Patient is able to 

respond to verbal questions today.  Patient is lying semirecumbent in bed and 

says he is mostly having pain in the left shin region when he dorsiflexes his 

left ankle. When patient was asked to bend left ankle he moans in pain. Patient 

denies chest pain, fever, shortness breath, nausea, vomiting, change in vision, 

loss of bowel/bladder control.








Objective





- Vital Signs


Vital signs: 


                                   Vital Signs











Temp  99.3 F   09/03/21 08:00


 


Pulse  113 H  09/03/21 08:00


 


Resp  18   09/03/21 08:00


 


BP  140/91   09/03/21 08:00


 


Pulse Ox  98   09/03/21 08:00








                                 Intake & Output











 09/02/21 09/03/21 09/03/21





 18:59 06:59 18:59


 


Intake Total 570 240 


 


Output Total  1150 


 


Balance 570 -910 


 


Weight  104 kg 


 


Intake:   


 


  Intake, IV Titration 50  





  Amount   


 


    Thiamine 100 mg In Sodium 50  





    Chloride 0.9% 50 ml @   





    100 mls/hr IVPB Q12HR [facility]   





    Rx#:[id number]   


 


  Oral 520 240 


 


Output:   


 


  Urine  1150 


 


Other:   


 


  Voiding Method Indwelling Catheter Indwelling Catheter 


 


  # Bowel Movements 3 2 














- Exam


left ankle:





Left ankle examined at bedside this morning.  When left ankle is dorsiflexed 

patient moans in pain.  The area of erythema seems to be less compared to 

yesterday around the ankle.  There is still some swelling around the foot and 

ankle.  DP pulses intact.  Cap refill under 3 seconds.








- Labs


CBC & Chem 7: 


                                 09/03/21 07:37





                                 09/03/21 06:36


Labs: 


                  Abnormal Lab Results - Last 24 Hours (Table)











  09/02/21 09/02/21 09/02/21 Range/Units





  10:25 10:25 10:25 


 


WBC  15.4 H    (3.8-10.6)  k/uL


 


RBC  3.31 L    (4.30-5.90)  m/uL


 


Hgb  11.3 L    (13.0-17.5)  gm/dL


 


Hct  32.6 L    (39.0-53.0)  %


 


MCV     (80.0-100.0)  fL


 


RDW  15.7 H    (11.5-15.5)  %


 


Plt Count  451 H    (150-450)  k/uL


 


D-Dimer     (<0.60)  mg/L FEU


 


VBG pH    7.54 H  (7.31-7.41)  


 


VBG pCO2    25 L  (37-51)  mmHg


 


VBG HCO3    21 L  (24-28)  mmol/L


 


Sodium     (137-145)  mmol/L


 


Chloride   108 H   ()  mmol/L


 


Carbon Dioxide   21 L   (22-30)  mmol/L


 


BUN   5 L   (9-20)  mg/dL


 


Creatinine   0.31 L   (0.66-1.25)  mg/dL


 


POC Glucose (mg/dL)     (75-99)  mg/dL


 


Calcium   8.2 L   (8.4-10.2)  mg/dL


 


C-Reactive Protein     (<1.0)  mg/dL


 


Procalcitonin     (0.02-0.09)  ng/mL














  09/02/21 09/02/21 09/02/21 Range/Units





  11:07 11:07 11:07 


 


WBC     (3.8-10.6)  k/uL


 


RBC     (4.30-5.90)  m/uL


 


Hgb     (13.0-17.5)  gm/dL


 


Hct     (39.0-53.0)  %


 


MCV     (80.0-100.0)  fL


 


RDW     (11.5-15.5)  %


 


Plt Count     (150-450)  k/uL


 


D-Dimer  3.30 H    (<0.60)  mg/L FEU


 


VBG pH     (7.31-7.41)  


 


VBG pCO2     (37-51)  mmHg


 


VBG HCO3     (24-28)  mmol/L


 


Sodium     (137-145)  mmol/L


 


Chloride     ()  mmol/L


 


Carbon Dioxide     (22-30)  mmol/L


 


BUN     (9-20)  mg/dL


 


Creatinine     (0.66-1.25)  mg/dL


 


POC Glucose (mg/dL)     (75-99)  mg/dL


 


Calcium     (8.4-10.2)  mg/dL


 


C-Reactive Protein   5.1 H   (<1.0)  mg/dL


 


Procalcitonin    10.40 H  (0.02-0.09)  ng/mL














  09/02/21 09/03/21 09/03/21 Range/Units





  11:41 06:10 06:36 


 


WBC     (3.8-10.6)  k/uL


 


RBC     (4.30-5.90)  m/uL


 


Hgb     (13.0-17.5)  gm/dL


 


Hct     (39.0-53.0)  %


 


MCV     (80.0-100.0)  fL


 


RDW     (11.5-15.5)  %


 


Plt Count     (150-450)  k/uL


 


D-Dimer     (<0.60)  mg/L FEU


 


VBG pH     (7.31-7.41)  


 


VBG pCO2     (37-51)  mmHg


 


VBG HCO3     (24-28)  mmol/L


 


Sodium    136 L  (137-145)  mmol/L


 


Chloride     ()  mmol/L


 


Carbon Dioxide    17 L  (22-30)  mmol/L


 


BUN    4 L  (9-20)  mg/dL


 


Creatinine    0.30 L  (0.66-1.25)  mg/dL


 


POC Glucose (mg/dL)  100 H  104 H   (75-99)  mg/dL


 


Calcium     (8.4-10.2)  mg/dL


 


C-Reactive Protein     (<1.0)  mg/dL


 


Procalcitonin     (0.02-0.09)  ng/mL














  09/03/21 Range/Units





  07:37 


 


WBC  14.2 H  (3.8-10.6)  k/uL


 


RBC  3.43 L  (4.30-5.90)  m/uL


 


Hgb  11.1 L  (13.0-17.5)  gm/dL


 


Hct  34.7 L  (39.0-53.0)  %


 


MCV  101.1 H  (80.0-100.0)  fL


 


RDW  15.7 H  (11.5-15.5)  %


 


Plt Count  498 H  (150-450)  k/uL


 


D-Dimer   (<0.60)  mg/L FEU


 


VBG pH   (7.31-7.41)  


 


VBG pCO2   (37-51)  mmHg


 


VBG HCO3   (24-28)  mmol/L


 


Sodium   (137-145)  mmol/L


 


Chloride   ()  mmol/L


 


Carbon Dioxide   (22-30)  mmol/L


 


BUN   (9-20)  mg/dL


 


Creatinine   (0.66-1.25)  mg/dL


 


POC Glucose (mg/dL)   (75-99)  mg/dL


 


Calcium   (8.4-10.2)  mg/dL


 


C-Reactive Protein   (<1.0)  mg/dL


 


Procalcitonin   (0.02-0.09)  ng/mL








                      Microbiology - Last 24 Hours (Table)











 08/29/21 17:40 Blood Culture - Preliminary





 Blood    No Growth after 96 hours














Assessment and Plan


Assessment: 


Left ankle pain, cellulitis





Plan: 


1. Left ankle pain, cellulitis -aspiration was performed Saturday 06/28/2021 the

left ankle where 6 mL of serous joint fluid was aspirated and sent to lab for 

culture, Gram stain, crystals, aerobic and anaerobic cultures.  Final results 

for Gram Stain and aerobic cultures of left ankle negative.anaerobic culture 

negative. CT lower extremity negative and shows some soft tissue swelling and 

calcaneal spurring. No urgent surgical intervention planned at this time from 

orthopedic standpoint.  Patient stable from an orthopedic standpoint. 

Orthopedics is signing off at this time.  Please do not hesitate to contact us 

if you have any further questions





2.  Appreciate medical management





3.  Pain management - stable at this time





4.  GI prophylaxis/DVT prophylaxis - Protonix; Mechanical - SCDs





5.  PT/OT - weightbearing as tolerated with assistance





6.  Appreciate consult





Time with Patient: Less than 30

## 2021-09-04 LAB
GLUCOSE BLD-MCNC: 112 MG/DL (ref 75–99)
GLUCOSE BLD-MCNC: 123 MG/DL (ref 75–99)
GLUCOSE BLD-MCNC: 94 MG/DL (ref 75–99)

## 2021-09-04 RX ADMIN — NYSTATIN SCH: 100000 OINTMENT TOPICAL at 09:26

## 2021-09-04 RX ADMIN — SODIUM CHLORIDE SCH MLS/HR: 9 INJECTION, SOLUTION INTRAVENOUS at 09:24

## 2021-09-04 RX ADMIN — HEPARIN SODIUM SCH UNIT: 5000 INJECTION INTRAVENOUS; SUBCUTANEOUS at 09:24

## 2021-09-04 RX ADMIN — FOLIC ACID SCH MG: 1 TABLET ORAL at 09:25

## 2021-09-04 RX ADMIN — PYRIDOXINE HCL TAB 50 MG SCH MG: 50 TAB at 09:24

## 2021-09-04 RX ADMIN — SODIUM CHLORIDE SCH MLS/HR: 900 INJECTION, SOLUTION INTRAVENOUS at 12:18

## 2021-09-04 RX ADMIN — ACETAMINOPHEN PRN MG: 325 TABLET, FILM COATED ORAL at 21:01

## 2021-09-04 RX ADMIN — MEGESTROL ACETATE SCH MG: 40 SUSPENSION ORAL at 09:32

## 2021-09-04 RX ADMIN — MEGESTROL ACETATE SCH MG: 40 SUSPENSION ORAL at 09:25

## 2021-09-04 RX ADMIN — ACETAMINOPHEN PRN MG: 325 TABLET, FILM COATED ORAL at 12:22

## 2021-09-04 RX ADMIN — NYSTATIN SCH: 100000 OINTMENT TOPICAL at 15:37

## 2021-09-04 RX ADMIN — NYSTATIN SCH APPLIC: 100000 OINTMENT TOPICAL at 21:01

## 2021-09-04 RX ADMIN — SODIUM CHLORIDE SCH MLS/HR: 900 INJECTION, SOLUTION INTRAVENOUS at 21:01

## 2021-09-04 RX ADMIN — PYRIDOXINE HCL TAB 50 MG SCH MG: 50 TAB at 21:01

## 2021-09-04 RX ADMIN — SODIUM CHLORIDE SCH MLS/HR: 9 INJECTION, SOLUTION INTRAVENOUS at 15:16

## 2021-09-04 RX ADMIN — HEPARIN SODIUM SCH UNIT: 5000 INJECTION INTRAVENOUS; SUBCUTANEOUS at 15:16

## 2021-09-04 RX ADMIN — PANTOPRAZOLE SODIUM SCH MG: 40 TABLET, DELAYED RELEASE ORAL at 09:25

## 2021-09-04 NOTE — PN
History and Physical performed by Neeta Gaitan CNP    Antonella Ponce is a 79 y.o. with past medical history of HTN, and Hyperlidemia who presents today for kidney stones.      Patient reports for the last few months she has had lower right groin pain. She also complains of back pain, right greater than left, but she also has sciatica pain so she is not sure if the pain is related to that. Her PCP ordered a KUB which showed bilateral non-obstructive renal calculi. Patient denies any history of kidney stones. She reports urinary urgency, frequency, urge incontinence, and nocturia 3x a night. She relates this to drinking a lot of water, as suggested by her PCP to help with kidney stones. She also takes lasix 80 mg daily. She denies gross hematuria, or dysuria. She reports pain is intermittent, nothing makes it worse or better. She denies fever, chills, nausea or vomiting.        She feels as though she empties her bladder. During office visit, patient had to go to bathroom twice, she took her lasix at noon.      Antonella Ponce comes in with her daughter. History is obtained from patient and electronic medical record.        Current Facility-Administered Medications          Current Outpatient Prescriptions   Medication Sig Dispense Refill    gabapentin (NEURONTIN) 100 MG capsule Take 100 mg by mouth 2 times daily. .        Diclofenac Sodium  MG TB24 Take by mouth daily        aspirin 81 MG tablet Take 81 mg by mouth daily        furosemide (LASIX) 80 MG tablet Take 80 mg by mouth daily        amLODIPine (NORVASC) 5 MG tablet Take 5 mg by mouth daily        losartan (COZAAR) 100 MG tablet Take 100 mg by mouth daily        atorvastatin (LIPITOR) 20 MG tablet Take 20 mg by mouth daily        potassium chloride (K-TAB) 10 MEQ extended release tablet Take 10 mEq by mouth daily        oxybutynin (DITROPAN) 5 MG tablet Take 1 tablet by mouth 2 times daily 60 tablet 1      No current PROGRESS NOTE



DATE OF SERVICE:

09/04/2021



REASON FOR FOLLOWUP:

Fever.



INTERVAL HISTORY:

The patient is afebrile.  Did spike a fever of 100.9 this afternoon.  The patient is

currently on room air.  Denies having any chest pain, shortness of breath or cough.  No

abdominal pain.  Still complains of pain to the left ankle area but no worsening.  No

diarrhea.



EXAMINATION:

Blood pressure 128/81 with a pulse of 150, temperature 98.3.  He is 98% on room air.

General description is a middle-aged male lying in bed in no distress.  Respiratory

system: Unlabored breathing, clear to auscultation anteriorly.  Heart S1, S2.  Regular

rate and rhythm.  Abdomen soft, no tenderness. Left leg did have some swelling.  No

redness or drainage.  Left ankle is not as tender as it used to be.



LABS:

No new labs have been obtained today.  Blood culture repeat has been negative.



DIAGNOSTIC IMPRESSION AND PLAN:

Patient with fever and leukocytosis and this patient did have extensive workup with no

obvious focus (  ).  There is a question of left leg cellulitis, covered with

vancomycin, dosing to monitored closely.  We will obtain a WBC scan.  Family at the

bedside, questions were answered.





MMODL / IJN: [id number] / Job#: [id number]

## 2021-09-04 NOTE — P.PN
<Pedrito Zayas - Last Filed: 09/04/21 14:07>





Subjective


Progress Note Date: 09/04/21





Hospital course:





Patient is a 55-year-old male with a past medical history of EtOH abuse.  He p

resented to the hospital on 8/23/21 after being found delirious and confused by 

his neighbor at home.  Patient was reportedly found laying on the floor covered 

in his own feces with empty alcohol bottles surrounding him.  Patient presented 

to the hospital with multiple bruises and abrasions in different stages of 

healing.  UDS positive for marijuana and benzodiazepines.  EtOH level was less 

than 10.  Patient was initially found to have hypernatremia with sodium of 152, 

hyponatremia with potassium of 3.2, hyperchloremia with chloride of 114 and 

elevated creatinine kinase of 999.  He was admitted for acute metabolic 

encephalopathy with hallucinations and EtOH withdrawal, rhabdomyolysis, and se

psis without sepsis shock.  Patient has underwent multiple labs and imaging see 

below.  Currently admitted under our services with consultation to pulmonology, 

infectious disease, neurology, and orthopedic surgery.








Labs and imaging:





Initial blood culture showing no results after 144 hours, CSF fluid negative 

wound culture right knee negatives wound culture left ankle preliminary results 

negative, 1 out of 2 blood culture results repeated on 8/28/21 positive for 

Staphylococcus epidermidis, possibly contaminated, and repeat blood culture 

drawn on 8/29/21 showing no growth after 24 hours.





TSH 1.770.





Pro-calcitonin 50.22 and CRP of 8.1.





Chest x-ray completed 8/23/21 revealed minimal subsegmental atelectasis at the 

right lung base without change.





CT head and cervical spine completed 8/23/21 negative for acute intercranial 

abnormality with spondylitic changes to cervical spine with no acute fractures 

or abnormalities.





CT facial bones without contrast showing evidence of old blowout fracture on the

floor of the left bony orbit with reported displacement of 4 mm and is negative 

for acute fractures.





X-ray right elbow olecranon process spur formation with posterior soft tissue 

swelling negative for acute fractures.





Bilateral carotid Dopplers negative for hemodynamically significant internal 

carotid artery stenosis





Venous Doppler left lower extremity negative for DVT.





X-ray left ankle negative for acute fracture.





MRI brain with and without contrast showing mild atrophy with mild subependymal 

white matter increased signal around the lateral ventricles measuring up to 5 mm

in thickness no evidence of cortical infarct.





MRI lumbar spine with and without contrast showing no evidence of discitis, no 

significant disc space narrowing or acute fractures.  Multilevel mild facet 

arthroplasty and mild lateral recess stenosis with multilevel mild posterior 

disc bulging without spinal stenosis.





CT left lower extremity showing chronic changes with soft tissue swelling and 

calcaneal spurring, negative for acute fracture.





CT chest, abdomen and pelvis showed fatty infiltration of the liver, pleural 

effusions and basilar atelectasis with elevated right diaphragm, subcutaneous 

edema around the abdomen, CHF is not ruled out, and avascular necrosis of left 

femoral head unchanged with no acute abnormalities reported in the pelvis.





Chest CTA negative for acute pulmonary embolism.  Lateral left upper lung 

groundglass opacity possibly representing an acute infectious process with 

stable small bilateral pleural effusions.








Physical exam:





9/4/21: Patient seen and evaluated at bedside this morning.  He remains alert to

person and place only and confused to time and situation.  Patient currently 

covered in feces as he took his stool out of his brief  and rubbed it all over 

his hands, arms, nose, and other areas of his body. CNAs at bedside cleaning pt 

at this time.  Patient continues to have episodes of visual and auditory 

hallucinations.  He denies any complaints at this time including chest pain, 

palpitations, shortness of breath, abdominal pain, or any other complaints.  

Patient does report pain in his knees and ankles at times.  Patient to be 

transferred to medical surgical unit at this time.  Temperature high over the 

past 24 hours 99.5F.  He remains tachycardic. 





General: Nontoxic, no distress and appears stated age.  


Derm: Skin warm and dry, normal coloration for ethnicity.  Multiple bruises and 

abrasions covering upper and lower extremities in different stages of healing. 

red rash to buttocks


Head: Atraumatic, normocephalic and symmetric.  


Eyes: No lid lag, and anicteric sclera.  Slight swelling and bruising 

surrounding left orbital region.


Mouth: No lip lesions, mucus membranes moist


Cardiovascular: Regular rhythm and tachycardic rate, no murmur, positive 

posterior tibial pulses bilaterally, and cap refill < 2 seconds.  


Lungs: Respirations even, regular, and unlabored on room air. Lungs CTA 

bilaterally, no rhonchi, no rales, no wheezing, and no accessory muscle usage. 


GI/: soft, nontender to palpation, no guarding, no appreciable organomegaly.  

Puente catheter in place.


Ext: ROM intact. No gross muscle atrophy, no edema, no contractures. erythema 

and swelling to BLE worse on left.  Swelling right knee. 


Neuro/Psych: Speech clear.  Patient able to follow limited commands, alert to 

person and place only today, confused to time and situation.  Patient continues 

with episodes of visual and auditory hallucinations.  Today, patient rubbed his 

own feces all over hands, nose, and other areas of his body. 








Assessment and Plan of Care:





Alcohol abuse with alcohol withdrawal syndrome


Delirium tremens


Rhabdomyolysis


Acute Metabolic Encephalopathy with hallucinations


-IV fluid hydration, Thiamine 100 mg twice daily


-Seizure precautions, Fall precautions


-Computed tomography scan of the head showed no acute findings.


-MRI of the brain was no acute finding


-Consulted neurology and psychiatry for further evaluation


-EEG was abnormal with generalized cerebral dysfunction and evidence of toxic me

tabolic encephalopathy


-Treatment of underlying infectious process, continue IV antibiotics cefepime 

and vancomycin pending further recommendations by infectious disease





Acute metabolic encephalopathy


Sepsis without septic shock


Fever, unclear etiology


Concerns about meningitis, ruled out


Bacteremia, contamination with staph epidermidis


Concerns of RLL Aspiration Pneumonia, questionable





-Initial Pro-calcitonin 50.22 and CRP of 8.1, improving with repeat pro-

calcitonin 10.40 and CRP 5.1.


-Continue IV antibiotics: Vancomycin


-Blood culture showing no growth after 144 hours


-CSF fluid not consistent with bacterial meningitis.  


-HSV PCR negative


-MRI of the lumbar spine with no evidence of discitis or prevertebral abscess


-Blood gas showed no evidence of acute CO2 narcosis


-Thiamine 100 mg twice daily.


-Treatment of underlying infectious process, continue IV antibiotics cefepime 

and vancomycin pending further recommendations by infectious disease





Hypokalemia, resolved


-We will continue to monitor with repeat a.m. labs.





Poor living condition


Recurrent episodes of hospital admission for alcohol abuse


Major life stressors with possible major depression





-Consult placed to psychiatry


- consultation





Macrocytic anemia mild, Most likely secondary to alcohol abuse


No reported GI bleeding





-Multiple skin abrasions and bruising in multiple stages of healing


-Left eye bruising


-No acute fractures identified





Hypernatremia, resolved status post administration of IV fluids. 


-We will continue to monitor with repeat a.m. labs.








CODE STATUS: Full code


DVT prophylaxis: SCDs


Discussed with: Patient and RN


Anticipated discharge date: Clinical course to determine


Anticipated discharge place: Extended care facility/skilled nursing facility


A total of 45 minutes was spent on the care of this complex patient more than 

50% of the time was spent in counseling and care coordination.





Objective





- Vital Signs


Vital signs: 


                                   Vital Signs











Temp  98.9 F   09/04/21 09:15


 


Pulse  117 H  09/04/21 09:40


 


Resp  18   09/04/21 09:15


 


BP  111/68   09/04/21 09:15


 


Pulse Ox  98   09/04/21 09:15








                                 Intake & Output











 09/03/21 09/04/21 09/04/21





 18:59 06:59 18:59


 


Intake Total 815  0


 


Balance 815  0


 


Weight 104 kg 68.5 kg 


 


Intake:   


 


  Intake, IV Titration 175  





  Amount   


 


    Thiamine 100 mg In Sodium 50  





    Chloride 0.9% 50 ml @   





    100 mls/hr IVPB Q12HR SHELBY   





    Rx#:[id number]   


 


    Vancomycin 1,500 mg In 125  





    Sodium Chloride 0.9% 250   





    ml @ 125 mls/hr IVPB Q8H   





    SHELBY Rx#:[id number]   


 


  Oral 640  0


 


Other:   


 


  Voiding Method Diaper Diaper Diaper


 


  # Voids 1 1 














- Labs


CBC & Chem 7: 


                                 09/03/21 07:37





                                 09/03/21 06:36


Labs: 


                  Abnormal Lab Results - Last 24 Hours (Table)











  09/03/21 Range/Units





  20:14 


 


POC Glucose (mg/dL)  107 H  (75-99)  mg/dL








                      Microbiology - Last 24 Hours (Table)











 08/29/21 17:40 Blood Culture - Preliminary





 Blood    No Growth after 120 hours














<Laura Caraballo A - Last Filed: 09/04/21 19:09>





Subjective


Pedrito Zayas NP rendered care for this patient independently, reviewed the 

findings and plan as documented in the note above.  I did not physically speak w

ith or examine the patient on this date. 








Objective





- Vital Signs


Vital signs: 


                                   Vital Signs











Temp  98.3 F   09/04/21 15:08


 


Pulse  115 H  09/04/21 15:08


 


Resp  18   09/04/21 15:08


 


BP  128/81   09/04/21 15:08


 


Pulse Ox  98   09/04/21 15:08








                                 Intake & Output











 09/04/21 09/04/21 09/05/21





 06:59 18:59 06:59


 


Intake Total  390 


 


Balance  390 


 


Weight 68.5 kg  


 


Intake:   


 


  Oral  390 


 


Other:   


 


  Voiding Method Diaper Diaper 


 


  # Voids 1 1 


 


  # Bowel Movements  3 














- Labs


CBC & Chem 7: 


                                 09/03/21 07:37





                                 09/03/21 06:36


Labs: 


                  Abnormal Lab Results - Last 24 Hours (Table)











  09/03/21 09/04/21 09/04/21 Range/Units





  20:14 11:40 16:28 


 


POC Glucose (mg/dL)  107 H  112 H  123 H  (75-99)  mg/dL








                      Microbiology - Last 24 Hours (Table)











 08/29/21 17:40 Blood Culture - Preliminary





 Blood    No Growth after 120 hours

## 2021-09-05 LAB
ANION GAP SERPL CALC-SCNC: 10.9 MMOL/L (ref 4–12)
BUN SERPL-SCNC: 5 MG/DL (ref 9–27)
BUN/CREAT SERPL: 16.67 RATIO (ref 12–20)
CALCIUM SPEC-MCNC: 8.3 MG/DL (ref 8.7–10.3)
CHLORIDE SERPL-SCNC: 106 MMOL/L (ref 96–109)
CO2 SERPL-SCNC: 23.1 MMOL/L (ref 21.6–31.8)
ERYTHROCYTE [DISTWIDTH] IN BLOOD BY AUTOMATED COUNT: 3.2 X 10*6/UL (ref 4.4–5.6)
ERYTHROCYTE [DISTWIDTH] IN BLOOD: 15.5 % (ref 11.5–14.5)
GLUCOSE BLD-MCNC: 115 MG/DL (ref 75–99)
GLUCOSE BLD-MCNC: 136 MG/DL (ref 75–99)
GLUCOSE BLD-MCNC: 144 MG/DL (ref 75–99)
GLUCOSE BLD-MCNC: 91 MG/DL (ref 75–99)
GLUCOSE SERPL-MCNC: 82 MG/DL (ref 70–110)
HCT VFR BLD AUTO: 30.8 % (ref 39.6–50)
HGB BLD-MCNC: 10.1 G/DL (ref 13–17)
MAGNESIUM SPEC-SCNC: 1.6 MG/DL (ref 1.5–2.4)
MCH RBC QN AUTO: 31.6 PG (ref 27–32)
MCHC RBC AUTO-ENTMCNC: 32.8 G/DL (ref 32–37)
MCV RBC AUTO: 96.3 FL (ref 80–97)
PLATELET # BLD AUTO: 364 X 10*3/UL (ref 140–440)
POTASSIUM SERPL-SCNC: 3.5 MMOL/L (ref 3.5–5.5)
SODIUM SERPL-SCNC: 140 MMOL/L (ref 135–145)
WBC # BLD AUTO: 12.8 X 10*3/UL (ref 4.5–10)

## 2021-09-05 RX ADMIN — SODIUM CHLORIDE SCH MLS/HR: 9 INJECTION, SOLUTION INTRAVENOUS at 01:42

## 2021-09-05 RX ADMIN — SODIUM CHLORIDE SCH MLS/HR: 900 INJECTION, SOLUTION INTRAVENOUS at 22:38

## 2021-09-05 RX ADMIN — ACETAMINOPHEN PRN MG: 325 TABLET, FILM COATED ORAL at 09:15

## 2021-09-05 RX ADMIN — FOLIC ACID SCH MG: 1 TABLET ORAL at 09:15

## 2021-09-05 RX ADMIN — SODIUM CHLORIDE SCH MLS/HR: 9 INJECTION, SOLUTION INTRAVENOUS at 10:00

## 2021-09-05 RX ADMIN — PYRIDOXINE HCL TAB 50 MG SCH MG: 50 TAB at 09:15

## 2021-09-05 RX ADMIN — CEFEPIME HYDROCHLORIDE SCH MLS/HR: 2 INJECTION, POWDER, FOR SOLUTION INTRAVENOUS at 10:00

## 2021-09-05 RX ADMIN — ACETAMINOPHEN PRN MG: 325 TABLET, FILM COATED ORAL at 22:31

## 2021-09-05 RX ADMIN — NYSTATIN SCH: 100000 OINTMENT TOPICAL at 10:01

## 2021-09-05 RX ADMIN — PYRIDOXINE HCL TAB 50 MG SCH MG: 50 TAB at 22:30

## 2021-09-05 RX ADMIN — SODIUM CHLORIDE SCH MLS/HR: 900 INJECTION, SOLUTION INTRAVENOUS at 09:16

## 2021-09-05 RX ADMIN — ACETAMINOPHEN PRN MG: 325 TABLET, FILM COATED ORAL at 02:36

## 2021-09-05 RX ADMIN — NYSTATIN SCH APPLIC: 100000 OINTMENT TOPICAL at 22:30

## 2021-09-05 RX ADMIN — CEFEPIME HYDROCHLORIDE SCH MLS/HR: 2 INJECTION, POWDER, FOR SOLUTION INTRAVENOUS at 17:41

## 2021-09-05 RX ADMIN — MEGESTROL ACETATE SCH MG: 40 SUSPENSION ORAL at 09:15

## 2021-09-05 RX ADMIN — ACETAMINOPHEN PRN MG: 325 TABLET, FILM COATED ORAL at 17:40

## 2021-09-05 RX ADMIN — HEPARIN SODIUM SCH UNIT: 5000 INJECTION INTRAVENOUS; SUBCUTANEOUS at 09:16

## 2021-09-05 RX ADMIN — HEPARIN SODIUM SCH UNIT: 5000 INJECTION INTRAVENOUS; SUBCUTANEOUS at 01:42

## 2021-09-05 RX ADMIN — MAGNESIUM SULFATE IN DEXTROSE SCH MLS/HR: 10 INJECTION, SOLUTION INTRAVENOUS at 16:48

## 2021-09-05 RX ADMIN — NYSTATIN SCH APPLIC: 100000 OINTMENT TOPICAL at 17:07

## 2021-09-05 RX ADMIN — PANTOPRAZOLE SODIUM SCH MG: 40 TABLET, DELAYED RELEASE ORAL at 09:16

## 2021-09-05 RX ADMIN — HEPARIN SODIUM SCH UNIT: 5000 INJECTION INTRAVENOUS; SUBCUTANEOUS at 15:37

## 2021-09-05 RX ADMIN — HEPARIN SODIUM SCH UNIT: 5000 INJECTION INTRAVENOUS; SUBCUTANEOUS at 22:31

## 2021-09-05 RX ADMIN — MAGNESIUM SULFATE IN DEXTROSE SCH MLS/HR: 10 INJECTION, SOLUTION INTRAVENOUS at 15:37

## 2021-09-05 RX ADMIN — SODIUM CHLORIDE SCH MLS/HR: 9 INJECTION, SOLUTION INTRAVENOUS at 17:41

## 2021-09-05 NOTE — P.PN
<Pedrito Zayas - Last Filed: 09/05/21 14:28>





Subjective


Progress Note Date: 09/05/21





Hospital course:





Patient is a 55-year-old male with a past medical history of EtOH abuse.  He p

resented to the hospital on 8/23/21 after being found delirious and confused by 

his neighbor at home.  Patient was reportedly found laying on the floor covered 

in his own feces with empty alcohol bottles surrounding him.  Patient presented 

to the hospital with multiple bruises and abrasions in different stages of 

healing.  UDS positive for marijuana and benzodiazepines.  EtOH level was less 

than 10.  Patient was initially found to have hypernatremia with sodium of 152, 

hyponatremia with potassium of 3.2, hyperchloremia with chloride of 114 and 

elevated creatinine kinase of 999.  He was admitted for acute metabolic 

encephalopathy with hallucinations and EtOH withdrawal, rhabdomyolysis, and se

psis without sepsis shock.  Patient has underwent multiple labs and imaging see 

below.  Currently admitted under our services with consultation to pulmonology, 

infectious disease, neurology, and orthopedic surgery.








Labs and imaging:





Initial blood culture showing no results after 144 hours, CSF fluid negative 

wound culture right knee negatives wound culture left ankle preliminary results 

negative, 1 out of 2 blood culture results repeated on 8/28/21 positive for 

Staphylococcus epidermidis, possibly contaminated, and repeat blood culture 

drawn on 8/29/21 showing no growth after 24 hours.





TSH 1.770.





Pro-calcitonin 50.22 and CRP of 8.1.





Chest x-ray completed 8/23/21 revealed minimal subsegmental atelectasis at the 

right lung base without change.





CT head and cervical spine completed 8/23/21 negative for acute intercranial 

abnormality with spondylitic changes to cervical spine with no acute fractures 

or abnormalities.





CT facial bones without contrast showing evidence of old blowout fracture on the

floor of the left bony orbit with reported displacement of 4 mm and is negative 

for acute fractures.





X-ray right elbow olecranon process spur formation with posterior soft tissue 

swelling negative for acute fractures.





Bilateral carotid Dopplers negative for hemodynamically significant internal 

carotid artery stenosis





Venous Doppler left lower extremity negative for DVT.





X-ray left ankle negative for acute fracture.





MRI brain with and without contrast showing mild atrophy with mild subependymal 

white matter increased signal around the lateral ventricles measuring up to 5 mm

in thickness no evidence of cortical infarct.





MRI lumbar spine with and without contrast showing no evidence of discitis, no 

significant disc space narrowing or acute fractures.  Multilevel mild facet 

arthroplasty and mild lateral recess stenosis with multilevel mild posterior 

disc bulging without spinal stenosis.





CT left lower extremity showing chronic changes with soft tissue swelling and 

calcaneal spurring, negative for acute fracture.





CT chest, abdomen and pelvis showed fatty infiltration of the liver, pleural 

effusions and basilar atelectasis with elevated right diaphragm, subcutaneous 

edema around the abdomen, CHF is not ruled out, and avascular necrosis of left 

femoral head unchanged with no acute abnormalities reported in the pelvis.





Chest CTA negative for acute pulmonary embolism.  Lateral left upper lung 

groundglass opacity possibly representing an acute infectious process with 

stable small bilateral pleural effusions.








Physical exam:





9/5/21: Patient seen and evaluated at bedside this morning.  Patient appeared 

more alert today.  He was alert to person, place, and time.  Patient was not 

noted to have any visual or auditory hallucinations during assessment.  RN 

reports patient even asked to use bedpan instead of defecating in brief (as 

patient has been using brief up until this morning).  Patient did have an 

elevated temp of 101.0F.  Upon further review of chart it appears on cefepime 

was discontinued, patient shortly after began having elevated temps.  At this 

time we will provide gram-negative coverage with cefepime and continue 

vancomycin pending further recommendations by infectious disease.  Repeat blood 

cultures showing no growth after 144 hours.  Leukocytosis remains with WBC count

of 12.80.  Patient remains tachycardic.  Hypomagnesemia with magnesium of 1.6, 

orders for replacement.  Patient reports pain in his legs and ankles remains 

with any movement.  He continues to deny any other complaints at this time 

including chest pain, palpitations, shortness of breath, abdominal pain, or any 

other complaints.  





General: Nontoxic, no distress and appears stated age.  


Derm: Skin warm and dry, normal coloration for ethnicity.  Multiple bruises and 

abrasions covering upper and lower extremities in different stages of healing. 

red rash to buttocks


Head: Atraumatic, normocephalic and symmetric.  


Eyes: No lid lag, and anicteric sclera.  Slight swelling and bruising 

surrounding left orbital region.


Mouth: No lip lesions, mucus membranes moist


Cardiovascular: Regular rhythm and tachycardic rate, no murmur, positive 

posterior tibial pulses bilaterally, and cap refill < 2 seconds.  


Lungs: Respirations even, regular, and unlabored on room air. Lungs CTA 

bilaterally, no rhonchi, no rales, no wheezing, and no accessory muscle usage. 


GI/: soft, nontender to palpation, no guarding, no appreciable organomegaly.  

Puente catheter in place.


Ext: ROM intact. No gross muscle atrophy, no edema, no contractures. erythema 

and swelling to BLE worse on left.  Swelling right knee. 


Neuro/Psych: Speech clear.  Patient able to follow limited commands, alert to 

person and place only today, confused to time and situation.  Patient continues 

with episodes of visual and auditory hallucinations.  Today, patient rubbed his 

own feces all over hands, nose, and other areas of his body. 








Assessment and Plan of Care:





Acute metabolic encephalopathy


Sepsis without septic shock


Fever, unclear etiology


Concerns about meningitis, ruled out


Bacteremia, contamination with staph epidermidis


Concerns of RLL Aspiration Pneumonia, questionable


-Initial Pro-calcitonin 50.22 and CRP of 8.1, improving with repeat pro-

calcitonin 10.40 and CRP 5.1.


-Continue IV antibiotics: Vancomycin. Patient again running fevers, temple 

101.0F this morning.  Added cefepime back on with vancomycin.


-Blood culture showing no growth after 144 hours


-CSF fluid not consistent with bacterial meningitis.  


-HSV PCR negative


-MRI of the lumbar spine with no evidence of discitis or prevertebral abscess


-Blood gas showed no evidence of acute CO2 narcosis


-Thiamine 100 mg twice daily.








Hypomagnesemia


-Magnesium 1.6, replaced.


-We will continue to monitor with repeat a.m. labs.





Alcohol abuse with alcohol withdrawal syndrome


Delirium tremens


Rhabdomyolysis


Acute Metabolic Encephalopathy with hallucinations


-IV fluid hydration, Thiamine 100 mg twice daily


-Seizure precautions, Fall precautions


-Computed tomography scan of the head showed no acute findings.


-MRI of the brain was no acute finding


-Consulted neurology and psychiatry for further evaluation


-EEG was abnormal with generalized cerebral dysfunction and evidence of toxic 

metabolic encephalopathy


-Treatment of underlying infectious process, continue IV antibiotics cefepime 

and vancomycin pending further recommendations by infectious disease





Hypokalemia, resolved


-We will continue to monitor with repeat a.m. labs.





Poor living condition


Recurrent episodes of hospital admission for alcohol abuse


Major life stressors with possible major depression


-Consult placed to psychiatry


- consultation





Macrocytic anemia mild, Most likely secondary to alcohol abuse


No reported GI bleeding





-Multiple skin abrasions and bruising in multiple stages of healing


-Left eye bruising


-No acute fractures identified





Hypernatremia, resolved status post administration of IV fluids. 


-We will continue to monitor with repeat a.m. labs.








CODE STATUS: Full code


DVT prophylaxis: SCDs


Discussed with: Patient and RN


Anticipated discharge date: Clinical course to determine


Anticipated discharge place: Extended care facility/skilled nursing facility


A total of 45 minutes was spent on the care of this complex patient more than 

50% of the time was spent in counseling and care coordination.





Objective





- Vital Signs


Vital signs: 


                                   Vital Signs











Temp  101.0 F H  09/05/21 07:46


 


Pulse  106 H  09/05/21 07:46


 


Resp  18   09/05/21 07:46


 


BP  127/85   09/05/21 07:46


 


Pulse Ox  95   09/05/21 07:46








                                 Intake & Output











 09/04/21 09/05/21 09/05/21





 18:59 06:59 18:59


 


Intake Total 390  


 


Output Total  300 


 


Balance 390 -300 


 


Intake:   


 


  Oral 390  


 


Output:   


 


  Urine  300 


 


Other:   


 


  Voiding Method Diaper Diaper 





  External Catheter 


 


  # Voids 1 1 


 


  # Bowel Movements 3  














- Labs


CBC & Chem 7: 


                                 09/05/21 06:45





                                 09/05/21 06:45


Labs: 


                  Abnormal Lab Results - Last 24 Hours (Table)











  09/04/21 09/04/21 09/05/21 Range/Units





  11:40 16:28 02:02 


 


POC Glucose (mg/dL)  112 H  123 H  115 H  (75-99)  mg/dL








                      Microbiology - Last 24 Hours (Table)











 08/29/21 17:40 Blood Culture - Final





 Blood    No Growth after 144 hours














<Laura Caraballo - Last Filed: 09/05/21 17:03>





Objective





- Vital Signs


Vital signs: 


                                   Vital Signs











Temp  99.1 F   09/05/21 14:00


 


Pulse  105 H  09/05/21 14:00


 


Resp  16   09/05/21 14:00


 


BP  115/77   09/05/21 14:00


 


Pulse Ox  97   09/05/21 14:00








                                 Intake & Output











 09/04/21 09/05/21 09/05/21





 18:59 06:59 18:59


 


Intake Total 390  


 


Output Total  300 


 


Balance 390 -300 


 


Intake:   


 


  Oral 390  


 


Output:   


 


  Urine  300 


 


Other:   


 


  Voiding Method Diaper Diaper 





  External Catheter 


 


  # Voids 1 1 


 


  # Bowel Movements 3  














- Labs


CBC & Chem 7: 


                                 09/05/21 06:45





                                 09/05/21 06:45


Labs: 


                  Abnormal Lab Results - Last 24 Hours (Table)











  09/05/21 09/05/21 09/05/21 Range/Units





  02:02 06:45 06:45 


 


WBC   12.80 H   (4.50-10.00)  X 10*3/uL


 


RBC   3.20 L   (4.40-5.60)  X 10*6/uL


 


Hgb   10.1 L   (13.0-17.0)  g/dL


 


Hct   30.8 L   (39.6-50.0)  %


 


RDW   15.5 H   (11.5-14.5)  %


 


Absolute Nucleated RBC   0.02 H   (0.00-0.00)  X 10*3/uL


 


NRBC/100 WBC Diff   0.2 H   (0.0-0.0)  /100 WBCS


 


BUN    5.0 L  (9.0-27.0)  mg/dL


 


Creatinine    0.3 L  (0.6-1.5)  mg/dL


 


POC Glucose (mg/dL)  115 H    (75-99)  mg/dL


 


Calcium    8.3 L  (8.7-10.3)  mg/dL














  09/05/21 09/05/21 Range/Units





  11:40 16:40 


 


WBC    (4.50-10.00)  X 10*3/uL


 


RBC    (4.40-5.60)  X 10*6/uL


 


Hgb    (13.0-17.0)  g/dL


 


Hct    (39.6-50.0)  %


 


RDW    (11.5-14.5)  %


 


Absolute Nucleated RBC    (0.00-0.00)  X 10*3/uL


 


NRBC/100 WBC Diff    (0.0-0.0)  /100 WBCS


 


BUN    (9.0-27.0)  mg/dL


 


Creatinine    (0.6-1.5)  mg/dL


 


POC Glucose (mg/dL)  136 H  144 H  (75-99)  mg/dL


 


Calcium    (8.7-10.3)  mg/dL








                      Microbiology - Last 24 Hours (Table)











 08/29/21 17:40 Blood Culture - Final





 Blood    No Growth after 144 hours














Assessment and Plan


Assessment: 





Patient seen and examined independently.  Patient was also seen by Pedrito Zayas NP and case was discussed.  I am in agreement with subjective, physical exam, 

assessment and plan as written above and amended below.





Patient is alert and oriented 2 today.  He denies any pain, no shortness of 

breath, no nausea or vomiting.





General: non toxic, no distress, appears at stated age


Derm: Multiple areas of abrasion in bilateral lower extremities, multiple areas 

of ecchymosis in various stages of healing warm, dry


Head: atraumatic, normocephalic, symmetric


Eyes: EOMI, no lid lag, anicteric sclera


Mouth: no lip lesion, mucus membranes moist


Cardiovascular: S1S2 reg, no murmur, positive posterior tibial pulse bilateral, 


Lungs: Coarse breath sounds bilateral, no rhonchi, no rales , no accessory 

muscle use


Abdominal: soft,  nontender to palpation, no guarding, no appreciable 

organomegaly


Ext: no gross muscle atrophy, no edema, no contractures


Neuro:  CN II-XI grossly intact, no focal neuro deficits


Psych: Alert, oriented, appropriate affect

## 2021-09-05 NOTE — XR
EXAMINATION TYPE: XR chest 1V portable

 

DATE OF EXAM: 9/5/2021

 

COMPARISON: Chest radiograph and CT 9/2/2021

 

HISTORY: Spiking temperature.

 

TECHNIQUE: Single frontal view of the chest is obtained.

 

FINDINGS:  Mediastinal silhouette and pulmonary vasculature is within normal limits.

 

Elevation of the right hemidiaphragm with strandy atelectasis at the right base. No consolidation, ef
fusion or pneumothorax.

 

Groundglass opacities at the left apex and tiny bilateral effusions on the recent CT are not well mary
reciated on the current examination.

 

IMPRESSION:  No acute cardiopulmonary process.  Groundglass opacities at the left apex and tiny bilat
eral effusions seen on the recent CT are not well appreciated on the current examination.

## 2021-09-06 LAB
GLUCOSE BLD-MCNC: 111 MG/DL (ref 75–99)
GLUCOSE BLD-MCNC: 118 MG/DL (ref 75–99)
GLUCOSE BLD-MCNC: 89 MG/DL (ref 75–99)
GLUCOSE BLD-MCNC: 90 MG/DL (ref 75–99)
GLUCOSE BLD-MCNC: 92 MG/DL (ref 75–99)

## 2021-09-06 RX ADMIN — CEFEPIME HYDROCHLORIDE SCH MLS/HR: 2 INJECTION, POWDER, FOR SOLUTION INTRAVENOUS at 09:20

## 2021-09-06 RX ADMIN — SODIUM CHLORIDE SCH MLS/HR: 900 INJECTION, SOLUTION INTRAVENOUS at 21:08

## 2021-09-06 RX ADMIN — NYSTATIN SCH APPLIC: 100000 OINTMENT TOPICAL at 16:06

## 2021-09-06 RX ADMIN — FOLIC ACID SCH MG: 1 TABLET ORAL at 08:46

## 2021-09-06 RX ADMIN — MEGESTROL ACETATE SCH MG: 40 SUSPENSION ORAL at 08:46

## 2021-09-06 RX ADMIN — PYRIDOXINE HCL TAB 50 MG SCH MG: 50 TAB at 08:46

## 2021-09-06 RX ADMIN — SODIUM CHLORIDE SCH MLS/HR: 9 INJECTION, SOLUTION INTRAVENOUS at 09:19

## 2021-09-06 RX ADMIN — PYRIDOXINE HCL TAB 50 MG SCH MG: 50 TAB at 21:07

## 2021-09-06 RX ADMIN — NYSTATIN SCH APPLIC: 100000 OINTMENT TOPICAL at 09:20

## 2021-09-06 RX ADMIN — HEPARIN SODIUM SCH UNIT: 5000 INJECTION INTRAVENOUS; SUBCUTANEOUS at 16:05

## 2021-09-06 RX ADMIN — SODIUM CHLORIDE SCH MLS/HR: 9 INJECTION, SOLUTION INTRAVENOUS at 16:05

## 2021-09-06 RX ADMIN — CEFEPIME HYDROCHLORIDE SCH MLS/HR: 2 INJECTION, POWDER, FOR SOLUTION INTRAVENOUS at 16:06

## 2021-09-06 RX ADMIN — SODIUM CHLORIDE SCH MLS/HR: 9 INJECTION, SOLUTION INTRAVENOUS at 00:16

## 2021-09-06 RX ADMIN — HEPARIN SODIUM SCH UNIT: 5000 INJECTION INTRAVENOUS; SUBCUTANEOUS at 08:46

## 2021-09-06 RX ADMIN — PANTOPRAZOLE SODIUM SCH MG: 40 TABLET, DELAYED RELEASE ORAL at 08:46

## 2021-09-06 RX ADMIN — SODIUM CHLORIDE SCH MLS/HR: 900 INJECTION, SOLUTION INTRAVENOUS at 08:42

## 2021-09-06 RX ADMIN — CEFEPIME HYDROCHLORIDE SCH MLS/HR: 2 INJECTION, POWDER, FOR SOLUTION INTRAVENOUS at 00:17

## 2021-09-06 NOTE — P.PN
<Pedrito Zayas - Last Filed: 09/06/21 12:16>





Subjective


Progress Note Date: 09/06/21





Hospital course:





Patient is a 55-year-old male with a past medical history of EtOH abuse.  He p

resented to the hospital on 8/23/21 after being found delirious and confused by 

his neighbor at home.  Patient was reportedly found laying on the floor covered 

in his own feces with empty alcohol bottles surrounding him.  Patient presented 

to the hospital with multiple bruises and abrasions in different stages of 

healing.  UDS positive for marijuana and benzodiazepines.  EtOH level was less 

than 10.  Patient was initially found to have hypernatremia with sodium of 152, 

hyponatremia with potassium of 3.2, hyperchloremia with chloride of 114 and 

elevated creatinine kinase of 999.  He was admitted for acute metabolic 

encephalopathy with hallucinations and EtOH withdrawal, rhabdomyolysis, and se

psis without sepsis shock.  Patient has underwent multiple labs and imaging see 

below.  Currently admitted under our services with consultation to pulmonology, 

infectious disease, neurology, and orthopedic surgery.








Labs and imaging:





Initial blood culture showing no results after 144 hours, CSF fluid negative 

wound culture right knee negatives wound culture left ankle preliminary results 

negative, 1 out of 2 blood culture results repeated on 8/28/21 positive for 

Staphylococcus epidermidis, possibly contaminated, and repeat blood culture 

drawn on 8/29/21 showing no growth after 24 hours.





TSH 1.770.





Pro-calcitonin 50.22 and CRP of 8.1.





Chest x-ray completed 8/23/21 revealed minimal subsegmental atelectasis at the 

right lung base without change.





CT head and cervical spine completed 8/23/21 negative for acute intercranial 

abnormality with spondylitic changes to cervical spine with no acute fractures 

or abnormalities.





CT facial bones without contrast showing evidence of old blowout fracture on the

floor of the left bony orbit with reported displacement of 4 mm and is negative 

for acute fractures.





X-ray right elbow olecranon process spur formation with posterior soft tissue 

swelling negative for acute fractures.





Bilateral carotid Dopplers negative for hemodynamically significant internal 

carotid artery stenosis





Venous Doppler left lower extremity negative for DVT.





X-ray left ankle negative for acute fracture.





MRI brain with and without contrast showing mild atrophy with mild subependymal 

white matter increased signal around the lateral ventricles measuring up to 5 mm

in thickness no evidence of cortical infarct.





MRI lumbar spine with and without contrast showing no evidence of discitis, no 

significant disc space narrowing or acute fractures.  Multilevel mild facet 

arthroplasty and mild lateral recess stenosis with multilevel mild posterior 

disc bulging without spinal stenosis.





CT left lower extremity showing chronic changes with soft tissue swelling and 

calcaneal spurring, negative for acute fracture.





CT chest, abdomen and pelvis showed fatty infiltration of the liver, pleural 

effusions and basilar atelectasis with elevated right diaphragm, subcutaneous 

edema around the abdomen, CHF is not ruled out, and avascular necrosis of left 

femoral head unchanged with no acute abnormalities reported in the pelvis.





Chest CTA negative for acute pulmonary embolism.  Lateral left upper lung 

groundglass opacity possibly representing an acute infectious process with 

stable small bilateral pleural effusions.








Physical exam:





9/6/21: Patient seen and evaluated at bedside this morning.  Patient resting in 

bed this morning.  He is alert to person and place and again confused to time 

and situation.  Elevated temperature has improved since restarting patient on 

cefepime with high over past 24 hours being 99.1F.  Patient continues to have 

reports of pain in his legs and ankles.  Attempts made at contacting patient's 

daughter for updates.  If patient remains afebrile and condition stable, plans 

for discharge to skilled nursing facility once medicaid approval is completed. 

Social work/Case Management assisting pt's daughter with application process. 





General: Nontoxic, no distress and appears stated age.  


Derm: Skin warm and dry, normal coloration for ethnicity.  Multiple bruises and 

abrasions covering upper and lower extremities in different stages of healing. 

red rash to buttocks


Head: Atraumatic, normocephalic and symmetric.  


Eyes: No lid lag, and anicteric sclera.  Slight swelling and bruising s

urrounding left orbital region.


Mouth: No lip lesions, mucus membranes moist


Cardiovascular: Regular rhythm and tachycardic rate, no murmur, positive 

posterior tibial pulses bilaterally, and cap refill < 2 seconds.  


Lungs: Respirations even, regular, and unlabored on room air. Lungs CTA 

bilaterally, no rhonchi, no rales, no wheezing, and no accessory muscle usage. 


GI/: soft, nontender to palpation, no guarding, no appreciable organomegaly.  

Puente catheter in place.


Ext: ROM intact. No gross muscle atrophy, no edema, no contractures. erythema 

and swelling to BLE worse on left.  Swelling right knee. 


Neuro/Psych: Speech clear.  Patient able to follow limited commands, alert to 

person and place only today, confused to time and situation.  








Assessment and Plan of Care:





Acute metabolic encephalopathy


Sepsis without septic shock


Fever, unclear etiology


Concerns about meningitis, ruled out


Bacteremia ruled out, contamination with staph epidermidis


Concerns of RLL Aspiration Pneumonia, questionable


-Initial Pro-calcitonin 50.22 and CRP of 8.1, improving with repeat pro-

calcitonin 10.40 and CRP 5.1.


-Continue IV antibiotics: Vancomycin. Patient again running fevers, temple 

101.0F this morning.  Added cefepime back on with vancomycin.


-Blood culture showing no growth after 144 hours


-CSF fluid not consistent with bacterial meningitis.  


-HSV PCR negative


-MRI of the lumbar spine with no evidence of discitis or prevertebral abscess


-Blood gas showed no evidence of acute CO2 narcosis


-Thiamine 100 mg twice daily.





Hypomagnesemia


-Replaced


-We will continue to monitor with repeat a.m. labs.





Alcohol abuse with alcohol withdrawal syndrome


Delirium tremens


Rhabdomyolysis


Acute Metabolic Encephalopathy with hallucinations


-IV fluid hydration, Thiamine 100 mg twice daily


-Seizure precautions, Fall precautions


-Computed tomography scan of the head showed no acute findings.


-MRI of the brain was no acute finding


-Consulted neurology and psychiatry for further evaluation


-EEG was abnormal with generalized cerebral dysfunction and evidence of toxic 

metabolic encephalopathy


-Treatment of underlying infectious process, continue IV antibiotics cefepime 

and vancomycin pending further recommendations by infectious disease





Hypokalemia, resolved


-We will continue to monitor with repeat a.m. labs.





Poor living condition


Recurrent episodes of hospital admission for alcohol abuse


Major life stressors with possible major depression


-Consult placed to psychiatry


- consultation





Macrocytic anemia mild, Most likely secondary to alcohol abuse


No reported GI bleeding





-Multiple skin abrasions and bruising in multiple stages of healing


-Left eye bruising


-No acute fractures identified





Hypernatremia, resolved status post administration of IV fluids. 


-We will continue to monitor with repeat a.m. labs.








CODE STATUS: Full code


DVT prophylaxis: SCDs


Discussed with: Patient and RN, left voicemail with patient's daughter/legal 

guardian Dinah Thorpe at 10:38 AM.


Anticipated discharge date: Clinical course to determine


Anticipated discharge place: Extended care facility/skilled nursing facility


A total of 45 minutes was spent on the care of this complex patient more than 

50% of the time was spent in counseling and care coordination.





Objective





- Vital Signs


Vital signs: 


                                   Vital Signs











Temp  98.5 F   09/06/21 07:53


 


Pulse  98   09/06/21 07:53


 


Resp  18   09/06/21 07:53


 


BP  129/82   09/06/21 07:53


 


Pulse Ox  96   09/06/21 07:53








                                 Intake & Output











 09/05/21 09/06/21 09/06/21





 18:59 06:59 18:59


 


Intake Total 540  


 


Output Total  900 


 


Balance 540 -900 


 


Intake:   


 


  Oral 540  


 


Output:   


 


  Urine  900 


 


Other:   


 


  Voiding Method Diaper  Diaper





 External Catheter  External Catheter


 


  # Voids 4 3 


 


  # Bowel Movements 1 1 














- Labs


CBC & Chem 7: 


                                 09/05/21 06:45





                                 09/05/21 06:45


Labs: 


                  Abnormal Lab Results - Last 24 Hours (Table)











  09/05/21 09/05/21 09/05/21 Range/Units





  06:45 06:45 11:40 


 


WBC  12.80 H    (4.50-10.00)  X 10*3/uL


 


RBC  3.20 L    (4.40-5.60)  X 10*6/uL


 


Hgb  10.1 L    (13.0-17.0)  g/dL


 


Hct  30.8 L    (39.6-50.0)  %


 


RDW  15.5 H    (11.5-14.5)  %


 


Absolute Nucleated RBC  0.02 H    (0.00-0.00)  X 10*3/uL


 


NRBC/100 WBC Diff  0.2 H    (0.0-0.0)  /100 WBCS


 


BUN   5.0 L   (9.0-27.0)  mg/dL


 


Creatinine   0.3 L   (0.6-1.5)  mg/dL


 


POC Glucose (mg/dL)    136 H  (75-99)  mg/dL


 


Calcium   8.3 L   (8.7-10.3)  mg/dL














  09/05/21 09/06/21 Range/Units





  16:40 00:38 


 


WBC    (4.50-10.00)  X 10*3/uL


 


RBC    (4.40-5.60)  X 10*6/uL


 


Hgb    (13.0-17.0)  g/dL


 


Hct    (39.6-50.0)  %


 


RDW    (11.5-14.5)  %


 


Absolute Nucleated RBC    (0.00-0.00)  X 10*3/uL


 


NRBC/100 WBC Diff    (0.0-0.0)  /100 WBCS


 


BUN    (9.0-27.0)  mg/dL


 


Creatinine    (0.6-1.5)  mg/dL


 


POC Glucose (mg/dL)  144 H  111 H  (75-99)  mg/dL


 


Calcium    (8.7-10.3)  mg/dL














<IlyaLaura INDY - Last Filed: 09/06/21 14:25>





Subjective


Patient seen and examined independently.  Patient was also seen by Pedrito Zayas NP and case was discussed.  I am in agreement with subjective, physical exam, 

assessment and plan as written above and amended below.





Patient is sleeping but awakes.  He complains of some low back pain.  Denies any

nausea or vomiting.  Was agitated yesterday evening but has since been more 

calm.





General: non toxic, no distress, appears at stated age


Derm: Multiple areas of excoriations and abrasions, multiple areas of ecchymoses

in various stages of healing warm, dry


Head: atraumatic, normocephalic, symmetric


Eyes: EOMI, no lid lag, anicteric sclera


Mouth: no lip lesion, mucus membranes moist


Cardiovascular: S1S2 reg, no murmur, positive posterior tibial pulse bilateral, 


Lungs: Coarse breath sounds bilateral bilateral, no rhonchi, no rales , no 

accessory muscle use


Abdominal: soft,  nontender to palpation, no guarding, no appreciable 

organomegaly


Ext: no gross muscle atrophy, no edema, no contractures


Neuro:  CN II-XI grossly intact, no focal neuro deficits


Psych: Alert, conversational








Objective





- Vital Signs


Vital signs: 


                                   Vital Signs











Temp  98.5 F   09/06/21 07:53


 


Pulse  98   09/06/21 07:53


 


Resp  18   09/06/21 07:53


 


BP  129/82   09/06/21 07:53


 


Pulse Ox  96   09/06/21 07:53








                                 Intake & Output











 09/05/21 09/06/21 09/06/21





 18:59 06:59 18:59


 


Intake Total 540  


 


Output Total  900 


 


Balance 540 -900 


 


Weight   68.5 kg


 


Intake:   


 


  Oral 540  


 


Output:   


 


  Urine  900 


 


Other:   


 


  Voiding Method Diaper  Diaper





 External Catheter  External Catheter


 


  # Voids 4 3 


 


  # Bowel Movements 1 1 














- Labs


CBC & Chem 7: 


                                 09/05/21 06:45





                                 09/05/21 06:45


Labs: 


                  Abnormal Lab Results - Last 24 Hours (Table)











  09/05/21 09/06/21 Range/Units





  16:40 00:38 


 


POC Glucose (mg/dL)  144 H  111 H  (75-99)  mg/dL

## 2021-09-06 NOTE — PN
PROGRESS NOTE



DATE OF SERVICE:

09/06/2021



REASON FOR FOLLOWUP:

Fever.



INTERVAL HISTORY:

The patient's overall fever pattern has improved.  He did have a low-grade fever of 100

degrees Fahrenheit this afternoon.  The patient remains pleasantly confused.  Denies

having any chest pain or cough. Currently on room air.  No vomiting or diarrhea has

been reported by the nursing staff.



PHYSICAL EXAMINATION:

Blood pressure 174/79 with a pulse of 117, temperature 100. He is 95% on room air.

GENERAL DESCRIPTION: General description is a middle-aged male lying in bed in no

distress.

RESPIRATORY SYSTEM: Unlabored breathing. Decreased breath sounds at the bases. No

wheeze.

HEART: S1, S2. Regular rate and rhythm.

ABDOMEN: Soft. No tenderness.



LABS:

No new labs have been obtained today.



DIAGNOSTIC IMPRESSION AND PLAN:

Patient with a fever in this patient who did have an extensive workup, and most of it

has been negative.  We are waiting for the WBC scan.  Continue with cefepime and

vancomycin and monitor his clinical course closely.





MMODL / IJN: [id number] / Job#: [id number]

## 2021-09-06 NOTE — PN
PROGRESS NOTE



DATE OF SERVICE:

09/05/2021



REASON FOR FOLLOWUP:

Fever.



INTERVAL HISTORY:

The patient has been running a fever again this morning with a temperature of 101

degrees Fahrenheit.  The patient is breathing comfortably on room air.  Remains to be

pleasantly confused.  Denies having any chest pain or cough.  No abdominal pain.  No

diarrhea has been reported.



PHYSICAL EXAMINATION:

Blood pressure 134/58 with a pulse of 126, temperature 98.5.  He is 95% on room air.

General description is a middle-aged male lying in bed in no distress.  Respiratory

system: Unlabored breathing, clear to auscultation anteriorly.  Heart S1, S2.  Regular

rate and rhythm.  Abdomen soft, no tenderness.  Left ankle swelling has decreased.



LABS:

Chest x-ray, no acute cardiopulmonary process.



DIAGNOSTIC IMPRESSION AND PLAN:

Patient with fever in this patient did have extensive workup including CT of the chest,

CT of abdomen and pelvis and lower extremity CT and has been negative. A WBC scan

pending.  He is currently covered with empiric antibiotic vancomycin and cefepime, to

continue.  Prognosis remains to be guarded.  Continue supportive care.





MMODL / IJN: [id number] / Job#: [id number]

## 2021-09-07 LAB
ANION GAP SERPL CALC-SCNC: 9 MMOL/L
APTT BLD: 26.4 SEC (ref 22–30)
BUN SERPL-SCNC: 5 MG/DL (ref 9–20)
CALCIUM SPEC-MCNC: 8.5 MG/DL (ref 8.4–10.2)
CHLORIDE SERPL-SCNC: 109 MMOL/L (ref 98–107)
CO2 SERPL-SCNC: 23 MMOL/L (ref 22–30)
ERYTHROCYTE [DISTWIDTH] IN BLOOD BY AUTOMATED COUNT: 3.11 X 10*6/UL (ref 4.4–5.6)
ERYTHROCYTE [DISTWIDTH] IN BLOOD: 15.9 % (ref 11.5–14.5)
GLUCOSE BLD-MCNC: 103 MG/DL (ref 75–99)
GLUCOSE BLD-MCNC: 121 MG/DL (ref 75–99)
GLUCOSE BLD-MCNC: 90 MG/DL (ref 75–99)
GLUCOSE BLD-MCNC: 98 MG/DL (ref 75–99)
GLUCOSE BLD-MCNC: 99 MG/DL (ref 75–99)
GLUCOSE CSF-MCNC: 61 MG/DL (ref 40–70)
GLUCOSE SERPL-MCNC: 97 MG/DL (ref 74–99)
HCT VFR BLD AUTO: 31.4 % (ref 39.6–50)
HGB BLD-MCNC: 10 G/DL (ref 13–17)
INR PPP: 1.1 (ref ?–1.2)
MAGNESIUM SPEC-SCNC: 2 MG/DL (ref 1.6–2.3)
MCH RBC QN AUTO: 32.2 PG (ref 27–32)
MCHC RBC AUTO-ENTMCNC: 31.8 G/DL (ref 32–37)
MCV RBC AUTO: 101 FL (ref 80–97)
NUC CELL # CSF: 0 U/L (ref 0–5)
PLATELET # BLD AUTO: 391 X 10*3/UL (ref 140–440)
POTASSIUM SERPL-SCNC: 3.4 MMOL/L (ref 3.5–5.1)
PT BLD: 11.3 SEC (ref 9–12)
RBC # CSF: 0 U/L (ref 0–10)
SODIUM SERPL-SCNC: 141 MMOL/L (ref 137–145)
SPECIMEN VOL CSF: 2 ML
WBC # BLD AUTO: 12.36 X 10*3/UL (ref 4.5–10)

## 2021-09-07 PROCEDURE — 009U3ZX DRAINAGE OF SPINAL CANAL, PERCUTANEOUS APPROACH, DIAGNOSTIC: ICD-10-PCS

## 2021-09-07 RX ADMIN — CEFEPIME HYDROCHLORIDE SCH MLS/HR: 2 INJECTION, POWDER, FOR SOLUTION INTRAVENOUS at 07:50

## 2021-09-07 RX ADMIN — SODIUM CHLORIDE SCH MLS/HR: 9 INJECTION, SOLUTION INTRAVENOUS at 17:49

## 2021-09-07 RX ADMIN — NYSTATIN SCH APPLIC: 100000 OINTMENT TOPICAL at 00:16

## 2021-09-07 RX ADMIN — NYSTATIN SCH APPLIC: 100000 OINTMENT TOPICAL at 22:32

## 2021-09-07 RX ADMIN — CEFEPIME HYDROCHLORIDE SCH MLS/HR: 2 INJECTION, POWDER, FOR SOLUTION INTRAVENOUS at 00:15

## 2021-09-07 RX ADMIN — ACETAMINOPHEN PRN MG: 325 TABLET, FILM COATED ORAL at 22:31

## 2021-09-07 RX ADMIN — SODIUM CHLORIDE SCH MLS/HR: 9 INJECTION, SOLUTION INTRAVENOUS at 09:31

## 2021-09-07 RX ADMIN — NYSTATIN SCH APPLIC: 100000 OINTMENT TOPICAL at 16:28

## 2021-09-07 RX ADMIN — NYSTATIN SCH APPLIC: 100000 OINTMENT TOPICAL at 10:14

## 2021-09-07 RX ADMIN — CEFEPIME HYDROCHLORIDE SCH MLS/HR: 2 INJECTION, POWDER, FOR SOLUTION INTRAVENOUS at 16:24

## 2021-09-07 RX ADMIN — PYRIDOXINE HCL TAB 50 MG SCH MG: 50 TAB at 22:32

## 2021-09-07 RX ADMIN — SODIUM CHLORIDE SCH MLS/HR: 900 INJECTION, SOLUTION INTRAVENOUS at 22:32

## 2021-09-07 RX ADMIN — HEPARIN SODIUM SCH: 5000 INJECTION INTRAVENOUS; SUBCUTANEOUS at 16:17

## 2021-09-07 RX ADMIN — PYRIDOXINE HCL TAB 50 MG SCH MG: 50 TAB at 09:26

## 2021-09-07 RX ADMIN — SODIUM CHLORIDE SCH MLS/HR: 9 INJECTION, SOLUTION INTRAVENOUS at 00:16

## 2021-09-07 RX ADMIN — HEPARIN SODIUM SCH UNIT: 5000 INJECTION INTRAVENOUS; SUBCUTANEOUS at 07:49

## 2021-09-07 RX ADMIN — FOLIC ACID SCH MG: 1 TABLET ORAL at 09:25

## 2021-09-07 RX ADMIN — MEGESTROL ACETATE SCH MG: 40 SUSPENSION ORAL at 09:25

## 2021-09-07 RX ADMIN — HEPARIN SODIUM SCH UNIT: 5000 INJECTION INTRAVENOUS; SUBCUTANEOUS at 00:16

## 2021-09-07 RX ADMIN — SODIUM CHLORIDE SCH MLS/HR: 9 INJECTION, SOLUTION INTRAVENOUS at 01:47

## 2021-09-07 RX ADMIN — SODIUM CHLORIDE SCH MLS/HR: 900 INJECTION, SOLUTION INTRAVENOUS at 12:45

## 2021-09-07 RX ADMIN — PANTOPRAZOLE SODIUM SCH MG: 40 TABLET, DELAYED RELEASE ORAL at 07:49

## 2021-09-07 NOTE — CDI
Documentation Clarification Form



Date: 09/07/2021 08:52:56 AM

From: Deborah Starks RN CCDS 

Phone: [phone]

MRN: [id number]

Admit Date: 08/23/2021 06:12:00 PM

Patient Name: Puneet Howard

Visit Number: [id number]

Discharge Date:  





ATTENTION: The Clinical Documentation Specialists (CDI) and [facility] Coding Staff 
appreciate your assistance in clarifying documentation. Please respond to the 
clarification below the line at the bottom and electronically sign. The CDI & 
[facility] Coding staff will review the response and follow-up if needed. Please note: 
Queries are made part of the Legal Health Record. If you have any questions, 
please contact the author of this message via ITS.



Dr. Harsh Naidu



The patient presented with the following clinical indicators. Additional 
clarification regarding the etiology/cause of the clinical indicators is 
requested.



History/Risk Factors:  55-year-old male presents to the ED via EMS after being 
found by neighbors on the floor covered in feces with empty bottles of alcohol 
around him. Medical history: HTN, Liver disease and alcohol abuse. 8/23, H&P.



Clinical Indicators:

9/6 Medicine progress note: Fever, unclear etiology. Meningitis ruled out. 
Bacteremia, ruled out, contamination with staph epidermidis. Concern of RLL 
Aspiration pneumonia questionable. 

WBC: 8/23 7.8

Blood cultures: 8/26 No growth after 144 hours.

CSF stain: 8/27 No growth after 4 days.

Right knee culture: No organism seen.

Left ankle culture: No organism seen 

CXR: 8/23 Minimal subsegmental atelectasis right lung base without change. 

Vitals signs: 8/23 B/P 135/73, , Temp 99.1 F Oral, RR 20, SpO2 94% room 
air.



Treatment:

ID Consult: Progress note 9/6 Patient with a fever in this patient who did have
an extensive workup, and most of it has been negative.

Antibiotics: 8/26 8/30 Vancomycin 1,500mg IVPB Q8H; 8/31  9/3 Vancomycin 
1,500mg IVPB Q8H; 9/3 - 9/7 Vancomycin 1,500mg IVPB Q8H; 9/7 to current 
Vancomycin 1,500mg IVPB Q8H.  8/25 9/1 Cefepime HCI 2gm IVPB Q8HR; 9/1 8/28 
Cefepime HCL 2 gm IVPB Q12HR.

IV Bolus: 8/23 0.9NS 500 mls/bolus x 1. 

 

In your professional opinion, please clarify if these findings signify one of 
the following conditions:



   [  ] Sepsis POA related to Aspiration pneumonia

   [  ] Sepsis POA related to (please specify) _____________

   [  ] Sepsis, Not POA related to ( please specify) __________

   [  ] Sepsis ruled out

   [  ] Other, please specify  _____________

   [  ] Unable to determine





SIRS Criteria: 2 or more of the following may indicate SIRS   

-Temperature < 96.8F (36C) or > 101.0F (38.3C)

-Heart Rate > 90 bpm

-Respiratory Rate > 20 breaths/min or PaCO2 < 32 mmHg

-White Blood Cell Count > 12,000 or < 4,000 cells/mm3 or > 10% bands



___________________________________________________________________________

(Template Last Reviewed: February 2021)



MTDD

## 2021-09-07 NOTE — P.PCN
Date of Procedure: 09/07/21


Operative Findings: 


Procedure: 


Lumbar puncture








PREOPERATIVE DIAGNOSIS:  Altered mental status





POSTOPERATIVE DIAGNOSIS: Altered mental status





SURGEON: Tabitha Jacinto 





ANESTHESIA: Local with 1% lidocaine, and IV sedation : None





EBL: None.





Specimen removed: 12 mL of CSF








PROCEDURE INDICATION:  The patient had recent history of altered mental status, 

primary team requested lumbar puncture for CSF analysis.





PROCEDURE DESCRIPTION: The patient was seen and identified in the room.  Risks, 

benefits, complications, and alternatives were discussed with the patient 

family.  Signed the consent.





Patient kept in sitting position with help of RN. The lumbosacral area was 

prepped with ChloraPrep 2, and draped in the usual sterile fashion. Critical 

pause was taken. Vital signs were closely monitored during the procedure.





Using palpation technique L4-L5 interspinous space identified.  Skin and deeper 

tissues were localized with 5 mL of 1% lidocaine. , a 22 gauge 3.5 Koo 

spinal needle entered the subarachnoid space with one attempt.,  No trauma 

noticed, no blood in the CSF.  Approximately 3 mL of CSF collected in 4 sample 

tubes.  Needle was withdrawn intact, skin was cleansed, and bandages were 

applied. 





COMPLICATIONS: None, and patient tolerated procedure well.





DISPOSITION / PLANS: The patient was placed in a supine position, recommended RN

to monitor patient..

## 2021-09-07 NOTE — PN
PROGRESS NOTE



DATE OF SERVICE:

09/07/2021



REASON FOR FOLLOWUP:

Fever.



INTERVAL HISTORY:

The patient is afebrile today.  The patient remains pleasantly confused.  He is

breathing comfortably on room air.  No vomiting or diarrhea has been reported by the

nursing staff.



PHYSICAL EXAMINATION:

Blood pressure 132/77 with pulse of 99, temperature 98.5.  He is 96% on room air.

GENERAL DESCRIPTION: General description is a middle-aged male lying in bed in no

distress.

RESPIRATORY SYSTEM: Unlabored breathing. Decreased breath sounds at the bases. No

wheeze.

HEART: S1, S2. Regular rate and rhythm.

ABDOMEN: Soft. No tenderness.



LABS:

LP was completed today, which is normal.  WBC scan is pending.



DIAGNOSTIC IMPRESSION AND PLAN:

Patient with a fever and elevated white count in this patient who did have extensive

workup without identification of his focus.  He even had the LP which is negative.  We

are waiting for the WBC scan.  Continue with cefepime and vancomycin and monitor his

clinical course closely.





MMODL / IJN: [id number] / Job#: [id number]

## 2021-09-07 NOTE — P.PN
Subjective


Progress Note Date: 09/07/21 09/07/2021: Patient appears much better as compared to yesterday.  Patient 

states that "you are the third doctor of the day".  He is concerned that he is 

not getting exercises, not getting out of bed.





09/06/2021:


Patient was seen for a follow-up.  Patient complains of extreme muscle pain and 

fatigue.  He tells me that he was attacked by a dog and he fell down on the 

ground.  He got up and was again attacked by the dog and fell again.  This 

happened a total of 3 times until he got in someone's garage.  He states that 

this happened when there was a big snowstorm in February 2021.  He also suffered

from a torn meniscus between summer and fall of 2020 when he was using a snow

blower and the handle snapped.  He still could not tell me the exact reason that

he came to the hospital.





He continues to have pain in the legs, right much worse than left.  Please refer

to examination below.  Right ankle appears swollen.  He has peripheral edema. 





WORK-UP:


* CPK is 999 on 08/23 but 217 on 08/25--improved.


* B12 582 normal, however at [facility] is elevated 0.46 (normal <0.40).  This may 

  indicate intracellular deficiency of B12.  Patient's folate 5.7, TSH 1.77, RPR

  nonreactive.  


* Low Vitamin B6 3 (normal is 5-50).


* Ammonia level 12 (normal)


* ESR 64 on 08/28 and the repeated ESR 77 on 08/30/2021.


* MRI of the brain on 08/27/21 revealed mild atrophy.  Mild subependymal white 

  matter increased signal around the lateral ventricles measuring up to 5 mm in 

  thickness.  No evidence of cortical infarct.


* REPEAT MRI brain w/o (09/01/21): I ordered MRI the brain with and without but 

  because the patient was in so much pain that was the study was limited and it 

  was done only without.  The MR the brain is reported as limited examination 

  due to patient's pain.  No obvious significant intracranial abnormality.  


* MRI of the lumbar spine with and without contrast 08/28/2021 revealed no 

  evidence of discitis.  No significant disc space narrowing.  No fracture.  

  Multilevel mild facet arthropathy and mild lateral recess stenosis.  

  Multilevel mild posterior disc bulging without spinal stenosis.  


* CerebroSpinal fluid examination revealed glucose 64 (40-70), proteins 226 (12-

  60), total WBC count in CSF 18, out of its 15% monocytes and 85% 

  polynuclear's.  CSF RBC 5175, due to traumatic tap.  


* Patient's spinal fluid was traumatic because patient was not cooperating.  CSF

  showsCSF shows very mild leukocytosis ( about 2-3 cells above normal after 

  correction for traumatic tap), and very elevated proteins.   Cultures  

  negative.  HSV-1 and 2 PCR in the CSF are negative.  Viral test are negative. 

  Infectious disease following.  


* Patient had undergone aspiration of right knee and left ankle, both of which 

  have been negative for any growth.  No crystals seen in the sinonasal fluid.


* Routine EEG on 08/26/21  was reported as abnormal due to background slowing of

  moderate to severe degree.  This is suggestive of generalized cerebral 

  dysfunction, as can be seen with toxic metabolic encephalopathy or due to dif

  fuse structural brain abnormality.  No epileptiform activity was seen.  


* Carotid Doppler was technically difficult due to patient's inability to hold 

  still.  No hemodynamically significant ICA stenosis identified on either side.

   Antegrade flow in both vertebral arteries.


* CT of facial bone on 08/23/2021 is reported as there is evidence of old blow 

  out fracture of the floor of the left bony orbits.  No acute fracture seen.


* Left Lower extremity CT 08/31/2021: is reported as chronic changes as noted.  

  Soft tissue swelling.  Calcaneal is spurring.


* CT of the abdomen/pelvis: Was reported as fatty infiltration of the liver.  

  Pleural effusion and basilar atelectasis.  Elevated right diaphragm could 

  relate to diaphragm paralysis.  Subcutaneous edema around the abdomen.  

  Congestive heart failure as possible.  These abnormality.  New compared to old

  exam.  Avascular necrosis of left femoral head on changed.  No acute 

  abnormality within the abdomen pelvis


* CT of the chest is reported as small left apical density.  Additional left 

  lateral long-based thickening may be present.  At the clinic today says an 

  underlying mass should be considered.  No evidence of pneumonia or aspiration 

  pneumonia.  Moderate fatty infiltration to the liver.


* CT of the chest is reported as suboptimal study without CT chest of for acute 

  pulmonary embolism.  Lateral left upper lung groundglass opacity could reflect

  acute infection process.  No significant change from one day earlier.  Stable 

  small bilateral pleural effusion noted.











Objective





- Vital Signs


Vital signs: 


                                   Vital Signs











Temp  98.5 F   09/07/21 14:00


 


Pulse  112 H  09/07/21 14:00


 


Resp  16   09/07/21 14:00


 


BP  132/77   09/07/21 14:00


 


Pulse Ox  96   09/07/21 14:00








                                 Intake & Output











 09/07/21 09/07/21 09/08/21





 06:59 18:59 06:59


 


Other:   


 


  Voiding Method Diaper Diaper 





 External Catheter  


 


  # Voids 5 2 


 


  # Bowel Movements 2  














- Exam





Patient is a middle aged  male, appears older than his stated age.  

Patient states it is September and the year is 2051.  He knows that he is in 

[facility] in [place].  Patient's speech and language functions have 

much improved.  On cranial examination pupils are round and reacting to light, 

visual fields are full on confrontation, extraocular muscles are intact.  Face 

is symmetric and tongue protrudes to the midline.  Palatal elevation is normal, 

hearing and shoulder shrug normal.  On muscle strength testing, the strength is 

normal in upper extremities.  In the lower extremities, his hip flexion is 

(right/left) 2/4, ankle dorsiflexion 5/4.  Patient is less tender and less pain 

in the legs.  Right knee appears swollen.  He has peripheral edema.  Sensations 

are equal.  No ataxia for finger-to-nose testing.  Reflexes are 3 to 3+ in the 

arms and legs bilaterally. Plantars are downgoing.  Patient has multiple 

scratches, old healed scabs.





Chest is clear, abdomen is soft.








- Labs


CBC & Chem 7: 


                                 09/07/21 06:43





                                 09/07/21 06:43


Labs: 


                  Abnormal Lab Results - Last 24 Hours (Table)











  09/07/21 09/07/21 09/07/21 Range/Units





  06:43 06:43 09:16 


 


WBC   12.36 H   (4.50-10.00)  X 10*3/uL


 


RBC   3.11 L   (4.40-5.60)  X 10*6/uL


 


Hgb   10.0 L   (13.0-17.0)  g/dL


 


Hct   31.4 L   (39.6-50.0)  %


 


MCV   101.0 H   (80.0-97.0)  fL


 


MCH   32.2 H   (27.0-32.0)  pg


 


MCHC   31.8 L   (32.0-37.0)  g/dL


 


RDW   15.9 H   (11.5-14.5)  %


 


Absolute Nucleated RBC   0.02 H   (0.00-0.00)  X 10*3/uL


 


NRBC/100 WBC Diff   0.2 H   (0.0-0.0)  /100 WBCS


 


ESR    104 H  (0-15)  mm/hr


 


Potassium  3.4 L    (3.5-5.1)  mmol/L


 


Chloride  109 H    ()  mmol/L


 


BUN  5 L    (9-20)  mg/dL


 


Creatinine  0.31 L    (0.66-1.25)  mg/dL


 


POC Glucose (mg/dL)     (75-99)  mg/dL














  09/07/21 Range/Units





  11:35 


 


WBC   (4.50-10.00)  X 10*3/uL


 


RBC   (4.40-5.60)  X 10*6/uL


 


Hgb   (13.0-17.0)  g/dL


 


Hct   (39.6-50.0)  %


 


MCV   (80.0-97.0)  fL


 


MCH   (27.0-32.0)  pg


 


MCHC   (32.0-37.0)  g/dL


 


RDW   (11.5-14.5)  %


 


Absolute Nucleated RBC   (0.00-0.00)  X 10*3/uL


 


NRBC/100 WBC Diff   (0.0-0.0)  /100 WBCS


 


ESR   (0-15)  mm/hr


 


Potassium   (3.5-5.1)  mmol/L


 


Chloride   ()  mmol/L


 


BUN   (9-20)  mg/dL


 


Creatinine   (0.66-1.25)  mg/dL


 


POC Glucose (mg/dL)  103 H  (75-99)  mg/dL














Assessment and Plan


Assessment: 





* 55-year-old male with history of alcoholism, was found at laying with fecal 

  matter with multiple bruises, scratches and pressure sores over dependent 

  areas, indicating patient has been on the floor for fairly long period of 

  time.  Last period of contact with his friends was on Friday, 3 days prior to 

  arrival.   Patient has improved slightly as compared to yesterday.  Still 

  continues to be significantly delirious/encephalopathic.  Probable alcohol 

  withdrawal/DTs/alcoholic hallucinosis, rule out infectious process.  Patient 

  now has developed temperature 101.4.  No definitive evidence of 

  meningitis/encephalitis based upon CSF results.


* Fever of unknown etiology.


* Vitamin B6 deficiency


* Hypertension


* Right leg pain, unclear etiology.  Rule out radiculopathy.


* History of alcoholism


* History of multiple falls.


* History of marijuana use.


Plan: 





* Patient's mentation has improved.  He is complaining less of muscle pain and 

  fatigue.  Patient's T-max is 99.1 in the last 24 hours.


* Patient underwent a repeat lumbar puncture, which is completely normal.  WBC 

  0, RBC 0, glucose 61 and total proteins 44.  


* ESR is very high 103, Lyme titer, repeat CK.  We will also check West Nile 

  virus IgG and IgM in the serum.  


* Infectious disease following.  No source identified yet.  Patient scheduled 

  for whole body WBC scan.


* Patient's previous CSF shows very mild leukocytosis (after correction for 

  traumatic tap), however very highly elevated proteins.  Cultures were 

  negative.  HSV-1 and 2 PCR in the CSF are negative.  Infectious disease 

  following.  Acyclovir has been discontinued.  Antibiotics have been tapered to

  control cryptogenic infection.  Currently on cefepime and vancomycin.  Patient

  had undergone aspiration of right knee and left ankle, both of which have been

  negative for any growth.  No crystals seen in the sinonasal fluid.


* EEG was abnormal due to background slowing of moderate to severe degree.  This

  is suggestive of generalized cerebral dysfunction, as can be seen with toxic 

  metabolic encephalopathy or due to diffuse structural brain abnormality.  No 

  epileptiform activity was seen.  


* Carotid Doppler was technically difficult due to patient's inability to hold 

  still.  No hemodynamically significant ICA stenosis identified on either side.

   Antegrade flow in both vertebral arteries.


* B12 582 normal, however at [facility] is elevated 0.46 (normal <0.40).  This may 

  indicate intracellular deficiency of B12.  Patient's folate 5.7, TSH 1.77, RPR

  nonreactive.  We will start folate and B12 replacement.  B6 was low 3, 

  currently on replacement.


* Thiamine, folate.  [facility] protocol.


* Orthopedic consultation input appreciated.  Patient had undergone left ankle 

  and right knee joint aspiration.  40 as he is 10.  His


* MRI of the lumbar spine with and without contrast revealed no evidence of 

  discitis.  No significant disc space narrowing.  No fracture.  Multilevel mild

  facet arthropathy and mild lateral recess stenosis.  Multilevel mild posterior

  disc bulging without spinal stenosis.  


* Spinal fluid examination revealed glucose 64 (40-70), proteins 226 (12-60), 

  total WBC count in CSF 18, out of its 15% monocytes and 85% polynuclear's.  

  CSF RBC 5175, due to traumatic tap.  


* MRI of the brain with and without contrast revealed mild atrophy.  Mild 

  subependymal white matter increased signal around the lateral ventricles 

  measuring up to 5 mm in thickness.  No evidence of cortical infarct.  Repeat 

  MRI of the brain without contrast showed no acute process.


* PT OT, possible rehab.

## 2021-09-07 NOTE — P.PN
Subjective


Progress Note Date: 09/07/21





Hospital course:





Patient is a 55-year-old male with a past medical history of EtOH abuse.  He 

presented to the hospital on 8/23/21 after being found delirious and confused by

his neighbor at home.  Patient was reportedly found laying on the floor covered 

in his own feces with empty alcohol bottles surrounding him.  Patient presented 

to the hospital with multiple bruises and abrasions in different stages of 

healing.  UDS positive for marijuana and benzodiazepines.  EtOH level was less 

than 10.  Patient was initially found to have hypernatremia with sodium of 152, 

hyponatremia with potassium of 3.2, hyperchloremia with chloride of 114 and 

elevated creatinine kinase of 999.  He was admitted for acute metabolic 

encephalopathy with hallucinations and EtOH withdrawal, rhabdomyolysis, and 

sepsis without sepsis shock.  Patient has underwent multiple labs and imaging 

see below.  Currently admitted under our services with consultation to 

pulmonology, infectious disease, neurology, and orthopedic surgery.





Labs and imaging:





Initial blood culture showing no results after 144 hours, CSF fluid negative 

wound culture right knee negatives wound culture left ankle preliminary results 

negative, 1 out of 2 blood culture results repeated on 8/28/21 positive for 

Staphylococcus epidermidis, possibly contaminated, and repeat blood culture 

drawn on 8/29/21 showing no growth after 24 hours.





TSH 1.770.





Pro-calcitonin 50.22 and CRP of 8.1.





Chest x-ray completed 8/23/21 revealed minimal subsegmental atelectasis at the r

ight lung base without change.





CT head and cervical spine completed 8/23/21 negative for acute intercranial 

abnormality with spondylitic changes to cervical spine with no acute fractures 

or abnormalities.





CT facial bones without contrast showing evidence of old blowout fracture on the

floor of the left bony orbit with reported displacement of 4 mm and is negative 

for acute fractures.





X-ray right elbow olecranon process spur formation with posterior soft tissue 

swelling negative for acute fractures.





Bilateral carotid Dopplers negative for hemodynamically significant internal 

carotid artery stenosis.





Venous Doppler left lower extremity negative for DVT.





X-ray left ankle negative for acute fracture.





MRI brain with and without contrast showing mild atrophy with mild subependymal 

white matter increased signal around the lateral ventricles measuring up to 5 mm

in thickness no evidence of cortical infarct.





MRI lumbar spine with and without contrast showing no evidence of discitis, no 

significant disc space narrowing or acute fractures.  Multilevel mild facet 

arthroplasty and mild lateral recess stenosis with multilevel mild posterior 

disc bulging without spinal stenosis.





CT left lower extremity showing chronic changes with soft tissue swelling and 

calcaneal spurring, negative for acute fracture.





CT chest, abdomen and pelvis showed fatty infiltration of the liver, pleural eff

usions and basilar atelectasis with elevated right diaphragm, subcutaneous edema

around the abdomen, CHF is not ruled out, and avascular necrosis of left femoral

head unchanged with no acute abnormalities reported in the pelvis.





Chest CTA negative for acute pulmonary embolism.  Lateral left upper lung 

groundglass opacity possibly representing an acute infectious process with 

stable small bilateral pleural effusions.








Physical exam:





9/7/21: Patient seen and evaluated at bedside this morning.  Patient resting in 

bed this morning.  He has removed his gown and remains alert to person and place

only.  Temperature high over the past 24 hours is 100.0F, patient remains on 

cefepime and vancomycin.  CBC revealing leukocytosis with WBC count of 12.36 and

macrocytic microchromic anemia with hemoglobin of 10.0, hematocrit 31.4, MCV 

101.0, MCH 32.2, MCHC 31.8, and RDW of 15.9.  Hypokalemia present with potassium

3.4, replacement ordered.





 Attempts made at contacting patient's daughter for updates.  If patient remains

afebrile and condition stable, plans for discharge to skilled nursing facility 

once medicaid approval is completed. Social work/Case Management assisting pt's 

daughter with application process. 





General: Nontoxic, no distress and appears stated age.  


Derm: Skin warm and dry, normal coloration for ethnicity.  Multiple bruises and 

abrasions covering upper and lower extremities in different stages of healing. 

red rash to buttocks


Head: Atraumatic, normocephalic and symmetric.  


Eyes: No lid lag, and anicteric sclera.  Slight swelling and bruising 

surrounding left orbital region.


Mouth: No lip lesions, mucus membranes moist


Cardiovascular: Regular rhythm and tachycardic rate, no murmur, positive posteri

or tibial pulses bilaterally, and cap refill < 2 seconds.  


Lungs: Respirations even, regular, and unlabored on room air. Lungs CTA 

bilaterally, no rhonchi, no rales, no wheezing, and no accessory muscle usage. 


GI/: soft, nontender to palpation, no guarding, no appreciable organomegaly.  

Puente catheter in place.


Ext: ROM intact. No gross muscle atrophy, no edema, no contractures. erythema 

and swelling to BLE worse on left.  Swelling right knee. 


Neuro/Psych: Speech clear.  Patient able to follow limited commands, alert to 

person and place only today, confused to time and situation.  








Assessment and Plan of Care:





Acute metabolic encephalopathy


Sepsis without septic shock


Concerns of RLL Aspiration Pneumonia, questionable


-Initial Pro-calcitonin 50.22 and CRP of 8.1, improving with repeat pro-

calcitonin 10.40 and CRP 5.1.


-Continue IV antibiotics: Vancomycin. Patient again running fevers, temple 

101.0F this morning.  Added cefepime back on with vancomycin.


-Blood culture showing no growth after 144 hours


-CSF fluid not consistent with bacterial meningitis.  


-HSV PCR negative


-MRI of the lumbar spine with no evidence of discitis or prevertebral abscess


-Blood gas showed no evidence of acute CO2 narcosis


-Thiamine 100 mg twice daily.





Hypomagnesemia


-Replaced


-We will continue to monitor with repeat a.m. labs.





Hypokalemia


-Potassium 3.4, replaced


-We will continue to monitor with repeat a.m. labs.





Alcohol abuse with alcohol withdrawal syndrome


Delirium tremens


Rhabdomyolysis


Acute Metabolic Encephalopathy with hallucinations


-IV fluid hydration, Thiamine 100 mg twice daily


-Seizure precautions, Fall precautions


-Computed tomography scan of the head showed no acute findings.


-MRI of the brain was no acute finding


-Consulted neurology and psychiatry for further evaluation


-EEG was abnormal with generalized cerebral dysfunction and evidence of toxic 

metabolic encephalopathy


-Treatment of underlying infectious process, continue IV antibiotics cefepime 

and vancomycin pending further recommendations by infectious disease





Vitamin B6 Deficiency 


-Daily vitamin replacment. 





Hypokalemia, resolved


-We will continue to monitor with repeat a.m. labs.





Poor living condition


Recurrent episodes of hospital admission for alcohol abuse


Major life stressors with possible major depression


-Consult placed to psychiatry


- consultation





Hypernatremia, resolved status post administration of IV fluids. 


-We will continue to monitor with repeat a.m. labs.








CODE STATUS: Full code


DVT prophylaxis: SCDs


Discussed with: Patient and RN, and pt's daughter/legal guardian Dinah Thorpe 

via telephone at 12:00 PM.


Anticipated discharge date: Clinical course to determine


Anticipated discharge place: Extended care facility/skilled nursing facility


A total of 45 minutes was spent on the care of this complex patient more than 

50% of the time was spent in counseling and care coordination.








Objective





- Vital Signs


Vital signs: 


                                   Vital Signs











Temp  98.9 F   09/07/21 07:16


 


Pulse  108 H  09/07/21 07:16


 


Resp  18   09/07/21 07:16


 


BP  132/91   09/07/21 07:16


 


Pulse Ox  98   09/07/21 07:16








                                 Intake & Output











 09/06/21 09/07/21 09/07/21





 18:59 06:59 18:59


 


Weight 68.5 kg  


 


Other:   


 


  Voiding Method Diaper Diaper 





 External Catheter External Catheter 


 


  # Voids 2 5 


 


  # Bowel Movements  2 














- Labs


CBC & Chem 7: 


                                 09/07/21 06:43





                                 09/07/21 06:43


Labs: 


                  Abnormal Lab Results - Last 24 Hours (Table)











  09/06/21 09/07/21 Range/Units





  16:47 06:43 


 


Potassium   3.4 L  (3.5-5.1)  mmol/L


 


Chloride   109 H  ()  mmol/L


 


BUN   5 L  (9-20)  mg/dL


 


Creatinine   0.31 L  (0.66-1.25)  mg/dL


 


POC Glucose (mg/dL)  118 H   (75-99)  mg/dL

## 2021-09-07 NOTE — P.PN
Progress Note - Text


Progress Note Date: 09/07/21





Interval History:


Patient was seen resting in bed comfortably.  The patient is currently alert and

oriented in all spheres aside from time.  The patient is able to however 

identify with the current president is as well as recent events including the 

corona virus and the delta variant.  Currently, the patient is not endorsing any

auditory or visual hallucinations at this time.  The patient states that he has 

previously witnessed significant hallucinations while undergoing active 

withdrawal.  He denies ever experienced any hallucinations outside the context 

of alcohol withdrawal.  He denies any suicidal or homicidal ideation, intention,

and/or plan at this time. He states the closest he felt to feeling like he will 

did not want to live anymore was when his mother passed away this past January. 

Despite this, the patient denies any prior attempts at suicide.


The patient does endorse a significant history of generalized anxiety.  He 

states that this has worsened over the course of the pandemic.  He states that 

he has been primarily isolate himself in his home, rarely venturing out except 

to  groceries which he states has only been 3 times over the past year.  


The patient does acknowledge that his primary issue is his heavy alcohol use.  

He reports that prior to this admission, he has been drinking anywhere between 

4-6 [employer] hard lemonade alcoholic beverages every day.  Patient reports that he

began drinking at the age of 14 and has been drinking heavily since.  He states 

that alcohol has been used to help manage his anxiety.  The patient has been in 

12-step programs, following his blue book, and has been to rehabilitation 

before.  The patient acknowledges that he needs to quit alcohol.  He is open to 

referrals for outpatient psychiatric and substance abuse treatment.





Mental Status Exam:


General Appearance: Patient appears to be stated age is alert, directable, and 

cooperative. 


Behavior: Patient is calmly seated without any agitated behavior.


Speech: Patient's speech is fluent and nonpressured.  Spontaneous slightly 

dysarthric and viscous.


Mood/Affect: Mood is "feeling better", affect is congruent, euthymic, and 

bright.


Suicidality/Homicidality:  Patient denies having any suicidal or homicidal 

ideation intent or plan.  


Perceptions: Patient denies any visual hallucinations and denies any auditory 

hallucinations  


Though content/process: There is no evidence of any delusional thought content 

and thought process is linear and goal-directed. 


Memory and concentration: AOX2.  The patient is alert and oriented to person and

place but not to year. Concentration is grossly intact for the purposes of this 

session


Judgment and insight: Improving mildly





                                   Vital Signs











Temp  99.1 F   09/07/21 10:58


 


Pulse  99   09/07/21 11:17


 


Resp  18   09/07/21 10:58


 


BP  142/82   09/07/21 10:58


 


Pulse Ox  96   09/07/21 10:58








                                 Intake & Output











 09/06/21 09/07/21 09/07/21





 18:59 06:59 18:59


 


Weight 68.5 kg  


 


Other:   


 


  Voiding Method Diaper Diaper Diaper





 External Catheter External Catheter 


 


  # Voids 2 5 


 


  # Bowel Movements  2 








                       Laboratory Results - Last 24 Hours











  09/06/21 09/07/21 09/07/21





  16:47 00:40 06:25


 


WBC   


 


RBC   


 


Hgb   


 


Hct   


 


MCV   


 


MCH   


 


MCHC   


 


RDW   


 


Plt Count   


 


MPV   


 


Absolute Nucleated RBC   


 


NRBC/100 WBC Diff   


 


ESR   


 


PT   


 


INR   


 


APTT   


 


Sodium   


 


Potassium   


 


Chloride   


 


Carbon Dioxide   


 


Anion Gap   


 


BUN   


 


Creatinine   


 


Est GFR (CKD-EPI)AfAm   


 


Est GFR (CKD-EPI)NonAf   


 


Glucose   


 


POC Glucose (mg/dL)  118 H  98  99


 


POC Glu Operater ID  Elisabeth Hightower, Leeanne  Mendiola, Leeanne


 


Calcium   


 


Magnesium   














  09/07/21 09/07/21 09/07/21





  06:43 06:43 09:16


 


WBC   12.36 H 


 


RBC   3.11 L 


 


Hgb   10.0 L 


 


Hct   31.4 L 


 


MCV   101.0 H 


 


MCH   32.2 H 


 


MCHC   31.8 L 


 


RDW   15.9 H 


 


Plt Count   391 


 


MPV   10.9 


 


Absolute Nucleated RBC   0.02 H 


 


NRBC/100 WBC Diff   0.2 H 


 


ESR    104 H


 


PT   


 


INR   


 


APTT   


 


Sodium  141  


 


Potassium  3.4 L  


 


Chloride  109 H  


 


Carbon Dioxide  23  


 


Anion Gap  9  


 


BUN  5 L  


 


Creatinine  0.31 L  


 


Est GFR (CKD-EPI)AfAm  >90  


 


Est GFR (CKD-EPI)NonAf  >90  


 


Glucose  97  


 


POC Glucose (mg/dL)   


 


POC Glu Operater ID   


 


Calcium  8.5  


 


Magnesium  2.0  














  09/07/21 09/07/21





  10:18 11:35


 


WBC  


 


RBC  


 


Hgb  


 


Hct  


 


MCV  


 


MCH  


 


MCHC  


 


RDW  


 


Plt Count  


 


MPV  


 


Absolute Nucleated RBC  


 


NRBC/100 WBC Diff  


 


ESR  


 


PT  11.3 


 


INR  1.1 


 


APTT  26.4 


 


Sodium  


 


Potassium  


 


Chloride  


 


Carbon Dioxide  


 


Anion Gap  


 


BUN  


 


Creatinine  


 


Est GFR (CKD-EPI)AfAm  


 


Est GFR (CKD-EPI)NonAf  


 


Glucose  


 


POC Glucose (mg/dL)   103 H


 


POC Glu Operater ID   Blanca Cleveland


 


Calcium  


 


Magnesium  











Assessment


Acute metabolic encephalopathy - resolving


Sepsis without septic shock


Alcohol use disorder


Anxiety disorder, unspecified


Major depressive disorder with anxious features versus alcohol-induced 

depressive disorder





Plan:


-At this time patient DOES NOT meet criteria for inpatient psychiatric 

admission.  The patient is currently not endorsing any significant symptoms of 

psychosis at this time.  It is likely that his psychotic symptoms were the 

results of alcohol hallucinosis versus delirium tremens.  The patient is also 

not presenting with any imminent risk of harm to self or others and is not 

endorsing any suicidal or homicidal ideation, intention, and/or plan.





-Delirium precautions recommended with patient including - avoiding use of 

narcotics and CNS sedatives, limit anticholinergic medications when possible, 

frequent re-orientation, minimize use of restraints, open window shades during 

the day and close them at night





-Would recommend the following medication changes/additions: 


We will restart the patient's trazodone 50 mg at bedtime as needed for insomnia


We will restart the patient's naltrexone 50 mg daily for alcohol use disorder


We will hold the patient's Vilazodone as it is not on formulary. May restart 

once patient is discharged.  Patient is not presenting with imminent risk of 

harm to self or others or endorsing any significant symptoms of depression 

therefore to restart this antidepressant or a new antidepressant is not a 

priority at this time.


We will hold gabapentin at this time.  At the prescribed dose which he was 

taking at home, the patient is likely to see little to no benefit from this 

medication.





-Recommend referral for outpatient psychiatric and substance abuse treatment.





-Psychiatry will sign off at this time.  Please contact us with any questions or

reconsult us if necessary.

## 2021-09-07 NOTE — P.PN
Subjective


Progress Note Date: 09/06/21 09/06/2021:


Patient was seen for a follow-up.  Patient complains of extreme muscle pain and 

fatigue.  He tells me that he was attacked by a dog and he fell down on the 

ground.  He got up and was again attacked by the dog and fell again.  This 

happened a total of 3 times until he got in someone's garage.  He states that 

this happened when there was a big snowstorm in February 2021.  He also suffered

from a torn meniscus between summer and fall of 2020 when he was using a 

snowblower and the handle snapped.  He still could not tell me the exact reason 

that he came to the hospital.





He continues to have pain in the legs, right much worse than left.  Please refer

to examination below.  Right ankle appears swollen.  He has peripheral edema. 





WORK-UP:


* CPK is 999 on 08/23 but 217 on 08/25--improved.


* B12 582 normal, however at MMA is elevated 0.46 (normal <0.40).  This may i

  ndicate intracellular deficiency of B12.  Patient's folate 5.7, TSH 1.77, RPR 

  nonreactive.  


* Low Vitamin B6 3 (normal is 5-50).


* Ammonia level 12 (normal)


* ESR 64 on 08/28 and the repeated ESR 77 on 08/30/2021.


* MRI of the brain on 08/27/21 revealed mild atrophy.  Mild subependymal white 

  matter increased signal around the lateral ventricles measuring up to 5 mm in 

  thickness.  No evidence of cortical infarct.


* REPEAT MRI brain w/o (09/01/21): I ordered MRI the brain with and without but 

  because the patient was in so much pain that was the study was limited and it 

  was done only without.  The MR the brain is reported as limited examination 

  due to patient's pain.  No obvious significant intracranial abnormality.  


* MRI of the lumbar spine with and without contrast 08/28/2021 revealed no 

  evidence of discitis.  No significant disc space narrowing.  No fracture.  

  Multilevel mild facet arthropathy and mild lateral recess stenosis.  

  Multilevel mild posterior disc bulging without spinal stenosis.  


* CerebroSpinal fluid examination revealed glucose 64 (40-70), proteins 226 (12-

  60), total WBC count in CSF 18, out of its 15% monocytes and 85% p

  olynuclear's.  CSF RBC 5175, due to traumatic tap.  


* Patient's spinal fluid was traumatic because patient was not cooperating.  CSF

  showsCSF shows very mild leukocytosis ( about 2-3 cells above normal after 

  correction for traumatic tap), and very elevated proteins.   Cultures  

  negative.  HSV-1 and 2 PCR in the CSF are negative.  Viral test are negative. 

  Infectious disease following.  


* Patient had undergone aspiration of right knee and left ankle, both of which 

  have been negative for any growth.  No crystals seen in the sinonasal fluid.


* Routine EEG on 08/26/21  was reported as abnormal due to background slowing of

  moderate to severe degree.  This is suggestive of generalized cerebral dysf

  unction, as can be seen with toxic metabolic encephalopathy or due to diffuse 

  structural brain abnormality.  No epileptiform activity was seen.  


* Carotid Doppler was technically difficult due to patient's inability to hold 

  still.  No hemodynamically significant ICA stenosis identified on either side.

   Antegrade flow in both vertebral arteries.


* CT of facial bone on 08/23/2021 is reported as there is evidence of old blow 

  out fracture of the floor of the left bony orbits.  No acute fracture seen.


* Left Lower extremity CT 08/31/2021: is reported as chronic changes as noted.  

  Soft tissue swelling.  Calcaneal is spurring.


* CT of the abdomen/pelvis: Was reported as fatty infiltration of the liver.  

  Pleural effusion and basilar atelectasis.  Elevated right diaphragm could 

  relate to diaphragm paralysis.  Subcutaneous edema around the abdomen.  

  Congestive heart failure as possible.  These abnormality.  New compared to old

  exam.  Avascular necrosis of left femoral head on changed.  No acute abn

  ormality within the abdomen pelvis


* CT of the chest is reported as small left apical density.  Additional left 

  lateral long-based thickening may be present.  At the clinic today says an 

  underlying mass should be considered.  No evidence of pneumonia or aspiration 

  pneumonia.  Moderate fatty infiltration to the liver.


* CT of the chest is reported as suboptimal study without CT chest of for acute 

  pulmonary embolism.  Lateral left upper lung groundglass opacity could reflect

  acute infection process.  No significant change from one day earlier.  Stable 

  small bilateral pleural effusion noted.











Objective





- Vital Signs


Vital signs: 


                                   Vital Signs











Temp  98.6 F   09/06/21 14:20


 


Pulse  117 H  09/06/21 14:10


 


Resp  18   09/06/21 14:10


 


BP  134/79   09/06/21 14:10


 


Pulse Ox  95   09/06/21 14:10








                                 Intake & Output











 09/05/21 09/06/21 09/06/21





 18:59 06:59 18:59


 


Intake Total 540  


 


Output Total  900 


 


Balance 540 -900 


 


Weight   68.5 kg


 


Intake:   


 


  Oral 540  


 


Output:   


 


  Urine  900 


 


Other:   


 


  Voiding Method Diaper  Diaper





 External Catheter  External Catheter


 


  # Voids 4 3 


 


  # Bowel Movements 1 1 














- Exam





Patient is a middle aged  male, appears older than his stated age.  

Patient states it is early September and the year is 2051.  He knows that he is 

in [facility] in [place] and states the current president is "sofya Ayoub".  Patient's speech and language functions have much improved.  On 

cranial examination pupils are round and reacting to light, visual fields are 

full on confrontation, extraocular muscles are intact.  Face is symmetric and 

tongue protrudes to the midline.  Palatal elevation is normal, hearing and 

shoulder shrug normal.  On muscle strength testing, the strength is normal in 

upper extremities.  In the lower extremities, his hip flexion is 1-2 on the 

right, 5 on the left.  Ankle dorsiflexion is 4 on the right, 4+ left.  There is 

significant pain in both legs, but right side is much worse than the left with 

any movement.  Right knee appears swollen.  He has peripheral edema.  Sensations

are equal.  No ataxia for finger-to-nose testing.  Reflexes are 3 in the upper 

limbs, 2 at the right knee, 3 at the left knee.  1+ at both ankles and plantars 

are downgoing.





Chest is clear, abdomen is soft.








- Labs


CBC & Chem 7: 


                                 09/07/21 06:43





                                 09/07/21 06:43


Labs: 


                  Abnormal Lab Results - Last 24 Hours (Table)











  09/06/21 09/06/21 Range/Units





  00:38 16:47 


 


POC Glucose (mg/dL)  111 H  118 H  (75-99)  mg/dL














Assessment and Plan


Assessment: 





* 55-year-old male with history of alcoholism, was found at laying with fecal 

  matter with multiple bruises, scratches and pressure sores over dependent 

  areas, indicating patient has been on the floor for fairly long period of 

  time.  Last period of contact with his friends was on Friday, 3 days prior to 

  arrival.   Patient has improved slightly as compared to yesterday.  Still 

  continues to be significantly delirious/encephalopathic.  Probable alcohol 

  withdrawal/DTs/alcoholic hallucinosis, rule out infectious process.  Patient 

  now has developed temperature 101.4.  No definitive evidence of 

  meningitis/encephalitis based upon CSF results.


* Fever of unknown etiology.


* Vitamin B6 deficiency


* Hypertension


* Right leg pain, unclear etiology.  Rule out radiculopathy.


* History of alcoholism


* History of multiple falls.


* History of marijuana use.


Plan: 





* Patient's mentation has improved.  He continues to complain of severe muscle 

  pain and fatigue.  We will check ESR, Lyme titer, repeat CK.  We will also 

  check West Nile virus IgG and IgM in the serum.  Patient's T-max is 100.0 

  Fahrenheit today at 2:10 PM, whereas it was 101.0 on 09/05/2021.  Suggest 

  repeat lumbar puncture to follow-up on abnormal spinal fluid.  Suggest West 

  Nile virus testing.  Infectious disease following.  No source identified yet. 

  Patient scheduled for whole body WBC scan.


* Patient's previous CSF shows very mild leukocytosis (after correction for 

  traumatic tap), however very highly elevated proteins.  Cultures were 

  negative.  HSV-1 and 2 PCR in the CSF are negative.  Infectious disease 

  following.  Acyclovir has been discontinued.  Antibiotics have been tapered to

  control cryptogenic infection.  Currently on cefepime and vancomycin.  Patient

  had undergone aspiration of right knee and left ankle, both of which have been

  negative for any growth.  No crystals seen in the sinonasal fluid.


* EEG was abnormal due to background slowing of moderate to severe degree.  This

  is suggestive of generalized cerebral dysfunction, as can be seen with toxic 

  metabolic encephalopathy or due to diffuse structural brain abnormality.  No 

  epileptiform activity was seen.  


* Carotid Doppler was technically difficult due to patient's inability to hold 

  still.  No hemodynamically significant ICA stenosis identified on either side.

   Antegrade flow in both vertebral arteries.


* B12 582 normal, however at MMA is elevated 0.46 (normal <0.40).  This may 

  indicate intracellular deficiency of B12.  Patient's folate 5.7, TSH 1.77, RPR

  nonreactive.  We will start folate and B12 replacement.  B6 was low 3, 

  currently on replacement.


* Thiamine, folate.  [facility] protocol.


* Orthopedic consultation input appreciated.  Patient had undergone left ankle 

  and right knee joint aspiration.  40 as he is 10.  His


* MRI of the lumbar spine with and without contrast revealed no evidence of 

  discitis.  No significant disc space narrowing.  No fracture.  Multilevel mild

  facet arthropathy and mild lateral recess stenosis.  Multilevel mild posterior

  disc bulging without spinal stenosis.  


* Spinal fluid examination revealed glucose 64 (40-70), proteins 226 (12-60), 

  total WBC count in CSF 18, out of its 15% monocytes and 85% polynuclear's.  

  CSF RBC 5175, due to traumatic tap.  


* MRI of the brain with and without contrast revealed mild atrophy.  Mild 

  subependymal white matter increased signal around the lateral ventricles 

  measuring up to 5 mm in thickness.  No evidence of cortical infarct.  Repeat 

  MRI of the brain without contrast showed no acute process.

## 2021-09-08 LAB
GLUCOSE BLD-MCNC: 109 MG/DL (ref 75–99)
GLUCOSE BLD-MCNC: 142 MG/DL (ref 75–99)
GLUCOSE BLD-MCNC: 156 MG/DL (ref 75–99)
GLUCOSE BLD-MCNC: 99 MG/DL (ref 75–99)

## 2021-09-08 RX ADMIN — FOLIC ACID SCH MG: 1 TABLET ORAL at 10:10

## 2021-09-08 RX ADMIN — SODIUM CHLORIDE SCH: 9 INJECTION, SOLUTION INTRAVENOUS at 13:08

## 2021-09-08 RX ADMIN — HEPARIN SODIUM SCH UNIT: 5000 INJECTION INTRAVENOUS; SUBCUTANEOUS at 01:37

## 2021-09-08 RX ADMIN — CEFEPIME HYDROCHLORIDE SCH MLS/HR: 2 INJECTION, POWDER, FOR SOLUTION INTRAVENOUS at 17:26

## 2021-09-08 RX ADMIN — SODIUM CHLORIDE SCH MLS/HR: 9 INJECTION, SOLUTION INTRAVENOUS at 13:26

## 2021-09-08 RX ADMIN — HEPARIN SODIUM SCH UNIT: 5000 INJECTION INTRAVENOUS; SUBCUTANEOUS at 08:28

## 2021-09-08 RX ADMIN — SODIUM CHLORIDE SCH MLS/HR: 9 INJECTION, SOLUTION INTRAVENOUS at 02:32

## 2021-09-08 RX ADMIN — NYSTATIN SCH APPLIC: 100000 OINTMENT TOPICAL at 11:11

## 2021-09-08 RX ADMIN — MEGESTROL ACETATE SCH MG: 40 SUSPENSION ORAL at 10:11

## 2021-09-08 RX ADMIN — PYRIDOXINE HCL TAB 50 MG SCH MG: 50 TAB at 22:11

## 2021-09-08 RX ADMIN — SODIUM CHLORIDE SCH MLS/HR: 900 INJECTION, SOLUTION INTRAVENOUS at 22:11

## 2021-09-08 RX ADMIN — NALTREXONE HYDROCHLORIDE SCH MG: 50 TABLET, FILM COATED ORAL at 10:11

## 2021-09-08 RX ADMIN — CEFEPIME HYDROCHLORIDE SCH MLS/HR: 2 INJECTION, POWDER, FOR SOLUTION INTRAVENOUS at 10:07

## 2021-09-08 RX ADMIN — CEFEPIME HYDROCHLORIDE SCH MLS/HR: 2 INJECTION, POWDER, FOR SOLUTION INTRAVENOUS at 01:37

## 2021-09-08 RX ADMIN — SODIUM CHLORIDE SCH: 900 INJECTION, SOLUTION INTRAVENOUS at 13:09

## 2021-09-08 RX ADMIN — PANTOPRAZOLE SODIUM SCH MG: 40 TABLET, DELAYED RELEASE ORAL at 08:28

## 2021-09-08 RX ADMIN — NYSTATIN SCH APPLIC: 100000 OINTMENT TOPICAL at 17:27

## 2021-09-08 RX ADMIN — SODIUM CHLORIDE SCH MLS/HR: 9 INJECTION, SOLUTION INTRAVENOUS at 22:11

## 2021-09-08 RX ADMIN — PYRIDOXINE HCL TAB 50 MG SCH MG: 50 TAB at 10:12

## 2021-09-08 RX ADMIN — HEPARIN SODIUM SCH UNIT: 5000 INJECTION INTRAVENOUS; SUBCUTANEOUS at 17:26

## 2021-09-08 NOTE — NM
EXAMINATION TYPE: NM WBC whole body

 

DATE OF EXAM: 9/8/2021

 

COMPARISON: NONE

 

HISTORY: Fever

 

TECHNIQUE:  Following administration of 15.6 mCi Tc99m Ceretec.  Images obtained 4 hours post injecti
on.

 

FINDINGS:

There is some tracer accumulation over the right proximal femur that is probably contamination of uri
ne. There is a thoracolumbar levoscoliosis. Uptake in the liver and spleen is fairly normal. I see no
 abdominal uptake to suggest a pyogenic infection. No significant abnormal uptake in the lungs.

 

IMPRESSION:

Exam fails to demonstrate a site of pyogenic infection.

## 2021-09-08 NOTE — P.PN
Subjective


Progress Note Date: 09/08/21


Hospital course:


Patient is a 55-year-old male with a past medical history of EtOH abuse.  He 

presented to the hospital on 8/23/21 after being found delirious and confused by

his neighbor at home.  Patient was reportedly found laying on the floor covered 

in his own feces with empty alcohol bottles surrounding him.  Patient presented 

to the hospital with multiple bruises and abrasions in different stages of 

healing.  UDS positive for marijuana and benzodiazepines.  EtOH level was less 

than 10.  Patient was initially found to have hypernatremia with sodium of 152, 

hyponatremia with potassium of 3.2, hyperchloremia with chloride of 114 and 

elevated creatinine kinase of 999.  He was admitted for acute metabolic 

encephalopathy with hallucinations and EtOH withdrawal, rhabdomyolysis, and 

sepsis without sepsis shock.  Patient has underwent multiple labs and imaging 

see below.  Currently admitted under our services with consultation to 

pulmonology, infectious disease, neurology, and orthopedic surgery.








Remains partially oriented.  No chest pain no abdominal pain.  Historian.








Objective





- Vital Signs


Vital signs: 


                                   Vital Signs











Temp  98.1 F   09/08/21 06:39


 


Pulse  105 H  09/08/21 11:44


 


Resp  20   09/08/21 08:30


 


BP  136/82   09/08/21 06:39


 


Pulse Ox  97   09/08/21 02:13








                                 Intake & Output











 09/07/21 09/08/21 09/08/21





 18:59 06:59 18:59


 


Weight   68.5 kg


 


Other:   


 


  Voiding Method Diaper Diaper Diaper


 


  # Voids 2 4 


 


  # Bowel Movements  2 














- Exam


General: Nontoxic, no distress and appears stated age.  


Derm: Skin warm and dry, normal coloration for ethnicity.  Multiple bruises and 

abrasions covering upper and lower extremities in different stages of healing. 

red rash to buttocks


Head: Atraumatic, normocephalic 


Eyes: No lid lag, and anicteric sclera.  


Mouth: No lip lesions, mucus membranes moist


Cardiovascular: Regular rhythm and rate, no murmur,


Lungs: Respirations even, regular, and unlabored on room air. Lungs CTA 

bilaterally, no rhonchi, no rales, no wheezing, and no accessory muscle usage. 


GI/: soft, nontender to palpation, no guarding, no appreciable organomegaly.  

Puente catheter in place.


Ext: ROM intact. No gross muscle atrophy, no edema, no contractures. erythema 

and swelling to BLE worse on left.  Swelling right knee. 


Neuro/Psych: Speech clear.  Patient able to follow limited commands, partially 

oriented








- Labs


CBC & Chem 7: 


                                 09/07/21 06:43





                                 09/08/21 10:02


Labs: 


                  Abnormal Lab Results - Last 24 Hours (Table)











  09/07/21 09/08/21 09/08/21 Range/Units





  20:45 00:05 10:02 


 


Creatinine    0.37 L  (0.66-1.25)  mg/dL


 


POC Glucose (mg/dL)  121 H  109 H   (75-99)  mg/dL














  09/08/21 Range/Units





  11:26 


 


Creatinine   (0.66-1.25)  mg/dL


 


POC Glucose (mg/dL)  142 H  (75-99)  mg/dL














Assessment and Plan


Plan: 





Sepsis without septic shock


Likely secondary to RLL Aspiration Pneumonia


-Initial Pro-calcitonin 50.22 and CRP of 8.1, improving with repeat pro-

calcitonin 10.40 and CRP 5.1.


-Continue IV antibiotics: Vancomycin and  cefepime 


-CSF fluid not consistent with bacterial meningitis.  


-HSV PCR negative


-MRI of the lumbar spine with no evidence of discitis or prevertebral abscess


-Thiamine 100 mg twice daily.








Hypomagnesemia


-Replace as indicated





Hypokalemia


-Replace as indicated





Alcohol abuse with alcohol withdrawal syndrome


Delirium tremens


Rhabdomyolysis


Acute Metabolic Encephalopathy with hallucinations


-Thiamine 100 mg twice daily


-Seizure precautions, Fall precautions


-Computed tomography scan of the head showed no acute findings.


-MRI of the brain was no acute finding


-Consulted neurology and psychiatry for further evaluation, input appreciated.


-EEG was abnormal with generalized cerebral dysfunction and evidence of toxic 

metabolic encephalopathy


-Treatment of underlying infectious process, continue IV antibiotics cefepime 

and vancomycin , ID input appreciated.





Vitamin B6 Deficiency 


-Daily vitamin replacment. 





Poor living condition


Recurrent episodes of hospital admission for alcohol abuse


Major life stressors with possible major depression


-Consult placed to psychiatry


- consultation





Hypernatremia, resolved 








CODE STATUS: Full code


DVT prophylaxis: SCDs


Discussed with the nurse and case management


Disposition: Pending clinical progression, to rehab once better.

## 2021-09-08 NOTE — PN
PROGRESS NOTE



DATE OF SERVICE:

09/08/2021.



REASON FOR FOLLOW:

Fever.



INTERVAL HISTORY:

Patient did have low grade fever of 100.3 this afternoon.  The patient remains to be

pleasantly confused. No agitation has been noticed.  Denies any chest pain or cough.

No abdominal pain or diarrhea.



PHYSICAL EXAMINATION:

Blood pressure 116/75 with a pulse of 108, temperature 100.3. He is 100% on room air.

General description is a middle-aged male lying in bed in no distress. Respiratory

system: Unlabored breathing, decreased breath sounds in the bases.  No wheeze.  Heart

S1, S2.  Regular rate and rhythm. Abdomen soft.  No tenderness.



LABS:

Creatinine 0.30. Vancomycin trough is 21.6.



DIAGNOSTIC IMPRESSION AND PLAN:

Patient with a fever in this patient who did have extensive workup without any clear

focus.  We are waiting for the WBC scan to be completed.  Patient is covered with

empiric Vanco, Rocephin to continue and monitor clinical course closely.





MMODL / IJN: [id number] / Job#: [id number]

## 2021-09-09 LAB
BASOPHILS # BLD AUTO: 0.06 X 10*3/UL (ref 0–0.1)
BASOPHILS NFR BLD AUTO: 0.5 %
EOSINOPHIL # BLD AUTO: 0.28 X 10*3/UL (ref 0.04–0.35)
EOSINOPHIL NFR BLD AUTO: 2.4 %
ERYTHROCYTE [DISTWIDTH] IN BLOOD BY AUTOMATED COUNT: 2.84 X 10*6/UL (ref 4.4–5.6)
ERYTHROCYTE [DISTWIDTH] IN BLOOD: 15.6 % (ref 11.5–14.5)
GLUCOSE BLD-MCNC: 103 MG/DL (ref 75–99)
GLUCOSE BLD-MCNC: 133 MG/DL (ref 75–99)
GLUCOSE BLD-MCNC: 137 MG/DL (ref 75–99)
GLUCOSE BLD-MCNC: 96 MG/DL (ref 75–99)
HCT VFR BLD AUTO: 28.5 % (ref 39.6–50)
HGB BLD-MCNC: 8.8 G/DL (ref 13–17)
LYMPHOCYTES # SPEC AUTO: 1.57 X 10*3/UL (ref 0.9–5)
LYMPHOCYTES NFR SPEC AUTO: 13.4 %
MCH RBC QN AUTO: 31 PG (ref 27–32)
MCHC RBC AUTO-ENTMCNC: 30.9 G/DL (ref 32–37)
MCV RBC AUTO: 100.4 FL (ref 80–97)
MONOCYTES # BLD AUTO: 0.98 X 10*3/UL (ref 0.2–1)
MONOCYTES NFR BLD AUTO: 8.3 %
NEUTROPHILS # BLD AUTO: 8.59 X 10*3/UL (ref 1.8–7.7)
NEUTROPHILS NFR BLD AUTO: 73.1 %
PLATELET # BLD AUTO: 505 X 10*3/UL (ref 140–440)
WBC # BLD AUTO: 11.75 X 10*3/UL (ref 4.5–10)

## 2021-09-09 RX ADMIN — PANTOPRAZOLE SODIUM SCH MG: 40 TABLET, DELAYED RELEASE ORAL at 07:35

## 2021-09-09 RX ADMIN — NYSTATIN SCH APPLIC: 100000 OINTMENT TOPICAL at 17:16

## 2021-09-09 RX ADMIN — SODIUM CHLORIDE SCH MLS/HR: 9 INJECTION, SOLUTION INTRAVENOUS at 22:31

## 2021-09-09 RX ADMIN — NALTREXONE HYDROCHLORIDE SCH MG: 50 TABLET, FILM COATED ORAL at 07:35

## 2021-09-09 RX ADMIN — SODIUM CHLORIDE SCH MLS/HR: 900 INJECTION, SOLUTION INTRAVENOUS at 22:31

## 2021-09-09 RX ADMIN — FOLIC ACID SCH MG: 1 TABLET ORAL at 07:36

## 2021-09-09 RX ADMIN — NYSTATIN SCH APPLIC: 100000 OINTMENT TOPICAL at 22:32

## 2021-09-09 RX ADMIN — NYSTATIN SCH APPLIC: 100000 OINTMENT TOPICAL at 07:37

## 2021-09-09 RX ADMIN — HEPARIN SODIUM SCH UNIT: 5000 INJECTION INTRAVENOUS; SUBCUTANEOUS at 07:36

## 2021-09-09 RX ADMIN — CEFEPIME HYDROCHLORIDE SCH MLS/HR: 2 INJECTION, POWDER, FOR SOLUTION INTRAVENOUS at 17:17

## 2021-09-09 RX ADMIN — NYSTATIN SCH APPLIC: 100000 OINTMENT TOPICAL at 00:21

## 2021-09-09 RX ADMIN — MEGESTROL ACETATE SCH MG: 40 SUSPENSION ORAL at 07:37

## 2021-09-09 RX ADMIN — HEPARIN SODIUM SCH UNIT: 5000 INJECTION INTRAVENOUS; SUBCUTANEOUS at 17:17

## 2021-09-09 RX ADMIN — PYRIDOXINE HCL TAB 50 MG SCH MG: 50 TAB at 07:37

## 2021-09-09 RX ADMIN — SODIUM CHLORIDE SCH MLS/HR: 9 INJECTION, SOLUTION INTRAVENOUS at 05:40

## 2021-09-09 RX ADMIN — CEFEPIME HYDROCHLORIDE SCH MLS/HR: 2 INJECTION, POWDER, FOR SOLUTION INTRAVENOUS at 09:33

## 2021-09-09 RX ADMIN — SODIUM CHLORIDE SCH MLS/HR: 9 INJECTION, SOLUTION INTRAVENOUS at 13:46

## 2021-09-09 RX ADMIN — CEFEPIME HYDROCHLORIDE SCH MLS/HR: 2 INJECTION, POWDER, FOR SOLUTION INTRAVENOUS at 00:51

## 2021-09-09 RX ADMIN — HEPARIN SODIUM SCH UNIT: 5000 INJECTION INTRAVENOUS; SUBCUTANEOUS at 00:51

## 2021-09-09 RX ADMIN — SODIUM CHLORIDE SCH MLS/HR: 900 INJECTION, SOLUTION INTRAVENOUS at 07:34

## 2021-09-09 NOTE — P.PN
Subjective


Progress Note Date: 09/09/21


Hospital course:


Patient is a 55-year-old male with a past medical history of EtOH abuse.  He 

presented to the hospital on 8/23/21 after being found delirious and confused by

his neighbor at home.  Patient was reportedly found laying on the floor covered 

in his own feces with empty alcohol bottles surrounding him.  Patient presented 

to the hospital with multiple bruises and abrasions in different stages of 

healing.  UDS positive for marijuana and benzodiazepines.  EtOH level was less 

than 10.  Patient was initially found to have hypernatremia with sodium of 152, 

hyponatremia with potassium of 3.2, hyperchloremia with chloride of 114 and 

elevated creatinine kinase of 999.  He was admitted for acute metabolic 

encephalopathy with hallucinations and EtOH withdrawal, rhabdomyolysis, and 

sepsis without sepsis shock.  Patient has underwent multiple labs and imaging 

see below.  Currently admitted under our services with consultation to 

pulmonology, infectious disease, neurology, and orthopedic surgery.








Remains partially oriented.  No chest pain no abdominal pain. poor Historian.  

Remains afebrile.








Objective





- Vital Signs


Vital signs: 


                                   Vital Signs











Temp  99.5 F   09/09/21 08:00


 


Pulse  111 H  09/09/21 08:00


 


Resp  16   09/09/21 08:30


 


BP  131/81   09/09/21 08:00


 


Pulse Ox  95   09/09/21 08:00








                                 Intake & Output











 09/08/21 09/09/21 09/09/21





 18:59 06:59 18:59


 


Intake Total 200 920 


 


Balance 200 920 


 


Weight 68.5 kg  


 


Intake:   


 


  Intake, IV Titration  350 





  Amount   


 


    Cefepime 2 gm In Sodium  100 





    Chloride 0.9% 100 ml @ 25   





    mls/hr IVPB Q8HR SHELBY Rx#   





    :[id number]   


 


    Vancomycin 1,250 mg In  250 





    Sodium Chloride 0.9% 250   





    ml @ 125 mls/hr IVPB Q8H   





    SHELBY Rx#:[id number]   


 


  Oral 200 570 


 


Other:   


 


  Voiding Method Diaper Diaper Diaper


 


  # Voids 2 2 


 


  # Bowel Movements 2 1 2














- Exam


General: Nontoxic, no distress and appears stated age.  


Derm: Skin warm and dry, normal coloration for ethnicity. 


Head: Atraumatic, normocephalic 


Eyes: No lid lag, and anicteric sclera.  


Mouth: No lip lesions, mucus membranes moist


Cardiovascular: Regular rhythm and rate, no murmur,


Lungs: Respirations even, regular, and unlabored on room air. Lungs CTA 

bilaterally, no rhonchi, no rales, no wheezing, and no accessory muscle usage. 


GI/: soft, nontender to palpation, no guarding, no appreciable organomegaly.  

Puente catheter in place.


Ext: ROM intact. No gross muscle atrophy, no edema, no contractures. erythema 

and swelling to BLE worse on left.  Swelling right knee. 


Neuro/Psych: Speech clear.  Patient able to follow limited commands, partially 

oriented








- Labs


CBC & Chem 7: 


                                 09/09/21 05:35





                                 09/08/21 10:02


Labs: 


                  Abnormal Lab Results - Last 24 Hours (Table)











  09/08/21 09/09/21 09/09/21 Range/Units





  18:30 05:35 06:19 


 


WBC   11.75 H   (4.50-10.00)  X 10*3/uL


 


RBC   2.84 L   (4.40-5.60)  X 10*6/uL


 


Hgb   8.8 L   (13.0-17.0)  g/dL


 


Hct   28.5 L   (39.6-50.0)  %


 


MCV   100.4 H   (80.0-97.0)  fL


 


MCHC   30.9 L   (32.0-37.0)  g/dL


 


RDW   15.6 H   (11.5-14.5)  %


 


Plt Count   505 H   (140-440)  X 10*3/uL


 


Absolute Nucleated RBC   0.02 H   (0.00-0.00)  X 10*3/uL


 


Immature Gran #   0.27 H   (0.00-0.04)  X 10*3/uL


 


Neutrophils #   8.59 H   (1.80-7.70)  X 10*3/uL


 


NRBC/100 WBC Diff   0.2 H   (0.0-0.0)  /100 WBCS


 


POC Glucose (mg/dL)  156 H   103 H  (75-99)  mg/dL














  09/09/21 Range/Units





  11:30 


 


WBC   (4.50-10.00)  X 10*3/uL


 


RBC   (4.40-5.60)  X 10*6/uL


 


Hgb   (13.0-17.0)  g/dL


 


Hct   (39.6-50.0)  %


 


MCV   (80.0-97.0)  fL


 


MCHC   (32.0-37.0)  g/dL


 


RDW   (11.5-14.5)  %


 


Plt Count   (140-440)  X 10*3/uL


 


Absolute Nucleated RBC   (0.00-0.00)  X 10*3/uL


 


Immature Gran #   (0.00-0.04)  X 10*3/uL


 


Neutrophils #   (1.80-7.70)  X 10*3/uL


 


NRBC/100 WBC Diff   (0.0-0.0)  /100 WBCS


 


POC Glucose (mg/dL)  137 H  (75-99)  mg/dL














Assessment and Plan


Plan: 





Sepsis without septic shock


Likely secondary to RLL Aspiration Pneumonia


-Initial Pro-calcitonin 50.22 and CRP of 8.1, improving with repeat pro-

calcitonin 10.40 and CRP 5.1.


-Continue IV antibiotics: Vancomycin and  cefepime 


-CSF fluid not consistent with bacterial meningitis.  


-HSV PCR negative


-MRI of the lumbar spine with no evidence of discitis or prevertebral abscess


-Thiamine 100 mg twice daily.


WBC improved down to 11,000 and patient remains afebrile.








Hypomagnesemia


-Replace as indicated





Hypokalemia


-Replace as indicated





Alcohol abuse with alcohol withdrawal syndrome


Delirium tremens


Rhabdomyolysis


Acute Metabolic Encephalopathy with hallucinations


-Thiamine 100 mg twice daily


-Seizure precautions, Fall precautions


-Computed tomography scan of the head showed no acute findings.


-MRI of the brain was no acute finding


-Consulted neurology and psychiatry for further evaluation, input appreciated.


-EEG was abnormal with generalized cerebral dysfunction and evidence of toxic 

metabolic encephalopathy


-Treatment of underlying infectious process, continue IV antibiotics cefepime 

and vancomycin , ID input appreciated.





Vitamin B6 Deficiency 


-Daily vitamin replacment. 





Poor living condition


Recurrent episodes of hospital admission for alcohol abuse


Major life stressors with possible major depression


-Consult placed to psychiatry, input appreciated.


- consultation





Hypernatremia, resolved 








CODE STATUS: Full code


DVT prophylaxis: SCDs


Discussed with the nurse and case management


Disposition: Pending rehab placement

## 2021-09-09 NOTE — PN
PROGRESS NOTE



DATE OF SERVICE:

09/09/2021



REASON FOR FOLLOWUP:

Fever, leukocytosis.



INTERVAL HISTORY:

The patient is afebrile today.  The patient did have a low-grade fever of 100.3

yesterday. The patient is hemodynamically stable.  Mentation remains _____. No

agitation has been reported. He is breathing comfortably on room air. Denies any chest

pain or cough.  No abdominal pain.  No diarrhea has been reported.



PHYSICAL EXAMINATION:

Blood pressure 129/83 with pulse of 140, temperature 99.8.  He is 96% on room air.

GENERAL DESCRIPTION: General description is a middle-aged male lying in bed in no

distress.

RESPIRATORY SYSTEM: Unlabored breathing. Clear to auscultation anteriorly.

HEART: S1, S2. Regular rate and rhythm.

ABDOMEN: Soft. No tenderness.



LABS:

Hemoglobin is 8.9, white count 11.75.  WBC scan has been negative.



DIAGNOSTIC IMPRESSION AND PLAN:

Patient with a fever and elevated white count in this patient who did have extensive

workup without any obvious focus of infection.  Culture has been negative.  Scan has

been negative.  Recommend discontinuing antibiotic and monitor the patient closely off

antibiotic therapy. Continue supportive care.





MMODL / IJN: [id number] / Job#: [id number]

## 2021-09-09 NOTE — P.PN
Subjective


Progress Note Date: 09/08/21 09/08/2021: Patient's coworkers were present.  They mentioned that patient is 

still confused.  Patient is talking something about "sprains and drains".  He 

does speak tangential.  States it is December 1951 09/07/2021: Patient appears much better as compared to yesterday.  Patient 

states that "you are the third doctor of the day".  He is concerned that he is 

not getting exercises, not getting out of bed.





09/06/2021:


Patient was seen for a follow-up.  Patient complains of extreme muscle pain and 

fatigue.  He tells me that he was attacked by a dog and he fell down on the mani

und.  He got up and was again attacked by the dog and fell again.  This happened

a total of 3 times until he got in someone's garage.  He states that this 

happened when there was a big snowstorm in February 2021.  He also suffered from

a torn meniscus between summer and fall of 2020 when he was using a snowblower 

and the handle snapped.  He still could not tell me the exact reason that he 

came to the hospital.





He continues to have pain in the legs, right much worse than left.  Please refer

to examination below.  Right ankle appears swollen.  He has peripheral edema. 





WORK-UP:


* CPK is 999 on 08/23 but 217 on 08/25--improved.


* B12 582 normal, however at [facility] is elevated 0.46 (normal <0.40).  This may 

  indicate intracellular deficiency of B12.  Patient's folate 5.7, TSH 1.77, RPR

  nonreactive.  


* Low Vitamin B6 3 (normal is 5-50).


* Ammonia level 12 (normal)


* ESR 64 on 08/28 and the repeated ESR 77 on 08/30/2021.


* MRI of the brain on 08/27/21 revealed mild atrophy.  Mild subependymal white 

  matter increased signal around the lateral ventricles measuring up to 5 mm in 

  thickness.  No evidence of cortical infarct.


* REPEAT MRI brain w/o (09/01/21): I ordered MRI the brain with and without but 

  because the patient was in so much pain that was the study was limited and it 

  was done only without.  The MR the brain is reported as limited examination 

  due to patient's pain.  No obvious significant intracranial abnormality.  


* MRI of the lumbar spine with and without contrast 08/28/2021 revealed no 

  evidence of discitis.  No significant disc space narrowing.  No fracture.  

  Multilevel mild facet arthropathy and mild lateral recess stenosis.  

  Multilevel mild posterior disc bulging without spinal stenosis.  


* CerebroSpinal fluid examination revealed glucose 64 (40-70), proteins 226 (12-

  60), total WBC count in CSF 18, out of its 15% monocytes and 85% 

  polynuclear's.  CSF RBC 5175, due to traumatic tap.  


* Patient's spinal fluid was traumatic because patient was not cooperating.  CSF

  showsCSF shows very mild leukocytosis ( about 2-3 cells above normal after 

  correction for traumatic tap), and very elevated proteins.   Cultures  

  negative.  HSV-1 and 2 PCR in the CSF are negative.  Viral test are negative. 

  Infectious disease following.  


* Patient had undergone aspiration of right knee and left ankle, both of which 

  have been negative for any growth.  No crystals seen in the sinonasal fluid.


* Routine EEG on 08/26/21  was reported as abnormal due to background slowing of

  moderate to severe degree.  This is suggestive of generalized cerebral 

  dysfunction, as can be seen with toxic metabolic encephalopathy or due to 

  diffuse structural brain abnormality.  No epileptiform activity was seen.  


* Carotid Doppler was technically difficult due to patient's inability to hold 

  still.  No hemodynamically significant ICA stenosis identified on either side.

   Antegrade flow in both vertebral arteries.


* CT of facial bone on 08/23/2021 is reported as there is evidence of old blow 

  out fracture of the floor of the left bony orbits.  No acute fracture seen.


* Left Lower extremity CT 08/31/2021: is reported as chronic changes as noted.  

  Soft tissue swelling.  Calcaneal is spurring.


* CT of the abdomen/pelvis: Was reported as fatty infiltration of the liver.  

  Pleural effusion and basilar atelectasis.  Elevated right diaphragm could 

  relate to diaphragm paralysis.  Subcutaneous edema around the abdomen.  

  Congestive heart failure as possible.  These abnormality.  New compared to old

  exam.  Avascular necrosis of left femoral head on changed.  No acute 

  abnormality within the abdomen pelvis


* CT of the chest is reported as small left apical density.  Additional left 

  lateral long-based thickening may be present.  At the clinic today says an 

  underlying mass should be considered.  No evidence of pneumonia or aspiration 

  pneumonia.  Moderate fatty infiltration to the liver.


* CT of the chest is reported as suboptimal study without CT chest of for acute 

  pulmonary embolism.  Lateral left upper lung groundglass opacity could reflect

  acute infection process.  No significant change from one day earlier.  Stable 

  small bilateral pleural effusion noted.











Objective





- Vital Signs


Vital signs: 


                                   Vital Signs











Temp  100.3 F H  09/08/21 14:00


 


Pulse  108 H  09/08/21 14:00


 


Resp  18   09/08/21 14:00


 


BP  116/75   09/08/21 14:00


 


Pulse Ox  100   09/08/21 14:00








                                 Intake & Output











 09/08/21 09/08/21 09/09/21





 06:59 18:59 06:59


 


Intake Total  200 


 


Balance  200 


 


Weight  68.5 kg 


 


Intake:   


 


  Oral  200 


 


Other:   


 


  Voiding Method Diaper Diaper 


 


  # Voids 4 2 


 


  # Bowel Movements 2 2 














- Exam





Patient is a middle aged  male, appears older than his stated age.  

Patient states it is December and the year is 1951.  He states that he is in a 

public community facility in [facility].  He knows that he is in [place] 

in [place] and name of the current president Camden Sidhu.  





Patient's speech and language functions have much improved.  On cranial 

examination pupils are round and reacting to light, visual fields are full on 

confrontation, extraocular muscles are intact.  Face is symmetric and tongue 

protrudes to the midline.  Palatal elevation is normal, hearing and shoulder 

shrug normal.  





On muscle strength testing, the strength is normal in upper extremities.  In the

lower extremities, his hip flexion is (right/left) 5-/5, ankle dorsiflexion 5/5-

.  Patient is less tender and less pain in the legs.  Right knee appears 

swollen, however appears better.  He has peripheral edema.  Sensations are 

equal.  No ataxia for finger-to-nose testing.  Reflexes are 3 to 3+ in the arms 

and legs bilaterally. Plantars are downgoing.  Patient has multiple scratches, 

old healed scabs.





Chest is clear, abdomen is soft.








- Labs


CBC & Chem 7: 


                                 09/09/21 05:35





                                 09/08/21 10:02


Labs: 


                  Abnormal Lab Results - Last 24 Hours (Table)











  09/07/21 09/08/21 09/08/21 Range/Units





  20:45 00:05 10:02 


 


Creatinine    0.37 L  (0.66-1.25)  mg/dL


 


POC Glucose (mg/dL)  121 H  109 H   (75-99)  mg/dL














  09/08/21 09/08/21 Range/Units





  11:26 18:30 


 


Creatinine    (0.66-1.25)  mg/dL


 


POC Glucose (mg/dL)  142 H  156 H  (75-99)  mg/dL














Assessment and Plan


Assessment: 





* 55-year-old male with history of alcoholism, was found at home laying with 

  fecal matter with multiple bruises, scratches and pressure sores over 

  dependent areas, indicating patient has been on the floor for fairly long 

  period of time.  Last period of contact with his friends was on Friday, 3 days

  prior to arrival.   Patient has improved slightly as compared to yesterday.  

  Still continues to be significantly delirious/encephalopathic.  Probable 

  alcohol withdrawal/DTs/alcoholic hallucinosis, rule out infectious process.  


* Fever of unknown etiology, still with low-grade fever of 100.3.


* Vitamin B6 deficiency


* Hypertension


* Right leg pain, unclear etiology, possible radiculopathy, significantly 

  improved.


* History of alcoholism


* History of multiple falls.


* History of marijuana use.


Plan: 





* Patient's mentation has improved.  His muscle strength and endurance has also 

  improved.  He has significantly improved pain in the legs on active and 

  passive movement.  Patient is able to cooperate more with the examination.  


* Patient's T-max is 100.3 in the last 24 hours.


* Patient underwent a repeat lumbar puncture, which is completely normal.  WBC 

  0, RBC 0, glucose 61 and total proteins 44.  


* ESR is very high 103, Lyme titer, repeat CK.  We will also check West Nile 

  virus IgG and IgM in the serum.  


* Infectious disease following.  No source identified yet.  Patient scheduled 

  for whole body WBC scan.


* Patient's previous CSF shows very mild leukocytosis (after correction for 

  traumatic tap), however very highly elevated proteins.  Cultures were 

  negative.  HSV-1 and 2 PCR in the CSF are negative.  Cryptococcal antigen 

  negative.  Infectious disease following.  


* West Nile virus serology negative.  Patient to undergo WBC whole body scan 

  today.


* Antibiotics have been tapered to control cryptogenic infection.  Currently on 

  cefepime and vancomycin.  Patient had undergone aspiration of right knee and 

  left ankle, both of which have been negative for any growth.  No crystals seen

  in the sinonasal fluid.


* EEG was abnormal due to background slowing of moderate to severe degree.  This

  is suggestive of generalized cerebral dysfunction, as can be seen with toxic 

  metabolic encephalopathy or due to diffuse structural brain abnormality.  No 

  epileptiform activity was seen.  


* Carotid Doppler was technically difficult due to patient's inability to hold 

  still.  No hemodynamically significant ICA stenosis identified on either side.

   Antegrade flow in both vertebral arteries.


* B12 582 normal, however at MMA is elevated 0.46 (normal <0.40).  This may 

  indicate intracellular deficiency of B12.  Patient's folate 5.7, TSH 1.77, RPR

  nonreactive.  We will start folate and B12 replacement.  B6 was low 3, 

  currently on replacement.  We will change to vitamin B6 50 mg daily.


* Thiamine, folate.  [facility] protocol.


* Orthopedic consultation input appreciated.  Patient had undergone left ankle 

  and right knee joint aspiration.  


* MRI of the lumbar spine with and without contrast revealed no evidence of d

  iscitis.  No significant disc space narrowing.  No fracture.  Multilevel mild 

  facet arthropathy and mild lateral recess stenosis.  Multilevel mild posterior

  disc bulging without spinal stenosis.  


* MRI of the brain with and without contrast revealed mild atrophy.  Mild 

  subependymal white matter increased signal around the lateral ventricles 

  measuring up to 5 mm in thickness.  No evidence of cortical infarct.  Repeat 

  MRI of the brain without contrast showed no acute process.


* PT OT, possible rehab.


* Neurologically patient is stable, in fact significantly improving.  Still 

  slightly delirious, which could be from infection.  ID following.

## 2021-09-10 LAB
ALBUMIN SERPL-MCNC: 2.5 G/DL (ref 3.5–5)
ALBUMIN/GLOB SERPL: 0.8 {RATIO}
ALP SERPL-CCNC: 74 U/L (ref 38–126)
ALT SERPL-CCNC: 17 U/L (ref 4–49)
ANION GAP SERPL CALC-SCNC: 7 MMOL/L
AST SERPL-CCNC: 49 U/L (ref 17–59)
BASOPHILS # BLD AUTO: 0.07 X 10*3/UL (ref 0–0.1)
BASOPHILS NFR BLD AUTO: 0.5 %
BUN SERPL-SCNC: 5 MG/DL (ref 9–20)
CALCIUM SPEC-MCNC: 8.7 MG/DL (ref 8.4–10.2)
CHLORIDE SERPL-SCNC: 111 MMOL/L (ref 98–107)
CO2 SERPL-SCNC: 23 MMOL/L (ref 22–30)
EOSINOPHIL # BLD AUTO: 0.14 X 10*3/UL (ref 0.04–0.35)
EOSINOPHIL NFR BLD AUTO: 0.9 %
ERYTHROCYTE [DISTWIDTH] IN BLOOD BY AUTOMATED COUNT: 2.85 X 10*6/UL (ref 4.4–5.6)
ERYTHROCYTE [DISTWIDTH] IN BLOOD: 15.8 % (ref 11.5–14.5)
GLOBULIN SER CALC-MCNC: 3.1 G/DL
GLUCOSE BLD-MCNC: 104 MG/DL (ref 75–99)
GLUCOSE BLD-MCNC: 106 MG/DL (ref 75–99)
GLUCOSE BLD-MCNC: 108 MG/DL (ref 75–99)
GLUCOSE BLD-MCNC: 144 MG/DL (ref 75–99)
GLUCOSE SERPL-MCNC: 133 MG/DL (ref 74–99)
HCT VFR BLD AUTO: 28.5 % (ref 39.6–50)
HGB BLD-MCNC: 8.9 G/DL (ref 13–17)
LYMPHOCYTES # SPEC AUTO: 1.86 X 10*3/UL (ref 0.9–5)
LYMPHOCYTES NFR SPEC AUTO: 12.4 %
MCH RBC QN AUTO: 31.2 PG (ref 27–32)
MCHC RBC AUTO-ENTMCNC: 31.2 G/DL (ref 32–37)
MCV RBC AUTO: 100 FL (ref 80–97)
MONOCYTES # BLD AUTO: 1.49 X 10*3/UL (ref 0.2–1)
MONOCYTES NFR BLD AUTO: 9.9 %
NEUTROPHILS # BLD AUTO: 11.26 X 10*3/UL (ref 1.8–7.7)
NEUTROPHILS NFR BLD AUTO: 74.7 %
PLATELET # BLD AUTO: 508 X 10*3/UL (ref 140–440)
POTASSIUM SERPL-SCNC: 4.1 MMOL/L (ref 3.5–5.1)
PROT SERPL-MCNC: 5.6 G/DL (ref 6.3–8.2)
SODIUM SERPL-SCNC: 141 MMOL/L (ref 137–145)
WBC # BLD AUTO: 15.06 X 10*3/UL (ref 4.5–10)

## 2021-09-10 RX ADMIN — HEPARIN SODIUM SCH UNIT: 5000 INJECTION INTRAVENOUS; SUBCUTANEOUS at 22:19

## 2021-09-10 RX ADMIN — FOLIC ACID SCH MG: 1 TABLET ORAL at 07:46

## 2021-09-10 RX ADMIN — SODIUM CHLORIDE SCH MLS/HR: 9 INJECTION, SOLUTION INTRAVENOUS at 05:23

## 2021-09-10 RX ADMIN — CEFEPIME HYDROCHLORIDE SCH: 2 INJECTION, POWDER, FOR SOLUTION INTRAVENOUS at 09:40

## 2021-09-10 RX ADMIN — CEFEPIME HYDROCHLORIDE SCH MLS/HR: 2 INJECTION, POWDER, FOR SOLUTION INTRAVENOUS at 01:43

## 2021-09-10 RX ADMIN — NYSTATIN SCH APPLIC: 100000 OINTMENT TOPICAL at 07:47

## 2021-09-10 RX ADMIN — HEPARIN SODIUM SCH UNIT: 5000 INJECTION INTRAVENOUS; SUBCUTANEOUS at 16:02

## 2021-09-10 RX ADMIN — SODIUM CHLORIDE SCH: 900 INJECTION, SOLUTION INTRAVENOUS at 22:21

## 2021-09-10 RX ADMIN — NALTREXONE HYDROCHLORIDE SCH MG: 50 TABLET, FILM COATED ORAL at 07:46

## 2021-09-10 RX ADMIN — NYSTATIN SCH APPLIC: 100000 OINTMENT TOPICAL at 22:22

## 2021-09-10 RX ADMIN — SODIUM CHLORIDE SCH: 900 INJECTION, SOLUTION INTRAVENOUS at 09:40

## 2021-09-10 RX ADMIN — HEPARIN SODIUM SCH UNIT: 5000 INJECTION INTRAVENOUS; SUBCUTANEOUS at 07:46

## 2021-09-10 RX ADMIN — HEPARIN SODIUM SCH UNIT: 5000 INJECTION INTRAVENOUS; SUBCUTANEOUS at 01:42

## 2021-09-10 RX ADMIN — PYRIDOXINE HCL TAB 50 MG SCH MG: 50 TAB at 07:47

## 2021-09-10 RX ADMIN — PYRIDOXINE HCL TAB 50 MG SCH: 50 TAB at 07:01

## 2021-09-10 RX ADMIN — PANTOPRAZOLE SODIUM SCH MG: 40 TABLET, DELAYED RELEASE ORAL at 07:46

## 2021-09-10 RX ADMIN — NYSTATIN SCH APPLIC: 100000 OINTMENT TOPICAL at 16:02

## 2021-09-10 RX ADMIN — MEGESTROL ACETATE SCH MG: 40 SUSPENSION ORAL at 07:47

## 2021-09-10 NOTE — P.PN
Subjective


Progress Note Date: 09/10/21





09/10/2021: Patient is sleeping at this time.  Per nurse report, patient was 

sitting in the chair most of the morning.  He continues to be very confused.  

Now sleeping at this time.





09/08/2021: Patient's coworkers were present.  They mentioned that patient is 

still confused.  Patient is talking something about "sprains and drains".  He 

does speak tangential.  States it is December 1951 09/07/2021: Patient appears much better as compared to yesterday.  Patient 

states that "you are the third doctor of the day".  He is concerned that he is 

not getting exercises, not getting out of bed.





09/06/2021:


Patient was seen for a follow-up.  Patient complains of extreme muscle pain and 

fatigue.  He tells me that he was attacked by a dog and he fell down on the 

ground.  He got up and was again attacked by the dog and fell again.  This 

happened a total of 3 times until he got in someone's garage.  He states that 

this happened when there was a big snowstorm in February 2021.  He also suffered

from a torn meniscus between summer and fall of 2020 when he was using a 

snowblower and the handle snapped.  He still could not tell me the exact reason 

that he came to the hospital.





He continues to have pain in the legs, right much worse than left.  Please refer

to examination below.  Right ankle appears swollen.  He has peripheral edema. 





WORK-UP:


* CPK is 999 on 08/23 but 217 on 08/25--improved.


* B12 582 normal, however at MMA is elevated 0.46 (normal <0.40).  This may 

  indicate intracellular deficiency of B12.  Patient's folate 5.7, TSH 1.77, RPR

  nonreactive.  


* Low Vitamin B6 3 (normal is 5-50).


* Ammonia level 12 (normal)


* ESR 64 on 08/28 and the repeated ESR 77 on 08/30/2021.


* MRI of the brain on 08/27/21 revealed mild atrophy.  Mild subependymal white 

  matter increased signal around the lateral ventricles measuring up to 5 mm in 

  thickness.  No evidence of cortical infarct.


* REPEAT MRI brain w/o (09/01/21): I ordered MRI the brain with and without but 

  because the patient was in so much pain that was the study was limited and it 

  was done only without.  The MR the brain is reported as limited examination 

  due to patient's pain.  No obvious significant intracranial abnormality.  


* MRI of the lumbar spine with and without contrast 08/28/2021 revealed no 

  evidence of discitis.  No significant disc space narrowing.  No fracture.  

  Multilevel mild facet arthropathy and mild lateral recess stenosis.  

  Multilevel mild posterior disc bulging without spinal stenosis.  


* CerebroSpinal fluid examination revealed glucose 64 (40-70), proteins 226 (12-

  60), total WBC count in CSF 18, out of its 15% monocytes and 85% 

  polynuclear's.  CSF RBC 5175, due to traumatic tap.  


* Patient's spinal fluid was traumatic because patient was not cooperating.  CSF

  showsCSF shows very mild leukocytosis ( about 2-3 cells above normal after 

  correction for traumatic tap), and very elevated proteins.   Cultures  neg

  ative.  HSV-1 and 2 PCR in the CSF are negative.  Viral test are negative.  

  Infectious disease following.  


* Patient had undergone aspiration of right knee and left ankle, both of which 

  have been negative for any growth.  No crystals seen in the sinonasal fluid.


* Routine EEG on 08/26/21  was reported as abnormal due to background slowing of

  moderate to severe degree.  This is suggestive of generalized cerebral 

  dysfunction, as can be seen with toxic metabolic encephalopathy or due to 

  diffuse structural brain abnormality.  No epileptiform activity was seen.  


* Carotid Doppler was technically difficult due to patient's inability to hold 

  still.  No hemodynamically significant ICA stenosis identified on either side.

   Antegrade flow in both vertebral arteries.


* CT of facial bone on 08/23/2021 is reported as there is evidence of old blow 

  out fracture of the floor of the left bony orbits.  No acute fracture seen.


* Left Lower extremity CT 08/31/2021: is reported as chronic changes as noted.  

  Soft tissue swelling.  Calcaneal is spurring.


* CT of the abdomen/pelvis: Was reported as fatty infiltration of the liver.  

  Pleural effusion and basilar atelectasis.  Elevated right diaphragm could 

  relate to diaphragm paralysis.  Subcutaneous edema around the abdomen.  

  Congestive heart failure as possible.  These abnormality.  New compared to old

  exam.  Avascular necrosis of left femoral head on changed.  No acute 

  abnormality within the abdomen pelvis


* CT of the chest is reported as small left apical density.  Additional left 

  lateral long-based thickening may be present.  At the clinic today says an 

  underlying mass should be considered.  No evidence of pneumonia or aspiration 

  pneumonia.  Moderate fatty infiltration to the liver.


* CT of the chest is reported as suboptimal study without CT chest of for acute 

  pulmonary embolism.  Lateral left upper lung groundglass opacity could reflect

  acute infection process.  No significant change from one day earlier.  Stable 

  small bilateral pleural effusion noted.











Objective





- Vital Signs


Vital signs: 


                                   Vital Signs











Temp  98.8 F   09/10/21 08:00


 


Pulse  109 H  09/10/21 08:00


 


Resp  18   09/10/21 08:00


 


BP  134/83   09/10/21 08:00


 


Pulse Ox  95   09/10/21 08:00








                                 Intake & Output











 09/09/21 09/10/21 09/10/21





 18:59 06:59 18:59


 


Intake Total 500 820 960


 


Output Total 500  


 


Balance 0 820 960


 


Intake:   


 


  Intake, IV Titration  100 





  Amount   


 


    Cefepime 2 gm In Sodium  100 





    Chloride 0.9% 100 ml @ 25   





    mls/hr IVPB Q8HR [facility] Rx#   





    :[id number]   


 


  Oral 500 720 960


 


Output:   


 


  Urine 500  


 


Other:   


 


  Voiding Method Diaper Diaper Diaper


 


  # Voids  4 


 


  # Bowel Movements 2 1 














- Exam





Deferred, as patient is asleep








- Labs


CBC & Chem 7: 


                                 09/12/21 05:09





                                 09/12/21 05:09


Labs: 


                  Abnormal Lab Results - Last 24 Hours (Table)











  09/09/21 09/10/21 09/10/21 Range/Units





  16:40 01:01 05:04 


 


WBC     (4.50-10.00)  X 10*3/uL


 


RBC     (4.40-5.60)  X 10*6/uL


 


Hgb     (13.0-17.0)  g/dL


 


Hct     (39.6-50.0)  %


 


MCV     (80.0-97.0)  fL


 


MCHC     (32.0-37.0)  g/dL


 


RDW     (11.5-14.5)  %


 


Plt Count     (140-440)  X 10*3/uL


 


Immature Gran #     (0.00-0.04)  X 10*3/uL


 


Neutrophils #     (1.80-7.70)  X 10*3/uL


 


Monocytes #     (0.20-1.00)  X 10*3/uL


 


Chloride    111 H  ()  mmol/L


 


BUN    5 L  (9-20)  mg/dL


 


Creatinine    0.37 L  (0.66-1.25)  mg/dL


 


Glucose    133 H  (74-99)  mg/dL


 


POC Glucose (mg/dL)  133 H  144 H   (75-99)  mg/dL


 


Total Protein    5.6 L  (6.3-8.2)  g/dL


 


Albumin    2.5 L  (3.5-5.0)  g/dL














  09/10/21 09/10/21 09/10/21 Range/Units





  05:04 05:53 11:30 


 


WBC  15.06 H    (4.50-10.00)  X 10*3/uL


 


RBC  2.85 L    (4.40-5.60)  X 10*6/uL


 


Hgb  8.9 L    (13.0-17.0)  g/dL


 


Hct  28.5 L    (39.6-50.0)  %


 


MCV  100.0 H    (80.0-97.0)  fL


 


MCHC  31.2 L    (32.0-37.0)  g/dL


 


RDW  15.8 H    (11.5-14.5)  %


 


Plt Count  508 H    (140-440)  X 10*3/uL


 


Immature Gran #  0.24 H    (0.00-0.04)  X 10*3/uL


 


Neutrophils #  11.26 H    (1.80-7.70)  X 10*3/uL


 


Monocytes #  1.49 H    (0.20-1.00)  X 10*3/uL


 


Chloride     ()  mmol/L


 


BUN     (9-20)  mg/dL


 


Creatinine     (0.66-1.25)  mg/dL


 


Glucose     (74-99)  mg/dL


 


POC Glucose (mg/dL)   108 H  106 H  (75-99)  mg/dL


 


Total Protein     (6.3-8.2)  g/dL


 


Albumin     (3.5-5.0)  g/dL














Assessment and Plan


Assessment: 





* 55-year-old male with history of alcoholism, was found at home laying with f

  ecal matter with multiple bruises, scratches and pressure sores over dependent

  areas, indicating patient has been on the floor for fairly long period of 

  time.  Last period of contact with his friends was on Friday, 3 days prior to 

  arrival.   Patient has improved slightly as compared to yesterday.  Still 

  continues to be significantly delirious/encephalopathic.  Probable alcohol 

  withdrawal/DTs/alcoholic hallucinosis, rule out infectious process.  


* Fever of unknown etiology, still with low-grade fever of 100.3.


* Vitamin B6 deficiency


* Hypertension


* Right leg pain, unclear etiology, possible radiculopathy, significantly 

  improved.


* History of alcoholism


* History of multiple falls.


* History of marijuana use.


Plan: 





* Patient continues to be delirious, confused at times.  Uncertain if patient 

  has developed alcohol-related dementia.  


* Patient's muscle strength particularly in the legs has much improved, and not 

  very tender and painful at this time. 


* Patient's T-max is 99.8 in the last 24 hours.


* Patient underwent a repeat lumbar puncture, which is completely normal.  WBC 

  0, RBC 0, glucose 61 and total proteins 44.  


* ESR is very high 103.  Repeat ESR and CRP.  If persistently high, would 

  recommend rheumatology consultation.   


* Nuclear medicine Whole body WBC scan also failed to demonstrate a site of 

  pyogenic infection.


* Patient's previous CSF shows very mild leukocytosis (after correction for 

  traumatic tap), however very highly elevated proteins.  Cultures were 

  negative.  HSV-1 and 2 PCR in the CSF are negative.  Cryptococcal antigen 

  negative.  Lyme titer negative.  Infectious disease following.  


* West Nile virus serology negative.  


* ID has recommended to stop antibiotics and observe.  Repeat ESR.  If the ESR 

  is high, would suggest rheumatology consultation.


* EEG was abnormal due to background slowing of moderate to severe degree.  This

  is suggestive of generalized cerebral dysfunction, as can be seen with toxic 

  metabolic encephalopathy or due to diffuse structural brain abnormality.  No 

  epileptiform activity was seen.  


* Carotid Doppler was technically difficult due to patient's inability to hold 

  still.  No hemodynamically significant ICA stenosis identified on either side.

   Antegrade flow in both vertebral arteries.


* B12 582 normal, however at MMA is elevated 0.46 (normal <0.40).  This may 

  indicate intracellular deficiency of B12.  Patient's folate 5.7, TSH 1.77, RPR

  nonreactive.  We will start folate and B12 replacement.  B6 was low 3, 

  currently on replacement.  We will change to vitamin B6 50 mg daily.


* Thiamine, folate.  [facility] protocol.


* Orthopedic consultation input appreciated.  Patient had undergone left ankle 

  and right knee joint aspiration.  


* MRI of the lumbar spine with and without contrast revealed no evidence of di

  scitis.  No significant disc space narrowing.  No fracture.  Multilevel mild 

  facet arthropathy and mild lateral recess stenosis.  Multilevel mild posterior

  disc bulging without spinal stenosis.  


* MRI of the brain with and without contrast revealed mild atrophy.  Mild 

  subependymal white matter increased signal around the lateral ventricles 

  measuring up to 5 mm in thickness.  No evidence of cortical infarct.  Repeat 

  MRI of the brain without contrast showed no acute process.


* PT OT, possible rehab.


* Dr. Turner will resume neurology service from the weekend and Dr. Ocampo from 

  Monday.  Please call neurology if any concerns.








Addendum 09/12/2021:


Patient's ESR is even further elevated 106, CRP 4.3/0.8.  TRINA negative, dsDNA 

negative.


Patient has persistently elevated white cell count.  T-max is 99.0.  Infectious 

disease following, checking for C. difficile.  Consider checking 2-D echo, rule 

out vegetation.


Elevated ESR is of unclear etiology.  


Dr. Tong Ocampo will resume neurology service from Monday, 09/13/2021.  Please 

call Dr. Ocampo if there is any neurological concerns as well.

## 2021-09-10 NOTE — PN
PROGRESS NOTE



DATE OF SERVICE:

09/10/2021



REASON FOR FOLLOWUP:

Fever and leukocytosis.



INTERVAL HISTORY:

The patient did have a low-grade fever last night of 99.7.  The patient is

hemodynamically stable.  He is breathing comfortably on room air.  The patient remains

pleasantly confused, unable to provide any history.  No vomiting, diarrhea or other

changes reported by the nursing staff.



PHYSICAL EXAMINATION:

Blood pressure 134/83, pulse of 109, temperature 98.8.  He is 95% on room air.

GENERAL DESCRIPTION: General description is a middle-aged male lying in bed in no

distress.

RESPIRATORY SYSTEM: Unlabored breathing. Decreased breath sounds at the bases. No

wheeze.

HEART: S1, S2. Regular rate and rhythm.

ABDOMEN: Soft. No tenderness.

EXTREMITIES: No edema of the feet.



LABS:

Hemoglobin is 8.9 with white count 15.06, BUN of 5, creatinine 0.35.



DIAGNOSTIC IMPRESSION AND PLAN:

1. Patient with a fever, elevated white count in this patient who did have extensive

    workup without any obvious focus.  He did have CTs, MRI, even a WBC scan without

    any focus.  The patient _____ has been ripping off his IV. Antibiotic will be

    discontinued and patient monitored closely off antibiotic therapy.

2. Elevated white count, possible oropharyngeal candidiasis. As the patient has been

    on multiple courses of antibiotic, will try Diflucan and see response. Monitor his

    clinical course closely.





MMODL / IJN: [id number] / Job#: [id number]

## 2021-09-10 NOTE — P.PN
Subjective


Progress Note Date: 09/10/21


Principal diagnosis: 





Metabolic encephalopathy





Hospital course:


Patient is a 55-year-old male with a past medical history of EtOH abuse.  He 

presented to the hospital on 8/23/21 after being found delirious and confused by

his neighbor at home.  Patient was reportedly found laying on the floor covered 

in his own feces with empty alcohol bottles surrounding him.  Patient presented 

to the hospital with multiple bruises and abrasions in different stages of hea

ling.  UDS positive for marijuana and benzodiazepines.  EtOH level was less than

10.  Patient was initially found to have hypernatremia with sodium of 152, 

hyponatremia with potassium of 3.2, hyperchloremia with chloride of 114 and 

elevated creatinine kinase of 999.  He was admitted for acute metabolic 

encephalopathy with hallucinations and EtOH withdrawal, rhabdomyolysis, and 

sepsis without sepsis shock.  Patient has underwent multiple labs and imaging 

see below.  Currently admitted under our services with consultation to 

pulmonology, infectious disease, neurology, and orthopedic surgery.





09/10/2021:


Patient seen and examined.  He continues to remain encephalopathic he denies any

complaints of chest pain shortness of breath no nausea no vomiting no fever or 

chills





Objective





- Vital Signs


Vital signs: 


                                   Vital Signs











Temp  98.8 F   09/10/21 08:00


 


Pulse  109 H  09/10/21 08:00


 


Resp  18   09/10/21 08:00


 


BP  134/83   09/10/21 08:00


 


Pulse Ox  95   09/10/21 08:00








                                 Intake & Output











 09/09/21 09/10/21 09/10/21





 18:59 06:59 18:59


 


Intake Total 


 


Output Total 500  


 


Balance 0 820 1440


 


Intake:   


 


  Intake, IV Titration  100 





  Amount   


 


    Cefepime 2 gm In Sodium  100 





    Chloride 0.9% 100 ml @ 25   





    mls/hr IVPB Q8HR [facility] Rx#   





    :[id number]   


 


  Oral 


 


Output:   


 


  Urine 500  


 


Other:   


 


  Voiding Method Diaper Diaper Diaper


 


  # Voids  4 


 


  # Bowel Movements 2 1 














- Exam





General: Nontoxic, no distress and appears stated age.  


Derm: Skin warm and dry, normal coloration for ethnicity. 


Head: Atraumatic, normocephalic 


Eyes: No lid lag, and anicteric sclera.  


Mouth: No lip lesions, mucus membranes moist


Cardiovascular: Regular rhythm and rate, no murmur,


Lungs: Respirations even, regular, and unlabored on room air. Lungs CTA 

bilaterally, no rhonchi, no rales, no wheezing, and no accessory muscle usage. 


GI/: soft, nontender to palpation, no guarding, no appreciable organomegaly.  

Puente catheter in place.


Ext: ROM intact. No gross muscle atrophy, no edema, no contractures. erythema 

and swelling to BLE worse on left.  Swelling right knee. 


Neuro/Psych: Speech clear.  Patient able to follow limited commands, partially 

oriented





- Labs


CBC & Chem 7: 


                                 09/10/21 05:04





                                 09/10/21 05:04


Labs: 


                  Abnormal Lab Results - Last 24 Hours (Table)











  09/09/21 09/10/21 09/10/21 Range/Units





  16:40 01:01 05:04 


 


WBC     (4.50-10.00)  X 10*3/uL


 


RBC     (4.40-5.60)  X 10*6/uL


 


Hgb     (13.0-17.0)  g/dL


 


Hct     (39.6-50.0)  %


 


MCV     (80.0-97.0)  fL


 


MCHC     (32.0-37.0)  g/dL


 


RDW     (11.5-14.5)  %


 


Plt Count     (140-440)  X 10*3/uL


 


Immature Gran #     (0.00-0.04)  X 10*3/uL


 


Neutrophils #     (1.80-7.70)  X 10*3/uL


 


Monocytes #     (0.20-1.00)  X 10*3/uL


 


Chloride    111 H  ()  mmol/L


 


BUN    5 L  (9-20)  mg/dL


 


Creatinine    0.37 L  (0.66-1.25)  mg/dL


 


Glucose    133 H  (74-99)  mg/dL


 


POC Glucose (mg/dL)  133 H  144 H   (75-99)  mg/dL


 


Total Protein    5.6 L  (6.3-8.2)  g/dL


 


Albumin    2.5 L  (3.5-5.0)  g/dL














  09/10/21 09/10/21 09/10/21 Range/Units





  05:04 05:53 11:30 


 


WBC  15.06 H    (4.50-10.00)  X 10*3/uL


 


RBC  2.85 L    (4.40-5.60)  X 10*6/uL


 


Hgb  8.9 L    (13.0-17.0)  g/dL


 


Hct  28.5 L    (39.6-50.0)  %


 


MCV  100.0 H    (80.0-97.0)  fL


 


MCHC  31.2 L    (32.0-37.0)  g/dL


 


RDW  15.8 H    (11.5-14.5)  %


 


Plt Count  508 H    (140-440)  X 10*3/uL


 


Immature Gran #  0.24 H    (0.00-0.04)  X 10*3/uL


 


Neutrophils #  11.26 H    (1.80-7.70)  X 10*3/uL


 


Monocytes #  1.49 H    (0.20-1.00)  X 10*3/uL


 


Chloride     ()  mmol/L


 


BUN     (9-20)  mg/dL


 


Creatinine     (0.66-1.25)  mg/dL


 


Glucose     (74-99)  mg/dL


 


POC Glucose (mg/dL)   108 H  106 H  (75-99)  mg/dL


 


Total Protein     (6.3-8.2)  g/dL


 


Albumin     (3.5-5.0)  g/dL














Assessment and Plan


Assessment: 





Sepsis without septic shock


Likely secondary to RLL Aspiration Pneumonia


-Initial Pro-calcitonin 50.22 and CRP of 8.1, improving with repeat pro-

calcitonin 10.40 and CRP 5.1.


-Patient was treated with IV antibiotics vancomycin and cefepime which has now 

been discontinued by infectious disease


-CSF fluid not consistent with bacterial meningitis.  


-HSV PCR negative


-MRI of the lumbar spine with no evidence of discitis or prevertebral abscess


-Thiamine 100 mg twice daily.


-Shin does continue to have a leukocytosis.  Patient did have a WBC scan which 

was negative.  Infectious disease time following and will await further 

recommendations








Hypomagnesemia


-Replace as indicated





Hypokalemia


-Replace as indicated





Alcohol abuse with alcohol withdrawal syndrome


Delirium tremens


Rhabdomyolysis


Acute Metabolic Encephalopathy with hallucinations


-Thiamine 100 mg twice daily


-Seizure precautions, Fall precautions


-Computed tomography scan of the head showed no acute findings.


-MRI of the brain was no acute finding


-Consulted neurology and psychiatry for further evaluation, input appreciated.


-EEG was abnormal with generalized cerebral dysfunction and evidence of toxic 

metabolic encephalopathy


-Treatment of underlying infectious process, continue IV antibiotics cefepime 

and vancomycin , ID input appreciated.





Vitamin B6 Deficiency 


-Daily vitamin replacment. 





Poor living condition


Recurrent episodes of hospital admission for alcohol abuse


Major life stressors with possible major depression


-Consult placed to psychiatry, input appreciated.


- consultation





Hypernatremia, resolved 





Patient needs placement in a group home or ECF facility.  A Kemp is unsafe to 

return home by himself.

## 2021-09-11 LAB
ANION GAP SERPL CALC-SCNC: 9.3 MMOL/L (ref 4–12)
BASOPHILS # BLD AUTO: 0.06 X 10*3/UL (ref 0–0.1)
BASOPHILS NFR BLD AUTO: 0.4 %
BUN SERPL-SCNC: 6 MG/DL (ref 9–27)
BUN/CREAT SERPL: 15 RATIO (ref 12–20)
CALCIUM SPEC-MCNC: 8.4 MG/DL (ref 8.7–10.3)
CHLORIDE SERPL-SCNC: 107 MMOL/L (ref 96–109)
CO2 SERPL-SCNC: 25.7 MMOL/L (ref 21.6–31.8)
DSDNA AB SER QL: NEGATIVE
DSDNA AB TITR SER: 1 IU/ML
EOSINOPHIL # BLD AUTO: 0.09 X 10*3/UL (ref 0.04–0.35)
EOSINOPHIL NFR BLD AUTO: 0.6 %
ERYTHROCYTE [DISTWIDTH] IN BLOOD BY AUTOMATED COUNT: 2.74 X 10*6/UL (ref 4.4–5.6)
ERYTHROCYTE [DISTWIDTH] IN BLOOD: 15.9 % (ref 11.5–14.5)
GLUCOSE BLD-MCNC: 104 MG/DL (ref 75–99)
GLUCOSE BLD-MCNC: 107 MG/DL (ref 75–99)
GLUCOSE BLD-MCNC: 89 MG/DL (ref 75–99)
GLUCOSE SERPL-MCNC: 103 MG/DL (ref 70–110)
HCT VFR BLD AUTO: 27.7 % (ref 39.6–50)
HGB BLD-MCNC: 8.6 G/DL (ref 13–17)
LYMPHOCYTES # SPEC AUTO: 1.69 X 10*3/UL (ref 0.9–5)
LYMPHOCYTES NFR SPEC AUTO: 12 %
MCH RBC QN AUTO: 31.4 PG (ref 27–32)
MCHC RBC AUTO-ENTMCNC: 31 G/DL (ref 32–37)
MCV RBC AUTO: 101.1 FL (ref 80–97)
MONOCYTES # BLD AUTO: 1.42 X 10*3/UL (ref 0.2–1)
MONOCYTES NFR BLD AUTO: 10.1 %
NEUTROPHILS # BLD AUTO: 10.66 X 10*3/UL (ref 1.8–7.7)
NEUTROPHILS NFR BLD AUTO: 75.5 %
PLATELET # BLD AUTO: 494 X 10*3/UL (ref 140–440)
POTASSIUM SERPL-SCNC: 3.8 MMOL/L (ref 3.5–5.5)
SODIUM SERPL-SCNC: 142 MMOL/L (ref 135–145)
WBC # BLD AUTO: 14.12 X 10*3/UL (ref 4.5–10)

## 2021-09-11 RX ADMIN — NYSTATIN SCH APPLIC: 100000 OINTMENT TOPICAL at 15:36

## 2021-09-11 RX ADMIN — HEPARIN SODIUM SCH UNIT: 5000 INJECTION INTRAVENOUS; SUBCUTANEOUS at 23:31

## 2021-09-11 RX ADMIN — MEGESTROL ACETATE SCH MG: 40 SUSPENSION ORAL at 09:20

## 2021-09-11 RX ADMIN — HEPARIN SODIUM SCH UNIT: 5000 INJECTION INTRAVENOUS; SUBCUTANEOUS at 15:36

## 2021-09-11 RX ADMIN — NALTREXONE HYDROCHLORIDE SCH MG: 50 TABLET, FILM COATED ORAL at 09:20

## 2021-09-11 RX ADMIN — HEPARIN SODIUM SCH UNIT: 5000 INJECTION INTRAVENOUS; SUBCUTANEOUS at 09:20

## 2021-09-11 RX ADMIN — NYSTATIN SCH APPLIC: 100000 OINTMENT TOPICAL at 09:20

## 2021-09-11 RX ADMIN — PANTOPRAZOLE SODIUM SCH MG: 40 TABLET, DELAYED RELEASE ORAL at 09:20

## 2021-09-11 RX ADMIN — FLUCONAZOLE SCH MG: 100 TABLET ORAL at 09:20

## 2021-09-11 RX ADMIN — SODIUM CHLORIDE SCH: 900 INJECTION, SOLUTION INTRAVENOUS at 21:14

## 2021-09-11 RX ADMIN — NYSTATIN SCH APPLIC: 100000 OINTMENT TOPICAL at 21:09

## 2021-09-11 RX ADMIN — SODIUM CHLORIDE SCH: 900 INJECTION, SOLUTION INTRAVENOUS at 09:20

## 2021-09-11 RX ADMIN — FOLIC ACID SCH MG: 1 TABLET ORAL at 09:19

## 2021-09-11 NOTE — P.PN
Subjective


Progress Note Date: 09/11/21


Principal diagnosis: 





Metabolic encephalopathy





Hospital course:


Patient is a 55-year-old male with a past medical history of EtOH abuse.  He 

presented to the hospital on 8/23/21 after being found delirious and confused by

his neighbor at home.  Patient was reportedly found laying on the floor covered 

in his own feces with empty alcohol bottles surrounding him.  Patient presented 

to the hospital with multiple bruises and abrasions in different stages of hea

ling.  UDS positive for marijuana and benzodiazepines.  EtOH level was less than

10.  Patient was initially found to have hypernatremia with sodium of 152, 

hyponatremia with potassium of 3.2, hyperchloremia with chloride of 114 and 

elevated creatinine kinase of 999.  He was admitted for acute metabolic 

encephalopathy with hallucinations and EtOH withdrawal, rhabdomyolysis, and 

sepsis without sepsis shock.  Patient has underwent multiple labs and imaging 

see below.  Currently admitted under our services with consultation to 

pulmonology, infectious disease, neurology, and orthopedic surgery.





09/11/2021:


Patient seen and examined.  He continues to remain encephalopathic.  He is not 

complaining of any chest pain shortness of breath cough.  He does continue to 

have mild fevers





Objective





- Vital Signs


Vital signs: 


                                   Vital Signs











Temp  97.8 F   09/11/21 08:00


 


Pulse  105 H  09/11/21 08:00


 


Resp  18   09/11/21 08:00


 


BP  124/73   09/11/21 08:00


 


Pulse Ox  93 L  09/11/21 08:00








                                 Intake & Output











 09/10/21 09/11/21 09/11/21





 18:59 06:59 18:59


 


Intake Total 1440  


 


Output Total  500 


 


Balance 1440 -500 


 


Intake:   


 


  Oral 1440  


 


Output:   


 


  Urine  500 


 


Other:   


 


  Voiding Method Diaper Diaper Diaper


 


  # Voids 3 3 


 


  # Bowel Movements  1 














- Exam





General: Nontoxic, no distress and appears stated age.  


Derm: Skin warm and dry, normal coloration for ethnicity. 


Head: Atraumatic, normocephalic 


Eyes: No lid lag, and anicteric sclera.  


Mouth: No lip lesions, mucus membranes moist


Cardiovascular: Regular rhythm and rate, no murmur,


Lungs: Respirations even, regular, and unlabored on room air. Lungs CTA 

bilaterally, no rhonchi, no rales, no wheezing, and no accessory muscle usage. 


GI/: soft, nontender to palpation, no guarding, no appreciable organomegaly.  

Puente catheter in place.


Ext: ROM intact. No gross muscle atrophy, no edema, no contractures. erythema 

and swelling to BLE worse on left.  Swelling right knee. 


Neuro/Psych: Speech clear.  Patient able to follow limited commands, partially 

oriented





- Labs


CBC & Chem 7: 


                                 09/11/21 06:59





                                 09/11/21 06:59


Labs: 


                  Abnormal Lab Results - Last 24 Hours (Table)











  09/10/21 09/10/21 09/11/21 Range/Units





  16:42 17:40 06:42 


 


WBC     (4.50-10.00)  X 10*3/uL


 


RBC     (4.40-5.60)  X 10*6/uL


 


Hgb     (13.0-17.0)  g/dL


 


Hct     (39.6-50.0)  %


 


MCV     (80.0-97.0)  fL


 


MCHC     (32.0-37.0)  g/dL


 


RDW     (11.5-14.5)  %


 


Plt Count     (140-440)  X 10*3/uL


 


Immature Gran #     (0.00-0.04)  X 10*3/uL


 


Neutrophils #     (1.80-7.70)  X 10*3/uL


 


Monocytes #     (0.20-1.00)  X 10*3/uL


 


BUN     (9.0-27.0)  mg/dL


 


Creatinine     (0.6-1.5)  mg/dL


 


POC Glucose (mg/dL)  104 H   107 H  (75-99)  mg/dL


 


Calcium     (8.7-10.3)  mg/dL


 


C-Reactive Protein   4.3 H   (0.0-0.8)  mg/dL


 


Procalcitonin     (0.02-0.09)  ng/mL














  09/11/21 09/11/21 09/11/21 Range/Units





  06:59 06:59 06:59 


 


WBC   14.12 H   (4.50-10.00)  X 10*3/uL


 


RBC   2.74 L   (4.40-5.60)  X 10*6/uL


 


Hgb   8.6 L   (13.0-17.0)  g/dL


 


Hct   27.7 L   (39.6-50.0)  %


 


MCV   101.1 H   (80.0-97.0)  fL


 


MCHC   31.0 L   (32.0-37.0)  g/dL


 


RDW   15.9 H   (11.5-14.5)  %


 


Plt Count   494 H   (140-440)  X 10*3/uL


 


Immature Gran #   0.20 H   (0.00-0.04)  X 10*3/uL


 


Neutrophils #   10.66 H   (1.80-7.70)  X 10*3/uL


 


Monocytes #   1.42 H   (0.20-1.00)  X 10*3/uL


 


BUN    6.0 L  (9.0-27.0)  mg/dL


 


Creatinine    0.4 L  (0.6-1.5)  mg/dL


 


POC Glucose (mg/dL)     (75-99)  mg/dL


 


Calcium    8.4 L  (8.7-10.3)  mg/dL


 


C-Reactive Protein     (0.0-0.8)  mg/dL


 


Procalcitonin  3.35 H    (0.02-0.09)  ng/mL














Assessment and Plan


Assessment: 





Sepsis without septic shock


Likely secondary to RLL Aspiration Pneumonia


-Initial Pro-calcitonin 50.22 and CRP of 8.1, improving with repeat pro-

calcitonin 10.40 and CRP 5.1.


-Patient was treated with IV antibiotics vancomycin and cefepime which has now 

been discontinued by infectious disease


-CSF fluid not consistent with bacterial meningitis.  


-HSV PCR negative


-MRI of the lumbar spine with no evidence of discitis or prevertebral abscess


-Thiamine 100 mg twice daily.


-Patient continues to have leukocytosis with pyrexia.  Patient did have a WBC 

scan which was negative.  Infectious disease time following and will await 

further recommendations








Hypomagnesemia


-Replace as indicated





Hypokalemia


-Replace as indicated





Alcohol abuse with alcohol withdrawal syndrome


Delirium tremens


Rhabdomyolysis


Acute Metabolic Encephalopathy with hallucinations


-Thiamine 100 mg twice daily


-Seizure precautions, Fall precautions


-Computed tomography scan of the head showed no acute findings.


-MRI of the brain was no acute finding


-Consulted neurology and psychiatry for further evaluation, input appreciated.


-EEG was abnormal with generalized cerebral dysfunction and evidence of toxic 

metabolic encephalopathy


-Treatment of underlying infectious process, continue IV antibiotics cefepime 

and vancomycin , ID input appreciated.





Vitamin B6 Deficiency 


-Daily vitamin replacment. 





Poor living condition


Recurrent episodes of hospital admission for alcohol abuse


Major life stressors with possible major depression


-Consult placed to psychiatry, input appreciated.


- consultation





Hypernatremia, resolved

## 2021-09-12 LAB
ANION GAP SERPL CALC-SCNC: 9.3 MMOL/L (ref 4–12)
BASOPHILS # BLD AUTO: 0.07 X 10*3/UL (ref 0–0.1)
BASOPHILS NFR BLD AUTO: 0.5 %
BUN SERPL-SCNC: 7 MG/DL (ref 9–27)
BUN/CREAT SERPL: 17.5 RATIO (ref 12–20)
CALCIUM SPEC-MCNC: 8.7 MG/DL (ref 8.7–10.3)
CHLORIDE SERPL-SCNC: 107 MMOL/L (ref 96–109)
CO2 SERPL-SCNC: 25.7 MMOL/L (ref 21.6–31.8)
EOSINOPHIL # BLD AUTO: 0.09 X 10*3/UL (ref 0.04–0.35)
EOSINOPHIL NFR BLD AUTO: 0.6 %
ERYTHROCYTE [DISTWIDTH] IN BLOOD BY AUTOMATED COUNT: 2.85 X 10*6/UL (ref 4.4–5.6)
ERYTHROCYTE [DISTWIDTH] IN BLOOD: 15.5 % (ref 11.5–14.5)
GLUCOSE BLD-MCNC: 104 MG/DL (ref 75–99)
GLUCOSE BLD-MCNC: 118 MG/DL (ref 75–99)
GLUCOSE BLD-MCNC: 95 MG/DL (ref 75–99)
GLUCOSE BLD-MCNC: 97 MG/DL (ref 75–99)
GLUCOSE SERPL-MCNC: 102 MG/DL (ref 70–110)
HCT VFR BLD AUTO: 28.3 % (ref 39.6–50)
HGB BLD-MCNC: 9 G/DL (ref 13–17)
LYMPHOCYTES # SPEC AUTO: 2.46 X 10*3/UL (ref 0.9–5)
LYMPHOCYTES NFR SPEC AUTO: 16.8 %
MCH RBC QN AUTO: 31.6 PG (ref 27–32)
MCHC RBC AUTO-ENTMCNC: 31.8 G/DL (ref 32–37)
MCV RBC AUTO: 99.3 FL (ref 80–97)
MONOCYTES # BLD AUTO: 1.34 X 10*3/UL (ref 0.2–1)
MONOCYTES NFR BLD AUTO: 9.2 %
NEUTROPHILS # BLD AUTO: 10.51 X 10*3/UL (ref 1.8–7.7)
NEUTROPHILS NFR BLD AUTO: 71.7 %
PLATELET # BLD AUTO: 554 X 10*3/UL (ref 140–440)
POTASSIUM SERPL-SCNC: 4 MMOL/L (ref 3.5–5.5)
SODIUM SERPL-SCNC: 142 MMOL/L (ref 135–145)
WBC # BLD AUTO: 14.64 X 10*3/UL (ref 4.5–10)

## 2021-09-12 RX ADMIN — NYSTATIN SCH APPLIC: 100000 OINTMENT TOPICAL at 20:35

## 2021-09-12 RX ADMIN — PANTOPRAZOLE SODIUM SCH MG: 40 TABLET, DELAYED RELEASE ORAL at 07:12

## 2021-09-12 RX ADMIN — CEFEPIME HYDROCHLORIDE SCH MLS/HR: 1 INJECTION, POWDER, FOR SOLUTION INTRAMUSCULAR; INTRAVENOUS at 20:36

## 2021-09-12 RX ADMIN — SODIUM CHLORIDE SCH MLS/HR: 9 INJECTION, SOLUTION INTRAVENOUS at 16:29

## 2021-09-12 RX ADMIN — PYRIDOXINE HCL TAB 50 MG SCH MG: 50 TAB at 07:14

## 2021-09-12 RX ADMIN — NYSTATIN SCH APPLIC: 100000 OINTMENT TOPICAL at 15:19

## 2021-09-12 RX ADMIN — FOLIC ACID SCH MG: 1 TABLET ORAL at 07:13

## 2021-09-12 RX ADMIN — SODIUM CHLORIDE SCH MLS/HR: 900 INJECTION, SOLUTION INTRAVENOUS at 20:36

## 2021-09-12 RX ADMIN — HEPARIN SODIUM SCH UNIT: 5000 INJECTION INTRAVENOUS; SUBCUTANEOUS at 15:19

## 2021-09-12 RX ADMIN — HEPARIN SODIUM SCH UNIT: 5000 INJECTION INTRAVENOUS; SUBCUTANEOUS at 07:14

## 2021-09-12 RX ADMIN — SODIUM CHLORIDE SCH: 900 INJECTION, SOLUTION INTRAVENOUS at 11:30

## 2021-09-12 RX ADMIN — NYSTATIN SCH APPLIC: 100000 OINTMENT TOPICAL at 07:14

## 2021-09-12 RX ADMIN — SODIUM CHLORIDE SCH MLS/HR: 9 INJECTION, SOLUTION INTRAVENOUS at 23:47

## 2021-09-12 RX ADMIN — NALTREXONE HYDROCHLORIDE SCH MG: 50 TABLET, FILM COATED ORAL at 07:13

## 2021-09-12 RX ADMIN — MEGESTROL ACETATE SCH MG: 40 SUSPENSION ORAL at 07:14

## 2021-09-12 RX ADMIN — HEPARIN SODIUM SCH UNIT: 5000 INJECTION INTRAVENOUS; SUBCUTANEOUS at 23:57

## 2021-09-12 RX ADMIN — FLUCONAZOLE SCH MG: 100 TABLET ORAL at 07:13

## 2021-09-12 NOTE — PN
PROGRESS NOTE



DATE OF SERVICE:

09/11/2021



REASON FOR FOLLOWUP:

Leukocytosis.



INTERVAL HISTORY:

Patient is afebrile.  The patient remains to be slightly sleepy, lethargic unable to

provide any  history.  No vomiting or any other changes reported by the nurse staff.



PHYSICAL EXAMINATION:

Blood pressure 122/66, pulse of 112, temperature 99, 95% on room air. General

description is a middle-aged male lying in bed in no distress.  Respiratory system:

Unlabored breathing, decreased breath sounds in the base, with no wheeze.  Heart S1,

S2.  Regular rate and rhythm. Abdomen soft, no tenderness.



LABS:

Hemoglobin 8.4, white count 14, BUN of 6, creatinine 0.4.



DIAGNOSTIC IMPRESSION AND PLAN:

Patient with a fever and did have extensive workup without any clear localizing focus

of infection currently being taken off antibiotics.  He is currently on _____with

concern of possible oropharyngeal candidiasis. White count trending down.  Continue

supportive care.





MMODL / IJN: [id number] / Job#: [id number]

## 2021-09-12 NOTE — PN
PROGRESS NOTE



DATE OF SERVICE:

09/12/2021



REASON FOR FOLLOWUP:

Leukocytosis.



INTERVAL HISTORY:

The patient is currently afebrile.  The patient has been breathing comfortably.

Hemodynamically stable.  No vomiting has been reported by the nursing staff.  Has had

some diarrhea though.



PHYSICAL EXAMINATION:

Blood pressure is 116/69 with pulse of 118, temperature 98.4.  He is 96% on room air.

General description is a middle-aged male lying in bed in no distress.  Respiratory

system: Unlabored breathing, clear to auscultation.  Heart S1, S2.  Regular rate and

rhythm. Abdomen soft. No tenderness.  Extremities: No edema of the feet.



LABS:

Hemoglobin is _____, white count _____, BUN of 7, creatinine 0.4.



DIAGNOSTIC IMPRESSION AND PLAN:

1. Patient with fever that has resolved in this patient who did have extensive workup.

    No obvious focus of infection. Currently being monitored closely off antibiotic

    therapy.

2. Elevated white count, possible _____ will check a stool for C difficile and treat

    if positive.





MMODL / IJN: [id number] / Job#: [id number]

## 2021-09-12 NOTE — P.PN
Subjective


Progress Note Date: 09/12/21


Hospital course:


Patient is a 55-year-old male with a past medical history of EtOH abuse.  He 

presented to the hospital on 8/23/21 after being found delirious and confused by

his neighbor at home.  Patient was reportedly found laying on the floor covered 

in his own feces with empty alcohol bottles surrounding him.  Patient presented 

to the hospital with multiple bruises and abrasions in different stages of 

healing.  UDS positive for marijuana and benzodiazepines.  EtOH level was less 

than 10.  Patient was initially found to have hypernatremia with sodium of 152, 

hyponatremia with potassium of 3.2, hyperchloremia with chloride of 114 and 

elevated creatinine kinase of 999.  He was admitted for acute metabolic 

encephalopathy with hallucinations and EtOH withdrawal, rhabdomyolysis, and 

sepsis without sepsis shock.  Patient has underwent multiple labs and imaging 

see below.  Currently admitted under our services with consultation to 

pulmonology, infectious disease, neurology, and orthopedic surgery.








Subjective:


Remains partially oriented.  No chest pain no abdominal pain. poor Historian.  

Remains afebrile.  No major overnight changes.








Objective





- Vital Signs


Vital signs: 


                                   Vital Signs











Temp  98.5 F   09/12/21 07:57


 


Pulse  105 H  09/12/21 07:57


 


Resp  16   09/12/21 07:57


 


BP  108/70   09/12/21 07:57


 


Pulse Ox  93 L  09/12/21 07:57








                                 Intake & Output











 09/11/21 09/12/21 09/12/21





 18:59 06:59 18:59


 


Intake Total 400  


 


Output Total  240 


 


Balance 400 -240 


 


Intake:   


 


  Oral 400  


 


Output:   


 


  Urine  240 


 


Other:   


 


  Voiding Method Diaper Diaper Diaper


 


  # Voids  4 


 


  # Bowel Movements  1 














- Exam


General: Nontoxic, no distress and appears stated age.  


Derm: Skin warm and dry, normal coloration for ethnicity. 


Head: Atraumatic, normocephalic 


Eyes: No lid lag, and anicteric sclera.  


Mouth: No lip lesions, mucus membranes moist


Cardiovascular: Regular rhythm and rate, no murmur,


Lungs: Respirations even, regular, and unlabored on room air. Lungs CTA 

bilaterally, no rhonchi, no rales, no wheezing, and no accessory muscle usage. 


GI/: soft, nontender to palpation, no guarding, no appreciable organomegaly.  

Puente catheter in place.


Ext: ROM intact. No gross muscle atrophy, no edema, no contractures.


Neuro/Psych: Speech clear.  Patient able to follow limited commands, partially 

oriented








- Labs


CBC & Chem 7: 


                                 09/12/21 05:09





                                 09/12/21 05:09


Labs: 


                  Abnormal Lab Results - Last 24 Hours (Table)











  09/10/21 09/11/21 09/12/21 Range/Units





  17:40 16:37 00:16 


 


WBC     (4.50-10.00)  X 10*3/uL


 


RBC     (4.40-5.60)  X 10*6/uL


 


Hgb     (13.0-17.0)  g/dL


 


Hct     (39.6-50.0)  %


 


MCV     (80.0-97.0)  fL


 


MCHC     (32.0-37.0)  g/dL


 


RDW     (11.5-14.5)  %


 


Plt Count     (140-440)  X 10*3/uL


 


Immature Gran #     (0.00-0.04)  X 10*3/uL


 


Neutrophils #     (1.80-7.70)  X 10*3/uL


 


Monocytes #     (0.20-1.00)  X 10*3/uL


 


ESR  106 H    (0-20)  mm/Hr


 


BUN     (9.0-27.0)  mg/dL


 


Creatinine     (0.6-1.5)  mg/dL


 


POC Glucose (mg/dL)   104 H  118 H  (75-99)  mg/dL














  09/12/21 09/12/21 Range/Units





  05:09 05:09 


 


WBC  14.64 H   (4.50-10.00)  X 10*3/uL


 


RBC  2.85 L   (4.40-5.60)  X 10*6/uL


 


Hgb  9.0 L   (13.0-17.0)  g/dL


 


Hct  28.3 L   (39.6-50.0)  %


 


MCV  99.3 H   (80.0-97.0)  fL


 


MCHC  31.8 L   (32.0-37.0)  g/dL


 


RDW  15.5 H   (11.5-14.5)  %


 


Plt Count  554 H   (140-440)  X 10*3/uL


 


Immature Gran #  0.17 H   (0.00-0.04)  X 10*3/uL


 


Neutrophils #  10.51 H   (1.80-7.70)  X 10*3/uL


 


Monocytes #  1.34 H   (0.20-1.00)  X 10*3/uL


 


ESR    (0-20)  mm/Hr


 


BUN   7.0 L  (9.0-27.0)  mg/dL


 


Creatinine   0.4 L  (0.6-1.5)  mg/dL


 


POC Glucose (mg/dL)    (75-99)  mg/dL














Assessment and Plan


Plan: 





Sepsis without septic shock


Likely secondary to RLL Aspiration Pneumonia


-Initial Pro-calcitonin 50.22 and CRP of 8.1, improving with repeat pro-

calcitonin 10.40 and CRP 5.1.


-Was on IV antibiotics: Vancomycin and  cefepime resume


-CSF fluid not consistent with bacterial meningitis.  


-HSV PCR negative


-MRI of the lumbar spine with no evidence of discitis or prevertebral abscess


-Thiamine 100 mg twice daily.


WBC fluctuates but remains afebrile.








Hypomagnesemia


-Replace as indicated





Hypokalemia


-Replace as indicated





Alcohol abuse with alcohol withdrawal syndrome


Delirium tremens


Rhabdomyolysis


Acute Metabolic Encephalopathy with hallucinations


-Thiamine 100 mg twice daily


-Seizure precautions, Fall precautions


-Computed tomography scan of the head showed no acute findings.


-MRI of the brain was no acute finding


-Consulted neurology and psychiatry for further evaluation, input appreciated.


-EEG was abnormal with generalized cerebral dysfunction and evidence of toxic 

metabolic encephalopathy


-Treatment of underlying infectious process, continue IV antibiotics cefepime a

nd vancomycin , ID input appreciated.





Vitamin B6 Deficiency 


-Daily vitamin replacment. 





Poor living condition


Recurrent episodes of hospital admission for alcohol abuse


Major life stressors with possible major depression


-Consult placed to psychiatry, input appreciated.


- consultation





Hypernatremia, resolved 








CODE STATUS: Full code


DVT prophylaxis: SCDs


Disposition: Pending rehab placement

## 2021-09-13 LAB
ALBUMIN SERPL-MCNC: 3 G/DL (ref 3.8–4.9)
ALBUMIN/GLOB SERPL: 1.15 G/DL (ref 1.6–3.17)
ALP SERPL-CCNC: 69 U/L (ref 41–126)
ALT SERPL-CCNC: 20 U/L (ref 10–49)
ANION GAP SERPL CALC-SCNC: 12 MMOL/L (ref 4–12)
AST SERPL-CCNC: 30 U/L (ref 14–35)
BASOPHILS # BLD AUTO: 0.08 X 10*3/UL (ref 0–0.1)
BASOPHILS NFR BLD AUTO: 0.7 %
BUN SERPL-SCNC: 6 MG/DL (ref 9–27)
BUN/CREAT SERPL: 15 RATIO (ref 12–20)
CALCIUM SPEC-MCNC: 8.5 MG/DL (ref 8.7–10.3)
CHLORIDE SERPL-SCNC: 105 MMOL/L (ref 96–109)
CO2 SERPL-SCNC: 22 MMOL/L (ref 21.6–31.8)
EOSINOPHIL # BLD AUTO: 0.03 X 10*3/UL (ref 0.04–0.35)
EOSINOPHIL NFR BLD AUTO: 0.3 %
ERYTHROCYTE [DISTWIDTH] IN BLOOD BY AUTOMATED COUNT: 2.86 X 10*6/UL (ref 4.4–5.6)
ERYTHROCYTE [DISTWIDTH] IN BLOOD: 15.2 % (ref 11.5–14.5)
GLOBULIN SER CALC-MCNC: 2.6 G/DL (ref 1.6–3.3)
GLUCOSE BLD-MCNC: 100 MG/DL (ref 75–99)
GLUCOSE BLD-MCNC: 103 MG/DL (ref 75–99)
GLUCOSE BLD-MCNC: 105 MG/DL (ref 75–99)
GLUCOSE BLD-MCNC: 116 MG/DL (ref 75–99)
GLUCOSE SERPL-MCNC: 90 MG/DL (ref 70–110)
HCT VFR BLD AUTO: 29 % (ref 39.6–50)
HGB BLD-MCNC: 9 G/DL (ref 13–17)
LYMPHOCYTES # SPEC AUTO: 1.88 X 10*3/UL (ref 0.9–5)
LYMPHOCYTES NFR SPEC AUTO: 16 %
MCH RBC QN AUTO: 31.5 PG (ref 27–32)
MCHC RBC AUTO-ENTMCNC: 31 G/DL (ref 32–37)
MCV RBC AUTO: 101.4 FL (ref 80–97)
MONOCYTES # BLD AUTO: 1.22 X 10*3/UL (ref 0.2–1)
MONOCYTES NFR BLD AUTO: 10.4 %
NEUTROPHILS # BLD AUTO: 8.42 X 10*3/UL (ref 1.8–7.7)
NEUTROPHILS NFR BLD AUTO: 71.4 %
PLATELET # BLD AUTO: 518 X 10*3/UL (ref 140–440)
POTASSIUM SERPL-SCNC: 4.4 MMOL/L (ref 3.5–5.5)
PROT SERPL-MCNC: 5.6 G/DL (ref 6.2–8.2)
SODIUM SERPL-SCNC: 139 MMOL/L (ref 135–145)
WBC # BLD AUTO: 11.77 X 10*3/UL (ref 4.5–10)

## 2021-09-13 RX ADMIN — SODIUM CHLORIDE SCH MLS/HR: 900 INJECTION, SOLUTION INTRAVENOUS at 08:22

## 2021-09-13 RX ADMIN — HEPARIN SODIUM SCH UNIT: 5000 INJECTION INTRAVENOUS; SUBCUTANEOUS at 08:55

## 2021-09-13 RX ADMIN — PYRIDOXINE HCL TAB 50 MG SCH MG: 50 TAB at 09:06

## 2021-09-13 RX ADMIN — SODIUM CHLORIDE SCH MLS/HR: 9 INJECTION, SOLUTION INTRAVENOUS at 08:54

## 2021-09-13 RX ADMIN — NYSTATIN SCH APPLIC: 100000 OINTMENT TOPICAL at 09:00

## 2021-09-13 RX ADMIN — NYSTATIN SCH APPLIC: 100000 OINTMENT TOPICAL at 16:38

## 2021-09-13 RX ADMIN — CEFEPIME HYDROCHLORIDE SCH MLS/HR: 1 INJECTION, POWDER, FOR SOLUTION INTRAMUSCULAR; INTRAVENOUS at 21:07

## 2021-09-13 RX ADMIN — PANTOPRAZOLE SODIUM SCH MG: 40 TABLET, DELAYED RELEASE ORAL at 08:58

## 2021-09-13 RX ADMIN — SODIUM CHLORIDE SCH MLS/HR: 9 INJECTION, SOLUTION INTRAVENOUS at 16:38

## 2021-09-13 RX ADMIN — MEGESTROL ACETATE SCH MG: 40 SUSPENSION ORAL at 09:06

## 2021-09-13 RX ADMIN — FLUCONAZOLE SCH MG: 100 TABLET ORAL at 09:00

## 2021-09-13 RX ADMIN — NALTREXONE HYDROCHLORIDE SCH MG: 50 TABLET, FILM COATED ORAL at 09:00

## 2021-09-13 RX ADMIN — HEPARIN SODIUM SCH UNIT: 5000 INJECTION INTRAVENOUS; SUBCUTANEOUS at 16:38

## 2021-09-13 RX ADMIN — NYSTATIN SCH APPLIC: 100000 OINTMENT TOPICAL at 21:09

## 2021-09-13 RX ADMIN — FOLIC ACID SCH MG: 1 TABLET ORAL at 08:58

## 2021-09-13 RX ADMIN — SODIUM CHLORIDE SCH MLS/HR: 900 INJECTION, SOLUTION INTRAVENOUS at 21:07

## 2021-09-13 RX ADMIN — CEFEPIME HYDROCHLORIDE SCH MLS/HR: 1 INJECTION, POWDER, FOR SOLUTION INTRAMUSCULAR; INTRAVENOUS at 11:03

## 2021-09-13 NOTE — P.PN
Subjective


Progress Note Date: 09/13/21 (delayed charting seen at 0920)


Patient is a 55-year-old male for history of alcohol abuse who initially 

presented on 8/23 after being found delirious and confused by his neighbor at 

home.  He presented to the hospital with multiple bruises and abrasions.  Urine 

drug screen was positive for marijuana and benzos.  Alcohol level was less than 

10.  He was also found to be significantly dehydrated with a sodium of 152.  He 

was admitted for alcohol withdrawal and rhabdo.  He was started on IV fluids.  

He has had a prolonged hospital stay secondary to confusion and intermittent 

fevers.





Patient seen and examined at bedside.  He is alert and oriented to self and 

place.  He has not alert and oriented to year.  He denies any chest discomfort 

or shortness of breath.  He again complains of ankle plain.





9/13-EF 45-50% with distal septal hypokinesis








General: non toxic, no distress, appears at stated age


Derm: warm, dry, erythema with blanching over the gluteal cleft area, multiple 

bruises and abrasions in upper and lower extremities in different stages of 

healing


Head: atraumatic, normocephalic, symmetric


Eyes: EOMI, no lid lag, anicteric sclera


Mouth: no lip lesion, mucus membranes moist


Cardiovascular: S1S2 reg, no murmur, positive posterior tibial pulse bilateral, 


Lungs: CTA bilateral, no rhonchi, no rales , no accessory muscle use


Abdominal: soft,  nontender to palpation, no guarding, no appreciable 

organomegaly


Ext: no gross muscle atrophy, no edema, no contractures


Neuro:  CN II-XI grossly intact, no focal neuro deficits


Psych: Alert, oriented to self and place, appropriate affect 








Pyrexia


-ID recommendations appreciated: Continue on cefepime suspect aspiration 

pneumonia 





Elevated ESR


- undetermined significance 





Candidiasis


-Continue with Diflucan





Severe protein calorie malnutrition with less then 50% oral intake


- Continue to encourage oral intake


-Patient not a candidate for PEG tube or NG tube secondary to intermittent 

confusion.  Patient was likely remove this device and cause more harm to himself

been good.


-Stop Megace as it has been ineffective at increasing his oral intake


-Trial of Marinol





Hypertension


-Continue with lisinopril, Norvasc








Right lower lobe aspiration pneumonia


Sepsis without septic shock


Hypomagnesemia


Hypokalemia


Alcohol withdrawal/DTs


Rhabdo


Acute metabolic encephalopathy with hallucinations


Vitamin B6 deficiency


Thiamine deficiency


Hypernatremia








Objective





- Vital Signs


Vital signs: 


                                   Vital Signs











Temp  97.5 F L  09/13/21 14:00


 


Pulse  56 L  09/13/21 14:00


 


Resp  16   09/13/21 14:00


 


BP  170/69   09/13/21 14:00


 


Pulse Ox  96   09/13/21 14:00








                                 Intake & Output











 09/13/21 09/13/21 09/14/21





 06:59 18:59 06:59


 


Intake Total  250 


 


Balance  250 


 


Weight  68.5 kg 


 


Intake:   


 


  Oral  250 


 


Other:   


 


  Voiding Method  Diaper 


 


  # Voids 6 2 1


 


  # Bowel Movements 1 1 1














- Labs


CBC & Chem 7: 


                                 09/13/21 06:25





                                 09/13/21 06:25


Labs: 


                  Abnormal Lab Results - Last 24 Hours (Table)











  09/13/21 09/13/21 09/13/21 Range/Units





  00:02 06:25 06:25 


 


WBC   11.77 H   (4.50-10.00)  X 10*3/uL


 


RBC   2.86 L   (4.40-5.60)  X 10*6/uL


 


Hgb   9.0 L   (13.0-17.0)  g/dL


 


Hct   29.0 L   (39.6-50.0)  %


 


MCV   101.4 H   (80.0-97.0)  fL


 


MCHC   31.0 L   (32.0-37.0)  g/dL


 


RDW   15.2 H   (11.5-14.5)  %


 


Plt Count   518 H   (140-440)  X 10*3/uL


 


Immature Gran #   0.14 H   (0.00-0.04)  X 10*3/uL


 


Neutrophils #   8.42 H   (1.80-7.70)  X 10*3/uL


 


Monocytes #   1.22 H   (0.20-1.00)  X 10*3/uL


 


Eosinophils #   0.03 L   (0.04-0.35)  X 10*3/uL


 


BUN    6.0 L  (9.0-27.0)  mg/dL


 


Creatinine    0.4 L  (0.6-1.5)  mg/dL


 


POC Glucose (mg/dL)  105 H    (75-99)  mg/dL


 


Calcium    8.5 L  (8.7-10.3)  mg/dL


 


Creatine Kinase     ()  U/L


 


Total Protein    5.6 L  (6.2-8.2)  g/dL


 


Albumin    3.00 L  (3.80-4.90)  g/dL


 


Albumin/Globulin Ratio    1.15 L  (1.60-3.17)  g/dL














  09/13/21 09/13/21 09/13/21 Range/Units





  06:25 06:31 11:31 


 


WBC     (4.50-10.00)  X 10*3/uL


 


RBC     (4.40-5.60)  X 10*6/uL


 


Hgb     (13.0-17.0)  g/dL


 


Hct     (39.6-50.0)  %


 


MCV     (80.0-97.0)  fL


 


MCHC     (32.0-37.0)  g/dL


 


RDW     (11.5-14.5)  %


 


Plt Count     (140-440)  X 10*3/uL


 


Immature Gran #     (0.00-0.04)  X 10*3/uL


 


Neutrophils #     (1.80-7.70)  X 10*3/uL


 


Monocytes #     (0.20-1.00)  X 10*3/uL


 


Eosinophils #     (0.04-0.35)  X 10*3/uL


 


BUN     (9.0-27.0)  mg/dL


 


Creatinine     (0.6-1.5)  mg/dL


 


POC Glucose (mg/dL)   100 H  103 H  (75-99)  mg/dL


 


Calcium     (8.7-10.3)  mg/dL


 


Creatine Kinase  37 L    ()  U/L


 


Total Protein     (6.2-8.2)  g/dL


 


Albumin     (3.80-4.90)  g/dL


 


Albumin/Globulin Ratio     (1.60-3.17)  g/dL














  09/13/21 Range/Units





  16:43 


 


WBC   (4.50-10.00)  X 10*3/uL


 


RBC   (4.40-5.60)  X 10*6/uL


 


Hgb   (13.0-17.0)  g/dL


 


Hct   (39.6-50.0)  %


 


MCV   (80.0-97.0)  fL


 


MCHC   (32.0-37.0)  g/dL


 


RDW   (11.5-14.5)  %


 


Plt Count   (140-440)  X 10*3/uL


 


Immature Gran #   (0.00-0.04)  X 10*3/uL


 


Neutrophils #   (1.80-7.70)  X 10*3/uL


 


Monocytes #   (0.20-1.00)  X 10*3/uL


 


Eosinophils #   (0.04-0.35)  X 10*3/uL


 


BUN   (9.0-27.0)  mg/dL


 


Creatinine   (0.6-1.5)  mg/dL


 


POC Glucose (mg/dL)  116 H  (75-99)  mg/dL


 


Calcium   (8.7-10.3)  mg/dL


 


Creatine Kinase   ()  U/L


 


Total Protein   (6.2-8.2)  g/dL


 


Albumin   (3.80-4.90)  g/dL


 


Albumin/Globulin Ratio   (1.60-3.17)  g/dL

## 2021-09-13 NOTE — PN
PROGRESS NOTE



DATE OF SERVICE:

09/13/2021



REASON FOR FOLLOWUP:

Leukocytosis, low-grade fever.



INTERVAL HISTORY:

The patient is afebrile.  The patient remains pleasantly confused.  He is breathing

comfortably on room air.  Denies any chest pain, shortness of breath or cough.  No

abdominal pain.  Did have a soft bowel movement per the nurse's aide.



PHYSICAL EXAMINATION:

Blood pressure 172/69 with a pulse of 56, temperature 97.5. He is 96% on room air.

GENERAL DESCRIPTION: General description is a middle-aged male lying in bed in no

distress.

RESPIRATORY SYSTEM: Unlabored breathing. Clear to auscultation anteriorly.

HEART: S1, S2. Regular rate and rhythm.

ABDOMEN: Soft. No tenderness.



LABS:

White count _____ 11.77, BUN of 6, creatinine 0.4.



DIAGNOSTIC IMPRESSION AND PLAN:

1. Patient with a fever in this patient who did have extensive workup with no clear

    focus. Fever subsequently has resolved.  No need for any antibiotic therapy.

2. Elevated white count; possible oropharyngeal candidiasis. White count responded to

    _____ continue for about a week.





MMODL / IJN: [id number] / Job#: [id number]

## 2021-09-13 NOTE — ECHOF
Referral Reason:persistent leucocytosis, high ESR, R/O vegetation



MEASUREMENTS

--------

HEIGHT: 182.9 cm

WEIGHT: 68.5 kg

BP: 

RVIDd:   2.7 cm     (< 3.3)

IVSd:   1.1 cm     (0.6 - 1.1)

LVIDd:   5.4 cm     (3.9 - 5.3)

LVPWd:   1.5 cm     (0.6 - 1.1)

IVSs:   1.4 cm

LVIDs:   4.5 cm

LVPWs:   1.6 cm

LA Diam:   5.1 cm     (2.7 - 3.8)

Ao Diam:   3.3 cm     (2.0 - 3.7)

AV Cusp:   2.3 cm     (1.5 - 2.6)

LA Diam:   3.9 cm     (2.7 - 3.8)

MV EXCURSION:   22.213 mm     (> 18.000)

MV EF SLOPE:   91 mm/s     (70 - 150)

EPSS:   0.4 cm

MV E Moshe:   0.58 m/s

MV DecT:   250 ms

MV A Moshe:   0.71 m/s

MV E/A Ratio:   0.82 

RAP:   5.00 mmHg

RVSP:   20.81 mmHg







FINDINGS

--------

Sinus rhythm.

This was a technically good study.

The left ventricular size is normal.   Left ventricular wall thickness is normal.   Overall left vent
ricular systolic function is mildly impaired with, an EF between 45 - 50 %.   Distal Septal Hypokines
is.

The right ventricle is normal in size.

The left atrium is moderately dilated.

The right atrial size is normal.

The aortic valve is trileaflet, and appears structurally normal. No aortic stenosis or regurgitation.


The mitral valve leaflets are mildly thickened.   Mild mitral regurgitation is present.

Mild tricuspid regurgitation present.   Right ventricular systolic pressure is normal at < 35 mmHg.  
 The right ventricular systolic pressure, as measured by Doppler, is 20.81mmHg.

There is no pulmonic regurgitation present.

There is no pericardial effusion.



CONCLUSIONS

--------

1. The left ventricular size is normal.

2. Left ventricular wall thickness is normal.

3. Overall left ventricular systolic function is mildly impaired with, an EF between 45 - 50 %.

4. Distal Septal Hypokinesis.

5. The right ventricle is normal in size.

6. The left atrium is moderately dilated.

7. The right atrial size is normal.

8. The aortic valve is trileaflet, and appears structurally normal. No aortic stenosis or regurgitati
on.

9. The mitral valve leaflets are mildly thickened.

10. Mild mitral regurgitation is present.

11. Mild tricuspid regurgitation present.

12. The right ventricular systolic pressure, as measured by Doppler, is 20.81mmHg.

13. There is no pericardial effusion.





SONOGRAPHER: Duyen Briseno RDCS

## 2021-09-14 LAB
GLUCOSE BLD-MCNC: 102 MG/DL (ref 75–99)
GLUCOSE BLD-MCNC: 111 MG/DL (ref 75–99)
GLUCOSE BLD-MCNC: 113 MG/DL (ref 75–99)
GLUCOSE BLD-MCNC: 113 MG/DL (ref 75–99)

## 2021-09-14 RX ADMIN — HEPARIN SODIUM SCH UNIT: 5000 INJECTION INTRAVENOUS; SUBCUTANEOUS at 01:32

## 2021-09-14 RX ADMIN — SODIUM CHLORIDE SCH: 9 INJECTION, SOLUTION INTRAVENOUS at 08:43

## 2021-09-14 RX ADMIN — NYSTATIN SCH APPLIC: 100000 OINTMENT TOPICAL at 15:33

## 2021-09-14 RX ADMIN — FOLIC ACID SCH MG: 1 TABLET ORAL at 08:51

## 2021-09-14 RX ADMIN — SODIUM CHLORIDE SCH MLS/HR: 900 INJECTION, SOLUTION INTRAVENOUS at 08:52

## 2021-09-14 RX ADMIN — FLUCONAZOLE SCH MG: 100 TABLET ORAL at 08:51

## 2021-09-14 RX ADMIN — HEPARIN SODIUM SCH UNIT: 5000 INJECTION INTRAVENOUS; SUBCUTANEOUS at 08:52

## 2021-09-14 RX ADMIN — NYSTATIN SCH APPLIC: 100000 OINTMENT TOPICAL at 20:57

## 2021-09-14 RX ADMIN — MEGESTROL ACETATE SCH MG: 40 SUSPENSION ORAL at 08:50

## 2021-09-14 RX ADMIN — PANTOPRAZOLE SODIUM SCH MG: 40 TABLET, DELAYED RELEASE ORAL at 08:51

## 2021-09-14 RX ADMIN — HEPARIN SODIUM SCH UNIT: 5000 INJECTION INTRAVENOUS; SUBCUTANEOUS at 15:33

## 2021-09-14 RX ADMIN — NALTREXONE HYDROCHLORIDE SCH MG: 50 TABLET, FILM COATED ORAL at 08:51

## 2021-09-14 RX ADMIN — Medication SCH MG: at 20:56

## 2021-09-14 RX ADMIN — PYRIDOXINE HCL TAB 50 MG SCH MG: 50 TAB at 08:52

## 2021-09-14 RX ADMIN — NYSTATIN SCH APPLIC: 100000 OINTMENT TOPICAL at 08:52

## 2021-09-14 RX ADMIN — SODIUM CHLORIDE SCH MLS/HR: 9 INJECTION, SOLUTION INTRAVENOUS at 01:32

## 2021-09-14 NOTE — P.PN
<Pedrito Zayas - Last Filed: 09/14/21 15:06>





Subjective


Progress Note Date: 09/14/21





Hospital course:





Patient is a 55-year-old male with a past medical history of EtOH abuse.  He p

resented to the hospital on 8/23/21 after being found delirious and confused by 

his neighbor at home.  Patient was reportedly found laying on the floor covered 

in his own feces with empty alcohol bottles surrounding him.  Patient presented 

to the hospital with multiple bruises and abrasions in different stages of 

healing.  UDS positive for marijuana and benzodiazepines.  EtOH level was less 

than 10.  Patient was initially found to have hypernatremia with sodium of 152, 

hypokalemia with potassium of 3.2, hyperchloremia with chloride of 114 and 

elevated creatinine kinase of 999.  He was admitted for acute metabolic 

encephalopathy with hallucinations and EtOH withdrawal, rhabdomyolysis, and sep

sis without sepsis shock.  Patient has underwent multiple labs and imaging with 

prolonged hospital stay secondary to confusion and intermittent fevers, please 

see findings below.  Currently admitted under our services with consultation to 

pulmonology, infectious disease, neurology, psychiatry and orthopedic surgery.





Labs and imaging:





Initial blood culture showing no results after 144 hours, CSF fluid negative 

wound culture right knee negatives wound culture left ankle preliminary results 

negative, 1 out of 2 blood culture results repeated on 8/28/21 positive for 

Staphylococcus epidermidis, possibly contaminated, and repeat blood culture 

drawn on 8/29/21 showing no growth after 24 hours.





TSH 1.770.





Pro-calcitonin 50.22 and CRP of 8.1. significant improvement with repeat pro-

calcitonin 3.35 and repeat CRP 4.3.





TRINA negative





Covid negative





West Nile negative





Lyme disease negative





Chest x-ray completed 8/23/21 revealed minimal subsegmental atelectasis at the 

right lung base without change.





CT head and cervical spine completed 8/23/21 negative for acute intercranial 

abnormality with spondylitic changes to cervical spine with no acute fractures 

or abnormalities.





CT facial bones without contrast showing evidence of old blowout fracture on the

floor of the left bony orbit with reported displacement of 4 mm and is negative 

for acute fractures.





X-ray right elbow olecranon process spur formation with posterior soft tissue 

swelling negative for acute fractures.





Bilateral carotid Dopplers negative for hemodynamically significant internal 

carotid artery stenosis.





Venous Doppler left lower extremity negative for DVT.





X-ray left ankle negative for acute fracture.





MRI brain with and without contrast showing mild atrophy with mild subependymal 

white matter increased signal around the lateral ventricles measuring up to 5 mm

in thickness no evidence of cortical infarct.





MRI lumbar spine with and without contrast showing no evidence of discitis, no 

significant disc space narrowing or acute fractures.  Multilevel mild facet arth

roplasty and mild lateral recess stenosis with multilevel mild posterior disc 

bulging without spinal stenosis.





CT left lower extremity showing chronic changes with soft tissue swelling and 

calcaneal spurring, negative for acute fracture.





CT chest, abdomen and pelvis showed fatty infiltration of the liver, pleural 

effusions and basilar atelectasis with elevated right diaphragm, subcutaneous 

edema around the abdomen, CHF is not ruled out, and avascular necrosis of left 

femoral head unchanged with no acute abnormalities reported in the pelvis.





Chest CTA negative for acute pulmonary embolism.  Lateral left upper lung 

groundglass opacity possibly representing an acute infectious process with s

table small bilateral pleural effusions.








Physical exam:





9/14/21: Patient seen and evaluated at bedside this morningand had meeting at 

bedside with patient, his daughter, social work, and case management.  patient 

remains alert to self and place only and confused to time and situation. patient

has had significant improvement in his pro-calcitonin and CRP with pro-

calcitonin down to 3.35 and CRP 4.3.  Patient has been afebrile since 9/6/21.  

Antibiotics discontinued at this time, infectious disease in agreement. 

discussed discharge planning with patient's daughter.  Patient's daughter 

verbalized understanding that this may be patient's mentation as he has shown 

minimal improvement in mental status. Patient daughter states she does not want 

her father to go to SNF, requesting patient be discharged home with her into her

care.  Patient's daughter in agreement that she and other family members will 

ensure patient receives 24 hour supervision and care.  Case management setting 

up for discharge planning to ensure patient has needed supplies.  Plan for 

discharge home tomorrow.





General: Nontoxic, no distress and appears stated age.  


Derm: Skin warm and dry, normal coloration for ethnicity.  Multiple bruises and 

abrasions covering upper and lower extremities in different stages of healing, 

significantly improved. 


Head: Atraumatic, normocephalic and symmetric.  


Eyes: No lid lag, and anicteric sclera.  Slight swelling and bruising 

surrounding left orbital region, resolved.


Mouth: No lip lesions, mucus membranes moist


Cardiovascular: Regular rhythm and tachycardic rate, no murmur, positive 

posterior tibial pulses bilaterally, and cap refill < 2 seconds.  


Lungs: Respirations even, regular, and unlabored on room air. Lungs CTA 

bilaterally, no rhonchi, no rales, no wheezing, and no accessory muscle usage. 


GI/: soft, nontender to palpation, no guarding, no appreciable organomegaly.  

Puente catheter in place.


Ext: ROM intact. No gross muscle atrophy, no edema, no contractures. erythema 

and swelling to BLE worse on left.  Swelling right knee. 


Neuro/Psych: Speech clear.  Patient alert to person and place only today, 

confused to time and situation.  








Assessment and Plan of Care:





Pyrexia, resolved


Patient has remained afebrile since 9/8/21


Antibiotics discontinued.


CRP and pro-calcitonin with significant improvement CRP down to 4.3 and pro-

calcitonin down to 3.35.


Infectious disease following and in agreement with plan.





Elevated ESR


- undetermined significance 





Candidiasis


-Continue with Diflucan





Severe protein calorie malnutrition with less then 50% oral intake


-Continue to encourage oral intake


-Patient not a candidate for PEG tube or NG tube secondary to intermittent 

confusion.  Patient was likely remove this device and cause more harm to himself

been good.


-Stop Megace as it has been ineffective at increasing his oral intake


-Trial of Marinol





Hypertension


-Continue with lisinopril, Norvasc








Acute metabolic encephalopathy with hallucinations


Sepsis without septic shock


Right lower lobe aspiration pneumonia


Pyrexia, resolved


hypokalemia, resolved


Hypomagnesemia, resolved


Hypernatremia resolved


Rhabdomyolysis


Acute alcohol withdrawal with DTs


Vitamin B6 deficiency


Thiamine deficiency














CODE STATUS: Full code


DVT prophylaxis: SCDs


Discussed with: Patient, RN, and pt's daughter/legal guardian Dinah Thorpe at 

bedside meeting with social work and case management. 


Anticipated discharge date: plan for discharge home tomorrow morning.


Anticipated discharge place: home with homecare in care of his daughter per her 

request.


A total of 45 minutes was spent on the care of this complex patient more than 

50% of the time was spent in counseling and care coordination.








Objective





- Vital Signs


Vital signs: 


                                   Vital Signs











Temp  99.0 F   09/14/21 02:00


 


Pulse  114 H  09/14/21 02:00


 


Resp  18   09/14/21 02:00


 


BP  119/75   09/14/21 02:00


 


Pulse Ox  95   09/14/21 02:00








                                 Intake & Output











 09/13/21 09/14/21 09/14/21





 18:59 06:59 18:59


 


Intake Total 250 830 


 


Output Total  200 


 


Balance 250 630 


 


Weight 68.5 kg  


 


Intake:   


 


  Intake, IV Titration  350 





  Amount   


 


    Cefepime 1 gm In Sodium  100 





    Chloride 0.9% 50 ml @ 12.   





    5 mls/hr IVPB Q12HR SHELBY   





    Rx#:[id number]   


 


    Vancomycin 1,500 mg In  250 





    Sodium Chloride 0.9% 250   





    ml @ 125 mls/hr IVPB Q8H   





    SHELBY Rx#:[id number]   


 


  Oral 250 480 


 


Output:   


 


  Urine  200 


 


Other:   


 


  Voiding Method Diaper  


 


  # Voids 2 3 1


 


  # Bowel Movements 1 3 1














- Labs


CBC & Chem 7: 


                                 09/13/21 06:25





                                 09/14/21 06:12


Labs: 


                  Abnormal Lab Results - Last 24 Hours (Table)











  09/13/21 09/13/21 09/13/21 Range/Units





  06:25 06:25 06:25 


 


WBC  11.77 H    (4.50-10.00)  X 10*3/uL


 


RBC  2.86 L    (4.40-5.60)  X 10*6/uL


 


Hgb  9.0 L    (13.0-17.0)  g/dL


 


Hct  29.0 L    (39.6-50.0)  %


 


MCV  101.4 H    (80.0-97.0)  fL


 


MCHC  31.0 L    (32.0-37.0)  g/dL


 


RDW  15.2 H    (11.5-14.5)  %


 


Plt Count  518 H    (140-440)  X 10*3/uL


 


Immature Gran #  0.14 H    (0.00-0.04)  X 10*3/uL


 


Neutrophils #  8.42 H    (1.80-7.70)  X 10*3/uL


 


Monocytes #  1.22 H    (0.20-1.00)  X 10*3/uL


 


Eosinophils #  0.03 L    (0.04-0.35)  X 10*3/uL


 


BUN   6.0 L   (9.0-27.0)  mg/dL


 


Creatinine   0.4 L   (0.6-1.5)  mg/dL


 


POC Glucose (mg/dL)     (75-99)  mg/dL


 


Calcium   8.5 L   (8.7-10.3)  mg/dL


 


Creatine Kinase    37 L  ()  U/L


 


Total Protein   5.6 L   (6.2-8.2)  g/dL


 


Albumin   3.00 L   (3.80-4.90)  g/dL


 


Albumin/Globulin Ratio   1.15 L   (1.60-3.17)  g/dL














  09/13/21 09/13/21 09/14/21 Range/Units





  11:31 16:43 00:20 


 


WBC     (4.50-10.00)  X 10*3/uL


 


RBC     (4.40-5.60)  X 10*6/uL


 


Hgb     (13.0-17.0)  g/dL


 


Hct     (39.6-50.0)  %


 


MCV     (80.0-97.0)  fL


 


MCHC     (32.0-37.0)  g/dL


 


RDW     (11.5-14.5)  %


 


Plt Count     (140-440)  X 10*3/uL


 


Immature Gran #     (0.00-0.04)  X 10*3/uL


 


Neutrophils #     (1.80-7.70)  X 10*3/uL


 


Monocytes #     (0.20-1.00)  X 10*3/uL


 


Eosinophils #     (0.04-0.35)  X 10*3/uL


 


BUN     (9.0-27.0)  mg/dL


 


Creatinine     (0.6-1.5)  mg/dL


 


POC Glucose (mg/dL)  103 H  116 H  113 H  (75-99)  mg/dL


 


Calcium     (8.7-10.3)  mg/dL


 


Creatine Kinase     ()  U/L


 


Total Protein     (6.2-8.2)  g/dL


 


Albumin     (3.80-4.90)  g/dL


 


Albumin/Globulin Ratio     (1.60-3.17)  g/dL














  09/14/21 09/14/21 Range/Units





  06:12 06:20 


 


WBC    (4.50-10.00)  X 10*3/uL


 


RBC    (4.40-5.60)  X 10*6/uL


 


Hgb    (13.0-17.0)  g/dL


 


Hct    (39.6-50.0)  %


 


MCV    (80.0-97.0)  fL


 


MCHC    (32.0-37.0)  g/dL


 


RDW    (11.5-14.5)  %


 


Plt Count    (140-440)  X 10*3/uL


 


Immature Gran #    (0.00-0.04)  X 10*3/uL


 


Neutrophils #    (1.80-7.70)  X 10*3/uL


 


Monocytes #    (0.20-1.00)  X 10*3/uL


 


Eosinophils #    (0.04-0.35)  X 10*3/uL


 


BUN    (9.0-27.0)  mg/dL


 


Creatinine  0.46 L   (0.6-1.5)  mg/dL


 


POC Glucose (mg/dL)   111 H  (75-99)  mg/dL


 


Calcium    (8.7-10.3)  mg/dL


 


Creatine Kinase    ()  U/L


 


Total Protein    (6.2-8.2)  g/dL


 


Albumin    (3.80-4.90)  g/dL


 


Albumin/Globulin Ratio    (1.60-3.17)  g/dL














<Laura Caraballo A - Last Filed: 09/14/21 20:40>





Objective





- Vital Signs


Vital signs: 


                                   Vital Signs











Temp  98.7 F   09/14/21 14:00


 


Pulse  106 H  09/14/21 14:00


 


Resp  16   09/14/21 14:00


 


BP  115/77   09/14/21 14:00


 


Pulse Ox  97   09/14/21 14:00








                                 Intake & Output











 09/14/21 09/14/21 09/15/21





 06:59 18:59 06:59


 


Intake Total 830 480 


 


Output Total 200  


 


Balance 630 480 


 


Intake:   


 


  Intake, IV Titration 350  





  Amount   


 


    Cefepime 1 gm In Sodium 100  





    Chloride 0.9% 50 ml @ 12.   





    5 mls/hr IVPB Q12HR SHELBY   





    Rx#:[id number]   


 


    Vancomycin 1,500 mg In 250  





    Sodium Chloride 0.9% 250   





    ml @ 125 mls/hr IVPB Q8H   





    SHELBY Rx#:[id number]   


 


  Oral 480 480 


 


Output:   


 


  Urine 200  


 


Other:   


 


  Voiding Method  Diaper 


 


  # Voids 3 2 


 


  # Bowel Movements 3 1 














- Labs


CBC & Chem 7: 


                                 09/13/21 06:25





                                 09/14/21 06:12


Labs: 


                  Abnormal Lab Results - Last 24 Hours (Table)











  09/14/21 09/14/21 09/14/21 Range/Units





  00:20 06:12 06:20 


 


Creatinine   0.46 L   (0.66-1.25)  mg/dL


 


POC Glucose (mg/dL)  113 H   111 H  (75-99)  mg/dL














  09/14/21 09/14/21 Range/Units





  11:25 16:37 


 


Creatinine    (0.66-1.25)  mg/dL


 


POC Glucose (mg/dL)  102 H  113 H  (75-99)  mg/dL














Assessment and Plan


Assessment: 


Pedrito Zayas NP rendered care for this patient independently, reviewed the 

findings and plan as documented in the note above.  I did not physically speak 

with or examine the patient on this date. 








Discharge home in AM


D/W neurology and ID who are in agreement

## 2021-09-14 NOTE — PN
PROGRESS NOTE



DATE OF SERVICE:

09/14/2021.



REASON FOR FOLLOWUP:

Fever and leukocytosis.



INTERVAL HISTORY:

Patient is afebrile.  The patient is more awake and alert.  He is breathing

comfortably.  Denies having any chest pain.  No shortness of breath or cough.  No

abdominal pain. No diarrhea.



PHYSICAL EXAMINATION:

Blood pressure 115/76, pulse of 106, temperature 98.6.  She is 97% on room air. General

description is a middle-aged male lying in bed in no distress.  Respiratory system:

Unlabored breathing, clear to auscultation anteriorly.  Heart S1, S2.  Regular rate and

rhythm. Abdomen soft, no tenderness.  Extremities: No edema of the feet.



LABS:

No new labs have been obtained today.



DIAGNOSTIC IMPRESSION AND PLAN:

1. Patient with fever in this patient who did have extensive workup with no clear

    focus of infection. The patient is currently being monitored closely off antibiotic

    therapy.

2. Elevated white count with concern for possible oropharyngeal candidiasis was on

    Diflucan and now has been started on steroids that can prompt white count to be

    elevated further.  We will continue to monitor closely.  Continue supportive care.





MMODL / IJN: [id number] / Job#: [id number]

## 2021-09-15 VITALS
TEMPERATURE: 98.4 F | DIASTOLIC BLOOD PRESSURE: 75 MMHG | HEART RATE: 96 BPM | SYSTOLIC BLOOD PRESSURE: 166 MMHG | RESPIRATION RATE: 16 BRPM

## 2021-09-15 LAB
GLUCOSE BLD-MCNC: 184 MG/DL (ref 75–99)
GLUCOSE BLD-MCNC: 219 MG/DL (ref 75–99)

## 2021-09-15 RX ADMIN — FOLIC ACID SCH MG: 1 TABLET ORAL at 08:01

## 2021-09-15 RX ADMIN — FLUCONAZOLE SCH MG: 100 TABLET ORAL at 08:01

## 2021-09-15 RX ADMIN — MEGESTROL ACETATE SCH MG: 40 SUSPENSION ORAL at 08:00

## 2021-09-15 RX ADMIN — HEPARIN SODIUM SCH: 5000 INJECTION INTRAVENOUS; SUBCUTANEOUS at 06:48

## 2021-09-15 RX ADMIN — PYRIDOXINE HCL TAB 50 MG SCH MG: 50 TAB at 08:01

## 2021-09-15 RX ADMIN — Medication SCH MG: at 08:01

## 2021-09-15 RX ADMIN — NALTREXONE HYDROCHLORIDE SCH MG: 50 TABLET, FILM COATED ORAL at 08:01

## 2021-09-15 RX ADMIN — NYSTATIN SCH APPLIC: 100000 OINTMENT TOPICAL at 08:02

## 2021-09-15 RX ADMIN — PANTOPRAZOLE SODIUM SCH MG: 40 TABLET, DELAYED RELEASE ORAL at 08:01

## 2021-09-15 RX ADMIN — HEPARIN SODIUM SCH UNIT: 5000 INJECTION INTRAVENOUS; SUBCUTANEOUS at 08:00

## 2021-09-15 NOTE — PN
PROGRESS NOTE



DATE OF SERVICE:

09/15/2021



REASON FOR FOLLOWUP:

1. Fever.

2. Leukocytosis.



INTERVAL HISTORY:

The patient is currently afebrile. The patient is breathing comfortably. The patient

denies having any chest pain or shortness of breath or cough.  No vomiting, no

abdominal pain or diarrhea.



PHYSICAL EXAMINATION:

Blood pressure 166/75, pulse of 96, temperature 98.4. He is 96% on room air.

GENERAL DESCRIPTION: General description is a middle-aged male lying in bed in no

distress.

RESPIRATORY SYSTEM: Unlabored breathing. Clear to auscultation anteriorly.

HEART: S1, S2. Regular rate and rhythm.

ABDOMEN: Soft. No tenderness.



LABS:

Creatinine 0.62.  No CBC was done today.



DIAGNOSTIC IMPRESSION AND PLAN:

1. Patient with a fever in this patient who did have extensive workup without any

    clear focus.  Patient is currently doing well off antibiotics. _____.

2. Elevated white count, possible oropharyngeal candidiasis. Continue with oral

    Diflucan for about a week.





MMODL / IJN: [id number] / Job#: [id number]

## 2021-09-15 NOTE — P.DS
<Pedrito Zayas - Last Filed: 09/15/21 16:33>





Providers


Expected date of discharge: 09/15/21


Hospital Course: 





Discharge Diagnosis:





Acute metabolic encephalopathy with hallucinations





Sepsis without septic shock





Right lower lobe aspiration pneumonia





Pyrexia, resolved





Hypokalemia, resolved





Hypomagnesemia, resolved





Hypernatremia resolved





Rhabdomyolysis





Acute alcohol withdrawal with DTs





Vitamin B6 deficiency





Thiamine deficiency





Elevated ESR





Oral Candidiasis





Severe protein calorie malnutrition with less then 50% oral intake





Hypertension





Hospital Course: 





Patient is a 55-year-old male with a past medical history of EtOH abuse.  He 

presented to the hospital on 8/23/21 after being found delirious and confused by

his neighbor at home.  Patient was reportedly found laying on the floor covered 

in his own feces with empty alcohol bottles surrounding him.  Patient presented 

to the hospital with multiple bruises and abrasions in different stages of 

healing.  UDS positive for marijuana and benzodiazepines.  EtOH level was less 

than 10.  Patient was initially found to have hypernatremia with sodium of 152, 

hypokalemia with potassium of 3.2, hyperchloremia with chloride of 114 and 

elevated creatinine kinase of 999.  He was admitted for acute metabolic 

encephalopathy with hallucinations and EtOH withdrawal, rhabdomyolysis, and 

sepsis without sepsis shock.  Patient had prolonged hospital stay secondary to 

persistent encephalopathy and intermittent fevers.  He underwent multiple labs 

and imaging as listed below.  He was admitted under our services with 

consultation to pulmonology, infectious disease, neurology, psychiatry and 

orthopedic surgery.  Patient was found to have acute metabolic encephalopathy 

with hallucinations, sepsis without septic shock, right lower lobe aspiration 

pneumonia, intermittent fevers, hypokalemia, hypomagnesemia, hypernatremia, 

rhabdomyolysis, acute alcohol withdrawal with DTs, vitamin B6 deficiency, 

thiamine deficiency, elevated ESR, oral candidiasis, and severe protein calorie 

malnutrition.  After multiple testing, imaging, and prolonged treatment 

patient's mentation and minimally improved.  Patient is medically stable for 

discharge and recommendations were made to family for skilled nursing facility 

for continued care.  Family declining placement in skilled nursing facility at 

this time.  Patient's daughter requesting to take patient home.  Patient's 

daughter was educated the patient will require 24-hour supervision and care and 

she reports her and her family will provide this.  Patient being discharged home

in care of his daughter, Dinah Thorpe, along with [facility].  

Patient to follow up outpatient with his PCP, pulmonologist, and neurologist and

appointments have been scheduled. 





Labs and imaging:





Initial blood culture showing no results after 144 hours, CSF fluid negative 

wound culture right knee negatives wound culture left ankle preliminary results 

negative, 1 out of 2 blood culture results repeated on 8/28/21 positive for 

Staphylococcus epidermidis, possibly contaminated, and repeat blood culture 

drawn on 8/29/21 showing no growth after 24 hours.





TSH 1.770.





Pro-calcitonin 50.22 and CRP of 8.1. significant improvement with repeat pro-

calcitonin 3.35 and repeat CRP 4.3.





TRINA negative





Covid negative





West Nile negative





Lyme disease negative





Chest x-ray completed 8/23/21 revealed minimal subsegmental atelectasis at the 

right lung base without change.





CT head and cervical spine completed 8/23/21 negative for acute intercranial 

abnormality with spondylitic changes to cervical spine with no acute fractures 

or abnormalities.





CT facial bones without contrast showing evidence of old blowout fracture on the

floor of the left bony orbit with reported displacement of 4 mm and is negative 

for acute fractures.





X-ray right elbow olecranon process spur formation with posterior soft tissue 

swelling negative for acute fractures.





Bilateral carotid Dopplers negative for hemodynamically significant internal 

carotid artery stenosis.





Venous Doppler left lower extremity negative for DVT.





X-ray left ankle negative for acute fracture.





MRI brain with and without contrast showing mild atrophy with mild subependymal 

white matter increased signal around the lateral ventricles measuring up to 5 mm

in thickness no evidence of cortical infarct.





MRI lumbar spine with and without contrast showing no evidence of discitis, no 

significant disc space narrowing or acute fractures.  Multilevel mild facet 

arthroplasty and mild lateral recess stenosis with multilevel mild posterior 

disc bulging without spinal stenosis.





CT left lower extremity showing chronic changes with soft tissue swelling and 

calcaneal spurring, negative for acute fracture.





CT chest, abdomen and pelvis showed fatty infiltration of the liver, pleural 

effusions and basilar atelectasis with elevated right diaphragm, subcutaneous 

edema around the abdomen, CHF is not ruled out, and avascular necrosis of left 

femoral head unchanged with no acute abnormalities reported in the pelvis.





Chest CTA negative for acute pulmonary embolism.  Lateral left upper lung 

groundglass opacity possibly representing an acute infectious process with 

stable small bilateral pleural effusions.








Physical exam:





General: Nontoxic, no distress and appears stated age.  


Derm: Skin warm and dry, normal coloration for ethnicity.  Multiple bruises and 

abrasions covering upper and lower extremities in different stages of healing, 

significantly improved. 


Head: Atraumatic, normocephalic and symmetric.  


Eyes: No lid lag, and anicteric sclera.  Slight swelling and bruising 

surrounding left orbital region, resolved.


Mouth: No lip lesions, mucus membranes moist


Cardiovascular: Regular rhythm and tachycardic rate, no murmur, positive 

posterior tibial pulses bilaterally, and cap refill < 2 seconds.  


Lungs: Respirations even, regular, and unlabored on room air. Lungs CTA madhu

aterally, no rhonchi, no rales, no wheezing, and no accessory muscle usage. 


GI/: soft, nontender to palpation, no guarding, no appreciable organomegaly.  

Puente catheter in place.


Ext: ROM intact. No gross muscle atrophy, no edema, no contractures. erythema 

and swelling to BLE worse on left.  Swelling right knee. 


Neuro/Psych: Speech clear.  Patient alert to person and place only today, 

confused to time and situation.  








A total of 45 minutes of time were spent preparing this complex discharge 

summary.








Patient Condition at Discharge: Fair





Plan - Discharge Summary


Discharge Rx Participant: Yes


New Discharge Prescriptions: 


New


   Fluconazole [Diflucan] 100 mg PO DAILY 10 Days #10 tab


   Folic Acid 1 mg PO DAILY 30 Days #30 tab


   Megestrol [Megace] 400 mg PO DAILY 30 Days #30 ml


   Nystatin 100,000 Unit/gm Oint [Mycostatin Oint] 1 applic TOPICAL TID 10 Days 

#1 cream


   Thiamine [Vitamin B-1] 100 mg PO TID 30 Days #30 tab


   Pyridoxine [Vitamin B-6] 50 mg PO DAILY 30 Days #30 tab





Continue


   Naltrexone HCl [Revia] 50 mg PO DAILY


   Omeprazole 40 mg PO DAILY


   amLODIPine BESYLATE/BENAZEPRIL [amLODIPine BESYLATE/BENAZEPRIL 5-20 MG] 1 cap

PO BID





Discontinued


   traZODone HCL 50 mg PO HS PRN


     PRN Reason: Insomnia


   Gabapentin [Neurontin] 100 mg PO TID


   Promethazine HCl 12.5 mg PO DAILY PRN


     PRN Reason: Vomiting


   tiZANidine HCL 4 mg PO TID


   Vilazodone HCl [Viibryd] 40 mg PO DAILY


Discharge Medication List





Naltrexone HCl [Revia] 50 mg PO DAILY 07/17/19 [History]


Omeprazole 40 mg PO DAILY 08/23/21 [History]


amLODIPine BESYLATE/BENAZEPRIL [amLODIPine BESYLATE/BENAZEPRIL 5-20 MG] 1 cap PO

BID 08/23/21 [History]


Fluconazole [Diflucan] 100 mg PO DAILY 10 Days #10 tab 09/15/21 [Rx]


Folic Acid 1 mg PO DAILY 30 Days #30 tab 09/15/21 [Rx]


Megestrol [Megace] 400 mg PO DAILY 30 Days #30 ml 09/15/21 [Rx]


Nystatin 100,000 Unit/gm Oint [Mycostatin Oint] 1 applic TOPICAL TID 10 Days #1 

cream 09/15/21 [Rx]


Pyridoxine [Vitamin B-6] 50 mg PO DAILY 30 Days #30 tab 09/15/21 [Rx]


Thiamine [Vitamin B-1] 100 mg PO TID 30 Days #30 tab 09/15/21 [Rx]








Follow up Appointment(s)/Referral(s): 


Thiago Dewitt MD [REFERRING] - 09/20/21 10:30 am


Jean-Paul Ocampo DO [Doctor of Osteopathic Medicine] - 10/05/21 2:45 pm (with 

Virgie)


Rick Woodward MD [Primary Care Provider] - 09/20/21 3:45 pm (Please call 

office if this appointment to reschedule)


Patient Instructions/Handouts:  Acute Delirium (DC), Encephalopathy (DC)


Activity/Diet/Wound Care/Special Instructions: 





Activity:





Requires 24 hour supervision and care.





Diet: 





Encourage increased oral intake.





Special Instructions: 





Mr. Howard is being discharged home into the care of his daughter and legal 

guardian, Dinah Thorpe. He requires 24 hour care and supervision. Family has 

chosen to take Mr. Howard home to care for him and declined placement in 

Skilled Nursing Facility at this time.








[facility]: [phone]


Discharge Disposition: HOME WITH HOME HEALTH SERVICES





<Laura Caraballo - Last Filed: 09/15/21 21:17>





Providers


Date of admission: 


08/23/21 18:12





Attending physician: 


Stephanie Horton MD





Consults: 





                                        





08/25/21 11:19


Consult Physician Routine 


   Consulting Provider: Christina Perez


   Consult Reason/Comments: Altered mental status, history of heavy alcohol 

abuse


   Do you want consulting provider notified?: Yes





08/25/21 18:44


Consult to Anesthesia Routine 


   Consulting Provider: Anesthesia,Services


   Consult Reason/Comments: Lumbar puncture, rule out meningitis





08/26/21 08:16


Consult Physician Routine 


   Consulting Provider: Gustavo Cerna


   Consult Reason/Comments: meningitis?


   Do you want consulting provider notified?: Yes





08/26/21 12:47


Consult Physician Routine 


   Consulting Provider: Travis Zhang


   Consult Reason/Comments: unable to protect airway?


   Do you want consulting provider notified?: Yes





08/28/21 11:09


Consult Physician Urgent 


   Consulting Provider: Nick Huang


   Consult Reason/Comments: Left ankle swelling and pain, rule out infection


   Do you want consulting provider notified?: Yes





09/03/21 15:37


Consult Physician Routine 


   Consulting Provider: Yuri Cortes


   Consult Reason/Comments: Psychiatric evaluation, pt with persistent visual & 

auditory


                                                hallucinations


   Do you want consulting provider notified?: Already Contacted





09/07/21 09:18


Consult to Anesthesia Routine 


   Consulting Provider: Anesthesia,Services


   Consult Reason/Comments: Lumbar puncture, persistent infection, previous 

abnormal CSF, for follow-up











Primary care physician: 


Rick Woodward MD





Hospital Course: 


Pedrito Zayas NP rendered care for this patient independently, reviewed the fi

ndings and plan as documented in the note above.  I did not physically speak 

with or examine the patient on this date. 








Patient has had a prolonged hospital stay.





Seen the patient numerous times throughout his stay.  Plan of care extensively 

discussed with nurse practitioner prior to discharge.

## 2021-09-15 NOTE — P.PN
Subjective


Progress Note Date: 09/14/21


I'm seeing the patient has a follow-up and he was last seen by Dr. Perez last 

week for neurological management.  Please refer to Dr. Perez's note for further 

details.


Per the patient's primary team it seems that patient's condition has been 

improving but he continues to be somewhat confused regarding not getting the 

year right.








Objective





- Vital Signs


Vital signs: 


                                   Vital Signs











Temp  98.4 F   09/15/21 07:46


 


Pulse  96   09/15/21 07:46


 


Resp  16   09/15/21 08:00


 


BP  166/75   09/15/21 07:46


 


Pulse Ox  96   09/15/21 07:46








                                 Intake & Output











 09/14/21 09/15/21 09/15/21





 18:59 06:59 18:59


 


Intake Total 480 300 


 


Balance 480 300 


 


Intake:   


 


  Oral 480 300 


 


Other:   


 


  Voiding Method Diaper Toilet Toilet





  Diaper Diaper


 


  # Voids 2 2 


 


  # Bowel Movements 1  














- Exam





GENERAL: The patient is lying in bed and is moderate acute distress.


INTEGUMENTARY: Multiple skin abrasion.


MUSCULOSKELETAL: Pain with flexion of the right knee.  Has bruises of both knee





NEUROLOGICAL:


Higher mental function: The patient is awake, alert, oriented to self and place.

 Regarding time he said it is 1920 and month is December but on second try he 

said the year is 2021.   Patient is able to name objects correctly (pen, watch 

and glasses).  He seems to be responding much faster today compared to since I 

saw him last.      Patient is following simple commands.   No aphasia  from 

limited language.  No neglect.


Cranial nerves: The pupils are round, equal and reactive to light.  Visual 

fields are full to confrontation throughout.  Extraocular movement is tracking 

to the right and left and no appreciable nystagmus.   The facial strength is 

normal throughout.  No dysarthria is noted. 


Motor: The strength is moving upper and lower extremities above gravity without 

focality Normal tone and bulk.


Cerebellum: Normal finger to nose bilaterally


Sensation: Sensation is normal to touch throughout.    


Plantars are mute bilaterally. 





WORK-UP:


* CPK is 999 on 08/23 but 217 on 08/25--improved.


* B12 582 normal, however at MMA is elevated 0.46 (normal <0.40).  This may in

  dicate intracellular deficiency of B12.  


* Patient's folate 5.7, TSH 1.77, RPR nonreactive.  


* Low Vitamin B6 3 (normal is 5-50).


* Ammonia level 12 (normal)


* ESR 64 on 0 828 and the repeated ESR 77 on 08/30/2021.  ESR is very high 103. 

  ESR is even further elevated 106, CRP 4.3/0.8.  TRINA negative, dsDNA 

  negative.RPR nonreactive.  


* HSV-1 and 2 PCR in the CSF are negative.


* MRI of the brain on 08/27/21 revealed mild atrophy.  Mild subependymal white 

  matter increased signal around the lateral ventricles measuring up to 5 mm in 

  thickness.  No evidence of cortical infarct.


* REPEAT MRI brain w/o (09/01/21): I ordered MRI the brain with and without but 

  because the patient was in so much pain that was the study was limited and it 

  was done only without.  The MR the brain is reported as limited examination 

  due to patient's pain.  No obvious significant intracranial abnormality.  


* MRI of the lumbar spine with and without contrast 08/28/2021 revealed no 

  evidence of discitis.  No significant disc space narrowing.  No fracture.  

  Multilevel mild facet arthropathy and mild lateral recess stenosis.  

  Multilevel mild posterior disc bulging without spinal stenosis.  


* CerebroSpinal fluid examination revealed glucose 64 (40-70), proteins 226 (12-

  60), total WBC count in CSF 18, out of its 15% monocytes and 85% polynuclear's

  .  CSF RBC 5175, due to traumatic tap.  


* Patient's spinal fluid was traumatic because patient was not cooperating.  CSF

  showsCSF shows very mild leukocytosis ( about 2-3 cells above normal after 

  correction for traumatic tap), and very elevated proteins.   Cultures  

  negative.  HSV-1 and 2 PCR in the CSF are negative.  Viral test are negative. 

  Infectious disease following.  


* Patient underwent a repeat lumbar puncture, which is completely normal.  WBC 

  0, RBC 0, glucose 61 and total proteins 44. 


* West Nile virus serology negative.  


* Patient had undergone aspiration of right knee and left ankle, both of which 

  have been negative for any growth.  No crystals seen in the sinonasal fluid.


* Routine EEG on 08/26/21  was reported as abnormal due to background slowing of

  moderate to severe degree.  This is suggestive of generalized cerebral 

  dysfunction, as can be seen with toxic metabolic encephalopathy or due to 

  diffuse structural brain abnormality.  No epileptiform activity was seen.  


* Carotid Doppler was technically difficult due to patient's inability to hold 

  still.  No hemodynamically significant ICA stenosis identified on either side.

   Antegrade flow in both vertebral arteries.


* CT of facial bone on 08/23/2021 is reported as there is evidence of old bone o

  ut fracture of the floor of the left bony orbits.  No acute fracture seen.


* Left Lower extremity CT 08/31/2021: is reported as chronic changes as noted.  

  Soft tissue swelling.  Calcaneal is spurring.


* CT of the abdomen/pelvis: Was reported as fatty infiltration of the liver.  

  Pleural effusion and basilar atelectasis.  Elevated right diaphragm could 

  relate to diaphragm paralysis.  Subcutaneous edema around the abdomen.  

  Congestive heart failure as possible.  These abnormality.  New compared to old

  exam.  Avascular necrosis of left femoral head on changed.  No acute 

  abnormality within the abdomen pelvis


* CT of the chest is reported as small left apical density.  Additional left 

  lateral long-based thickening may be present.  At the clinic today says an 

  underlying mass should be considered.  No evidence of pneumonia or aspiration 

  pneumonia.  Moderate fatty infiltration to the liver.


* CT of the chest is reported as suboptimal study without CT chest of for acute 

  pulmonary embolism.  Lateral left upper lung groundglass opacity could reflect

  acute infection process.  No significant change from one day earlier.  Stable 

  small bilateral pleural effusion noted.


* Nuclear medicine Whole body WBC scan also failed to demonstrate a site of 

  pyogenic infection.











- Labs


CBC & Chem 7: 


                                 09/13/21 06:25





                                 09/15/21 06:01


Labs: 


                  Abnormal Lab Results - Last 24 Hours (Table)











  09/15/21 09/15/21 09/15/21 Range/Units





  03:13 06:01 11:04 


 


Creatinine   0.62 L   (0.66-1.25)  mg/dL


 


POC Glucose (mg/dL)  184 H   219 H  (75-99)  mg/dL














Assessment and Plan


Assessment: 





* 55-year-old male with history of alcoholism, was found at laying with fecal 

  matter with multiple bruises, scratches and pressure sores over dependent 

  areas, indicating patient has been on the floor for fairly long period of 

  time.  Last period of contact with his friends was on Friday, 3 days prior to 

  arrival.   


* Fevers of unknown etiology.  MRI Brain is negative.  No definitive evidence of

  meningitis/encephalitis based upon CSF results. CSF culture are negative.  HSV

  PCR negative and viral are negative.


* Encpehalopathy of unknown etiology.  Rule out infectious process (unknown 

  source yet).  --mentation is improving but not back to baseline. 


* Low Vitamin B6 3 (normal is 5-50).


* Multiple skin abrasions including left eye bruising


* History of multiple falls


* Hypertension


* History of alcoholism


* History of marijuana use.





Plan: 








*  Unclear to source of elevated ESR.  Possible consider Rheumatology 

  consultation.  I would consider Neurology and possibly Neurosurgery follow-up 

  as outpatient within 1-2 weeks for consideration of brain biospy if he 

  continues to have symptoms.    


*  B6 was low 3, currently on replacement.  Continue vitamin B6 50 mg daily.


* Continue folic acid 1mg daily and Vitamin B12 1000mcg daily.  Continue 

  thiamine.


* Orthopedic consultation input appreciated.  Patient had undergone left ankle 

  and right knee joint aspiration.  


* Will defer antibiotic management to the I.D. team. 


* Pulmonary team is on board.


* Will defer the rest of medical management to the primary team.





The plan is discussed with the patient's primary team.  








oTng Ocampo MD


Neuro-Hospitalist





Time with Patient: Less than 30

## 2024-02-14 NOTE — P.CNNES
Goal Outcome Evaluation:  Plan of Care Reviewed With: patient, family        Progress: improving  Outcome Evaluation: Patient continues to present with limitations of balance, strength and endurance/ activity tolerance which are impacting ADL/ transfer independence. Continued skilled OT services are indicated to remediate/ compensate for deficits to maximize independence and safety with functional tasks.      Anticipated Discharge Disposition (OT): sub acute care setting, inpatient rehabilitation facility                         History of Present Illness


Consult date: 08/25/21


Requesting physician: Harsh Naidu


Reason for Consult: Altered mental status, history of heavy alcohol abuse


History of Present Illness: 





Patient is a 55-year-old male came to the hospital by ambulance on 08/23/2021 at

3:23 PM for altered mental status, possible episode of unresponsiveness and 

history of alcoholism.  As per EMS flow sheet, when they arrived, patient was 

laying in fecal matter dry head to toe.  Several bruising inferior/superior 

lower extremities bilaterally, and also the thigh and chest and upper 

extremities.  Patient has a black right eye.  Patient has mentioned that he has 

been falling a lot recently.  Patient admits to alcohol use and alcohol abuse 

and several empty and full container scattered throughout the home.  EMS was 

called and friend stopped by and hadn't heard from the patient in a while.  

Patient does have history of diabetes, hypertension, alcohol abuse.  His blood 

glucose was 119.  [place] stroke scale was negative.  Patient was able to 

tell his name and birthday but could not tell the date month or the year.  

Patient's vitals at the scene was blood pressure 137/103, pulse rate 114, 

respiration 14, saturation 96% and blood glucose 119.





Patient's blood test shows WBC 7.8 hemoglobin 12.3 with elevated .3.  

Platelets are normal.  INR 1.2.  Sodium was 153, potassium 3.4, normal renal 

functions.  CK was 999, which is down to 217.  Troponin negative.  AST was 100, 

ALT normal 44.  Blood alcohol level negative.  Computed tomography scan of the 

head showed cerebral atrophy, no acute process.  CT of the cervical spine shows 

spondylotic changes in the cervical spine.  No fracture.  EKG shows sinus rhythm

with marked sinus arrhythmia.  Nonspecific ST-T wave abnormality.  CT of the 

facial bones revealed evidence of old blowout fracture of the floor of the left 

bony orbit.  No acute fracture.  X-ray of the elbow showed Olecranon process 

spur formation.  Posterior soft tissue swelling.  No fracture.





Patient's home medication includes trazodone 50 mg, naltrexone, omeprazole, 

gabapentin 100 mg 3 times a day, Zanaflex 4 mg 3 times a day, amlodipine/benazep

ril, Vilazodone.





Patient's friend was present today, who apparently called the EMS.  She has been

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  She 

states patient has hypertension, alcoholism for the last 4 years that she knows 

him.  He binges off and on, which could be for weeks to months.  He has been in 

rehab twice.  He does not have diabetes.  He has 2 other brothers, 1 sister and 

dad who is alive.  He also has a daughter.  She also states that patient 

suffered from a fall and hit his head hard about a week ago.  Patient and his 

friend usually do not talk on the phone, but the text each other frequently.  

Patient apparently called in sick on Friday.  Over the weekend, she tried to 

text him but he did not answer which is unusual.  Because of his history of alc

oholism, she got concerned and went to his home, and the door was open and she 

saw him on the floor of the bedroom, where he does not typically sleep.  She 

then called the ambulance as above.  He was also noted to have multiple bruises 

in different stages of healing as well as a hematoma to the right eye and 

abrasions to his lower extremities.  The patient is noted to have visited this e

mergency department multiple times for similar complaints.  





The patient has been noted by staff to be quite delirious, disorganized, 

disoriented, and responding to internal stimuli.  He appeared to be reaching and

objects that were not present and speaking to people that were not there.  The 

patient is currently wearing mitts for his protection.  The patient is grossly 

disorganized at this time and is unable to provide any clear or coherent 

history.  His house is noted by his daughter to be uninhabitable due to the 

condition it is in. She states that it is like an episode of "hoarders." He has 

a history of marijuana and alcohol use. 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

the post office. 








Review of Systems


ROS unobtainable: due to mental status





Past Medical History


Past Medical History: GERD/Reflux, Hypertension, Liver Disease


Additional Past Medical History / Comment(s): seasonal allergies


History of Any Multi-Drug Resistant Organisms: None Reported


Past Surgical History: Hernia Repair


Additional Past Surgical History / Comment(s): umbilical hernia repair 2013


Past Anesthesia/Blood Transfusion Reactions: Postoperative Nausea & Vomiting 

(PONV)


Past Psychological History: No Psychological Hx Reported


Smoking Status: Never smoker


Past Alcohol Use History: Heavy


Past Drug Use History: Prescription Drug Abuse





- Past Family History


  ** Mother


Family Medical History: Cancer, Diabetes Mellitus, Hypertension


Additional Family Medical History / Comment(s): nonHodgkins lymphoma





  ** Father


Family Medical History: Hypertension





Medications and Allergies


                                Home Medications











 Medication  Instructions  Recorded  Confirmed  Type


 


Naltrexone HCl [Revia] 50 mg PO DAILY 07/17/19 08/23/21 History


 


traZODone HCL 50 mg PO HS PRN 07/17/19 08/23/21 History


 


Gabapentin [Neurontin] 100 mg PO TID 08/23/21 08/23/21 History


 


Omeprazole 40 mg PO DAILY 08/23/21 08/23/21 History


 


Promethazine HCl 12.5 mg PO DAILY PRN 08/23/21 08/23/21 History


 


Vilazodone HCl [Viibryd] 40 mg PO DAILY 08/23/21 08/23/21 History


 


amLODIPine BESYLATE/BENAZEPRIL 1 cap PO BID 08/23/21 08/23/21 History





[amLODIPine BESYLATE/BENAZEPRIL    





5-20 MG]    


 


tiZANidine HCL 4 mg PO TID 08/23/21 08/23/21 History








                                    Allergies











Allergy/AdvReac Type Severity Reaction Status Date / Time


 


bee venom protein (honey bee) Allergy  Anaphylaxis Verified 08/23/21 18:27


 


Penicillins Allergy  Unknown Verified 08/24/21 03:38


 


Sulfa (Sulfonamide Allergy  Swelling Verified 08/23/21 18:27





Antibiotics)     


 


codeine AdvReac  Nausea & Verified 08/23/21 18:27





   Vomiting  














Physical Examination





- Vital Signs


Vital Signs: 


                                   Vital Signs











  Temp Pulse Resp BP Pulse Ox


 


 08/25/21 08:00  98.5 F  108 H  16  126/80  94 L


 


 08/25/21 03:20  99.7 F H  108 H  16  127/71  96


 


 08/25/21 00:00  99.3 F  92  18  135/78  95


 


 08/24/21 20:00  98.3 F  92  20  126/79  95


 


 08/24/21 16:00    16  


 


 08/24/21 14:00   86  14  








                                Intake and Output











 08/24/21 08/25/21 08/25/21





 22:59 06:59 14:59


 


Intake Total 180  60


 


Balance 180  60


 


Intake:   


 


  Oral 180  60


 


Other:   


 


  Voiding Method Diaper Diaper Diaper


 


  # Voids 1 1 1


 


  # Bowel Movements 1  














Patient is a middle aged  male, appears older than his stated age.


Patient is groggy, encephalopathic, does not answer questions.  He does open his

eyes, makes eye contact, but spoke very little if at all.  Speech and language 

functions could not be assessed.  Attention, concentration and fund of knowledge

could not be assessed. 





On cranial examination, pupils are round and reacting to light, visual fields 

could not be tested.  His extraocular muscles appears intact, no obvious gaze 

preference. Face is symmetric, tongue protrudes to the midline.  No evidence of 

oral trauma.  Palatal elevation and sensation could not be tested.  Hearing, 

facial sensation and shoulder shrug could not be tested.  





On muscle strength testing, patient did not cooperate with examination.  His 

tone is equal bilaterally.  On manually lifting his arms, he drops both arms 

equally down.  No obvious flaccidity.





Deep tendon reflexes are 2 in the upper limbs at biceps and brachioradialis, no 

response at the knees, trace ankles and plantar is probable downgoing 

bilaterally.





Sensory patient withdraws to painful stimuli equally. 





Cerebellar function could not be tested.  





Gait cannot be tested.  





On general examination, there is no carotid bruit or murmur, S1-S2 audible.  Ab

domen is soft nontender.  Chest is clear.  Peripheral pulses are present.  

Patient has evidence of right black eye.  He has multiple scratches, bruises 

over his arms and legs and the knees.  There is evidence of pressure sore over 

the right lateral knee region.





Results





- Laboratory Findings


CBC and BMP: 


                                 08/24/21 08:27





                                 08/25/21 07:53


Abnormal Lab Findings: 


                                  Abnormal Labs











  08/23/21 08/23/21 08/23/21





  16:34 16:34 16:34


 


RBC  3.65 L  


 


Hgb  12.3 L  


 


Hct  37.3 L  


 


MCV  102.3 H  


 


PT   12.1 H 


 


INR   1.2 H 


 


Sodium    153 H


 


Potassium    3.4 L


 


Chloride    115 H


 


BUN   


 


Creatinine   


 


Glucose   


 


Calcium   


 


AST    100 H


 


Creatine Kinase    999 H


 


Total Protein    5.6 L


 


Albumin    2.8 L














  08/23/21 08/24/21 08/24/21





  23:15 08:27 08:27


 


RBC   3.64 L 


 


Hgb   12.2 L 


 


Hct   37.4 L 


 


MCV   102.8 H 


 


PT   


 


INR   


 


Sodium  152 H   150 H


 


Potassium  3.2 L   3.3 L


 


Chloride  114 H   115 H


 


BUN   


 


Creatinine    0.62 L


 


Glucose   


 


Calcium  8.3 L   8.1 L


 


AST   


 


Creatine Kinase   


 


Total Protein   


 


Albumin   














  08/25/21





  07:53


 


RBC 


 


Hgb 


 


Hct 


 


MCV 


 


PT 


 


INR 


 


Sodium 


 


Potassium  3.1 L


 


Chloride  113 H


 


BUN  5 L


 


Creatinine  0.53 L


 


Glucose  114 H


 


Calcium  7.9 L


 


AST 


 


Creatine Kinase  217 H


 


Total Protein 


 


Albumin 














Assessment and Plan


Assessment: 





* 55-year-old male with history of alcoholism, was found at laying with fecal 

  matter with multiple bruises, scratches and pressure sores over dependent 

  areas, indicating patient has been on the floor for fairly long period of ti

  me.  Last period of contact with his friends was on Friday, 3 days prior to 

  arrival.  Patient was soiled with faces.  Patient has not improved clinically 

  since yesterday, continues to be significantly delirious/encephalopathic.  

  Probable alcohol withdrawal/DTs/alcoholic hallucinosis, rule out infectious 

  process, although he is afebrile and white cells are normal.  


* Hypertension


* History of alcoholism


* History of marijuana use.


Plan: 





* Per nursing report, patient has not improved clinically since yesterday.


* I would suggest lumbar puncture to rule out any intracranial infectious 

  process.


* EEG rule out any epileptiform activity.


* Carotid Doppler.


* B12, folate, TSH, MMA, B6, RPR.


* Thiamine, folate.  [facility] protocol.


* Psychiatry also following the patient.


* We will follow clinically.

## 2024-08-29 ENCOUNTER — HOSPITAL ENCOUNTER (EMERGENCY)
Dept: HOSPITAL 47 - EC | Age: 59
Discharge: HOME | End: 2024-08-29
Payer: COMMERCIAL

## 2024-08-29 VITALS — SYSTOLIC BLOOD PRESSURE: 132 MMHG | HEART RATE: 94 BPM | DIASTOLIC BLOOD PRESSURE: 88 MMHG | TEMPERATURE: 97.9 F

## 2024-08-29 VITALS — RESPIRATION RATE: 18 BRPM

## 2024-08-29 PROCEDURE — 99283 EMERGENCY DEPT VISIT LOW MDM: CPT

## 2024-08-29 RX ADMIN — METHYLPREDNISOLONE SODIUM SUCCINATE STA MG: 125 INJECTION, POWDER, FOR SOLUTION INTRAMUSCULAR; INTRAVENOUS at 12:06

## 2024-08-29 RX ADMIN — DIPHENHYDRAMINE HYDROCHLORIDE STA MG: 50 INJECTION, SOLUTION INTRAMUSCULAR; INTRAVENOUS at 12:06

## 2024-08-29 RX ADMIN — FAMOTIDINE STA MG: 10 INJECTION, SOLUTION INTRAVENOUS at 12:06

## 2024-08-29 RX ADMIN — NICARDIPINE HYDROCHLORIDE ONE MLS/HR: 2.5 INJECTION INTRAVENOUS at 12:01

## 2024-08-29 NOTE — ED
Allergic Reaction HPI





- General


Chief complaint: Allergic Reaction


Stated complaint: bee sting-allergic


Time Seen by Provider: 08/29/24 11:43


Source: patient, RN notes reviewed


Mode of arrival: ambulatory


Limitations: no limitations





- History of Present Illness


Initial Comments: 


58-year-old male presents emergency department chief complaint of bee sting.  

Patient states he got stung on his right leg.  Patient states he has allergy to 

bee stings in which she had used epi in the past.  He states he has mild right 

leg swelling, redness itching and some facial itching.  Denies any difficulty 

breathing no difficulty swallowing states he does not have his EpiPen patient 

states this happened 45 minutes ago.








- Related Data


                                Home Medications











 Medication  Instructions  Recorded  Confirmed


 


Naltrexone HCl [Revia] 50 mg PO DAILY 07/17/19 08/23/21


 


Omeprazole 40 mg PO DAILY 08/23/21 08/23/21


 


amLODIPine BESYLATE/BENAZEPRIL 1 cap PO BID 08/23/21 08/23/21





[amLODIPine BESYLATE/BENAZEPRIL   





5-20 MG]   








                                  Previous Rx's











 Medication  Instructions  Recorded


 


Fluconazole [Diflucan] 100 mg PO DAILY 10 Days #10 tab 09/15/21


 


Folic Acid 1 mg PO DAILY 30 Days #30 tab 09/15/21


 


Megestrol [Megace] 400 mg PO DAILY 30 Days #30 ml 09/15/21


 


Nystatin 100,000 Unit/gm Oint 1 applic TOPICAL TID 10 Days #1 09/15/21





[Mycostatin Oint] cream 


 


Pyridoxine [Vitamin B-6] 50 mg PO DAILY 30 Days #30 tab 09/15/21


 


Thiamine [Vitamin B-1] 100 mg PO TID 30 Days #30 tab 09/15/21











                                    Allergies











Allergy/AdvReac Type Severity Reaction Status Date / Time


 


bee venom protein (honey bee) Allergy  Anaphylaxis Verified 08/29/24 11:42


 


lactose Allergy  Diarrhea Verified 08/29/24 11:42


 


Penicillins Allergy  Unknown Verified 08/29/24 11:42


 


Sulfa (Sulfonamide Allergy  Swelling Verified 08/29/24 11:42





Antibiotics)     


 


codeine AdvReac  Nausea & Verified 08/29/24 11:42





   Vomiting  














Review of Systems


ROS Statement: 


Those systems with pertinent positive or pertinent negative responses have been 

documented in the HPI.





ROS Other: All systems not noted in ROS Statement are negative.





Past Medical History


Past Medical History: GERD/Reflux, Hypertension, Liver Disease


Additional Past Medical History / Comment(s): seasonal allergies


History of Any Multi-Drug Resistant Organisms: None Reported


Past Surgical History: Hernia Repair


Additional Past Surgical History / Comment(s): umbilical hernia repair 2013


Past Anesthesia/Blood Transfusion Reactions: Postoperative Nausea & Vomiting 

(PONV)


Past Psychological History: No Psychological Hx Reported


Smoking Status: Never smoker


Past Alcohol Use History: Heavy


Past Drug Use History: Prescription Drug Abuse





- Past Family History


  ** Mother


Family Medical History: Cancer, Diabetes Mellitus, Hypertension


Additional Family Medical History / Comment(s): nonHodgkins lymphoma





  ** Father


Family Medical History: Hypertension





General Exam


Limitations: no limitations


General appearance: alert, in no apparent distress


Head exam: Present: atraumatic, normocephalic, normal inspection


Eye exam: Present: normal appearance, PERRL, EOMI.  Absent: scleral icterus, 

conjunctival injection, periorbital swelling


ENT exam: Present: normal exam, normal oropharynx, mucous membranes moist


Neck exam: Present: normal inspection, full ROM.  Absent: tenderness, 

meningismus, lymphadenopathy


Respiratory exam: Present: normal lung sounds bilaterally.  Absent: respiratory 

distress, wheezes, rales, rhonchi, stridor


Cardiovascular Exam: Present: normal rhythm, tachycardia, normal heart sounds.  

Absent: systolic murmur, diastolic murmur, rubs, gallop, clicks


Skin exam: Present: warm, dry, intact, normal color, rash (Bee sting right calf)





Course


                                   Vital Signs











  08/29/24 08/29/24 08/29/24





  11:40 12:07 12:29


 


Temperature 98.2 F  


 


Pulse Rate 118 H 108 H 82


 


Respiratory 22  18





Rate   


 


Blood Pressure 119/82  


 


O2 Sat by Pulse 96 95 97





Oximetry   














Medical Decision Making





- Medical Decision Making


Was pt. sent in by a medical professional or institution (, PA, NP, urgent 

care, hospital, or nursing home...) When possible be specific


@ -No


Did you speak to anyone other than the patient for history (EMS, parent, family,

police, friend...)? What history was obtained from this source 


@ -No


Did you review nursing and triage notes (agree or disagree)? Why? 


@ -I reviewed and agree with nursing and triage notes


Were old charts reviewed (outside hosp., previous admission, EMS record, old 

EKG, old radiological studies, urgent care reports/EKG's, nursing home records)?

Report findings 


@ -No old charts were reviewed


Differential Diagnosis (chest pain, altered mental status, abdominal pain women,

abdominal pain men, vaginal bleeding, weakness, fever, dyspnea, syncope, 

headache, dizziness, GI bleed, back pain, seizure, CVA, palpatations, mental 

health, musculoskeletal)? 


@ -Bee sting, anaphylaxis, allergic reaction


EKG interpreted by me (3pts min.).


@ -None


X-rays interpreted by me (1pt min.).


@ -None done


CT interpreted by me (1pt min.).


@ -None done


U/S interpreted by me (1pt. min.).


@ -None done


What testing was considered but not performed or refused? (CT, X-rays, U/S, 

labs)? Why?


@ -None


What meds were considered but not given or refused? Why?


@ -None


Did you discuss the management of the patient with other professionals 

(professionals i.e. , PA, NP, lab, RT, psych nurse, , , 

teacher, , )? Give summary


@ -No


Was smoking cessation discussed for >3mins.?


@ -No


Was critical care preformed (if so, how long)?


@ -No


Were there social determinants of health that impacted care today? How? 

(Homelessness, low income, unemployed, alcoholism, drug addiction, 

transportation, low edu. Level, literacy, decrease access to med. care, halfway, 

rehab)?


@ -No


Was there de-escalation of care discussed even if they declined (Discuss DNR or 

withdrawal of care, Hospice)? DNR status


@ -No


What co-morbidities impacted this encounter? (DM, HTN, Smoking, COPD, CAD, 

Cancer, CVA, ARF, Chemo, Hep., AIDS, mental health diagnosis, sleep apnea, 

morbid obesity)?


@ -None


Was patient admitted / discharged? Hospital course, mention meds given and 

route, prescriptions, significant lab abnormalities, going to OR and other 

pertinent info.


@ -Discharge patient presented after bee sting with a history of allergic react

ion.  Patient received Solu-Medrol Benadryl and Pepcid symptoms have resolved he

has no difficulty breathing no localized reaction patient is discharged in 

stable condition


Undiagnosed new problem with uncertain prognosis?


@ -No


Drug Therapy requiring intensive monitoring for toxicity (Heparin, Nitro, 

Insulin, Cardizem)?


@ -No


Were any procedures done?


@ -No


Diagnosis/symptom?


@ -Bee sting, allergic reaction


Acute, or Chronic, or Acute on Chronic?


@ -Acute


Uncomplicated (without systemic symptoms) or Complicated (systemic symptoms)?


@ -Uncomplicated


Side effects of treatment?


@ -No


Exacerbation, Progression, or Severe Exacerbation?


@ -No


Poses a threat to life or bodily function? How? (Chest pain, [place], MI, pneumonia,

PE, COPD, DKA, ARF, appy, cholecystitis, CVA, Diverticulitis, Homicidal, 

Suicidal, threat to staff... and all critical care pts)


@ -No








Disposition


Clinical Impression: 


 Allergic reaction to insect sting





Disposition: HOME SELF-CARE


Condition: Stable


Instructions (If sedation given, give patient instructions):  General Allergic 

Reaction (ED)


Additional Instructions: 


Please return to the Emergency Department if symptoms worsen or any other 

concerns.


Is patient prescribed a controlled substance at d/c from ED?: No


Referrals: 


Riaz Swanson MD [Primary Care Provider] - 1-2 days


Time of Disposition: 13:17